# Patient Record
Sex: MALE | Race: WHITE | NOT HISPANIC OR LATINO | URBAN - METROPOLITAN AREA
[De-identification: names, ages, dates, MRNs, and addresses within clinical notes are randomized per-mention and may not be internally consistent; named-entity substitution may affect disease eponyms.]

---

## 2018-04-02 ENCOUNTER — APPOINTMENT (OUTPATIENT)
Dept: LAB | Facility: CLINIC | Age: 73
End: 2018-04-02
Payer: COMMERCIAL

## 2018-04-02 ENCOUNTER — TRANSCRIBE ORDERS (OUTPATIENT)
Dept: RADIOLOGY | Facility: CLINIC | Age: 73
End: 2018-04-02

## 2018-04-02 DIAGNOSIS — I11.0 BENIGN HYPERTENSIVE HEART DISEASE WITH CONGESTIVE HEART FAILURE (HCC): ICD-10-CM

## 2018-04-02 DIAGNOSIS — I11.0 BENIGN HYPERTENSIVE HEART DISEASE WITH CONGESTIVE HEART FAILURE (HCC): Primary | ICD-10-CM

## 2018-04-02 LAB
ALBUMIN SERPL BCP-MCNC: 2.9 G/DL (ref 3.5–5)
ALP SERPL-CCNC: 62 U/L (ref 46–116)
ALT SERPL W P-5'-P-CCNC: 19 U/L (ref 12–78)
ANION GAP SERPL CALCULATED.3IONS-SCNC: 8 MMOL/L (ref 4–13)
AST SERPL W P-5'-P-CCNC: 28 U/L (ref 5–45)
BACTERIA UR QL AUTO: NORMAL /HPF
BASOPHILS # BLD AUTO: 0.02 THOUSANDS/ΜL (ref 0–0.1)
BASOPHILS NFR BLD AUTO: 0 % (ref 0–1)
BILIRUB SERPL-MCNC: 0.24 MG/DL (ref 0.2–1)
BILIRUB UR QL STRIP: NEGATIVE
BUN SERPL-MCNC: 93 MG/DL (ref 5–25)
CALCIUM SERPL-MCNC: 7.6 MG/DL (ref 8.3–10.1)
CHLORIDE SERPL-SCNC: 105 MMOL/L (ref 100–108)
CLARITY UR: CLEAR
CO2 SERPL-SCNC: 25 MMOL/L (ref 21–32)
COLOR UR: YELLOW
CREAT SERPL-MCNC: 6.08 MG/DL (ref 0.6–1.3)
EOSINOPHIL # BLD AUTO: 0.18 THOUSAND/ΜL (ref 0–0.61)
EOSINOPHIL NFR BLD AUTO: 3 % (ref 0–6)
ERYTHROCYTE [DISTWIDTH] IN BLOOD BY AUTOMATED COUNT: 13.4 % (ref 11.6–15.1)
GFR SERPL CREATININE-BSD FRML MDRD: 8 ML/MIN/1.73SQ M
GLUCOSE SERPL-MCNC: 78 MG/DL (ref 65–140)
GLUCOSE UR STRIP-MCNC: NEGATIVE MG/DL
HCT VFR BLD AUTO: 26.9 % (ref 36.5–49.3)
HGB BLD-MCNC: 8.7 G/DL (ref 12–17)
HGB UR QL STRIP.AUTO: NEGATIVE
HYALINE CASTS #/AREA URNS LPF: NORMAL /LPF
KETONES UR STRIP-MCNC: NEGATIVE MG/DL
LEUKOCYTE ESTERASE UR QL STRIP: NEGATIVE
LYMPHOCYTES # BLD AUTO: 0.72 THOUSANDS/ΜL (ref 0.6–4.47)
LYMPHOCYTES NFR BLD AUTO: 11 % (ref 14–44)
MCH RBC QN AUTO: 32.2 PG (ref 26.8–34.3)
MCHC RBC AUTO-ENTMCNC: 32.3 G/DL (ref 31.4–37.4)
MCV RBC AUTO: 100 FL (ref 82–98)
MONOCYTES # BLD AUTO: 0.85 THOUSAND/ΜL (ref 0.17–1.22)
MONOCYTES NFR BLD AUTO: 13 % (ref 4–12)
NEUTROPHILS # BLD AUTO: 4.56 THOUSANDS/ΜL (ref 1.85–7.62)
NEUTS SEG NFR BLD AUTO: 73 % (ref 43–75)
NITRITE UR QL STRIP: NEGATIVE
NON-SQ EPI CELLS URNS QL MICRO: NORMAL /HPF
NRBC BLD AUTO-RTO: 0 /100 WBCS
PH UR STRIP.AUTO: 6.5 [PH] (ref 4.5–8)
PLATELET # BLD AUTO: 253 THOUSANDS/UL (ref 149–390)
PMV BLD AUTO: 9.9 FL (ref 8.9–12.7)
POTASSIUM SERPL-SCNC: 6 MMOL/L (ref 3.5–5.3)
PROT SERPL-MCNC: 5.6 G/DL (ref 6.4–8.2)
PROT UR STRIP-MCNC: ABNORMAL MG/DL
RBC # BLD AUTO: 2.7 MILLION/UL (ref 3.88–5.62)
RBC #/AREA URNS AUTO: NORMAL /HPF
SODIUM SERPL-SCNC: 138 MMOL/L (ref 136–145)
SP GR UR STRIP.AUTO: 1.01 (ref 1–1.03)
UROBILINOGEN UR QL STRIP.AUTO: 0.2 E.U./DL
WBC # BLD AUTO: 6.37 THOUSAND/UL (ref 4.31–10.16)
WBC #/AREA URNS AUTO: NORMAL /HPF

## 2018-04-02 PROCEDURE — 36415 COLL VENOUS BLD VENIPUNCTURE: CPT

## 2018-04-02 PROCEDURE — 80053 COMPREHEN METABOLIC PANEL: CPT

## 2018-04-02 PROCEDURE — 85025 COMPLETE CBC W/AUTO DIFF WBC: CPT

## 2018-04-02 PROCEDURE — 81001 URINALYSIS AUTO W/SCOPE: CPT

## 2019-02-13 ENCOUNTER — TRANSCRIBE ORDERS (OUTPATIENT)
Dept: ADMINISTRATIVE | Facility: HOSPITAL | Age: 74
End: 2019-02-13

## 2019-02-13 DIAGNOSIS — R55 SYNCOPE AND COLLAPSE: Primary | ICD-10-CM

## 2019-02-27 ENCOUNTER — HOSPITAL ENCOUNTER (OUTPATIENT)
Dept: NON INVASIVE DIAGNOSTICS | Facility: HOSPITAL | Age: 74
Discharge: HOME | End: 2019-02-27
Payer: COMMERCIAL

## 2019-02-27 DIAGNOSIS — R55 SYNCOPE AND COLLAPSE: ICD-10-CM

## 2019-02-27 PROCEDURE — 93306 TTE W/DOPPLER COMPLETE: CPT | Performed by: INTERNAL MEDICINE

## 2019-02-27 PROCEDURE — 93306 TTE W/DOPPLER COMPLETE: CPT

## 2019-04-29 ENCOUNTER — CONSULT (OUTPATIENT)
Dept: CARDIOLOGY CLINIC | Facility: CLINIC | Age: 74
End: 2019-04-29
Payer: COMMERCIAL

## 2019-04-29 VITALS
DIASTOLIC BLOOD PRESSURE: 78 MMHG | HEART RATE: 56 BPM | WEIGHT: 267 LBS | BODY MASS INDEX: 39.55 KG/M2 | SYSTOLIC BLOOD PRESSURE: 186 MMHG | HEIGHT: 69 IN

## 2019-04-29 DIAGNOSIS — I10 ESSENTIAL HYPERTENSION: Primary | ICD-10-CM

## 2019-04-29 DIAGNOSIS — I71.4 ABDOMINAL AORTIC ANEURYSM (AAA) WITHOUT RUPTURE (HCC): ICD-10-CM

## 2019-04-29 DIAGNOSIS — I95.3 HEMODIALYSIS-ASSOCIATED HYPOTENSION: ICD-10-CM

## 2019-04-29 PROBLEM — I71.40 AAA (ABDOMINAL AORTIC ANEURYSM) (HCC): Status: ACTIVE | Noted: 2019-04-29

## 2019-04-29 PROCEDURE — 93000 ELECTROCARDIOGRAM COMPLETE: CPT | Performed by: INTERNAL MEDICINE

## 2019-04-29 PROCEDURE — 99214 OFFICE O/P EST MOD 30 MIN: CPT | Performed by: INTERNAL MEDICINE

## 2019-04-29 RX ORDER — DUTASTERIDE 0.5 MG/1
0.5 CAPSULE, LIQUID FILLED ORAL
COMMUNITY
Start: 2019-03-20 | End: 2020-05-27

## 2019-04-29 RX ORDER — HYDRALAZINE HYDROCHLORIDE 100 MG/1
100 TABLET, FILM COATED ORAL
COMMUNITY
Start: 2019-02-14 | End: 2022-01-01 | Stop reason: SDUPTHER

## 2019-04-29 RX ORDER — CANDESARTAN 8 MG/1
8 TABLET ORAL
COMMUNITY
Start: 2019-03-05 | End: 2022-01-01

## 2019-04-29 RX ORDER — FEBUXOSTAT 80 MG/1
80 TABLET ORAL
COMMUNITY
Start: 2019-03-11 | End: 2022-01-01

## 2019-04-29 RX ORDER — CALCIUM ACETATE 667 MG/1
667 CAPSULE ORAL
Refills: 11 | COMMUNITY
Start: 2019-04-06 | End: 2022-01-01

## 2019-04-29 RX ORDER — ASPIRIN 81 MG/1
81 TABLET ORAL DAILY
COMMUNITY
End: 2019-04-29 | Stop reason: DRUGHIGH

## 2019-04-29 RX ORDER — METOPROLOL SUCCINATE 50 MG/1
50 TABLET, EXTENDED RELEASE ORAL 2 TIMES DAILY
COMMUNITY
Start: 2019-04-16 | End: 2022-01-01

## 2019-04-29 RX ORDER — UBIDECARENONE 200 MG
200 CAPSULE ORAL DAILY
COMMUNITY
End: 2022-01-01

## 2019-04-29 RX ORDER — DOXAZOSIN 2 MG/1
2 TABLET ORAL DAILY
COMMUNITY
Start: 2019-04-16 | End: 2022-01-01 | Stop reason: ALTCHOICE

## 2019-04-29 RX ORDER — MAGNESIUM OXIDE 400 MG/1
400 TABLET ORAL DAILY
COMMUNITY
End: 2022-01-01

## 2019-12-03 ENCOUNTER — ANESTHESIA EVENT (OUTPATIENT)
Dept: PERIOP | Facility: HOSPITAL | Age: 74
End: 2019-12-03
Payer: COMMERCIAL

## 2019-12-03 NOTE — ANESTHESIA PREPROCEDURE EVALUATION
Review of Systems/Medical History  Patient summary reviewed  Chart reviewed      Cardiovascular  Exercise tolerance (METS): <4,  Hyperlipidemia, Hypertension (Lopressor, Hydralazine take this am) on > 1 medication, CHF compensated CHF, No PND, Aortic disease (S/P AAA repair),    Pulmonary       GI/Hepatic    GERD well controlled,        Chronic kidney disease stage 4, Dialysis hemodialysis Date of last dialysis: 12/3/19,   Comment: K= 4 0 11/27/19     Endo/Other  Negative endo/other ROS      GYN       Hematology  Anemia anemia of chronic disease,     Musculoskeletal    Arthritis     Neurology  Negative neurology ROS      Psychology         This result has an attachment that is not available  Result Narrative   EXAM REASON:     INTERPRETATION:   SINUS BRADYCARDIA WITH 1ST DEGREE A-V BLOCK  MODERATE VOLTAGE CRITERIA FOR LVH, MAY BE NORMAL VARIANT  BORDERLINE ECG  WHEN COMPARED WITH ECG OF 28-FEB-2018 15:12,  AR INTERVAL HAS INCREASED  Confirmed by Matilde Hercules ([id number]) on 11/24/2019 1:56:41 PM       Physical Exam    Airway    Mallampati score: II  TM Distance: >3 FB  Neck ROM: full     Dental   upper dentures and lower dentures,     Cardiovascular  Cardiovascular exam normal    Pulmonary  Pulmonary exam normal     Other Findings        Anesthesia Plan  ASA Score- 3     Anesthesia Type- IV sedation with anesthesia with ASA Monitors  Additional Monitors:   Airway Plan:         Plan Factors-    Induction-     Postoperative Plan-     Informed Consent- Anesthetic plan and risks discussed with patient  I personally reviewed this patient with the CRNA  Discussed and agreed on the Anesthesia Plan with the CRNA  Guru Ramachandran

## 2019-12-03 NOTE — PRE-PROCEDURE INSTRUCTIONS
Pre-Surgery Instructions:   Medication Instructions    hydrALAZINE (APRESOLINE) 100 MG tablet Instructed patient per Anesthesia Guidelines   metoprolol succinate (TOPROL-XL) 50 mg 24 hr tablet Instructed patient per Anesthesia Guidelines

## 2019-12-04 ENCOUNTER — HOSPITAL ENCOUNTER (OUTPATIENT)
Facility: HOSPITAL | Age: 74
Setting detail: OUTPATIENT SURGERY
Discharge: HOME | End: 2019-12-04
Attending: PLASTIC SURGERY | Admitting: PLASTIC SURGERY
Payer: COMMERCIAL

## 2019-12-04 ENCOUNTER — ANESTHESIA (OUTPATIENT)
Dept: PERIOP | Facility: HOSPITAL | Age: 74
End: 2019-12-04
Payer: COMMERCIAL

## 2019-12-04 VITALS
TEMPERATURE: 97.9 F | RESPIRATION RATE: 16 BRPM | WEIGHT: 258 LBS | HEART RATE: 57 BPM | OXYGEN SATURATION: 94 % | DIASTOLIC BLOOD PRESSURE: 65 MMHG | SYSTOLIC BLOOD PRESSURE: 145 MMHG | HEIGHT: 69 IN | BODY MASS INDEX: 38.21 KG/M2

## 2019-12-04 DIAGNOSIS — D49.2 NEOPLASM OF UNSPECIFIED BEHAVIOR OF BONE, SOFT TISSUE, AND SKIN: ICD-10-CM

## 2019-12-04 PROCEDURE — 88305 TISSUE EXAM BY PATHOLOGIST: CPT | Performed by: PATHOLOGY

## 2019-12-04 RX ORDER — FENTANYL CITRATE 50 UG/ML
INJECTION, SOLUTION INTRAMUSCULAR; INTRAVENOUS AS NEEDED
Status: DISCONTINUED | OUTPATIENT
Start: 2019-12-04 | End: 2019-12-04 | Stop reason: SURG

## 2019-12-04 RX ORDER — MAGNESIUM HYDROXIDE 1200 MG/15ML
LIQUID ORAL AS NEEDED
Status: DISCONTINUED | OUTPATIENT
Start: 2019-12-04 | End: 2019-12-04 | Stop reason: HOSPADM

## 2019-12-04 RX ORDER — PROPOFOL 10 MG/ML
INJECTION, EMULSION INTRAVENOUS CONTINUOUS PRN
Status: DISCONTINUED | OUTPATIENT
Start: 2019-12-04 | End: 2019-12-04 | Stop reason: SURG

## 2019-12-04 RX ORDER — SODIUM CHLORIDE 9 MG/ML
50 INJECTION, SOLUTION INTRAVENOUS CONTINUOUS
Status: DISCONTINUED | OUTPATIENT
Start: 2019-12-04 | End: 2019-12-04 | Stop reason: HOSPADM

## 2019-12-04 RX ORDER — MINERAL OIL
OIL (ML) MISCELLANEOUS AS NEEDED
Status: DISCONTINUED | OUTPATIENT
Start: 2019-12-04 | End: 2019-12-04 | Stop reason: HOSPADM

## 2019-12-04 RX ORDER — LIDOCAINE HYDROCHLORIDE AND EPINEPHRINE 5; 5 MG/ML; UG/ML
INJECTION, SOLUTION INFILTRATION; PERINEURAL AS NEEDED
Status: DISCONTINUED | OUTPATIENT
Start: 2019-12-04 | End: 2019-12-04 | Stop reason: HOSPADM

## 2019-12-04 RX ORDER — CEFAZOLIN SODIUM 2 G/50ML
SOLUTION INTRAVENOUS AS NEEDED
Status: DISCONTINUED | OUTPATIENT
Start: 2019-12-04 | End: 2019-12-04 | Stop reason: SURG

## 2019-12-04 RX ORDER — GINSENG 100 MG
CAPSULE ORAL AS NEEDED
Status: DISCONTINUED | OUTPATIENT
Start: 2019-12-04 | End: 2019-12-04 | Stop reason: HOSPADM

## 2019-12-04 RX ORDER — PROPOFOL 10 MG/ML
INJECTION, EMULSION INTRAVENOUS AS NEEDED
Status: DISCONTINUED | OUTPATIENT
Start: 2019-12-04 | End: 2019-12-04 | Stop reason: SURG

## 2019-12-04 RX ORDER — LIDOCAINE HYDROCHLORIDE 10 MG/ML
INJECTION, SOLUTION INFILTRATION; PERINEURAL AS NEEDED
Status: DISCONTINUED | OUTPATIENT
Start: 2019-12-04 | End: 2019-12-04 | Stop reason: SURG

## 2019-12-04 RX ORDER — EPHEDRINE SULFATE 50 MG/ML
INJECTION INTRAVENOUS AS NEEDED
Status: DISCONTINUED | OUTPATIENT
Start: 2019-12-04 | End: 2019-12-04 | Stop reason: SURG

## 2019-12-04 RX ADMIN — CEFAZOLIN SODIUM 2000 MG: 2 SOLUTION INTRAVENOUS at 10:02

## 2019-12-04 RX ADMIN — PROPOFOL 50 MCG/KG/MIN: 10 INJECTION, EMULSION INTRAVENOUS at 09:50

## 2019-12-04 RX ADMIN — EPHEDRINE SULFATE 10 MG: 50 INJECTION, SOLUTION INTRAVENOUS at 10:55

## 2019-12-04 RX ADMIN — EPHEDRINE SULFATE 10 MG: 50 INJECTION, SOLUTION INTRAVENOUS at 10:33

## 2019-12-04 RX ADMIN — LIDOCAINE HYDROCHLORIDE 50 MG: 10 INJECTION, SOLUTION INFILTRATION; PERINEURAL at 09:48

## 2019-12-04 RX ADMIN — FENTANYL CITRATE 50 MCG: 50 INJECTION INTRAMUSCULAR; INTRAVENOUS at 09:47

## 2019-12-04 RX ADMIN — SODIUM CHLORIDE: 0.9 INJECTION, SOLUTION INTRAVENOUS at 09:09

## 2019-12-04 RX ADMIN — FENTANYL CITRATE 25 MCG: 50 INJECTION INTRAMUSCULAR; INTRAVENOUS at 10:03

## 2019-12-04 RX ADMIN — PROPOFOL 50 MG: 10 INJECTION, EMULSION INTRAVENOUS at 09:48

## 2019-12-04 RX ADMIN — FENTANYL CITRATE 25 MCG: 50 INJECTION INTRAMUSCULAR; INTRAVENOUS at 09:53

## 2019-12-04 NOTE — NURSING NOTE
Preadmission complete  VSS  Denies pain at this time  Skin growth noted on r-cheek and behind r-ear  No bleeding or weeping noted

## 2019-12-04 NOTE — ANESTHESIA POSTPROCEDURE EVALUATION
Post-Op Assessment Note    CV Status:  Stable  Pain Score: 0    Pain management: adequate     Mental Status:  Sleepy and arousable   Hydration Status:  Stable   PONV Controlled:  None   Airway Patency:  Patent   Post Op Vitals Reviewed: Yes      Staff: CRNA           /72 (12/04/19 1121)    Temp (!) 97 4 °F (36 3 °C) (12/04/19 1121)    Pulse 63 (12/04/19 1121)   Resp 16 (12/04/19 1121)    SpO2 93 % (12/04/19 1121) 3L nc

## 2019-12-04 NOTE — OP NOTE
OPERATIVE REPORT  PATIENT NAME: Viviane Navarro    :  1945  MRN: [id number]  Pt Location: [facility] OR ROOM 10    SURGERY DATE: 2019    Surgeon(s) and Role:     * Joselin Childress MD - Primary    Preop and postoperative Diagnosis:  Right cheek and right postauricular lesions    Operative Procedure(s) (LRB):  REMOVE SKIN CA FROM EAR & CHEEK (Right)  THIGH STSG (N/A)     Operative history:  The patient had a biopsy-proven skin cancer of the right cheek measuring 20 x 22 mm in size  It was removed with a oblique ellipse following skin wrinkles taking 5 mm margins  A suture was placed at the 12:00 p m  Or superior more ellipse pole of excision  Total length of closure was 42 mm  He also had a deep ulcerating lesion of the right postauricular crease measuring 30 x 35 mm in size  This also was removed taking 5 mm margins with a suture placed at the 12:00 p m  Or superior margin  In the center of the lesion was deep into the and posing on the ear cartilage that was taken with the specimen as the deep margin  An additional portion that was exposed of the 3:00 a m  Deep margin of cartilage was also sent for permanent examination  Cover this defect  A split-thickness skin graft necessary  Operative procedure:  Patient taken the operating placed supine the operating table  He was prepped and draped in usual fashion  General supplemented xylocaine 1% with epinephrine  Wide excision as mention of the right cheek lesion was performed  Hemostasis as necessary achieved with the Bovie  The areas closed with 3-0 Vicryl interrupted simple subcutaneous sutures followed by 4-0 nylon interrupted vertical mattress skin sutures  Light dressings were applied here  Next wide excision was performed of the lesion in the right postauricular area again using the Bovie for cutting coagulation purposes    Around the area where the tumor was adherent to the ear cartilage incision was made circumferentially in that cartilage  The skin of the floor of the jonah was then  from neck cartilage to service the deep margin  A [employer] electric dermatome was used to harvest a 13 1000 split-thickness skin graft from the right anterior thigh  Part of the graft was perforated 1/2 to 1  The graft was placed on the right postauricular area using staples for fixation  A dressing was sewn over this with 2-0 silk simple sutures  Some of the extra skin was meshed 3-1 returned to the center of the donor site of the right thigh where it too was stapled into position with the 10 to hasten healing of that donor site  Light dressings were placed on the right thigh  The patient tolerated procedure well taken to recovery in good condition      Specimen(s):  ID Type Source Tests Collected by Time Destination   1 : RIGHT POST AURICULAR LESION WITH SUTURE AT 12 0"CLOCK Tissue Ear, Right TISSUE EXAM Yvon Lazar MD 12/4/2019 1010    2 : RIGHT CHEEK LESIONC WITH SUTURE AT 12 O"CLOCK Tissue Soft Tissue, Other TISSUE EXAM Yvon Lazar MD 12/4/2019 1014    3 : DEEP MARGIN AT 3 O"CLOCK OF RIGHT POST AURICULAR LESION  Tissue Ear, Right TISSUE EXAM Yvon Laazr MD 12/4/2019 1051          SIGNATURE: Yvon Lazar MD  DATE: December 4, 2019  TIME: 3:07 PM

## 2019-12-04 NOTE — PERIOPERATIVE NURSING NOTE
Returned from pacu sleepy but arousable  ivs running  Incision on cheek well approximated  Right ear dressing sutured and dry  Right thigh dressing dry and intact  Denies pain  Taking sips of juice

## 2019-12-04 NOTE — DISCHARGE INSTRUCTIONS
Care For Your Stitches   AMBULATORY CARE:   Stitches , or sutures, are used to close cuts and wounds on the skin  Stitches need to be removed after your wound has healed  Seek care immediately if:   · Your stitches come apart  · Blood soaks through your bandages  · You suddenly cannot move your injured joint  · You have sudden numbness around your wound  · You see red streaks coming from your wound  Contact your healthcare provider if:   · You have a fever and chills  · Your wound is red, warm, swollen, or leaking pus  · There is a bad smell coming from your wound  · You have increased pain in the wound area  · You have questions or concerns about your condition or care  Care for your stitches:  Keep your stitches clean and dry  You may need to cover your stitches with a bandage for 24 to 48 hours, or as directed  Do not bump or hit the suture area, as this could open the wound  Do not trim or shorten the ends of your stitches  If they rub on your clothing, put a gauze bandage between the stitches and your clothes  Clean your wound:  Carefully wash your wound with soap and water  For mouth and lip wounds, rinse your mouth after meals and at bedtime  Ask your healthcare provider what to use to rinse your mouth  If you have a scalp wound, you may gently wash your hair every 2 days with mild shampoo  Do not use hair products, such as hair spray  Help your wound heal:   · Elevate  your wound above the level of your heart as often as you can  This will help decrease swelling and pain  Prop your wound on pillows or blankets to keep it elevated comfortably  · Limit activity  Do not stretch the skin around your wound  This will help prevent bleeding and swelling of the wound area  Follow up with your healthcare provider as directed: You may need to return to have your stitches removed  Write down your questions so you remember to ask them during your visits     © 2017 [facility] [address] is for End User's use only and may not be sold, redistributed or otherwise used for commercial purposes  All illustrations and images included in CareNotes® are the copyrighted property of A D A M , Inc  or Kuldip Aguirre  The above information is an  only  It is not intended as medical advice for individual conditions or treatments  Talk to your doctor, nurse or pharmacist before following any medical regimen to see if it is safe and effective for you

## 2019-12-04 NOTE — INTERVAL H&P NOTE
H&P reviewed  After examining the patient I find no changes in the patients condition since the H&P had been written      Vitals:    12/04/19 0819   BP: (!) 196/79   Pulse: 58   Resp: 18   Temp: 98 1 °F (36 7 °C)   SpO2: 98%

## 2019-12-17 PROCEDURE — 88305 TISSUE EXAM BY PATHOLOGIST: CPT | Performed by: PATHOLOGY

## 2019-12-18 ENCOUNTER — LAB REQUISITION (OUTPATIENT)
Dept: LAB | Facility: HOSPITAL | Age: 74
End: 2019-12-18
Payer: COMMERCIAL

## 2019-12-18 DIAGNOSIS — D49.2 NEOPLASM OF UNSPECIFIED BEHAVIOR OF BONE, SOFT TISSUE, AND SKIN: ICD-10-CM

## 2020-04-08 ENCOUNTER — HOSPITAL ENCOUNTER (OUTPATIENT)
Dept: GASTROENTEROLOGY | Facility: HOSPITAL | Age: 75
Discharge: HOME | End: 2020-04-08
Attending: INTERNAL MEDICINE

## 2020-05-12 ENCOUNTER — DOCUMENTATION (OUTPATIENT)
Dept: OTHER | Facility: HOSPITAL | Age: 75
End: 2020-05-12

## 2020-05-12 DIAGNOSIS — Z86.010 PERSONAL HISTORY OF COLONIC POLYPS: Primary | ICD-10-CM

## 2020-05-12 DIAGNOSIS — K57.30 DIVERTICULOSIS LARGE INTESTINE W/O PERFORATION OR ABSCESS W/O BLEEDING: ICD-10-CM

## 2020-05-20 DIAGNOSIS — Z86.010 PERSONAL HISTORY OF COLONIC POLYPS: ICD-10-CM

## 2020-05-20 DIAGNOSIS — K57.30 DIVERTICULOSIS LARGE INTESTINE W/O PERFORATION OR ABSCESS W/O BLEEDING: ICD-10-CM

## 2020-05-20 PROCEDURE — U0003 INFECTIOUS AGENT DETECTION BY NUCLEIC ACID (DNA OR RNA); SEVERE ACUTE RESPIRATORY SYNDROME CORONAVIRUS 2 (SARS-COV-2) (CORONAVIRUS DISEASE [COVID-19]), AMPLIFIED PROBE TECHNIQUE, MAKING USE OF HIGH THROUGHPUT TECHNOLOGIES AS DESCRIBED BY CMS-2020-01-R: HCPCS

## 2020-05-21 LAB — SARS-COV-2 RNA SPEC QL NAA+PROBE: NOT DETECTED

## 2020-05-22 ENCOUNTER — TRANSCRIBE ORDERS (OUTPATIENT)
Dept: ADMINISTRATIVE | Facility: HOSPITAL | Age: 75
End: 2020-05-22

## 2020-05-22 DIAGNOSIS — R93.89 ABNORMAL CT SCAN: ICD-10-CM

## 2020-05-22 DIAGNOSIS — K86.2 PANCREATIC CYST: Primary | ICD-10-CM

## 2020-05-27 ENCOUNTER — HOSPITAL ENCOUNTER (OUTPATIENT)
Dept: GASTROENTEROLOGY | Facility: HOSPITAL | Age: 75
Setting detail: OUTPATIENT SURGERY
Discharge: HOME | End: 2020-05-27
Attending: INTERNAL MEDICINE | Admitting: INTERNAL MEDICINE
Payer: COMMERCIAL

## 2020-05-27 ENCOUNTER — ANESTHESIA EVENT (OUTPATIENT)
Dept: GASTROENTEROLOGY | Facility: HOSPITAL | Age: 75
End: 2020-05-27

## 2020-05-27 ENCOUNTER — ANESTHESIA (OUTPATIENT)
Dept: GASTROENTEROLOGY | Facility: HOSPITAL | Age: 75
End: 2020-05-27

## 2020-05-27 VITALS
TEMPERATURE: 96.9 F | WEIGHT: 253 LBS | HEIGHT: 69 IN | HEART RATE: 52 BPM | BODY MASS INDEX: 37.47 KG/M2 | SYSTOLIC BLOOD PRESSURE: 154 MMHG | OXYGEN SATURATION: 94 % | DIASTOLIC BLOOD PRESSURE: 72 MMHG | RESPIRATION RATE: 18 BRPM

## 2020-05-27 DIAGNOSIS — K57.30 DIVERTICULOSIS OF LARGE INTESTINE WITHOUT PERFORATION OR ABSCESS WITHOUT BLEEDING: ICD-10-CM

## 2020-05-27 DIAGNOSIS — Z86.010 PERSONAL HISTORY OF COLONIC POLYPS: ICD-10-CM

## 2020-05-27 PROCEDURE — 88305 TISSUE EXAM BY PATHOLOGIST: CPT | Performed by: PATHOLOGY

## 2020-05-27 RX ORDER — LIDOCAINE HYDROCHLORIDE 20 MG/ML
INJECTION, SOLUTION EPIDURAL; INFILTRATION; INTRACAUDAL; PERINEURAL AS NEEDED
Status: DISCONTINUED | OUTPATIENT
Start: 2020-05-27 | End: 2020-05-27 | Stop reason: SURG

## 2020-05-27 RX ORDER — SODIUM CHLORIDE 9 MG/ML
INJECTION, SOLUTION INTRAVENOUS CONTINUOUS PRN
Status: DISCONTINUED | OUTPATIENT
Start: 2020-05-27 | End: 2020-05-27 | Stop reason: SURG

## 2020-05-27 RX ORDER — PROPOFOL 10 MG/ML
INJECTION, EMULSION INTRAVENOUS AS NEEDED
Status: DISCONTINUED | OUTPATIENT
Start: 2020-05-27 | End: 2020-05-27 | Stop reason: SURG

## 2020-05-27 RX ORDER — GLYCOPYRROLATE 0.2 MG/ML
INJECTION INTRAMUSCULAR; INTRAVENOUS AS NEEDED
Status: DISCONTINUED | OUTPATIENT
Start: 2020-05-27 | End: 2020-05-27 | Stop reason: SURG

## 2020-05-27 RX ADMIN — LIDOCAINE HYDROCHLORIDE 25 MG: 20 INJECTION, SOLUTION EPIDURAL; INFILTRATION; INTRACAUDAL; PERINEURAL at 08:16

## 2020-05-27 RX ADMIN — PROPOFOL 50 MG: 10 INJECTION, EMULSION INTRAVENOUS at 08:19

## 2020-05-27 RX ADMIN — PROPOFOL 50 MG: 10 INJECTION, EMULSION INTRAVENOUS at 08:22

## 2020-05-27 RX ADMIN — GLYCOPYRROLATE 0.2 MG: 0.2 INJECTION, SOLUTION INTRAMUSCULAR; INTRAVENOUS at 08:39

## 2020-05-27 RX ADMIN — PROPOFOL 50 MG: 10 INJECTION, EMULSION INTRAVENOUS at 08:25

## 2020-05-27 RX ADMIN — SODIUM CHLORIDE: 0.9 INJECTION, SOLUTION INTRAVENOUS at 07:59

## 2020-05-27 RX ADMIN — PROPOFOL 50 MG: 10 INJECTION, EMULSION INTRAVENOUS at 08:16

## 2020-05-27 RX ADMIN — PROPOFOL 50 MG: 10 INJECTION, EMULSION INTRAVENOUS at 08:35

## 2020-05-27 RX ADMIN — PROPOFOL 50 MG: 10 INJECTION, EMULSION INTRAVENOUS at 08:29

## 2020-05-29 ENCOUNTER — HOSPITAL ENCOUNTER (OUTPATIENT)
Dept: MRI IMAGING | Facility: HOSPITAL | Age: 75
Discharge: HOME | End: 2020-05-29
Attending: PHYSICIAN ASSISTANT
Payer: COMMERCIAL

## 2020-05-29 DIAGNOSIS — K86.2 PANCREATIC CYST: ICD-10-CM

## 2020-05-29 DIAGNOSIS — R93.89 ABNORMAL CT SCAN: ICD-10-CM

## 2020-05-29 PROCEDURE — 74181 MRI ABDOMEN W/O CONTRAST: CPT

## 2021-01-01 ENCOUNTER — OFFICE VISIT (OUTPATIENT)
Dept: FAMILY MEDICINE CLINIC | Facility: CLINIC | Age: 76
End: 2021-01-01
Payer: COMMERCIAL

## 2021-01-01 ENCOUNTER — APPOINTMENT (OUTPATIENT)
Dept: LAB | Facility: CLINIC | Age: 76
End: 2021-01-01
Payer: COMMERCIAL

## 2021-01-01 VITALS
OXYGEN SATURATION: 98 % | SYSTOLIC BLOOD PRESSURE: 162 MMHG | HEART RATE: 57 BPM | WEIGHT: 257 LBS | DIASTOLIC BLOOD PRESSURE: 68 MMHG | TEMPERATURE: 97.9 F | HEIGHT: 68 IN | BODY MASS INDEX: 38.95 KG/M2

## 2021-01-01 VITALS
DIASTOLIC BLOOD PRESSURE: 58 MMHG | SYSTOLIC BLOOD PRESSURE: 152 MMHG | OXYGEN SATURATION: 97 % | HEIGHT: 68 IN | HEART RATE: 51 BPM | WEIGHT: 258.38 LBS | BODY MASS INDEX: 39.16 KG/M2 | TEMPERATURE: 96.7 F

## 2021-01-01 DIAGNOSIS — D63.8 ANEMIA OF CHRONIC DISEASE: ICD-10-CM

## 2021-01-01 DIAGNOSIS — M17.0 PRIMARY OSTEOARTHRITIS OF BOTH KNEES: ICD-10-CM

## 2021-01-01 DIAGNOSIS — Z11.59 ENCOUNTER FOR HEPATITIS C SCREENING TEST FOR LOW RISK PATIENT: ICD-10-CM

## 2021-01-01 DIAGNOSIS — N18.6 CHRONIC KIDNEY DISEASE WITH END STAGE RENAL FAILURE ON DIALYSIS (HCC): ICD-10-CM

## 2021-01-01 DIAGNOSIS — I71.4 ABDOMINAL AORTIC ANEURYSM (AAA) WITHOUT RUPTURE (HCC): ICD-10-CM

## 2021-01-01 DIAGNOSIS — Z99.2 CHRONIC KIDNEY DISEASE WITH END STAGE RENAL FAILURE ON DIALYSIS (HCC): ICD-10-CM

## 2021-01-01 DIAGNOSIS — Z76.89 ENCOUNTER TO ESTABLISH CARE WITH NEW DOCTOR: Primary | ICD-10-CM

## 2021-01-01 DIAGNOSIS — E78.5 HYPERLIPIDEMIA, UNSPECIFIED HYPERLIPIDEMIA TYPE: ICD-10-CM

## 2021-01-01 DIAGNOSIS — N25.81 SECONDARY HYPERPARATHYROIDISM OF RENAL ORIGIN (HCC): ICD-10-CM

## 2021-01-01 DIAGNOSIS — I10 ESSENTIAL HYPERTENSION: ICD-10-CM

## 2021-01-01 DIAGNOSIS — Z23 ENCOUNTER FOR IMMUNIZATION: ICD-10-CM

## 2021-01-01 DIAGNOSIS — Z00.00 ENCOUNTER FOR SUBSEQUENT ANNUAL WELLNESS VISIT (AWV) IN MEDICARE PATIENT: Primary | ICD-10-CM

## 2021-01-01 DIAGNOSIS — Z13.31 NEGATIVE DEPRESSION SCREENING: ICD-10-CM

## 2021-01-01 DIAGNOSIS — N18.9 CHRONIC RENAL FAILURE, UNSPECIFIED CKD STAGE: ICD-10-CM

## 2021-01-01 DIAGNOSIS — M1A.3290: ICD-10-CM

## 2021-01-01 DIAGNOSIS — E66.01 CLASS 2 SEVERE OBESITY DUE TO EXCESS CALORIES WITH SERIOUS COMORBIDITY AND BODY MASS INDEX (BMI) OF 39.0 TO 39.9 IN ADULT (HCC): ICD-10-CM

## 2021-01-01 LAB
ALBUMIN SERPL BCP-MCNC: 3.4 G/DL (ref 3.5–5)
ALP SERPL-CCNC: 68 U/L (ref 46–116)
ALT SERPL W P-5'-P-CCNC: 23 U/L (ref 12–78)
ANION GAP SERPL CALCULATED.3IONS-SCNC: 5 MMOL/L (ref 4–13)
AST SERPL W P-5'-P-CCNC: 24 U/L (ref 5–45)
BASOPHILS # BLD AUTO: 0.06 THOUSANDS/ΜL (ref 0–0.1)
BASOPHILS NFR BLD AUTO: 1 % (ref 0–1)
BILIRUB SERPL-MCNC: 0.42 MG/DL (ref 0.2–1)
BUN SERPL-MCNC: 37 MG/DL (ref 5–25)
CALCIUM ALBUM COR SERPL-MCNC: 9.4 MG/DL (ref 8.3–10.1)
CALCIUM SERPL-MCNC: 8.9 MG/DL (ref 8.3–10.1)
CHLORIDE SERPL-SCNC: 101 MMOL/L (ref 100–108)
CHOLEST SERPL-MCNC: 131 MG/DL (ref 50–200)
CO2 SERPL-SCNC: 32 MMOL/L (ref 21–32)
CREAT SERPL-MCNC: 5.15 MG/DL (ref 0.6–1.3)
EOSINOPHIL # BLD AUTO: 0.12 THOUSAND/ΜL (ref 0–0.61)
EOSINOPHIL NFR BLD AUTO: 2 % (ref 0–6)
ERYTHROCYTE [DISTWIDTH] IN BLOOD BY AUTOMATED COUNT: 14 % (ref 11.6–15.1)
GFR SERPL CREATININE-BSD FRML MDRD: 10 ML/MIN/1.73SQ M
GLUCOSE P FAST SERPL-MCNC: 99 MG/DL (ref 65–99)
HCT VFR BLD AUTO: 35.9 % (ref 36.5–49.3)
HDLC SERPL-MCNC: 40 MG/DL
HGB BLD-MCNC: 11.1 G/DL (ref 12–17)
IMM GRANULOCYTES # BLD AUTO: 0.04 THOUSAND/UL (ref 0–0.2)
IMM GRANULOCYTES NFR BLD AUTO: 1 % (ref 0–2)
LDLC SERPL CALC-MCNC: 77 MG/DL (ref 0–100)
LYMPHOCYTES # BLD AUTO: 0.91 THOUSANDS/ΜL (ref 0.6–4.47)
LYMPHOCYTES NFR BLD AUTO: 18 % (ref 14–44)
MCH RBC QN AUTO: 32.5 PG (ref 26.8–34.3)
MCHC RBC AUTO-ENTMCNC: 30.9 G/DL (ref 31.4–37.4)
MCV RBC AUTO: 105 FL (ref 82–98)
MONOCYTES # BLD AUTO: 0.67 THOUSAND/ΜL (ref 0.17–1.22)
MONOCYTES NFR BLD AUTO: 13 % (ref 4–12)
NEUTROPHILS # BLD AUTO: 3.39 THOUSANDS/ΜL (ref 1.85–7.62)
NEUTS SEG NFR BLD AUTO: 65 % (ref 43–75)
NONHDLC SERPL-MCNC: 91 MG/DL
NRBC BLD AUTO-RTO: 0 /100 WBCS
PLATELET # BLD AUTO: 263 THOUSANDS/UL (ref 149–390)
PMV BLD AUTO: 9.7 FL (ref 8.9–12.7)
POTASSIUM SERPL-SCNC: 4.2 MMOL/L (ref 3.5–5.3)
PROT SERPL-MCNC: 6.9 G/DL (ref 6.4–8.2)
RBC # BLD AUTO: 3.42 MILLION/UL (ref 3.88–5.62)
SODIUM SERPL-SCNC: 138 MMOL/L (ref 136–145)
TRIGL SERPL-MCNC: 68 MG/DL
TSH SERPL DL<=0.05 MIU/L-ACNC: 1.4 UIU/ML (ref 0.36–3.74)
WBC # BLD AUTO: 5.19 THOUSAND/UL (ref 4.31–10.16)

## 2021-01-01 PROCEDURE — 3288F FALL RISK ASSESSMENT DOCD: CPT | Performed by: FAMILY MEDICINE

## 2021-01-01 PROCEDURE — 80061 LIPID PANEL: CPT

## 2021-01-01 PROCEDURE — 85025 COMPLETE CBC W/AUTO DIFF WBC: CPT

## 2021-01-01 PROCEDURE — 84443 ASSAY THYROID STIM HORMONE: CPT

## 2021-01-01 PROCEDURE — 1170F FXNL STATUS ASSESSED: CPT | Performed by: FAMILY MEDICINE

## 2021-01-01 PROCEDURE — 3077F SYST BP >= 140 MM HG: CPT | Performed by: FAMILY MEDICINE

## 2021-01-01 PROCEDURE — 1036F TOBACCO NON-USER: CPT | Performed by: FAMILY MEDICINE

## 2021-01-01 PROCEDURE — 3725F SCREEN DEPRESSION PERFORMED: CPT | Performed by: FAMILY MEDICINE

## 2021-01-01 PROCEDURE — 1160F RVW MEDS BY RX/DR IN RCRD: CPT | Performed by: FAMILY MEDICINE

## 2021-01-01 PROCEDURE — 3078F DIAST BP <80 MM HG: CPT | Performed by: FAMILY MEDICINE

## 2021-01-01 PROCEDURE — 99204 OFFICE O/P NEW MOD 45 MIN: CPT | Performed by: FAMILY MEDICINE

## 2021-01-01 PROCEDURE — G0439 PPPS, SUBSEQ VISIT: HCPCS | Performed by: FAMILY MEDICINE

## 2021-01-01 PROCEDURE — 99214 OFFICE O/P EST MOD 30 MIN: CPT | Performed by: FAMILY MEDICINE

## 2021-01-01 PROCEDURE — 36415 COLL VENOUS BLD VENIPUNCTURE: CPT

## 2021-01-01 PROCEDURE — 80053 COMPREHEN METABOLIC PANEL: CPT

## 2021-01-01 PROCEDURE — 1125F AMNT PAIN NOTED PAIN PRSNT: CPT | Performed by: FAMILY MEDICINE

## 2021-01-01 RX ORDER — SEVELAMER CARBONATE 800 MG/1
TABLET, FILM COATED ORAL
COMMUNITY
Start: 2021-06-27 | End: 2022-01-01

## 2021-09-07 PROBLEM — N18.9 CHRONIC RENAL FAILURE: Status: ACTIVE | Noted: 2021-01-01

## 2021-09-08 PROBLEM — Z99.2 CHRONIC KIDNEY DISEASE WITH END STAGE RENAL FAILURE ON DIALYSIS (HCC): Status: ACTIVE | Noted: 2021-01-01

## 2021-09-08 PROBLEM — N18.6 CHRONIC KIDNEY DISEASE WITH END STAGE RENAL FAILURE ON DIALYSIS (HCC): Status: ACTIVE | Noted: 2021-01-01

## 2021-09-08 PROBLEM — Z13.31 NEGATIVE DEPRESSION SCREENING: Status: ACTIVE | Noted: 2021-01-01

## 2021-09-08 PROBLEM — M17.0 PRIMARY OSTEOARTHRITIS OF BOTH KNEES: Status: ACTIVE | Noted: 2021-01-01

## 2021-09-08 PROBLEM — E66.01 CLASS 2 SEVERE OBESITY DUE TO EXCESS CALORIES WITH SERIOUS COMORBIDITY AND BODY MASS INDEX (BMI) OF 39.0 TO 39.9 IN ADULT (HCC): Status: ACTIVE | Noted: 2021-01-01

## 2021-09-08 PROBLEM — N25.81 SECONDARY HYPERPARATHYROIDISM OF RENAL ORIGIN (HCC): Status: ACTIVE | Noted: 2021-01-01

## 2021-09-08 PROBLEM — E78.5 HYPERLIPIDEMIA: Status: ACTIVE | Noted: 2021-01-01

## 2021-09-08 NOTE — PROGRESS NOTES
Assessment/Plan:    No problem-specific Assessment & Plan notes found for this encounter  Diagnoses and all orders for this visit:    Encounter to establish care with new doctor    Abdominal aortic aneurysm (AAA) without rupture ([facility] [address] )  Comments:  Repaired in 2004    Essential hypertension  Comments:  Continue current medications, elevated today at first visit, will monitor  Orders:  -     CBC and differential; Future  -     Comprehensive metabolic panel; Future  -     TSH, 3rd generation with Free T4 reflex; Future    Chronic renal failure, unspecified CKD stage  Comments:  Hemodialysis 3 days/week  Orders:  -     TSH, 3rd generation with Free T4 reflex; Future    Hyperlipidemia, unspecified hyperlipidemia type  Comments:  Continue Red Rice Yeast - intolerant to statins  Orders:  -     Lipid panel; Future    Primary osteoarthritis of both knees  Comments: Follows with Rheumatology    Class 2 severe obesity due to excess calories with serious comorbidity and body mass index (BMI) of 39 0 to 39 9 in adult [facility])    Negative depression screening    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 [facility])    Secondary hyperparathyroidism of renal [facility] ([facility] )    Other orders  -     sevelamer carbonate (RENVELA) 800 mg tablet; TAKE 2 TABLET BY MOUTH THREE TIMES A DAY WITH MEALS 1 WITH SNACKS  -     Methoxy PEG-Epoetin Beta (MIRCERA IJ); 75 mcg Once every four weeks          PHQ-9 Depression Screening    PHQ-9:   Frequency of the following problems over the past two weeks:      Little interest or pleasure in doing things: 0 - not at all  Feeling down, depressed, or hopeless: 0 - not at all  PHQ-2 Score: 0        BMI Counseling: Body mass index is 39 08 kg/m²   The BMI is above normal  Nutrition recommendations include reducing portion sizes, decreasing overall calorie intake, 3-5 servings of fruits/vegetables daily, reducing fast food intake, consuming healthier snacks, decreasing soda and/or juice intake and moderation in carbohydrate intake  Exercise recommendations include vigorous aerobic physical activity for 75 minutes/week and exercising 3-5 times per week  Subjective:      Patient ID: Monika Nunez is a 64 y o  male  NP establishment, has been on HD for 3 years  He is not clear as to why he has CRF  He feels well currently  The following portions of the patient's history were reviewed and updated as appropriate: allergies, current medications, past family history, past medical history, past social history, past surgical history and problem list     Review of Systems   Constitutional: Negative for activity change, appetite change, chills, diaphoresis, fatigue and fever  HENT: Negative for congestion, dental problem, hearing loss, postnasal drip, sore throat, trouble swallowing and voice change  Eyes: Negative for pain, redness and visual disturbance  Respiratory: Negative for cough, chest tightness, shortness of breath and wheezing  Cardiovascular: Positive for leg swelling  Negative for chest pain and palpitations  Gastrointestinal: Negative for abdominal pain, blood in stool, constipation, diarrhea, nausea and vomiting  Endocrine: Negative for cold intolerance and heat intolerance  Musculoskeletal: Positive for arthralgias  Negative for back pain, joint swelling and myalgias  Knee pain bilaterally   Skin: Negative  Negative for color change and rash  Neurological: Negative for dizziness, syncope, weakness, light-headedness and headaches  Psychiatric/Behavioral: Negative for dysphoric mood, sleep disturbance and suicidal ideas  The patient is not nervous/anxious  Objective:    /68 (BP Location: Left arm, Patient Position: Sitting, Cuff Size: Standard)   Pulse 57   Temp 97 9 °F (36 6 °C) (Tympanic)   Ht 5' 8" (1 727 m)   Wt 117 kg (257 lb)   SpO2 98%   BMI 39 08 kg/m²      Physical Exam  Vitals and nursing note reviewed     Constitutional: General: He is not in acute distress  Appearance: Normal appearance  He is well-developed  He is not ill-appearing, toxic-appearing or diaphoretic  HENT:      Head: Normocephalic and atraumatic  Right Ear: Tympanic membrane, ear canal and external ear normal  There is no impacted cerumen  Left Ear: Tympanic membrane, ear canal and external ear normal  There is no impacted cerumen  Ears:      Comments: R  Ear lesion S/P plastic surgery repair     Nose: Nose normal  No congestion or rhinorrhea  Mouth/Throat:      Mouth: Mucous membranes are moist       Pharynx: Oropharynx is clear  No oropharyngeal exudate or posterior oropharyngeal erythema  Eyes:      General: No scleral icterus  Right eye: No discharge  Left eye: No discharge  Extraocular Movements: Extraocular movements intact  Conjunctiva/sclera: Conjunctivae normal       Pupils: Pupils are equal, round, and reactive to light  Cardiovascular:      Rate and Rhythm: Normal rate and regular rhythm  Pulses: Normal pulses  Heart sounds: Normal heart sounds  No murmur heard  No friction rub  No gallop  Pulmonary:      Effort: Pulmonary effort is normal  No respiratory distress  Breath sounds: Normal breath sounds  No wheezing, rhonchi or rales  Abdominal:      General: Bowel sounds are normal  There is no distension  Palpations: Abdomen is soft  There is no mass  Tenderness: There is no abdominal tenderness  There is no guarding or rebound  Musculoskeletal:         General: No swelling or deformity  Normal range of motion  Cervical back: Normal range of motion and neck supple  No rigidity  Right lower leg: Edema present  Left lower leg: Edema present  Comments: Bilateral knee crepitations with flexion/extension    1+ L>R pitting edema   Lymphadenopathy:      Cervical: No cervical adenopathy  Skin:     General: Skin is warm and dry  Findings: No rash  Neurological:      General: No focal deficit present  Mental Status: He is alert and oriented to person, place, and time  Sensory: No sensory deficit  Motor: No weakness  Coordination: Coordination normal       Gait: Gait normal       Deep Tendon Reflexes: Reflexes are normal and symmetric  Reflexes normal    Psychiatric:         Mood and Affect: Mood normal          Behavior: Behavior normal          Thought Content:  Thought content normal          Judgment: Judgment normal

## 2022-01-01 ENCOUNTER — HOME CARE VISIT (OUTPATIENT)
Dept: HOME HOSPICE | Facility: HOSPICE | Age: 77
End: 2022-01-01
Payer: MEDICARE

## 2022-01-01 ENCOUNTER — APPOINTMENT (INPATIENT)
Dept: DIALYSIS | Facility: HOSPITAL | Age: 77
DRG: 177 | End: 2022-01-01
Attending: INTERNAL MEDICINE
Payer: COMMERCIAL

## 2022-01-01 ENCOUNTER — TRANSCRIBE ORDERS (OUTPATIENT)
Dept: HOME HEALTH SERVICES | Facility: HOME HEALTHCARE | Age: 77
End: 2022-01-01

## 2022-01-01 ENCOUNTER — OFFICE VISIT (OUTPATIENT)
Dept: FAMILY MEDICINE CLINIC | Facility: CLINIC | Age: 77
End: 2022-01-01
Payer: COMMERCIAL

## 2022-01-01 ENCOUNTER — OFFICE VISIT (OUTPATIENT)
Dept: OBGYN CLINIC | Facility: CLINIC | Age: 77
End: 2022-01-01
Payer: COMMERCIAL

## 2022-01-01 ENCOUNTER — APPOINTMENT (INPATIENT)
Dept: DIALYSIS | Facility: HOSPITAL | Age: 77
DRG: 177 | End: 2022-01-01
Payer: COMMERCIAL

## 2022-01-01 ENCOUNTER — APPOINTMENT (OUTPATIENT)
Dept: MRI IMAGING | Facility: HOSPITAL | Age: 77
DRG: 557 | End: 2022-01-01
Payer: COMMERCIAL

## 2022-01-01 ENCOUNTER — TELEPHONE (OUTPATIENT)
Dept: FAMILY MEDICINE CLINIC | Facility: CLINIC | Age: 77
End: 2022-01-01

## 2022-01-01 ENCOUNTER — APPOINTMENT (OUTPATIENT)
Dept: DIALYSIS | Facility: HOSPITAL | Age: 77
DRG: 557 | End: 2022-01-01
Payer: COMMERCIAL

## 2022-01-01 ENCOUNTER — APPOINTMENT (OUTPATIENT)
Dept: NON INVASIVE DIAGNOSTICS | Facility: HOSPITAL | Age: 77
DRG: 557 | End: 2022-01-01
Payer: COMMERCIAL

## 2022-01-01 ENCOUNTER — TELEPHONE (OUTPATIENT)
Dept: OBGYN CLINIC | Facility: HOSPITAL | Age: 77
End: 2022-01-01

## 2022-01-01 ENCOUNTER — HOSPITAL ENCOUNTER (INPATIENT)
Facility: HOSPITAL | Age: 77
LOS: 4 days | Discharge: HOME HEALTH | DRG: 557 | End: 2022-01-08
Attending: EMERGENCY MEDICINE | Admitting: HOSPITALIST
Payer: COMMERCIAL

## 2022-01-01 ENCOUNTER — APPOINTMENT (EMERGENCY)
Dept: RADIOLOGY | Facility: HOSPITAL | Age: 77
DRG: 557 | End: 2022-01-01
Payer: COMMERCIAL

## 2022-01-01 ENCOUNTER — HOME CARE VISIT (OUTPATIENT)
Dept: HOME HEALTH SERVICES | Facility: HOME HEALTHCARE | Age: 77
End: 2022-01-01
Payer: MEDICARE

## 2022-01-01 ENCOUNTER — APPOINTMENT (OUTPATIENT)
Dept: LAB | Facility: HOSPITAL | Age: 77
End: 2022-01-01
Payer: COMMERCIAL

## 2022-01-01 ENCOUNTER — HOSPICE ADMISSION (OUTPATIENT)
Dept: HOME HOSPICE | Facility: HOSPICE | Age: 77
End: 2022-01-01
Payer: MEDICARE

## 2022-01-01 ENCOUNTER — APPOINTMENT (EMERGENCY)
Dept: CT IMAGING | Facility: HOSPITAL | Age: 77
DRG: 557 | End: 2022-01-01
Payer: COMMERCIAL

## 2022-01-01 ENCOUNTER — APPOINTMENT (EMERGENCY)
Dept: RADIOLOGY | Facility: HOSPITAL | Age: 77
DRG: 177 | End: 2022-01-01
Payer: COMMERCIAL

## 2022-01-01 ENCOUNTER — OFFICE VISIT (OUTPATIENT)
Dept: LAB | Facility: HOSPITAL | Age: 77
End: 2022-01-01
Attending: PHYSICIAN ASSISTANT
Payer: COMMERCIAL

## 2022-01-01 ENCOUNTER — TELEPHONE (OUTPATIENT)
Dept: OBGYN CLINIC | Facility: CLINIC | Age: 77
End: 2022-01-01

## 2022-01-01 ENCOUNTER — APPOINTMENT (INPATIENT)
Dept: DIALYSIS | Facility: HOSPITAL | Age: 77
DRG: 557 | End: 2022-01-01
Payer: COMMERCIAL

## 2022-01-01 ENCOUNTER — HOME HEALTH ADMISSION (OUTPATIENT)
Dept: HOME HEALTH SERVICES | Facility: HOME HEALTHCARE | Age: 77
End: 2022-01-01

## 2022-01-01 ENCOUNTER — OFFICE VISIT (OUTPATIENT)
Dept: URGENT CARE | Facility: CLINIC | Age: 77
End: 2022-01-01
Payer: COMMERCIAL

## 2022-01-01 ENCOUNTER — PREP FOR PROCEDURE (OUTPATIENT)
Dept: OBGYN CLINIC | Facility: CLINIC | Age: 77
End: 2022-01-01

## 2022-01-01 ENCOUNTER — APPOINTMENT (INPATIENT)
Dept: RADIOLOGY | Facility: HOSPITAL | Age: 77
DRG: 177 | End: 2022-01-01
Payer: COMMERCIAL

## 2022-01-01 ENCOUNTER — TRANSITIONAL CARE MANAGEMENT (OUTPATIENT)
Dept: FAMILY MEDICINE CLINIC | Facility: CLINIC | Age: 77
End: 2022-01-01

## 2022-01-01 ENCOUNTER — HOSPITAL ENCOUNTER (INPATIENT)
Facility: HOSPITAL | Age: 77
LOS: 13 days | Discharge: HOSPICE | DRG: 177 | End: 2022-06-27
Attending: EMERGENCY MEDICINE | Admitting: INTERNAL MEDICINE
Payer: COMMERCIAL

## 2022-01-01 ENCOUNTER — APPOINTMENT (INPATIENT)
Dept: CT IMAGING | Facility: HOSPITAL | Age: 77
DRG: 177 | End: 2022-01-01
Payer: COMMERCIAL

## 2022-01-01 VITALS
HEART RATE: 51 BPM | BODY MASS INDEX: 37.3 KG/M2 | DIASTOLIC BLOOD PRESSURE: 70 MMHG | SYSTOLIC BLOOD PRESSURE: 178 MMHG | HEIGHT: 68 IN | WEIGHT: 246.13 LBS | OXYGEN SATURATION: 95 % | TEMPERATURE: 96.8 F

## 2022-01-01 VITALS
WEIGHT: 229.72 LBS | SYSTOLIC BLOOD PRESSURE: 79 MMHG | DIASTOLIC BLOOD PRESSURE: 49 MMHG | HEIGHT: 68 IN | RESPIRATION RATE: 20 BRPM | TEMPERATURE: 98.5 F | OXYGEN SATURATION: 91 % | HEART RATE: 57 BPM | BODY MASS INDEX: 34.82 KG/M2

## 2022-01-01 VITALS
DIASTOLIC BLOOD PRESSURE: 68 MMHG | SYSTOLIC BLOOD PRESSURE: 118 MMHG | OXYGEN SATURATION: 98 % | HEART RATE: 56 BPM | HEIGHT: 68 IN | TEMPERATURE: 97 F | BODY MASS INDEX: 38.49 KG/M2 | WEIGHT: 254 LBS

## 2022-01-01 VITALS — TEMPERATURE: 97.5 F | OXYGEN SATURATION: 94 % | HEART RATE: 66 BPM | RESPIRATION RATE: 20 BRPM

## 2022-01-01 VITALS
DIASTOLIC BLOOD PRESSURE: 74 MMHG | HEIGHT: 68 IN | HEART RATE: 58 BPM | SYSTOLIC BLOOD PRESSURE: 197 MMHG | BODY MASS INDEX: 37.28 KG/M2 | WEIGHT: 246 LBS

## 2022-01-01 VITALS
WEIGHT: 261.91 LBS | DIASTOLIC BLOOD PRESSURE: 71 MMHG | RESPIRATION RATE: 18 BRPM | OXYGEN SATURATION: 98 % | BODY MASS INDEX: 39.69 KG/M2 | SYSTOLIC BLOOD PRESSURE: 158 MMHG | HEART RATE: 55 BPM | TEMPERATURE: 97.6 F | HEIGHT: 68 IN

## 2022-01-01 VITALS
TEMPERATURE: 97.8 F | RESPIRATION RATE: 28 BRPM | SYSTOLIC BLOOD PRESSURE: 130 MMHG | DIASTOLIC BLOOD PRESSURE: 66 MMHG | HEART RATE: 78 BPM

## 2022-01-01 DIAGNOSIS — K86.2 PANCREATIC CYST: ICD-10-CM

## 2022-01-01 DIAGNOSIS — U07.1 PNEUMONIA DUE TO COVID-19 VIRUS: ICD-10-CM

## 2022-01-01 DIAGNOSIS — R79.89 ELEVATED D-DIMER: ICD-10-CM

## 2022-01-01 DIAGNOSIS — R11.0 NAUSEA: Primary | ICD-10-CM

## 2022-01-01 DIAGNOSIS — N18.6 CHRONIC KIDNEY DISEASE WITH END STAGE RENAL FAILURE ON DIALYSIS (HCC): ICD-10-CM
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
## 2022-01-01 DIAGNOSIS — K86.89 PANCREATIC MASS: ICD-10-CM

## 2022-01-01 DIAGNOSIS — Z01.812 PRE-OPERATIVE LABORATORY EXAMINATION: ICD-10-CM

## 2022-01-01 DIAGNOSIS — D63.1 ANEMIA IN CHRONIC KIDNEY DISEASE, ON CHRONIC DIALYSIS (HCC): ICD-10-CM

## 2022-01-01 DIAGNOSIS — Z11.59 SPECIAL SCREENING EXAMINATION FOR VIRAL DISEASE: ICD-10-CM

## 2022-01-01 DIAGNOSIS — I10 ESSENTIAL HYPERTENSION: ICD-10-CM

## 2022-01-01 DIAGNOSIS — Z11.59 ENCOUNTER FOR HEPATITIS C SCREENING TEST FOR LOW RISK PATIENT: ICD-10-CM

## 2022-01-01 DIAGNOSIS — S72.002A CLOSED FRACTURE OF LEFT HIP, INITIAL ENCOUNTER (HCC): Primary | ICD-10-CM

## 2022-01-01 DIAGNOSIS — I10 PRIMARY HYPERTENSION: Primary | ICD-10-CM

## 2022-01-01 DIAGNOSIS — M25.562 PAIN IN BOTH KNEES, UNSPECIFIED CHRONICITY: ICD-10-CM

## 2022-01-01 DIAGNOSIS — E78.5 HYPERLIPIDEMIA, UNSPECIFIED HYPERLIPIDEMIA TYPE: ICD-10-CM

## 2022-01-01 DIAGNOSIS — J93.9 PNEUMOTHORAX, UNSPECIFIED TYPE: ICD-10-CM

## 2022-01-01 DIAGNOSIS — M16.12 PRIMARY OSTEOARTHRITIS OF LEFT HIP: ICD-10-CM

## 2022-01-01 DIAGNOSIS — I95.3 HEMODIALYSIS-ASSOCIATED HYPOTENSION: ICD-10-CM

## 2022-01-01 DIAGNOSIS — R09.02 HYPOXIA: ICD-10-CM

## 2022-01-01 DIAGNOSIS — Z99.2 CHRONIC KIDNEY DISEASE WITH END STAGE RENAL FAILURE ON DIALYSIS (HCC): ICD-10-CM

## 2022-01-01 DIAGNOSIS — J96.01 ACUTE RESPIRATORY FAILURE WITH HYPOXIA (HCC): ICD-10-CM

## 2022-01-01 DIAGNOSIS — Z99.2 ANEMIA IN CHRONIC KIDNEY DISEASE, ON CHRONIC DIALYSIS (HCC): ICD-10-CM

## 2022-01-01 DIAGNOSIS — M17.0 PRIMARY OSTEOARTHRITIS OF BOTH KNEES: ICD-10-CM

## 2022-01-01 DIAGNOSIS — R11.0 NAUSEA: ICD-10-CM

## 2022-01-01 DIAGNOSIS — M25.561 PAIN IN BOTH KNEES, UNSPECIFIED CHRONICITY: ICD-10-CM

## 2022-01-01 DIAGNOSIS — R73.9 HYPERGLYCEMIA: ICD-10-CM

## 2022-01-01 DIAGNOSIS — N18.6 ANEMIA IN CHRONIC KIDNEY DISEASE, ON CHRONIC DIALYSIS (HCC): ICD-10-CM

## 2022-01-01 DIAGNOSIS — J12.82 PNEUMONIA DUE TO CORONAVIRUS DISEASE 2019 (CODE): Primary | ICD-10-CM

## 2022-01-01 DIAGNOSIS — M17.11 PRIMARY OSTEOARTHRITIS OF RIGHT KNEE: ICD-10-CM

## 2022-01-01 DIAGNOSIS — R05.1 ACUTE COUGH: Primary | ICD-10-CM

## 2022-01-01 DIAGNOSIS — J12.82 PNEUMONIA DUE TO COVID-19 VIRUS: ICD-10-CM

## 2022-01-01 DIAGNOSIS — M17.12 PRIMARY OSTEOARTHRITIS OF LEFT KNEE: Primary | ICD-10-CM

## 2022-01-01 DIAGNOSIS — I10 ESSENTIAL HYPERTENSION: Primary | ICD-10-CM

## 2022-01-01 DIAGNOSIS — N18.6 ESRD (END STAGE RENAL DISEASE) (HCC): ICD-10-CM

## 2022-01-01 DIAGNOSIS — M17.12 PRIMARY OSTEOARTHRITIS OF LEFT KNEE: ICD-10-CM

## 2022-01-01 DIAGNOSIS — K21.9 GASTROESOPHAGEAL REFLUX DISEASE, UNSPECIFIED WHETHER ESOPHAGITIS PRESENT: Primary | ICD-10-CM

## 2022-01-01 DIAGNOSIS — U07.1 COVID-19: Primary | ICD-10-CM

## 2022-01-01 LAB
ALBUMIN SERPL BCP-MCNC: 2.6 G/DL (ref 3.5–5)
ALBUMIN SERPL BCP-MCNC: 2.8 G/DL (ref 3.5–5)
ALBUMIN SERPL BCP-MCNC: 3 G/DL (ref 3.5–5)
ALBUMIN SERPL BCP-MCNC: 3.1 G/DL (ref 3.5–5)
ALBUMIN SERPL BCP-MCNC: 3.6 G/DL (ref 3.5–5)
ALBUMIN SERPL BCP-MCNC: 3.9 G/DL (ref 3.5–5)
ALBUMIN SERPL BCP-MCNC: 3.9 G/DL (ref 3.5–5)
ALP SERPL-CCNC: 41 U/L (ref 34–104)
ALP SERPL-CCNC: 51 U/L (ref 34–104)
ALP SERPL-CCNC: 55 U/L (ref 34–104)
ALP SERPL-CCNC: 65 U/L (ref 34–104)
ALT SERPL W P-5'-P-CCNC: 13 U/L (ref 7–52)
ALT SERPL W P-5'-P-CCNC: 14 U/L (ref 7–52)
ALT SERPL W P-5'-P-CCNC: 16 U/L (ref 7–52)
ALT SERPL W P-5'-P-CCNC: 17 U/L (ref 7–52)
ALT SERPL W P-5'-P-CCNC: 21 U/L (ref 7–52)
ALT SERPL W P-5'-P-CCNC: 9 U/L (ref 7–52)
ANION GAP SERPL CALCULATED.3IONS-SCNC: 10 MMOL/L (ref 4–13)
ANION GAP SERPL CALCULATED.3IONS-SCNC: 11 MMOL/L (ref 4–13)
ANION GAP SERPL CALCULATED.3IONS-SCNC: 13 MMOL/L (ref 4–13)
ANION GAP SERPL CALCULATED.3IONS-SCNC: 13 MMOL/L (ref 4–13)
ANION GAP SERPL CALCULATED.3IONS-SCNC: 14 MMOL/L (ref 4–13)
ANION GAP SERPL CALCULATED.3IONS-SCNC: 14 MMOL/L (ref 4–13)
ANION GAP SERPL CALCULATED.3IONS-SCNC: 7 MMOL/L (ref 4–13)
ANION GAP SERPL CALCULATED.3IONS-SCNC: 8 MMOL/L (ref 4–13)
ANION GAP SERPL CALCULATED.3IONS-SCNC: 9 MMOL/L (ref 4–13)
ANISOCYTOSIS BLD QL SMEAR: PRESENT
ANISOCYTOSIS BLD QL SMEAR: PRESENT
APTT 1H NP PPP: 37 SECONDS (ref 24–36)
APTT IMM NP PPP: 35 SECONDS (ref 24–36)
APTT PPP: 107 SECONDS (ref 23–37)
APTT PPP: 121 SECONDS (ref 23–37)
APTT PPP: 140 SECONDS (ref 23–37)
APTT PPP: 195 SECONDS (ref 23–37)
APTT PPP: 29 SECONDS (ref 23–37)
APTT PPP: 33 SECONDS (ref 23–37)
APTT PPP: 35 SECONDS (ref 23–37)
APTT PPP: 38 SECONDS (ref 23–37)
APTT PPP: 39 SECONDS (ref 23–37)
APTT PPP: 40 SECONDS (ref 23–37)
APTT PPP: 42 SECONDS (ref 23–37)
APTT PPP: 45 SECONDS (ref 23–37)
APTT PPP: 47 SECONDS (ref 23–37)
APTT PPP: 50 SECONDS (ref 23–37)
APTT PPP: 52 SECONDS (ref 23–37)
APTT PPP: 67 SECONDS (ref 23–37)
APTT PPP: 73 SECONDS (ref 23–37)
APTT PPP: 77 SECONDS (ref 23–37)
APTT PPP: 95 SECONDS (ref 23–37)
AST SERPL W P-5'-P-CCNC: 15 U/L (ref 13–39)
AST SERPL W P-5'-P-CCNC: 15 U/L (ref 13–39)
AST SERPL W P-5'-P-CCNC: 16 U/L (ref 13–39)
AST SERPL W P-5'-P-CCNC: 21 U/L (ref 13–39)
AST SERPL W P-5'-P-CCNC: 31 U/L (ref 13–39)
AST SERPL W P-5'-P-CCNC: 32 U/L (ref 13–39)
ATRIAL RATE: 52 BPM
ATRIAL RATE: 73 BPM
BACTERIA BLD CULT: NORMAL
BACTERIA BLD CULT: NORMAL
BACTERIA SPT RESP CULT: ABNORMAL
BACTERIA SPT RESP CULT: ABNORMAL
BASOPHILS # BLD AUTO: 0.01 THOUSANDS/ΜL (ref 0–0.1)
BASOPHILS # BLD AUTO: 0.01 THOUSANDS/ΜL (ref 0–0.1)
BASOPHILS # BLD AUTO: 0.02 THOUSANDS/ΜL (ref 0–0.1)
BASOPHILS # BLD AUTO: 0.04 THOUSANDS/ΜL (ref 0–0.1)
BASOPHILS # BLD MANUAL: 0 THOUSAND/UL (ref 0–0.1)
BASOPHILS NFR BLD AUTO: 0 % (ref 0–1)
BASOPHILS NFR BLD AUTO: 1 % (ref 0–1)
BASOPHILS NFR MAR MANUAL: 0 % (ref 0–1)
BILIRUB SERPL-MCNC: 0.32 MG/DL (ref 0.2–1)
BILIRUB SERPL-MCNC: 0.35 MG/DL (ref 0.2–1)
BILIRUB SERPL-MCNC: 0.36 MG/DL (ref 0.2–1)
BILIRUB SERPL-MCNC: 0.37 MG/DL (ref 0.2–1)
BILIRUB SERPL-MCNC: 0.55 MG/DL (ref 0.2–1)
BILIRUB SERPL-MCNC: 0.59 MG/DL (ref 0.2–1)
BNP SERPL-MCNC: 784 PG/ML (ref 0–100)
BUN SERPL-MCNC: 24 MG/DL (ref 5–25)
BUN SERPL-MCNC: 36 MG/DL (ref 5–25)
BUN SERPL-MCNC: 40 MG/DL (ref 5–25)
BUN SERPL-MCNC: 42 MG/DL (ref 5–25)
BUN SERPL-MCNC: 43 MG/DL (ref 5–25)
BUN SERPL-MCNC: 46 MG/DL (ref 5–25)
BUN SERPL-MCNC: 47 MG/DL (ref 5–25)
BUN SERPL-MCNC: 47 MG/DL (ref 5–25)
BUN SERPL-MCNC: 49 MG/DL (ref 5–25)
BUN SERPL-MCNC: 51 MG/DL (ref 5–25)
BUN SERPL-MCNC: 54 MG/DL (ref 5–25)
BUN SERPL-MCNC: 55 MG/DL (ref 5–25)
BUN SERPL-MCNC: 57 MG/DL (ref 5–25)
BUN SERPL-MCNC: 57 MG/DL (ref 5–25)
BUN SERPL-MCNC: 69 MG/DL (ref 5–25)
BUN SERPL-MCNC: 73 MG/DL (ref 5–25)
BUN SERPL-MCNC: 86 MG/DL (ref 5–25)
CA-I BLD-SCNC: 1 MMOL/L (ref 1.12–1.32)
CALCIUM ALBUM COR SERPL-MCNC: 8.5 MG/DL (ref 8.3–10.1)
CALCIUM ALBUM COR SERPL-MCNC: 9.3 MG/DL (ref 8.3–10.1)
CALCIUM ALBUM COR SERPL-MCNC: 9.7 MG/DL (ref 8.3–10.1)
CALCIUM SERPL-MCNC: 7.7 MG/DL (ref 8.4–10.2)
CALCIUM SERPL-MCNC: 7.8 MG/DL (ref 8.4–10.2)
CALCIUM SERPL-MCNC: 8.1 MG/DL (ref 8.4–10.2)
CALCIUM SERPL-MCNC: 8.1 MG/DL (ref 8.4–10.2)
CALCIUM SERPL-MCNC: 8.2 MG/DL (ref 8.4–10.2)
CALCIUM SERPL-MCNC: 8.3 MG/DL (ref 8.4–10.2)
CALCIUM SERPL-MCNC: 8.3 MG/DL (ref 8.4–10.2)
CALCIUM SERPL-MCNC: 8.4 MG/DL (ref 8.4–10.2)
CALCIUM SERPL-MCNC: 8.4 MG/DL (ref 8.4–10.2)
CALCIUM SERPL-MCNC: 8.5 MG/DL (ref 8.4–10.2)
CALCIUM SERPL-MCNC: 8.6 MG/DL (ref 8.4–10.2)
CALCIUM SERPL-MCNC: 8.6 MG/DL (ref 8.4–10.2)
CALCIUM SERPL-MCNC: 8.7 MG/DL (ref 8.4–10.2)
CALCIUM SERPL-MCNC: 8.7 MG/DL (ref 8.4–10.2)
CHLORIDE SERPL-SCNC: 88 MMOL/L (ref 96–108)
CHLORIDE SERPL-SCNC: 90 MMOL/L (ref 96–108)
CHLORIDE SERPL-SCNC: 91 MMOL/L (ref 96–108)
CHLORIDE SERPL-SCNC: 92 MMOL/L (ref 96–108)
CHLORIDE SERPL-SCNC: 93 MMOL/L (ref 96–108)
CHLORIDE SERPL-SCNC: 93 MMOL/L (ref 96–108)
CHLORIDE SERPL-SCNC: 94 MMOL/L (ref 96–108)
CHLORIDE SERPL-SCNC: 94 MMOL/L (ref 96–108)
CHLORIDE SERPL-SCNC: 95 MMOL/L (ref 96–108)
CHLORIDE SERPL-SCNC: 95 MMOL/L (ref 96–108)
CHLORIDE SERPL-SCNC: 96 MMOL/L (ref 96–108)
CK SERPL-CCNC: 125 U/L (ref 39–308)
CO2 SERPL-SCNC: 22 MMOL/L (ref 21–32)
CO2 SERPL-SCNC: 24 MMOL/L (ref 21–32)
CO2 SERPL-SCNC: 25 MMOL/L (ref 21–32)
CO2 SERPL-SCNC: 26 MMOL/L (ref 21–32)
CO2 SERPL-SCNC: 26 MMOL/L (ref 21–32)
CO2 SERPL-SCNC: 27 MMOL/L (ref 21–32)
CO2 SERPL-SCNC: 28 MMOL/L (ref 21–32)
CO2 SERPL-SCNC: 28 MMOL/L (ref 21–32)
CO2 SERPL-SCNC: 29 MMOL/L (ref 21–32)
CO2 SERPL-SCNC: 30 MMOL/L (ref 21–32)
CO2 SERPL-SCNC: 30 MMOL/L (ref 21–32)
CO2 SERPL-SCNC: 31 MMOL/L (ref 21–32)
CO2 SERPL-SCNC: 35 MMOL/L (ref 21–32)
CREAT SERPL-MCNC: 3.87 MG/DL (ref 0.6–1.3)
CREAT SERPL-MCNC: 4.36 MG/DL (ref 0.6–1.3)
CREAT SERPL-MCNC: 4.66 MG/DL (ref 0.6–1.3)
CREAT SERPL-MCNC: 4.93 MG/DL (ref 0.6–1.3)
CREAT SERPL-MCNC: 5.13 MG/DL (ref 0.6–1.3)
CREAT SERPL-MCNC: 5.44 MG/DL (ref 0.6–1.3)
CREAT SERPL-MCNC: 5.81 MG/DL (ref 0.6–1.3)
CREAT SERPL-MCNC: 5.9 MG/DL (ref 0.6–1.3)
CREAT SERPL-MCNC: 6.09 MG/DL (ref 0.6–1.3)
CREAT SERPL-MCNC: 6.26 MG/DL (ref 0.6–1.3)
CREAT SERPL-MCNC: 6.34 MG/DL (ref 0.6–1.3)
CREAT SERPL-MCNC: 6.54 MG/DL (ref 0.6–1.3)
CREAT SERPL-MCNC: 7.16 MG/DL (ref 0.6–1.3)
CREAT SERPL-MCNC: 7.16 MG/DL (ref 0.6–1.3)
CREAT SERPL-MCNC: 7.33 MG/DL (ref 0.6–1.3)
CREAT SERPL-MCNC: 7.64 MG/DL (ref 0.6–1.3)
CREAT SERPL-MCNC: 8.29 MG/DL (ref 0.6–1.3)
CRP SERPL QL: 152.7 MG/L
CRP SERPL QL: 173.9 MG/L
CRP SERPL QL: 305.7 MG/L
D DIMER PPP FEU-MCNC: 2.33 UG/ML FEU
D DIMER PPP FEU-MCNC: 2.39 UG/ML FEU
D DIMER PPP FEU-MCNC: 3.55 UG/ML FEU
EOSINOPHIL # BLD AUTO: 0 THOUSAND/ΜL (ref 0–0.61)
EOSINOPHIL # BLD AUTO: 0 THOUSAND/ΜL (ref 0–0.61)
EOSINOPHIL # BLD AUTO: 0.04 THOUSAND/ΜL (ref 0–0.61)
EOSINOPHIL # BLD AUTO: 0.1 THOUSAND/ΜL (ref 0–0.61)
EOSINOPHIL # BLD AUTO: 0.13 THOUSAND/ΜL (ref 0–0.61)
EOSINOPHIL # BLD AUTO: 0.14 THOUSAND/ΜL (ref 0–0.61)
EOSINOPHIL # BLD AUTO: 0.15 THOUSAND/ΜL (ref 0–0.61)
EOSINOPHIL # BLD MANUAL: 0 THOUSAND/UL (ref 0–0.4)
EOSINOPHIL # BLD MANUAL: 0 THOUSAND/UL (ref 0–0.4)
EOSINOPHIL # BLD MANUAL: 0.1 THOUSAND/UL (ref 0–0.4)
EOSINOPHIL NFR BLD AUTO: 0 % (ref 0–6)
EOSINOPHIL NFR BLD AUTO: 0 % (ref 0–6)
EOSINOPHIL NFR BLD AUTO: 1 % (ref 0–6)
EOSINOPHIL NFR BLD AUTO: 2 % (ref 0–6)
EOSINOPHIL NFR BLD AUTO: 2 % (ref 0–6)
EOSINOPHIL NFR BLD AUTO: 3 % (ref 0–6)
EOSINOPHIL NFR BLD AUTO: 3 % (ref 0–6)
EOSINOPHIL NFR BLD MANUAL: 0 % (ref 0–6)
EOSINOPHIL NFR BLD MANUAL: 0 % (ref 0–6)
EOSINOPHIL NFR BLD MANUAL: 1 % (ref 0–6)
ERYTHROCYTE [DISTWIDTH] IN BLOOD BY AUTOMATED COUNT: 13.2 % (ref 11.6–15.1)
ERYTHROCYTE [DISTWIDTH] IN BLOOD BY AUTOMATED COUNT: 13.3 % (ref 11.6–15.1)
ERYTHROCYTE [DISTWIDTH] IN BLOOD BY AUTOMATED COUNT: 13.3 % (ref 11.6–15.1)
ERYTHROCYTE [DISTWIDTH] IN BLOOD BY AUTOMATED COUNT: 13.4 % (ref 11.6–15.1)
ERYTHROCYTE [DISTWIDTH] IN BLOOD BY AUTOMATED COUNT: 13.4 % (ref 11.6–15.1)
ERYTHROCYTE [DISTWIDTH] IN BLOOD BY AUTOMATED COUNT: 13.6 % (ref 11.6–15.1)
ERYTHROCYTE [DISTWIDTH] IN BLOOD BY AUTOMATED COUNT: 13.6 % (ref 11.6–15.1)
ERYTHROCYTE [DISTWIDTH] IN BLOOD BY AUTOMATED COUNT: 13.7 % (ref 11.6–15.1)
ERYTHROCYTE [DISTWIDTH] IN BLOOD BY AUTOMATED COUNT: 13.8 % (ref 11.6–15.1)
ERYTHROCYTE [DISTWIDTH] IN BLOOD BY AUTOMATED COUNT: 14.1 % (ref 11.6–15.1)
ERYTHROCYTE [DISTWIDTH] IN BLOOD BY AUTOMATED COUNT: 14.2 % (ref 11.6–15.1)
ERYTHROCYTE [DISTWIDTH] IN BLOOD BY AUTOMATED COUNT: 14.6 % (ref 11.6–15.1)
ERYTHROCYTE [DISTWIDTH] IN BLOOD BY AUTOMATED COUNT: 15.2 % (ref 11.6–15.1)
ERYTHROCYTE [DISTWIDTH] IN BLOOD BY AUTOMATED COUNT: 15.6 % (ref 11.6–15.1)
ERYTHROCYTE [DISTWIDTH] IN BLOOD BY AUTOMATED COUNT: 15.7 % (ref 11.6–15.1)
ERYTHROCYTE [DISTWIDTH] IN BLOOD BY AUTOMATED COUNT: 15.8 % (ref 11.6–15.1)
EST. AVERAGE GLUCOSE BLD GHB EST-MCNC: 85 MG/DL
FERRITIN SERPL-MCNC: 1103 NG/ML (ref 8–388)
FERRITIN SERPL-MCNC: 2154 NG/ML (ref 8–388)
G6PD BLD QN: 450 U/10E12 RBC (ref 127–427)
GFR SERPL CREATININE-BSD FRML MDRD: 10 ML/MIN/1.73SQ M
GFR SERPL CREATININE-BSD FRML MDRD: 11 ML/MIN/1.73SQ M
GFR SERPL CREATININE-BSD FRML MDRD: 12 ML/MIN/1.73SQ M
GFR SERPL CREATININE-BSD FRML MDRD: 13 ML/MIN/1.73SQ M
GFR SERPL CREATININE-BSD FRML MDRD: 5 ML/MIN/1.73SQ M
GFR SERPL CREATININE-BSD FRML MDRD: 6 ML/MIN/1.73SQ M
GFR SERPL CREATININE-BSD FRML MDRD: 7 ML/MIN/1.73SQ M
GFR SERPL CREATININE-BSD FRML MDRD: 8 ML/MIN/1.73SQ M
GFR SERPL CREATININE-BSD FRML MDRD: 9 ML/MIN/1.73SQ M
GFR SERPL CREATININE-BSD FRML MDRD: 9 ML/MIN/1.73SQ M
GLUCOSE P FAST SERPL-MCNC: 90 MG/DL (ref 65–99)
GLUCOSE SERPL-MCNC: 103 MG/DL (ref 65–140)
GLUCOSE SERPL-MCNC: 108 MG/DL (ref 65–140)
GLUCOSE SERPL-MCNC: 112 MG/DL (ref 65–140)
GLUCOSE SERPL-MCNC: 113 MG/DL (ref 65–140)
GLUCOSE SERPL-MCNC: 113 MG/DL (ref 65–140)
GLUCOSE SERPL-MCNC: 117 MG/DL (ref 65–140)
GLUCOSE SERPL-MCNC: 126 MG/DL (ref 65–140)
GLUCOSE SERPL-MCNC: 132 MG/DL (ref 65–140)
GLUCOSE SERPL-MCNC: 133 MG/DL (ref 65–140)
GLUCOSE SERPL-MCNC: 136 MG/DL (ref 65–140)
GLUCOSE SERPL-MCNC: 138 MG/DL (ref 65–140)
GLUCOSE SERPL-MCNC: 148 MG/DL (ref 65–140)
GLUCOSE SERPL-MCNC: 87 MG/DL (ref 65–140)
GLUCOSE SERPL-MCNC: 87 MG/DL (ref 65–140)
GLUCOSE SERPL-MCNC: 90 MG/DL (ref 65–140)
GLUCOSE SERPL-MCNC: 93 MG/DL (ref 65–140)
GLUCOSE SERPL-MCNC: 95 MG/DL (ref 65–140)
GRAM STN SPEC: ABNORMAL
HBA1C MFR BLD: 4.6 %
HCT VFR BLD AUTO: 22.5 % (ref 36.5–49.3)
HCT VFR BLD AUTO: 23.8 % (ref 36.5–49.3)
HCT VFR BLD AUTO: 24.5 % (ref 36.5–49.3)
HCT VFR BLD AUTO: 25.1 % (ref 36.5–49.3)
HCT VFR BLD AUTO: 25.5 % (ref 36.5–49.3)
HCT VFR BLD AUTO: 26.2 % (ref 36.5–49.3)
HCT VFR BLD AUTO: 27.3 % (ref 36.5–49.3)
HCT VFR BLD AUTO: 27.9 % (ref 36.5–49.3)
HCT VFR BLD AUTO: 28.4 % (ref 36.5–49.3)
HCT VFR BLD AUTO: 28.5 % (ref 36.5–49.3)
HCT VFR BLD AUTO: 29.1 % (ref 36.5–49.3)
HCT VFR BLD AUTO: 29.4 % (ref 36.5–49.3)
HCT VFR BLD AUTO: 31.5 % (ref 36.5–49.3)
HCT VFR BLD AUTO: 32.2 % (ref 36.5–49.3)
HCT VFR BLD AUTO: 32.6 % (ref 36.5–49.3)
HCT VFR BLD AUTO: 35.4 % (ref 36.5–49.3)
HCV AB SER QL: NORMAL
HGB BLD-MCNC: 10.1 G/DL (ref 12–17)
HGB BLD-MCNC: 10.3 G/DL (ref 12–17)
HGB BLD-MCNC: 10.6 G/DL (ref 12–17)
HGB BLD-MCNC: 11.3 G/DL (ref 12–17)
HGB BLD-MCNC: 7.5 G/DL (ref 12–17)
HGB BLD-MCNC: 7.7 G/DL (ref 12–17)
HGB BLD-MCNC: 8 G/DL (ref 12–17)
HGB BLD-MCNC: 8.3 G/DL (ref 12–17)
HGB BLD-MCNC: 8.4 G/DL (ref 12–17)
HGB BLD-MCNC: 8.7 G/DL (ref 12–17)
HGB BLD-MCNC: 8.7 G/DL (ref 12–17)
HGB BLD-MCNC: 9.2 G/DL (ref 12–17)
HGB BLD-MCNC: 9.2 G/DL (ref 12–17)
HGB BLD-MCNC: 9.3 G/DL (ref 12–17)
IMM GRANULOCYTES # BLD AUTO: 0.02 THOUSAND/UL (ref 0–0.2)
IMM GRANULOCYTES # BLD AUTO: 0.03 THOUSAND/UL (ref 0–0.2)
IMM GRANULOCYTES # BLD AUTO: 0.03 THOUSAND/UL (ref 0–0.2)
IMM GRANULOCYTES # BLD AUTO: 0.04 THOUSAND/UL (ref 0–0.2)
IMM GRANULOCYTES # BLD AUTO: 0.06 THOUSAND/UL (ref 0–0.2)
IMM GRANULOCYTES # BLD AUTO: 0.07 THOUSAND/UL (ref 0–0.2)
IMM GRANULOCYTES # BLD AUTO: 0.13 THOUSAND/UL (ref 0–0.2)
IMM GRANULOCYTES NFR BLD AUTO: 0 % (ref 0–2)
IMM GRANULOCYTES NFR BLD AUTO: 0 % (ref 0–2)
IMM GRANULOCYTES NFR BLD AUTO: 1 % (ref 0–2)
INR PPP: 0.95 (ref 0.84–1.19)
INR PPP: 0.98 (ref 0.84–1.19)
INR PPP: 1.01 (ref 0.84–1.19)
INR PPP: 1.07 (ref 0.84–1.19)
IRON SATN MFR SERPL: 57 % (ref 20–50)
IRON SERPL-MCNC: 125 UG/DL (ref 65–175)
LACTATE SERPL-SCNC: 1 MMOL/L (ref 0.5–2)
LYMPHOCYTES # BLD AUTO: 0.27 THOUSAND/UL (ref 0.6–4.47)
LYMPHOCYTES # BLD AUTO: 0.44 THOUSANDS/ΜL (ref 0.6–4.47)
LYMPHOCYTES # BLD AUTO: 0.48 THOUSANDS/ΜL (ref 0.6–4.47)
LYMPHOCYTES # BLD AUTO: 0.78 THOUSAND/UL (ref 0.6–4.47)
LYMPHOCYTES # BLD AUTO: 0.8 THOUSANDS/ΜL (ref 0.6–4.47)
LYMPHOCYTES # BLD AUTO: 0.86 THOUSANDS/ΜL (ref 0.6–4.47)
LYMPHOCYTES # BLD AUTO: 0.93 THOUSANDS/ΜL (ref 0.6–4.47)
LYMPHOCYTES # BLD AUTO: 0.98 THOUSAND/UL (ref 0.6–4.47)
LYMPHOCYTES # BLD AUTO: 1 % (ref 14–44)
LYMPHOCYTES # BLD AUTO: 1.1 THOUSANDS/ΜL (ref 0.6–4.47)
LYMPHOCYTES # BLD AUTO: 1.16 THOUSANDS/ΜL (ref 0.6–4.47)
LYMPHOCYTES # BLD AUTO: 5 % (ref 14–44)
LYMPHOCYTES # BLD AUTO: 8 % (ref 14–44)
LYMPHOCYTES NFR BLD AUTO: 11 % (ref 14–44)
LYMPHOCYTES NFR BLD AUTO: 16 % (ref 14–44)
LYMPHOCYTES NFR BLD AUTO: 17 % (ref 14–44)
LYMPHOCYTES NFR BLD AUTO: 19 % (ref 14–44)
LYMPHOCYTES NFR BLD AUTO: 20 % (ref 14–44)
LYMPHOCYTES NFR BLD AUTO: 4 % (ref 14–44)
LYMPHOCYTES NFR BLD AUTO: 5 % (ref 14–44)
MACROCYTES BLD QL AUTO: PRESENT
MAGNESIUM SERPL-MCNC: 2 MG/DL (ref 1.9–2.7)
MAGNESIUM SERPL-MCNC: 2.1 MG/DL (ref 1.9–2.7)
MCH RBC QN AUTO: 32.1 PG (ref 26.8–34.3)
MCH RBC QN AUTO: 32.2 PG (ref 26.8–34.3)
MCH RBC QN AUTO: 32.4 PG (ref 26.8–34.3)
MCH RBC QN AUTO: 32.5 PG (ref 26.8–34.3)
MCH RBC QN AUTO: 32.6 PG (ref 26.8–34.3)
MCH RBC QN AUTO: 32.6 PG (ref 26.8–34.3)
MCH RBC QN AUTO: 32.7 PG (ref 26.8–34.3)
MCH RBC QN AUTO: 32.8 PG (ref 26.8–34.3)
MCH RBC QN AUTO: 32.8 PG (ref 26.8–34.3)
MCH RBC QN AUTO: 32.9 PG (ref 26.8–34.3)
MCH RBC QN AUTO: 33 PG (ref 26.8–34.3)
MCH RBC QN AUTO: 33 PG (ref 26.8–34.3)
MCH RBC QN AUTO: 33.1 PG (ref 26.8–34.3)
MCH RBC QN AUTO: 33.1 PG (ref 26.8–34.3)
MCH RBC QN AUTO: 33.2 PG (ref 26.8–34.3)
MCH RBC QN AUTO: 33.5 PG (ref 26.8–34.3)
MCHC RBC AUTO-ENTMCNC: 31.6 G/DL (ref 31.4–37.4)
MCHC RBC AUTO-ENTMCNC: 31.9 G/DL (ref 31.4–37.4)
MCHC RBC AUTO-ENTMCNC: 31.9 G/DL (ref 31.4–37.4)
MCHC RBC AUTO-ENTMCNC: 32 G/DL (ref 31.4–37.4)
MCHC RBC AUTO-ENTMCNC: 32 G/DL (ref 31.4–37.4)
MCHC RBC AUTO-ENTMCNC: 32.1 G/DL (ref 31.4–37.4)
MCHC RBC AUTO-ENTMCNC: 32.4 G/DL (ref 31.4–37.4)
MCHC RBC AUTO-ENTMCNC: 32.4 G/DL (ref 31.4–37.4)
MCHC RBC AUTO-ENTMCNC: 32.5 G/DL (ref 31.4–37.4)
MCHC RBC AUTO-ENTMCNC: 32.6 G/DL (ref 31.4–37.4)
MCHC RBC AUTO-ENTMCNC: 32.7 G/DL (ref 31.4–37.4)
MCHC RBC AUTO-ENTMCNC: 32.9 G/DL (ref 31.4–37.4)
MCHC RBC AUTO-ENTMCNC: 33 G/DL (ref 31.4–37.4)
MCHC RBC AUTO-ENTMCNC: 33.1 G/DL (ref 31.4–37.4)
MCHC RBC AUTO-ENTMCNC: 33.2 G/DL (ref 31.4–37.4)
MCHC RBC AUTO-ENTMCNC: 33.3 G/DL (ref 31.4–37.4)
MCV RBC AUTO: 100 FL (ref 82–98)
MCV RBC AUTO: 101 FL (ref 82–98)
MCV RBC AUTO: 102 FL (ref 82–98)
MCV RBC AUTO: 103 FL (ref 82–98)
MCV RBC AUTO: 103 FL (ref 82–98)
MCV RBC AUTO: 104 FL (ref 82–98)
MCV RBC AUTO: 98 FL (ref 82–98)
MCV RBC AUTO: 99 FL (ref 82–98)
MCV RBC AUTO: 99 FL (ref 82–98)
METAMYELOCYTES NFR BLD MANUAL: 5 % (ref 0–1)
MONOCYTES # BLD AUTO: 0 THOUSAND/UL (ref 0–1.22)
MONOCYTES # BLD AUTO: 0.45 THOUSAND/ΜL (ref 0.17–1.22)
MONOCYTES # BLD AUTO: 0.55 THOUSAND/UL (ref 0–1.22)
MONOCYTES # BLD AUTO: 0.57 THOUSAND/ΜL (ref 0.17–1.22)
MONOCYTES # BLD AUTO: 0.58 THOUSAND/ΜL (ref 0.17–1.22)
MONOCYTES # BLD AUTO: 0.71 THOUSAND/ΜL (ref 0.17–1.22)
MONOCYTES # BLD AUTO: 0.74 THOUSAND/ΜL (ref 0.17–1.22)
MONOCYTES # BLD AUTO: 0.79 THOUSAND/UL (ref 0–1.22)
MONOCYTES # BLD AUTO: 0.81 THOUSAND/ΜL (ref 0.17–1.22)
MONOCYTES # BLD AUTO: 0.9 THOUSAND/ΜL (ref 0.17–1.22)
MONOCYTES NFR BLD AUTO: 10 % (ref 4–12)
MONOCYTES NFR BLD AUTO: 11 % (ref 4–12)
MONOCYTES NFR BLD AUTO: 12 % (ref 4–12)
MONOCYTES NFR BLD AUTO: 13 % (ref 4–12)
MONOCYTES NFR BLD AUTO: 6 % (ref 4–12)
MONOCYTES NFR BLD AUTO: 7 % (ref 4–12)
MONOCYTES NFR BLD AUTO: 9 % (ref 4–12)
MONOCYTES NFR BLD: 0 % (ref 4–12)
MONOCYTES NFR BLD: 2 % (ref 4–12)
MONOCYTES NFR BLD: 4 % (ref 4–12)
MRSA NOSE QL CULT: NORMAL
MRSA NOSE QL CULT: NORMAL
NEUTROPHILS # BLD AUTO: 10.87 THOUSANDS/ΜL (ref 1.85–7.62)
NEUTROPHILS # BLD AUTO: 3.56 THOUSANDS/ΜL (ref 1.85–7.62)
NEUTROPHILS # BLD AUTO: 3.75 THOUSANDS/ΜL (ref 1.85–7.62)
NEUTROPHILS # BLD AUTO: 3.87 THOUSANDS/ΜL (ref 1.85–7.62)
NEUTROPHILS # BLD AUTO: 4.13 THOUSANDS/ΜL (ref 1.85–7.62)
NEUTROPHILS # BLD AUTO: 5.56 THOUSANDS/ΜL (ref 1.85–7.62)
NEUTROPHILS # BLD AUTO: 8.41 THOUSANDS/ΜL (ref 1.85–7.62)
NEUTROPHILS # BLD MANUAL: 16.9 THOUSAND/UL (ref 1.85–7.62)
NEUTROPHILS # BLD MANUAL: 26.47 THOUSAND/UL (ref 1.85–7.62)
NEUTROPHILS # BLD MANUAL: 8.86 THOUSAND/UL (ref 1.85–7.62)
NEUTS BAND NFR BLD MANUAL: 21 % (ref 0–8)
NEUTS BAND NFR BLD MANUAL: 3 % (ref 0–8)
NEUTS SEG NFR BLD AUTO: 65 % (ref 43–75)
NEUTS SEG NFR BLD AUTO: 66 % (ref 43–75)
NEUTS SEG NFR BLD AUTO: 69 % (ref 43–75)
NEUTS SEG NFR BLD AUTO: 70 % (ref 43–75)
NEUTS SEG NFR BLD AUTO: 76 % (ref 43–75)
NEUTS SEG NFR BLD AUTO: 77 % (ref 43–75)
NEUTS SEG NFR BLD AUTO: 83 % (ref 43–75)
NEUTS SEG NFR BLD AUTO: 88 % (ref 43–75)
NEUTS SEG NFR BLD AUTO: 88 % (ref 43–75)
NEUTS SEG NFR BLD AUTO: 91 % (ref 43–75)
NRBC BLD AUTO-RTO: 0 /100 WBCS
P AXIS: 2 DEGREES
P AXIS: 44 DEGREES
PHOSPHATE SERPL-MCNC: 4.2 MG/DL (ref 2.3–4.1)
PHOSPHATE SERPL-MCNC: 4.8 MG/DL (ref 2.3–4.1)
PLATELET # BLD AUTO: 194 THOUSANDS/UL (ref 149–390)
PLATELET # BLD AUTO: 207 THOUSANDS/UL (ref 149–390)
PLATELET # BLD AUTO: 237 THOUSANDS/UL (ref 149–390)
PLATELET # BLD AUTO: 244 THOUSANDS/UL (ref 149–390)
PLATELET # BLD AUTO: 244 THOUSANDS/UL (ref 149–390)
PLATELET # BLD AUTO: 255 THOUSANDS/UL (ref 149–390)
PLATELET # BLD AUTO: 256 THOUSANDS/UL (ref 149–390)
PLATELET # BLD AUTO: 258 THOUSANDS/UL (ref 149–390)
PLATELET # BLD AUTO: 263 THOUSANDS/UL (ref 149–390)
PLATELET # BLD AUTO: 267 THOUSANDS/UL (ref 149–390)
PLATELET # BLD AUTO: 282 THOUSANDS/UL (ref 149–390)
PLATELET # BLD AUTO: 290 THOUSANDS/UL (ref 149–390)
PLATELET # BLD AUTO: 293 THOUSANDS/UL (ref 149–390)
PLATELET # BLD AUTO: 293 THOUSANDS/UL (ref 149–390)
PLATELET # BLD AUTO: 311 THOUSANDS/UL (ref 149–390)
PLATELET # BLD AUTO: 314 THOUSANDS/UL (ref 149–390)
PLATELET BLD QL SMEAR: ADEQUATE
PMV BLD AUTO: 10 FL (ref 8.9–12.7)
PMV BLD AUTO: 10.1 FL (ref 8.9–12.7)
PMV BLD AUTO: 9 FL (ref 8.9–12.7)
PMV BLD AUTO: 9.1 FL (ref 8.9–12.7)
PMV BLD AUTO: 9.2 FL (ref 8.9–12.7)
PMV BLD AUTO: 9.3 FL (ref 8.9–12.7)
PMV BLD AUTO: 9.4 FL (ref 8.9–12.7)
PMV BLD AUTO: 9.5 FL (ref 8.9–12.7)
PMV BLD AUTO: 9.6 FL (ref 8.9–12.7)
PMV BLD AUTO: 9.6 FL (ref 8.9–12.7)
PMV BLD AUTO: 9.7 FL (ref 8.9–12.7)
PMV BLD AUTO: 9.8 FL (ref 8.9–12.7)
PMV BLD AUTO: 9.9 FL (ref 8.9–12.7)
PMV BLD AUTO: 9.9 FL (ref 8.9–12.7)
POLYCHROMASIA BLD QL SMEAR: PRESENT
POTASSIUM SERPL-SCNC: 3.6 MMOL/L (ref 3.5–5.3)
POTASSIUM SERPL-SCNC: 3.9 MMOL/L (ref 3.5–5.3)
POTASSIUM SERPL-SCNC: 3.9 MMOL/L (ref 3.5–5.3)
POTASSIUM SERPL-SCNC: 4 MMOL/L (ref 3.5–5.3)
POTASSIUM SERPL-SCNC: 4.1 MMOL/L (ref 3.5–5.3)
POTASSIUM SERPL-SCNC: 4.2 MMOL/L (ref 3.5–5.3)
POTASSIUM SERPL-SCNC: 4.3 MMOL/L (ref 3.5–5.3)
POTASSIUM SERPL-SCNC: 4.5 MMOL/L (ref 3.5–5.3)
POTASSIUM SERPL-SCNC: 4.6 MMOL/L (ref 3.5–5.3)
POTASSIUM SERPL-SCNC: 4.7 MMOL/L (ref 3.5–5.3)
POTASSIUM SERPL-SCNC: 4.9 MMOL/L (ref 3.5–5.3)
POTASSIUM SERPL-SCNC: 5 MMOL/L (ref 3.5–5.3)
PR INTERVAL: 174 MS
PR INTERVAL: 190 MS
PROCALCITONIN SERPL-MCNC: 0.9 NG/ML
PROCALCITONIN SERPL-MCNC: 1.28 NG/ML
PROCALCITONIN SERPL-MCNC: 1.54 NG/ML
PROCALCITONIN SERPL-MCNC: 3.01 NG/ML
PROCALCITONIN SERPL-MCNC: 6.42 NG/ML
PROCALCITONIN SERPL-MCNC: 7.86 NG/ML
PROT SERPL-MCNC: 5 G/DL (ref 6.4–8.4)
PROT SERPL-MCNC: 5.2 G/DL (ref 6.4–8.4)
PROT SERPL-MCNC: 5.4 G/DL (ref 6.4–8.4)
PROT SERPL-MCNC: 6.3 G/DL (ref 6.4–8.4)
PROT SERPL-MCNC: 6.4 G/DL (ref 6.4–8.4)
PROT SERPL-MCNC: 6.7 G/DL (ref 6.4–8.4)
PROTHROMBIN TIME: 12.6 SECONDS (ref 11.6–14.5)
PROTHROMBIN TIME: 12.9 SECONDS (ref 11.6–14.5)
PROTHROMBIN TIME: 13.2 SECONDS (ref 11.6–14.5)
PROTHROMBIN TIME: 13.8 SECONDS (ref 11.6–14.5)
QRS AXIS: -30 DEGREES
QRS AXIS: -39 DEGREES
QRSD INTERVAL: 106 MS
QRSD INTERVAL: 108 MS
QT INTERVAL: 422 MS
QT INTERVAL: 478 MS
QTC INTERVAL: 444 MS
QTC INTERVAL: 464 MS
RBC # BLD AUTO: 2.27 MILLION/UL (ref 3.88–5.62)
RBC # BLD AUTO: 2.39 MILLION/UL (ref 3.88–5.62)
RBC # BLD AUTO: 2.44 MILLION/UL (ref 3.88–5.62)
RBC # BLD AUTO: 2.51 MILLION/UL (ref 3.88–5.62)
RBC # BLD AUTO: 2.53 MILLION/UL (ref 3.88–5.62)
RBC # BLD AUTO: 2.67 MILLION/UL (ref 3.88–5.62)
RBC # BLD AUTO: 2.68 MILLION/UL (ref 3.88–5.62)
RBC # BLD AUTO: 2.75 MILLION/UL (ref 3.88–5.62)
RBC # BLD AUTO: 2.81 MILLION/UL (ref 3.88–5.62)
RBC # BLD AUTO: 2.82 MILLION/UL (ref 3.88–5.62)
RBC # BLD AUTO: 2.87 MILLION/UL (ref 3.88–5.62)
RBC # BLD AUTO: 2.9 MILLION/UL (ref 3.88–5.62)
RBC # BLD AUTO: 3.09 MILLION/UL (ref 3.88–5.62)
RBC # BLD AUTO: 3.11 X10E6/UL (ref 4.14–5.8)
RBC # BLD AUTO: 3.12 MILLION/UL (ref 3.88–5.62)
RBC # BLD AUTO: 3.22 MILLION/UL (ref 3.88–5.62)
RBC # BLD AUTO: 3.45 MILLION/UL (ref 3.88–5.62)
RBC MORPH BLD: NORMAL
RBC MORPH BLD: PRESENT
RBC MORPH BLD: PRESENT
RETICS # AUTO: NORMAL 10*3/UL (ref 14356–105094)
RETICS # CALC: 1.66 % (ref 0.37–1.87)
SARS-COV-2 AG UPPER RESP QL IA: POSITIVE
SARS-COV-2 RNA RESP QL NAA+PROBE: POSITIVE
SODIUM SERPL-SCNC: 125 MMOL/L (ref 135–147)
SODIUM SERPL-SCNC: 126 MMOL/L (ref 135–147)
SODIUM SERPL-SCNC: 128 MMOL/L (ref 135–147)
SODIUM SERPL-SCNC: 129 MMOL/L (ref 135–147)
SODIUM SERPL-SCNC: 129 MMOL/L (ref 135–147)
SODIUM SERPL-SCNC: 130 MMOL/L (ref 135–147)
SODIUM SERPL-SCNC: 130 MMOL/L (ref 135–147)
SODIUM SERPL-SCNC: 131 MMOL/L (ref 135–147)
SODIUM SERPL-SCNC: 132 MMOL/L (ref 135–147)
SODIUM SERPL-SCNC: 132 MMOL/L (ref 135–147)
SODIUM SERPL-SCNC: 135 MMOL/L (ref 135–147)
SODIUM SERPL-SCNC: 135 MMOL/L (ref 135–147)
SODIUM SERPL-SCNC: 137 MMOL/L (ref 135–147)
T WAVE AXIS: 16 DEGREES
T WAVE AXIS: 55 DEGREES
TIBC SERPL-MCNC: 218 UG/DL (ref 250–450)
TRIGL SERPL-MCNC: 153 MG/DL
VALID CONTROL: ABNORMAL
VANCOMYCIN SERPL-MCNC: 12.2 UG/ML (ref 10–20)
VENTRICULAR RATE: 52 BPM
VENTRICULAR RATE: 73 BPM
WBC # BLD AUTO: 12.4 THOUSAND/UL (ref 4.31–10.16)
WBC # BLD AUTO: 13 THOUSAND/UL (ref 4.31–10.16)
WBC # BLD AUTO: 19.65 THOUSAND/UL (ref 4.31–10.16)
WBC # BLD AUTO: 21.87 THOUSAND/UL (ref 4.31–10.16)
WBC # BLD AUTO: 22.7 THOUSAND/UL (ref 4.31–10.16)
WBC # BLD AUTO: 23.8 THOUSAND/UL (ref 4.31–10.16)
WBC # BLD AUTO: 23.82 THOUSAND/UL (ref 4.31–10.16)
WBC # BLD AUTO: 27.29 THOUSAND/UL (ref 4.31–10.16)
WBC # BLD AUTO: 5.05 THOUSAND/UL (ref 4.31–10.16)
WBC # BLD AUTO: 5.78 THOUSAND/UL (ref 4.31–10.16)
WBC # BLD AUTO: 5.84 THOUSAND/UL (ref 4.31–10.16)
WBC # BLD AUTO: 5.93 THOUSAND/UL (ref 4.31–10.16)
WBC # BLD AUTO: 6.44 THOUSAND/UL (ref 4.31–10.16)
WBC # BLD AUTO: 7.26 THOUSAND/UL (ref 4.31–10.16)
WBC # BLD AUTO: 9.5 THOUSAND/UL (ref 4.31–10.16)
WBC # BLD AUTO: 9.74 THOUSAND/UL (ref 4.31–10.16)

## 2022-01-01 PROCEDURE — 36415 COLL VENOUS BLD VENIPUNCTURE: CPT

## 2022-01-01 PROCEDURE — 83550 IRON BINDING TEST: CPT

## 2022-01-01 PROCEDURE — 99232 SBSQ HOSP IP/OBS MODERATE 35: CPT | Performed by: NURSE PRACTITIONER

## 2022-01-01 PROCEDURE — 94640 AIRWAY INHALATION TREATMENT: CPT

## 2022-01-01 PROCEDURE — 80048 BASIC METABOLIC PNL TOTAL CA: CPT | Performed by: PHYSICIAN ASSISTANT

## 2022-01-01 PROCEDURE — 99232 SBSQ HOSP IP/OBS MODERATE 35: CPT | Performed by: INTERNAL MEDICINE

## 2022-01-01 PROCEDURE — 99285 EMERGENCY DEPT VISIT HI MDM: CPT | Performed by: EMERGENCY MEDICINE

## 2022-01-01 PROCEDURE — 97110 THERAPEUTIC EXERCISES: CPT

## 2022-01-01 PROCEDURE — 87081 CULTURE SCREEN ONLY: CPT | Performed by: HOSPITALIST

## 2022-01-01 PROCEDURE — 99232 SBSQ HOSP IP/OBS MODERATE 35: CPT | Performed by: PHYSICIAN ASSISTANT

## 2022-01-01 PROCEDURE — 5A1D70Z PERFORMANCE OF URINARY FILTRATION, INTERMITTENT, LESS THAN 6 HOURS PER DAY: ICD-10-PCS | Performed by: INTERNAL MEDICINE

## 2022-01-01 PROCEDURE — 80048 BASIC METABOLIC PNL TOTAL CA: CPT | Performed by: HOSPITALIST

## 2022-01-01 PROCEDURE — 85025 COMPLETE CBC W/AUTO DIFF WBC: CPT

## 2022-01-01 PROCEDURE — 85025 COMPLETE CBC W/AUTO DIFF WBC: CPT | Performed by: HOSPITALIST

## 2022-01-01 PROCEDURE — 71045 X-RAY EXAM CHEST 1 VIEW: CPT

## 2022-01-01 PROCEDURE — 85379 FIBRIN DEGRADATION QUANT: CPT | Performed by: EMERGENCY MEDICINE

## 2022-01-01 PROCEDURE — 36415 COLL VENOUS BLD VENIPUNCTURE: CPT | Performed by: PHYSICIAN ASSISTANT

## 2022-01-01 PROCEDURE — 85027 COMPLETE CBC AUTOMATED: CPT | Performed by: PHYSICIAN ASSISTANT

## 2022-01-01 PROCEDURE — 97530 THERAPEUTIC ACTIVITIES: CPT

## 2022-01-01 PROCEDURE — 84145 PROCALCITONIN (PCT): CPT | Performed by: INTERNAL MEDICINE

## 2022-01-01 PROCEDURE — 99232 SBSQ HOSP IP/OBS MODERATE 35: CPT | Performed by: HOSPITALIST

## 2022-01-01 PROCEDURE — 70486 CT MAXILLOFACIAL W/O DYE: CPT

## 2022-01-01 PROCEDURE — 94760 N-INVAS EAR/PLS OXIMETRY 1: CPT

## 2022-01-01 PROCEDURE — 94668 MNPJ CHEST WALL SBSQ: CPT

## 2022-01-01 PROCEDURE — 71275 CT ANGIOGRAPHY CHEST: CPT

## 2022-01-01 PROCEDURE — 93970 EXTREMITY STUDY: CPT | Performed by: SURGERY

## 2022-01-01 PROCEDURE — 93010 ELECTROCARDIOGRAM REPORT: CPT | Performed by: INTERNAL MEDICINE

## 2022-01-01 PROCEDURE — 90935 HEMODIALYSIS ONE EVALUATION: CPT | Performed by: INTERNAL MEDICINE

## 2022-01-01 PROCEDURE — 97535 SELF CARE MNGMENT TRAINING: CPT

## 2022-01-01 PROCEDURE — 85610 PROTHROMBIN TIME: CPT

## 2022-01-01 PROCEDURE — 93970 EXTREMITY STUDY: CPT

## 2022-01-01 PROCEDURE — 83605 ASSAY OF LACTIC ACID: CPT | Performed by: EMERGENCY MEDICINE

## 2022-01-01 PROCEDURE — 80053 COMPREHEN METABOLIC PANEL: CPT | Performed by: NURSE PRACTITIONER

## 2022-01-01 PROCEDURE — 99222 1ST HOSP IP/OBS MODERATE 55: CPT | Performed by: NURSE PRACTITIONER

## 2022-01-01 PROCEDURE — 72125 CT NECK SPINE W/O DYE: CPT

## 2022-01-01 PROCEDURE — 86140 C-REACTIVE PROTEIN: CPT | Performed by: EMERGENCY MEDICINE

## 2022-01-01 PROCEDURE — 85730 THROMBOPLASTIN TIME PARTIAL: CPT

## 2022-01-01 PROCEDURE — NC001 PR NO CHARGE: Performed by: NURSE PRACTITIONER

## 2022-01-01 PROCEDURE — 87070 CULTURE OTHR SPECIMN AEROBIC: CPT | Performed by: INTERNAL MEDICINE

## 2022-01-01 PROCEDURE — G0257 UNSCHED DIALYSIS ESRD PT HOS: HCPCS

## 2022-01-01 PROCEDURE — 99204 OFFICE O/P NEW MOD 45 MIN: CPT | Performed by: INTERNAL MEDICINE

## 2022-01-01 PROCEDURE — 85732 THROMBOPLASTIN TIME PARTIAL: CPT | Performed by: NURSE PRACTITIONER

## 2022-01-01 PROCEDURE — 82550 ASSAY OF CK (CPK): CPT | Performed by: EMERGENCY MEDICINE

## 2022-01-01 PROCEDURE — 93005 ELECTROCARDIOGRAM TRACING: CPT

## 2022-01-01 PROCEDURE — 85730 THROMBOPLASTIN TIME PARTIAL: CPT | Performed by: PHYSICIAN ASSISTANT

## 2022-01-01 PROCEDURE — 99223 1ST HOSP IP/OBS HIGH 75: CPT | Performed by: INTERNAL MEDICINE

## 2022-01-01 PROCEDURE — 84145 PROCALCITONIN (PCT): CPT | Performed by: EMERGENCY MEDICINE

## 2022-01-01 PROCEDURE — 87811 SARS-COV-2 COVID19 W/OPTIC: CPT | Performed by: PHYSICIAN ASSISTANT

## 2022-01-01 PROCEDURE — 97116 GAIT TRAINING THERAPY: CPT

## 2022-01-01 PROCEDURE — 85027 COMPLETE CBC AUTOMATED: CPT | Performed by: INTERNAL MEDICINE

## 2022-01-01 PROCEDURE — 85045 AUTOMATED RETICULOCYTE COUNT: CPT

## 2022-01-01 PROCEDURE — 1160F RVW MEDS BY RX/DR IN RCRD: CPT | Performed by: FAMILY MEDICINE

## 2022-01-01 PROCEDURE — 71250 CT THORAX DX C-: CPT

## 2022-01-01 PROCEDURE — 99222 1ST HOSP IP/OBS MODERATE 55: CPT | Performed by: INTERNAL MEDICINE

## 2022-01-01 PROCEDURE — 84145 PROCALCITONIN (PCT): CPT | Performed by: NURSE PRACTITIONER

## 2022-01-01 PROCEDURE — 99203 OFFICE O/P NEW LOW 30 MIN: CPT | Performed by: ORTHOPAEDIC SURGERY

## 2022-01-01 PROCEDURE — 82728 ASSAY OF FERRITIN: CPT

## 2022-01-01 PROCEDURE — 86803 HEPATITIS C AB TEST: CPT

## 2022-01-01 PROCEDURE — 73564 X-RAY EXAM KNEE 4 OR MORE: CPT

## 2022-01-01 PROCEDURE — 82040 ASSAY OF SERUM ALBUMIN: CPT | Performed by: INTERNAL MEDICINE

## 2022-01-01 PROCEDURE — 85730 THROMBOPLASTIN TIME PARTIAL: CPT | Performed by: INTERNAL MEDICINE

## 2022-01-01 PROCEDURE — 99213 OFFICE O/P EST LOW 20 MIN: CPT | Performed by: PHYSICIAN ASSISTANT

## 2022-01-01 PROCEDURE — 85027 COMPLETE CBC AUTOMATED: CPT | Performed by: NURSE PRACTITIONER

## 2022-01-01 PROCEDURE — 20610 DRAIN/INJ JOINT/BURSA W/O US: CPT | Performed by: ORTHOPAEDIC SURGERY

## 2022-01-01 PROCEDURE — 85730 THROMBOPLASTIN TIME PARTIAL: CPT | Performed by: NURSE PRACTITIONER

## 2022-01-01 PROCEDURE — 87205 SMEAR GRAM STAIN: CPT | Performed by: INTERNAL MEDICINE

## 2022-01-01 PROCEDURE — 87081 CULTURE SCREEN ONLY: CPT | Performed by: NURSE PRACTITIONER

## 2022-01-01 PROCEDURE — 1111F DSCHRG MED/CURRENT MED MERGE: CPT | Performed by: FAMILY MEDICINE

## 2022-01-01 PROCEDURE — 85007 BL SMEAR W/DIFF WBC COUNT: CPT | Performed by: PHYSICIAN ASSISTANT

## 2022-01-01 PROCEDURE — 80053 COMPREHEN METABOLIC PANEL: CPT | Performed by: EMERGENCY MEDICINE

## 2022-01-01 PROCEDURE — 85025 COMPLETE CBC W/AUTO DIFF WBC: CPT | Performed by: INTERNAL MEDICINE

## 2022-01-01 PROCEDURE — 85027 COMPLETE CBC AUTOMATED: CPT | Performed by: EMERGENCY MEDICINE

## 2022-01-01 PROCEDURE — 99239 HOSP IP/OBS DSCHRG MGMT >30: CPT | Performed by: HOSPITALIST

## 2022-01-01 PROCEDURE — 80202 ASSAY OF VANCOMYCIN: CPT | Performed by: NURSE PRACTITIONER

## 2022-01-01 PROCEDURE — G1004 CDSM NDSC: HCPCS

## 2022-01-01 PROCEDURE — 80053 COMPREHEN METABOLIC PANEL: CPT | Performed by: INTERNAL MEDICINE

## 2022-01-01 PROCEDURE — 86140 C-REACTIVE PROTEIN: CPT | Performed by: INTERNAL MEDICINE

## 2022-01-01 PROCEDURE — 82948 REAGENT STRIP/BLOOD GLUCOSE: CPT

## 2022-01-01 PROCEDURE — 99220 PR INITIAL OBSERVATION CARE/DAY 70 MINUTES: CPT | Performed by: HOSPITALIST

## 2022-01-01 PROCEDURE — 83036 HEMOGLOBIN GLYCOSYLATED A1C: CPT

## 2022-01-01 PROCEDURE — 74176 CT ABD & PELVIS W/O CONTRAST: CPT

## 2022-01-01 PROCEDURE — 90935 HEMODIALYSIS ONE EVALUATION: CPT | Performed by: PHYSICIAN ASSISTANT

## 2022-01-01 PROCEDURE — 85610 PROTHROMBIN TIME: CPT | Performed by: PHYSICIAN ASSISTANT

## 2022-01-01 PROCEDURE — S9088 SERVICES PROVIDED IN URGENT: HCPCS | Performed by: PHYSICIAN ASSISTANT

## 2022-01-01 PROCEDURE — XW033E5 INTRODUCTION OF REMDESIVIR ANTI-INFECTIVE INTO PERIPHERAL VEIN, PERCUTANEOUS APPROACH, NEW TECHNOLOGY GROUP 5: ICD-10-PCS | Performed by: INTERNAL MEDICINE

## 2022-01-01 PROCEDURE — U0003 INFECTIOUS AGENT DETECTION BY NUCLEIC ACID (DNA OR RNA); SEVERE ACUTE RESPIRATORY SYNDROME CORONAVIRUS 2 (SARS-COV-2) (CORONAVIRUS DISEASE [COVID-19]), AMPLIFIED PROBE TECHNIQUE, MAKING USE OF HIGH THROUGHPUT TECHNOLOGIES AS DESCRIBED BY CMS-2020-01-R: HCPCS | Performed by: PHYSICIAN ASSISTANT

## 2022-01-01 PROCEDURE — 85007 BL SMEAR W/DIFF WBC COUNT: CPT | Performed by: EMERGENCY MEDICINE

## 2022-01-01 PROCEDURE — 80053 COMPREHEN METABOLIC PANEL: CPT

## 2022-01-01 PROCEDURE — T2042 HOSPICE ROUTINE HOME CARE: HCPCS

## 2022-01-01 PROCEDURE — 99285 EMERGENCY DEPT VISIT HI MDM: CPT

## 2022-01-01 PROCEDURE — 97167 OT EVAL HIGH COMPLEX 60 MIN: CPT

## 2022-01-01 PROCEDURE — 86140 C-REACTIVE PROTEIN: CPT | Performed by: NURSE PRACTITIONER

## 2022-01-01 PROCEDURE — 84100 ASSAY OF PHOSPHORUS: CPT | Performed by: PHYSICIAN ASSISTANT

## 2022-01-01 PROCEDURE — 83540 ASSAY OF IRON: CPT

## 2022-01-01 PROCEDURE — 99239 HOSP IP/OBS DSCHRG MGMT >30: CPT | Performed by: NURSE PRACTITIONER

## 2022-01-01 PROCEDURE — 74181 MRI ABDOMEN W/O CONTRAST: CPT

## 2022-01-01 PROCEDURE — 10330057 MEDICATION, GENERAL

## 2022-01-01 PROCEDURE — 85379 FIBRIN DEGRADATION QUANT: CPT | Performed by: PHYSICIAN ASSISTANT

## 2022-01-01 PROCEDURE — 36415 COLL VENOUS BLD VENIPUNCTURE: CPT | Performed by: INTERNAL MEDICINE

## 2022-01-01 PROCEDURE — 92610 EVALUATE SWALLOWING FUNCTION: CPT

## 2022-01-01 PROCEDURE — 83735 ASSAY OF MAGNESIUM: CPT | Performed by: EMERGENCY MEDICINE

## 2022-01-01 PROCEDURE — 85379 FIBRIN DEGRADATION QUANT: CPT | Performed by: NURSE PRACTITIONER

## 2022-01-01 PROCEDURE — 1036F TOBACCO NON-USER: CPT | Performed by: FAMILY MEDICINE

## 2022-01-01 PROCEDURE — U0005 INFEC AGEN DETEC AMPLI PROBE: HCPCS | Performed by: PHYSICIAN ASSISTANT

## 2022-01-01 PROCEDURE — 73721 MRI JNT OF LWR EXTRE W/O DYE: CPT

## 2022-01-01 PROCEDURE — 85025 COMPLETE CBC W/AUTO DIFF WBC: CPT | Performed by: NURSE PRACTITIONER

## 2022-01-01 PROCEDURE — 83880 ASSAY OF NATRIURETIC PEPTIDE: CPT | Performed by: EMERGENCY MEDICINE

## 2022-01-01 PROCEDURE — 99233 SBSQ HOSP IP/OBS HIGH 50: CPT | Performed by: NURSE PRACTITIONER

## 2022-01-01 PROCEDURE — 99233 SBSQ HOSP IP/OBS HIGH 50: CPT | Performed by: INTERNAL MEDICINE

## 2022-01-01 PROCEDURE — 3725F SCREEN DEPRESSION PERFORMED: CPT | Performed by: FAMILY MEDICINE

## 2022-01-01 PROCEDURE — 85025 COMPLETE CBC W/AUTO DIFF WBC: CPT | Performed by: PHYSICIAN ASSISTANT

## 2022-01-01 PROCEDURE — 99285 EMERGENCY DEPT VISIT HI MDM: CPT | Performed by: PHYSICIAN ASSISTANT

## 2022-01-01 PROCEDURE — 97166 OT EVAL MOD COMPLEX 45 MIN: CPT

## 2022-01-01 PROCEDURE — 97163 PT EVAL HIGH COMPLEX 45 MIN: CPT

## 2022-01-01 PROCEDURE — 87040 BLOOD CULTURE FOR BACTERIA: CPT | Performed by: EMERGENCY MEDICINE

## 2022-01-01 PROCEDURE — 82330 ASSAY OF CALCIUM: CPT | Performed by: EMERGENCY MEDICINE

## 2022-01-01 PROCEDURE — 82728 ASSAY OF FERRITIN: CPT | Performed by: EMERGENCY MEDICINE

## 2022-01-01 PROCEDURE — 70450 CT HEAD/BRAIN W/O DYE: CPT

## 2022-01-01 PROCEDURE — 82955 ASSAY OF G6PD ENZYME: CPT | Performed by: EMERGENCY MEDICINE

## 2022-01-01 PROCEDURE — 99214 OFFICE O/P EST MOD 30 MIN: CPT | Performed by: FAMILY MEDICINE

## 2022-01-01 PROCEDURE — 80048 BASIC METABOLIC PNL TOTAL CA: CPT | Performed by: NURSE PRACTITIONER

## 2022-01-01 PROCEDURE — 84145 PROCALCITONIN (PCT): CPT | Performed by: PHYSICIAN ASSISTANT

## 2022-01-01 PROCEDURE — 85007 BL SMEAR W/DIFF WBC COUNT: CPT | Performed by: NURSE PRACTITIONER

## 2022-01-01 PROCEDURE — 80048 BASIC METABOLIC PNL TOTAL CA: CPT | Performed by: INTERNAL MEDICINE

## 2022-01-01 PROCEDURE — 85610 PROTHROMBIN TIME: CPT | Performed by: EMERGENCY MEDICINE

## 2022-01-01 PROCEDURE — 36415 COLL VENOUS BLD VENIPUNCTURE: CPT | Performed by: EMERGENCY MEDICINE

## 2022-01-01 PROCEDURE — 84478 ASSAY OF TRIGLYCERIDES: CPT | Performed by: EMERGENCY MEDICINE

## 2022-01-01 PROCEDURE — 99496 TRANSJ CARE MGMT HIGH F2F 7D: CPT | Performed by: FAMILY MEDICINE

## 2022-01-01 PROCEDURE — 83735 ASSAY OF MAGNESIUM: CPT | Performed by: PHYSICIAN ASSISTANT

## 2022-01-01 RX ORDER — BENZONATATE 100 MG/1
100 CAPSULE ORAL 3 TIMES DAILY
Status: DISCONTINUED | OUTPATIENT
Start: 2022-01-01 | End: 2022-01-01

## 2022-01-01 RX ORDER — ONDANSETRON 2 MG/ML
4 INJECTION INTRAMUSCULAR; INTRAVENOUS EVERY 6 HOURS PRN
Status: DISCONTINUED | OUTPATIENT
Start: 2022-01-01 | End: 2022-01-01

## 2022-01-01 RX ORDER — TRIAMCINOLONE ACETONIDE 40 MG/ML
80 INJECTION, SUSPENSION INTRA-ARTICULAR; INTRAMUSCULAR
Status: COMPLETED | OUTPATIENT
Start: 2022-01-01 | End: 2022-01-01

## 2022-01-01 RX ORDER — HYDRALAZINE HYDROCHLORIDE 25 MG/1
25 TABLET, FILM COATED ORAL ONCE
Status: COMPLETED | OUTPATIENT
Start: 2022-01-01 | End: 2022-01-01

## 2022-01-01 RX ORDER — LIDOCAINE 40 MG/G
CREAM TOPICAL DAILY PRN
Status: DISCONTINUED | OUTPATIENT
Start: 2022-01-01 | End: 2022-01-01

## 2022-01-01 RX ORDER — FLUCONAZOLE 100 MG/1
200 TABLET ORAL DAILY
Status: DISCONTINUED | OUTPATIENT
Start: 2022-01-01 | End: 2022-01-01

## 2022-01-01 RX ORDER — MAGNESIUM HYDROXIDE/ALUMINUM HYDROXICE/SIMETHICONE 120; 1200; 1200 MG/30ML; MG/30ML; MG/30ML
30 SUSPENSION ORAL EVERY 4 HOURS PRN
Status: DISCONTINUED | OUTPATIENT
Start: 2022-01-01 | End: 2022-01-01 | Stop reason: HOSPADM

## 2022-01-01 RX ORDER — ACETAMINOPHEN 325 MG/1
650 TABLET ORAL EVERY 4 HOURS PRN
Status: DISCONTINUED | OUTPATIENT
Start: 2022-01-01 | End: 2022-01-01 | Stop reason: HOSPADM

## 2022-01-01 RX ORDER — HEPARIN SODIUM 5000 [USP'U]/ML
5000 INJECTION, SOLUTION INTRAVENOUS; SUBCUTANEOUS EVERY 8 HOURS SCHEDULED
Status: DISCONTINUED | OUTPATIENT
Start: 2022-01-01 | End: 2022-01-01

## 2022-01-01 RX ORDER — LIDOCAINE AND PRILOCAINE 25; 25 MG/G; MG/G
CREAM TOPICAL
COMMUNITY
Start: 2022-01-01 | End: 2022-01-01

## 2022-01-01 RX ORDER — ALBUMIN, HUMAN INJ 5% 5 %
12.5 SOLUTION INTRAVENOUS ONCE
Status: DISCONTINUED | OUTPATIENT
Start: 2022-01-01 | End: 2022-01-01

## 2022-01-01 RX ORDER — HEPARIN SODIUM 5000 [USP'U]/ML
5000 INJECTION, SOLUTION INTRAVENOUS; SUBCUTANEOUS EVERY 8 HOURS SCHEDULED
Status: DISCONTINUED | OUTPATIENT
Start: 2022-01-01 | End: 2022-01-01 | Stop reason: HOSPADM

## 2022-01-01 RX ORDER — HYDROMORPHONE HCL/PF 1 MG/ML
1 SYRINGE (ML) INJECTION EVERY 4 HOURS PRN
Status: DISCONTINUED | OUTPATIENT
Start: 2022-01-01 | End: 2022-01-01 | Stop reason: HOSPADM

## 2022-01-01 RX ORDER — FLUTICASONE FUROATE AND VILANTEROL 200; 25 UG/1; UG/1
1 POWDER RESPIRATORY (INHALATION) DAILY
Status: DISCONTINUED | OUTPATIENT
Start: 2022-01-01 | End: 2022-01-01

## 2022-01-01 RX ORDER — ALLOPURINOL 100 MG/1
200 TABLET ORAL DAILY
Status: DISCONTINUED | OUTPATIENT
Start: 2022-01-01 | End: 2022-01-01

## 2022-01-01 RX ORDER — FOLIC ACID 1 MG/1
1 TABLET ORAL DAILY
Status: DISCONTINUED | OUTPATIENT
Start: 2022-01-01 | End: 2022-01-01

## 2022-01-01 RX ORDER — FERROUS SULFATE 325(65) MG
325 TABLET ORAL
Status: DISCONTINUED | OUTPATIENT
Start: 2022-01-01 | End: 2022-01-01

## 2022-01-01 RX ORDER — HYDRALAZINE HYDROCHLORIDE 100 MG/1
100 TABLET, FILM COATED ORAL
Status: CANCELLED | OUTPATIENT
Start: 2022-01-01

## 2022-01-01 RX ORDER — HEPARIN SODIUM 10000 [USP'U]/100ML
3-20 INJECTION, SOLUTION INTRAVENOUS
Status: DISCONTINUED | OUTPATIENT
Start: 2022-01-01 | End: 2022-01-01

## 2022-01-01 RX ORDER — METOCLOPRAMIDE HYDROCHLORIDE 5 MG/ML
5 INJECTION INTRAMUSCULAR; INTRAVENOUS EVERY 6 HOURS PRN
Status: DISCONTINUED | OUTPATIENT
Start: 2022-01-01 | End: 2022-01-01 | Stop reason: HOSPADM

## 2022-01-01 RX ORDER — FOLIC ACID 1 MG/1
1 TABLET ORAL DAILY
Qty: 30 TABLET | Refills: 1 | Status: SHIPPED | OUTPATIENT
Start: 2022-01-01 | End: 2022-01-01

## 2022-01-01 RX ORDER — ONDANSETRON 4 MG/1
4 TABLET, FILM COATED ORAL EVERY 8 HOURS PRN
Qty: 10 TABLET | Refills: 0 | Status: SHIPPED | OUTPATIENT
Start: 2022-01-01 | End: 2022-01-01

## 2022-01-01 RX ORDER — HYDROMORPHONE HCL/PF 1 MG/ML
0.5 SYRINGE (ML) INJECTION
Status: DISCONTINUED | OUTPATIENT
Start: 2022-01-01 | End: 2022-01-01 | Stop reason: HOSPADM

## 2022-01-01 RX ORDER — FLUCONAZOLE 100 MG/1
100 TABLET ORAL DAILY
Status: DISCONTINUED | OUTPATIENT
Start: 2022-01-01 | End: 2022-01-01

## 2022-01-01 RX ORDER — ONDANSETRON 2 MG/ML
4 INJECTION INTRAMUSCULAR; INTRAVENOUS EVERY 6 HOURS PRN
Status: DISCONTINUED | OUTPATIENT
Start: 2022-01-01 | End: 2022-01-01 | Stop reason: HOSPADM

## 2022-01-01 RX ORDER — SODIUM CHLORIDE 30 MG/ML INHALATION SOLUTION 30 MG/ML
4 SOLUTION INHALANT 3 TIMES DAILY
Status: DISCONTINUED | OUTPATIENT
Start: 2022-01-01 | End: 2022-01-01

## 2022-01-01 RX ORDER — HEPARIN SODIUM 1000 [USP'U]/ML
8800 INJECTION, SOLUTION INTRAVENOUS; SUBCUTANEOUS ONCE
Status: COMPLETED | OUTPATIENT
Start: 2022-01-01 | End: 2022-01-01

## 2022-01-01 RX ORDER — LEVALBUTEROL 1.25 MG/.5ML
1.25 SOLUTION, CONCENTRATE RESPIRATORY (INHALATION)
Status: DISCONTINUED | OUTPATIENT
Start: 2022-01-01 | End: 2022-01-01

## 2022-01-01 RX ORDER — HYDROCODONE BITARTRATE AND ACETAMINOPHEN 5; 325 MG/1; MG/1
1 TABLET ORAL EVERY 6 HOURS PRN
Status: DISCONTINUED | OUTPATIENT
Start: 2022-01-01 | End: 2022-01-01 | Stop reason: HOSPADM

## 2022-01-01 RX ORDER — HYDRALAZINE HYDROCHLORIDE 25 MG/1
25 TABLET, FILM COATED ORAL EVERY 8 HOURS SCHEDULED
Status: DISCONTINUED | OUTPATIENT
Start: 2022-01-01 | End: 2022-01-01

## 2022-01-01 RX ORDER — CEFEPIME HYDROCHLORIDE 1 G/50ML
1000 INJECTION, SOLUTION INTRAVENOUS EVERY 24 HOURS
Status: DISCONTINUED | OUTPATIENT
Start: 2022-01-01 | End: 2022-01-01

## 2022-01-01 RX ORDER — MORPHINE SULFATE 100 MG/5ML
5 SOLUTION, CONCENTRATE ORAL EVERY 2 HOUR PRN
Qty: 30 ML | Refills: 0 | Status: SHIPPED | OUTPATIENT
Start: 2022-01-01 | End: 2022-07-07

## 2022-01-01 RX ORDER — LOSARTAN POTASSIUM 50 MG/1
50 TABLET ORAL DAILY
Status: DISCONTINUED | OUTPATIENT
Start: 2022-01-01 | End: 2022-01-01

## 2022-01-01 RX ORDER — SODIUM CHLORIDE FOR INHALATION 0.9 %
3 VIAL, NEBULIZER (ML) INHALATION
Status: DISCONTINUED | OUTPATIENT
Start: 2022-01-01 | End: 2022-01-01

## 2022-01-01 RX ORDER — ASCORBIC ACID 500 MG
500 TABLET ORAL 2 TIMES DAILY
Status: DISCONTINUED | OUTPATIENT
Start: 2022-01-01 | End: 2022-01-01

## 2022-01-01 RX ORDER — METOPROLOL SUCCINATE 25 MG/1
50 TABLET, EXTENDED RELEASE ORAL 2 TIMES DAILY
Status: DISCONTINUED | OUTPATIENT
Start: 2022-01-01 | End: 2022-01-01 | Stop reason: HOSPADM

## 2022-01-01 RX ORDER — LIDOCAINE 40 MG/G
CREAM TOPICAL DAILY PRN
Status: DISCONTINUED | OUTPATIENT
Start: 2022-01-01 | End: 2022-01-01 | Stop reason: HOSPADM

## 2022-01-01 RX ORDER — METOCLOPRAMIDE HYDROCHLORIDE 5 MG/ML
5 INJECTION INTRAMUSCULAR; INTRAVENOUS EVERY 6 HOURS PRN
Status: DISCONTINUED | OUTPATIENT
Start: 2022-01-01 | End: 2022-01-01

## 2022-01-01 RX ORDER — BENZONATATE 100 MG/1
200 CAPSULE ORAL 3 TIMES DAILY
Status: DISCONTINUED | OUTPATIENT
Start: 2022-01-01 | End: 2022-01-01

## 2022-01-01 RX ORDER — ZINC SULFATE 50(220)MG
220 CAPSULE ORAL
Status: DISPENSED | OUTPATIENT
Start: 2022-01-01 | End: 2022-01-01

## 2022-01-01 RX ORDER — SEVELAMER HYDROCHLORIDE 800 MG/1
1600 TABLET, FILM COATED ORAL
Status: DISCONTINUED | OUTPATIENT
Start: 2022-01-01 | End: 2022-01-01

## 2022-01-01 RX ORDER — OXYCODONE HYDROCHLORIDE 5 MG/1
5 TABLET ORAL EVERY 8 HOURS PRN
Status: DISCONTINUED | OUTPATIENT
Start: 2022-01-01 | End: 2022-01-01 | Stop reason: HOSPADM

## 2022-01-01 RX ORDER — FERROUS SULFATE TAB EC 324 MG (65 MG FE EQUIVALENT) 324 (65 FE) MG
324 TABLET DELAYED RESPONSE ORAL
Qty: 60 TABLET | Refills: 1 | Status: SHIPPED | OUTPATIENT
Start: 2022-01-01 | End: 2022-01-01

## 2022-01-01 RX ORDER — ACETAMINOPHEN 325 MG/1
650 TABLET ORAL EVERY 6 HOURS PRN
Status: DISCONTINUED | OUTPATIENT
Start: 2022-01-01 | End: 2022-01-01 | Stop reason: HOSPADM

## 2022-01-01 RX ORDER — HYDROCODONE BITARTRATE AND ACETAMINOPHEN 5; 325 MG/1; MG/1
1 TABLET ORAL ONCE
Status: COMPLETED | OUTPATIENT
Start: 2022-01-01 | End: 2022-01-01

## 2022-01-01 RX ORDER — TRAMADOL HYDROCHLORIDE 50 MG/1
50 TABLET ORAL EVERY 8 HOURS PRN
Status: DISCONTINUED | OUTPATIENT
Start: 2022-01-01 | End: 2022-01-01 | Stop reason: HOSPADM

## 2022-01-01 RX ORDER — CHLORHEXIDINE GLUCONATE 0.12 MG/ML
15 RINSE ORAL ONCE
Status: CANCELLED | OUTPATIENT
Start: 2022-01-01 | End: 2022-01-01

## 2022-01-01 RX ORDER — CHLORHEXIDINE GLUCONATE 4 G/100ML
SOLUTION TOPICAL DAILY PRN
Status: CANCELLED | OUTPATIENT
Start: 2022-01-01

## 2022-01-01 RX ORDER — HYDROMORPHONE HCL/PF 1 MG/ML
0.5 SYRINGE (ML) INJECTION EVERY 4 HOURS PRN
Status: DISCONTINUED | OUTPATIENT
Start: 2022-01-01 | End: 2022-01-01

## 2022-01-01 RX ORDER — HYDROMORPHONE HCL/PF 1 MG/ML
0.5 SYRINGE (ML) INJECTION
Status: DISCONTINUED | OUTPATIENT
Start: 2022-01-01 | End: 2022-01-01

## 2022-01-01 RX ORDER — MAGNESIUM CARB/ALUMINUM HYDROX 105-160MG
296 TABLET,CHEWABLE ORAL ONCE
Status: COMPLETED | OUTPATIENT
Start: 2022-01-01 | End: 2022-01-01

## 2022-01-01 RX ORDER — HEPARIN SODIUM 1000 [USP'U]/ML
8800 INJECTION, SOLUTION INTRAVENOUS; SUBCUTANEOUS
Status: DISCONTINUED | OUTPATIENT
Start: 2022-01-01 | End: 2022-01-01

## 2022-01-01 RX ORDER — HEPARIN SODIUM 1000 [USP'U]/ML
4400 INJECTION, SOLUTION INTRAVENOUS; SUBCUTANEOUS
Status: DISCONTINUED | OUTPATIENT
Start: 2022-01-01 | End: 2022-01-01

## 2022-01-01 RX ORDER — ALBUTEROL SULFATE 2.5 MG/3ML
2.5 SOLUTION RESPIRATORY (INHALATION) EVERY 4 HOURS PRN
Status: DISCONTINUED | OUTPATIENT
Start: 2022-01-01 | End: 2022-01-01 | Stop reason: HOSPADM

## 2022-01-01 RX ORDER — HYDRALAZINE HYDROCHLORIDE 25 MG/1
50 TABLET, FILM COATED ORAL 2 TIMES DAILY
Status: DISCONTINUED | OUTPATIENT
Start: 2022-01-01 | End: 2022-01-01 | Stop reason: HOSPADM

## 2022-01-01 RX ORDER — ALLOPURINOL 100 MG/1
200 TABLET ORAL DAILY
Status: DISCONTINUED | OUTPATIENT
Start: 2022-01-01 | End: 2022-01-01 | Stop reason: HOSPADM

## 2022-01-01 RX ORDER — BUPIVACAINE HYDROCHLORIDE 2.5 MG/ML
4 INJECTION, SOLUTION INFILTRATION; PERINEURAL
Status: COMPLETED | OUTPATIENT
Start: 2022-01-01 | End: 2022-01-01

## 2022-01-01 RX ORDER — ALLOPURINOL 100 MG/1
200 TABLET ORAL
Status: DISCONTINUED | OUTPATIENT
Start: 2022-01-01 | End: 2022-01-01

## 2022-01-01 RX ORDER — GUAIFENESIN 600 MG
600 TABLET, EXTENDED RELEASE 12 HR ORAL EVERY 12 HOURS SCHEDULED
Status: DISCONTINUED | OUTPATIENT
Start: 2022-01-01 | End: 2022-01-01

## 2022-01-01 RX ORDER — DOXAZOSIN MESYLATE 4 MG/1
2 TABLET ORAL DAILY
Status: DISCONTINUED | OUTPATIENT
Start: 2022-01-01 | End: 2022-01-01 | Stop reason: HOSPADM

## 2022-01-01 RX ORDER — MELATONIN
5000 DAILY
Status: DISPENSED | OUTPATIENT
Start: 2022-01-01 | End: 2022-01-01

## 2022-01-01 RX ORDER — ASCORBIC ACID 500 MG
500 TABLET ORAL 2 TIMES DAILY
Qty: 60 TABLET | Refills: 1 | Status: SHIPPED | OUTPATIENT
Start: 2022-01-01 | End: 2022-01-01

## 2022-01-01 RX ORDER — HYDRALAZINE HYDROCHLORIDE 25 MG/1
100 TABLET, FILM COATED ORAL 2 TIMES DAILY
Status: DISCONTINUED | OUTPATIENT
Start: 2022-01-01 | End: 2022-01-01

## 2022-01-01 RX ORDER — POLYETHYLENE GLYCOL 3350 17 G/17G
17 POWDER, FOR SOLUTION ORAL DAILY
Status: DISPENSED | OUTPATIENT
Start: 2022-01-01 | End: 2022-01-01

## 2022-01-01 RX ORDER — CALCIUM ACETATE 667 MG/1
1334 CAPSULE ORAL
Status: DISCONTINUED | OUTPATIENT
Start: 2022-01-01 | End: 2022-01-01 | Stop reason: HOSPADM

## 2022-01-01 RX ORDER — SEVELAMER HYDROCHLORIDE 800 MG/1
800 TABLET, FILM COATED ORAL
Status: DISCONTINUED | OUTPATIENT
Start: 2022-01-01 | End: 2022-01-01 | Stop reason: HOSPADM

## 2022-01-01 RX ORDER — ALBUTEROL SULFATE 90 UG/1
2 AEROSOL, METERED RESPIRATORY (INHALATION) 4 TIMES DAILY
Status: DISCONTINUED | OUTPATIENT
Start: 2022-01-01 | End: 2022-01-01

## 2022-01-01 RX ORDER — HYDROCODONE BITARTRATE AND ACETAMINOPHEN 5; 325 MG/1; MG/1
1 TABLET ORAL EVERY 6 HOURS PRN
Qty: 12 TABLET | Refills: 0 | Status: SHIPPED | OUTPATIENT
Start: 2022-01-01 | End: 2022-01-01

## 2022-01-01 RX ORDER — CHOLECALCIFEROL (VITAMIN D3) 125 MCG
200 CAPSULE ORAL DAILY
Status: DISCONTINUED | OUTPATIENT
Start: 2022-01-01 | End: 2022-01-01 | Stop reason: HOSPADM

## 2022-01-01 RX ORDER — AMPICILLIN TRIHYDRATE 250 MG
1200 CAPSULE ORAL DAILY
Status: DISCONTINUED | OUTPATIENT
Start: 2022-01-01 | End: 2022-01-01

## 2022-01-01 RX ORDER — CEFEPIME HYDROCHLORIDE 1 G/1
1000 INJECTION, POWDER, FOR SOLUTION INTRAMUSCULAR; INTRAVENOUS EVERY 12 HOURS
Status: DISCONTINUED | OUTPATIENT
Start: 2022-01-01 | End: 2022-01-01

## 2022-01-01 RX ORDER — HYDRALAZINE HYDROCHLORIDE 100 MG/1
100 TABLET, FILM COATED ORAL 2 TIMES DAILY
Qty: 180 TABLET | Refills: 1 | OUTPATIENT
Start: 2022-01-01

## 2022-01-01 RX ORDER — DIPHENHYDRAMINE HYDROCHLORIDE 50 MG/ML
25 INJECTION INTRAMUSCULAR; INTRAVENOUS ONCE
Status: COMPLETED | OUTPATIENT
Start: 2022-01-01 | End: 2022-01-01

## 2022-01-01 RX ORDER — METOPROLOL SUCCINATE 50 MG/1
50 TABLET, EXTENDED RELEASE ORAL 2 TIMES DAILY
Status: DISCONTINUED | OUTPATIENT
Start: 2022-01-01 | End: 2022-01-01

## 2022-01-01 RX ORDER — ALBUTEROL SULFATE 90 UG/1
2 AEROSOL, METERED RESPIRATORY (INHALATION) EVERY 4 HOURS PRN
Status: DISCONTINUED | OUTPATIENT
Start: 2022-01-01 | End: 2022-01-01

## 2022-01-01 RX ORDER — CHOLECALCIFEROL (VITAMIN D3) 125 MCG
200 CAPSULE ORAL DAILY
Status: DISCONTINUED | OUTPATIENT
Start: 2022-01-01 | End: 2022-01-01

## 2022-01-01 RX ORDER — LOSARTAN POTASSIUM 50 MG/1
50 TABLET ORAL DAILY
Status: DISCONTINUED | OUTPATIENT
Start: 2022-01-01 | End: 2022-01-01 | Stop reason: HOSPADM

## 2022-01-01 RX ORDER — CHOLECALCIFEROL (VITAMIN D3) 125 MCG
200 CAPSULE ORAL
Status: DISCONTINUED | OUTPATIENT
Start: 2022-01-01 | End: 2022-01-01

## 2022-01-01 RX ORDER — AMLODIPINE BESYLATE 5 MG/1
5 TABLET ORAL DAILY
Status: DISCONTINUED | OUTPATIENT
Start: 2022-01-01 | End: 2022-01-01

## 2022-01-01 RX ORDER — LABETALOL HYDROCHLORIDE 5 MG/ML
10 INJECTION, SOLUTION INTRAVENOUS EVERY 4 HOURS PRN
Status: DISCONTINUED | OUTPATIENT
Start: 2022-01-01 | End: 2022-01-01 | Stop reason: HOSPADM

## 2022-01-01 RX ORDER — LOSARTAN POTASSIUM 50 MG/1
50 TABLET ORAL 2 TIMES DAILY
Status: DISCONTINUED | OUTPATIENT
Start: 2022-01-01 | End: 2022-01-01

## 2022-01-01 RX ORDER — ONDANSETRON 4 MG/1
4 TABLET, FILM COATED ORAL EVERY 8 HOURS PRN
Qty: 10 TABLET | Refills: 0 | Status: SHIPPED | OUTPATIENT
Start: 2022-01-01 | End: 2022-01-01 | Stop reason: SDUPTHER

## 2022-01-01 RX ORDER — HEPARIN SODIUM 10000 [USP'U]/100ML
3-30 INJECTION, SOLUTION INTRAVENOUS
Status: DISCONTINUED | OUTPATIENT
Start: 2022-01-01 | End: 2022-01-01

## 2022-01-01 RX ORDER — HYDRALAZINE HYDROCHLORIDE 100 MG/1
100 TABLET, FILM COATED ORAL 2 TIMES DAILY
Qty: 180 TABLET | Refills: 1 | Status: SHIPPED | OUTPATIENT
Start: 2022-01-01 | End: 2022-01-01

## 2022-01-01 RX ORDER — CALCIUM ACETATE 667 MG/1
1334 CAPSULE ORAL
Status: DISCONTINUED | OUTPATIENT
Start: 2022-01-01 | End: 2022-01-01

## 2022-01-01 RX ORDER — SENNOSIDES 8.6 MG
1 TABLET ORAL
Status: DISCONTINUED | OUTPATIENT
Start: 2022-01-01 | End: 2022-01-01

## 2022-01-01 RX ORDER — ONDANSETRON 4 MG/1
4 TABLET, ORALLY DISINTEGRATING ORAL EVERY 6 HOURS PRN
Status: DISCONTINUED | OUTPATIENT
Start: 2022-01-01 | End: 2022-01-01 | Stop reason: HOSPADM

## 2022-01-01 RX ORDER — FUROSEMIDE 10 MG/ML
80 INJECTION INTRAMUSCULAR; INTRAVENOUS ONCE
Status: COMPLETED | OUTPATIENT
Start: 2022-01-01 | End: 2022-01-01

## 2022-01-01 RX ORDER — DOCUSATE SODIUM 100 MG/1
100 CAPSULE, LIQUID FILLED ORAL 2 TIMES DAILY
Status: DISCONTINUED | OUTPATIENT
Start: 2022-01-01 | End: 2022-01-01

## 2022-01-01 RX ORDER — HYDRALAZINE HYDROCHLORIDE 25 MG/1
100 TABLET, FILM COATED ORAL EVERY 8 HOURS SCHEDULED
Status: DISCONTINUED | OUTPATIENT
Start: 2022-01-01 | End: 2022-01-01

## 2022-01-01 RX ORDER — DEXAMETHASONE SODIUM PHOSPHATE 4 MG/ML
6 INJECTION, SOLUTION INTRA-ARTICULAR; INTRALESIONAL; INTRAMUSCULAR; INTRAVENOUS; SOFT TISSUE EVERY 24 HOURS
Status: COMPLETED | OUTPATIENT
Start: 2022-01-01 | End: 2022-01-01

## 2022-01-01 RX ORDER — LORAZEPAM 0.5 MG/1
0.5 TABLET ORAL EVERY 6 HOURS PRN
Qty: 10 TABLET | Refills: 0 | Status: SHIPPED | OUTPATIENT
Start: 2022-01-01 | End: 2022-07-07

## 2022-01-01 RX ADMIN — CALCIUM ACETATE 1334 MG: 667 CAPSULE ORAL at 08:17

## 2022-01-01 RX ADMIN — HYDROMORPHONE HYDROCHLORIDE 0.5 MG: 1 INJECTION, SOLUTION INTRAMUSCULAR; INTRAVENOUS; SUBCUTANEOUS at 19:35

## 2022-01-01 RX ADMIN — AMLODIPINE BESYLATE 5 MG: 5 TABLET ORAL at 13:57

## 2022-01-01 RX ADMIN — HYDRALAZINE HYDROCHLORIDE 25 MG: 25 TABLET ORAL at 05:58

## 2022-01-01 RX ADMIN — BENZONATATE 200 MG: 100 CAPSULE ORAL at 16:08

## 2022-01-01 RX ADMIN — ACETAMINOPHEN 650 MG: 325 TABLET ORAL at 11:26

## 2022-01-01 RX ADMIN — SEVELAMER HYDROCHLORIDE 1600 MG: 800 TABLET, FILM COATED PARENTERAL at 08:17

## 2022-01-01 RX ADMIN — CALCIUM ACETATE 1334 MG: 667 CAPSULE ORAL at 12:07

## 2022-01-01 RX ADMIN — HYDROCODONE BITARTRATE AND HOMATROPINE METHYLBROMIDE 5 ML: 5; 1.5 SOLUTION ORAL at 13:09

## 2022-01-01 RX ADMIN — CALCIUM ACETATE 1334 MG: 667 CAPSULE ORAL at 08:37

## 2022-01-01 RX ADMIN — IPRATROPIUM BROMIDE 0.5 MG: 0.5 SOLUTION RESPIRATORY (INHALATION) at 19:39

## 2022-01-01 RX ADMIN — Medication 5000 UNITS: at 08:16

## 2022-01-01 RX ADMIN — HEPARIN SODIUM 11.1 UNITS/KG/HR: 10000 INJECTION, SOLUTION INTRAVENOUS at 15:51

## 2022-01-01 RX ADMIN — SODIUM CHLORIDE SOLN NEBU 3% 4 ML: 3 NEBU SOLN at 13:04

## 2022-01-01 RX ADMIN — SEVELAMER HYDROCHLORIDE 1600 MG: 800 TABLET, FILM COATED PARENTERAL at 16:08

## 2022-01-01 RX ADMIN — NYSTATIN 500000 UNITS: 100000 SUSPENSION ORAL at 21:39

## 2022-01-01 RX ADMIN — HYDROCODONE BITARTRATE AND ACETAMINOPHEN 1 TABLET: 5; 325 TABLET ORAL at 12:50

## 2022-01-01 RX ADMIN — LOSARTAN POTASSIUM 50 MG: 50 TABLET, FILM COATED ORAL at 08:23

## 2022-01-01 RX ADMIN — CALCIUM ACETATE 1334 MG: 667 CAPSULE ORAL at 14:46

## 2022-01-01 RX ADMIN — MAGNESIUM OXIDE TAB 400 MG (241.3 MG ELEMENTAL MG) 400 MG: 400 (241.3 MG) TAB at 14:46

## 2022-01-01 RX ADMIN — ALLOPURINOL 200 MG: 100 TABLET ORAL at 17:54

## 2022-01-01 RX ADMIN — Medication 5000 UNITS: at 13:09

## 2022-01-01 RX ADMIN — REMDESIVIR 100 MG: 100 INJECTION, POWDER, LYOPHILIZED, FOR SOLUTION INTRAVENOUS at 17:57

## 2022-01-01 RX ADMIN — ONDANSETRON 4 MG: 4 TABLET, ORALLY DISINTEGRATING ORAL at 17:54

## 2022-01-01 RX ADMIN — METOPROLOL SUCCINATE 50 MG: 50 TABLET, EXTENDED RELEASE ORAL at 14:46

## 2022-01-01 RX ADMIN — ALLOPURINOL 200 MG: 100 TABLET ORAL at 21:29

## 2022-01-01 RX ADMIN — SODIUM CHLORIDE SOLN NEBU 3% 4 ML: 3 NEBU SOLN at 07:21

## 2022-01-01 RX ADMIN — DOCUSATE SODIUM 100 MG: 100 CAPSULE, LIQUID FILLED ORAL at 21:09

## 2022-01-01 RX ADMIN — HEPARIN SODIUM 5000 UNITS: 5000 INJECTION INTRAVENOUS; SUBCUTANEOUS at 05:46

## 2022-01-01 RX ADMIN — HEPARIN SODIUM 5000 UNITS: 5000 INJECTION INTRAVENOUS; SUBCUTANEOUS at 14:13

## 2022-01-01 RX ADMIN — LOSARTAN POTASSIUM 50 MG: 50 TABLET, FILM COATED ORAL at 15:48

## 2022-01-01 RX ADMIN — IPRATROPIUM BROMIDE 0.5 MG: 0.5 SOLUTION RESPIRATORY (INHALATION) at 07:01

## 2022-01-01 RX ADMIN — METOPROLOL SUCCINATE 50 MG: 50 TABLET, EXTENDED RELEASE ORAL at 17:22

## 2022-01-01 RX ADMIN — Medication 5000 UNITS: at 08:37

## 2022-01-01 RX ADMIN — SODIUM CHLORIDE SOLN NEBU 3% 4 ML: 3 NEBU SOLN at 20:01

## 2022-01-01 RX ADMIN — HYDROCODONE BITARTRATE AND ACETAMINOPHEN 1 TABLET: 5; 325 TABLET ORAL at 20:28

## 2022-01-01 RX ADMIN — FOLIC ACID 1 MG: 1 TABLET ORAL at 08:35

## 2022-01-01 RX ADMIN — METOPROLOL SUCCINATE 50 MG: 50 TABLET, EXTENDED RELEASE ORAL at 17:01

## 2022-01-01 RX ADMIN — DOXAZOSIN 2 MG: 4 TABLET ORAL at 08:23

## 2022-01-01 RX ADMIN — HYDRALAZINE HYDROCHLORIDE 100 MG: 25 TABLET ORAL at 02:01

## 2022-01-01 RX ADMIN — MAGNESIUM OXIDE TAB 400 MG (241.3 MG ELEMENTAL MG) 400 MG: 400 (241.3 MG) TAB at 11:04

## 2022-01-01 RX ADMIN — ACETAMINOPHEN 650 MG: 325 TABLET ORAL at 08:22

## 2022-01-01 RX ADMIN — CALCIUM ACETATE 1334 MG: 667 CAPSULE ORAL at 08:28

## 2022-01-01 RX ADMIN — HYDROCODONE BITARTRATE AND HOMATROPINE METHYLBROMIDE 5 ML: 5; 1.5 SOLUTION ORAL at 21:00

## 2022-01-01 RX ADMIN — DEXAMETHASONE SODIUM PHOSPHATE 6 MG: 4 INJECTION, SOLUTION INTRAMUSCULAR; INTRAVENOUS at 17:36

## 2022-01-01 RX ADMIN — BENZONATATE 100 MG: 100 CAPSULE ORAL at 18:01

## 2022-01-01 RX ADMIN — BENZONATATE 100 MG: 100 CAPSULE ORAL at 17:03

## 2022-01-01 RX ADMIN — GUAIFENESIN 600 MG: 600 TABLET, EXTENDED RELEASE ORAL at 08:35

## 2022-01-01 RX ADMIN — BENZONATATE 200 MG: 100 CAPSULE ORAL at 21:32

## 2022-01-01 RX ADMIN — LEVALBUTEROL HYDROCHLORIDE 1.25 MG: 1.25 SOLUTION, CONCENTRATE RESPIRATORY (INHALATION) at 13:14

## 2022-01-01 RX ADMIN — HYDROCODONE BITARTRATE AND HOMATROPINE METHYLBROMIDE 5 ML: 5; 1.5 SOLUTION ORAL at 12:21

## 2022-01-01 RX ADMIN — GUAIFENESIN 600 MG: 600 TABLET, EXTENDED RELEASE ORAL at 08:16

## 2022-01-01 RX ADMIN — METOPROLOL SUCCINATE 50 MG: 50 TABLET, EXTENDED RELEASE ORAL at 08:19

## 2022-01-01 RX ADMIN — GUAIFENESIN 600 MG: 600 TABLET, EXTENDED RELEASE ORAL at 21:38

## 2022-01-01 RX ADMIN — HEPARIN SODIUM 5000 UNITS: 5000 INJECTION INTRAVENOUS; SUBCUTANEOUS at 21:22

## 2022-01-01 RX ADMIN — HYDRALAZINE HYDROCHLORIDE 50 MG: 25 TABLET ORAL at 21:26

## 2022-01-01 RX ADMIN — HYDROMORPHONE HYDROCHLORIDE 1 MG: 1 INJECTION, SOLUTION INTRAMUSCULAR; INTRAVENOUS; SUBCUTANEOUS at 20:49

## 2022-01-01 RX ADMIN — POLYETHYLENE GLYCOL 3350 17 G: 17 POWDER, FOR SOLUTION ORAL at 10:25

## 2022-01-01 RX ADMIN — CALCIUM ACETATE 1334 MG: 667 CAPSULE ORAL at 11:54

## 2022-01-01 RX ADMIN — BENZONATATE 200 MG: 100 CAPSULE ORAL at 21:33

## 2022-01-01 RX ADMIN — ONDANSETRON 4 MG: 4 TABLET, ORALLY DISINTEGRATING ORAL at 00:46

## 2022-01-01 RX ADMIN — HYDROMORPHONE HYDROCHLORIDE 0.5 MG: 1 INJECTION, SOLUTION INTRAMUSCULAR; INTRAVENOUS; SUBCUTANEOUS at 21:26

## 2022-01-01 RX ADMIN — NYSTATIN 500000 UNITS: 100000 SUSPENSION ORAL at 14:40

## 2022-01-01 RX ADMIN — SEVELAMER HYDROCHLORIDE 800 MG: 800 TABLET, FILM COATED PARENTERAL at 07:53

## 2022-01-01 RX ADMIN — HYDRALAZINE HYDROCHLORIDE 25 MG: 25 TABLET ORAL at 06:12

## 2022-01-01 RX ADMIN — HEPARIN SODIUM 18 UNITS/KG/HR: 10000 INJECTION, SOLUTION INTRAVENOUS at 13:16

## 2022-01-01 RX ADMIN — Medication 200 MG: at 17:40

## 2022-01-01 RX ADMIN — IPRATROPIUM BROMIDE 0.5 MG: 0.5 SOLUTION RESPIRATORY (INHALATION) at 07:00

## 2022-01-01 RX ADMIN — SEVELAMER HYDROCHLORIDE 800 MG: 800 TABLET, FILM COATED PARENTERAL at 11:54

## 2022-01-01 RX ADMIN — ACETAMINOPHEN 650 MG: 325 TABLET ORAL at 04:09

## 2022-01-01 RX ADMIN — HEPARIN SODIUM 8800 UNITS: 1000 INJECTION, SOLUTION INTRAVENOUS; SUBCUTANEOUS at 04:38

## 2022-01-01 RX ADMIN — HYDRALAZINE HYDROCHLORIDE 50 MG: 25 TABLET ORAL at 21:22

## 2022-01-01 RX ADMIN — HYDROCODONE BITARTRATE AND HOMATROPINE METHYLBROMIDE 5 ML: 5; 1.5 SOLUTION ORAL at 04:21

## 2022-01-01 RX ADMIN — SEVELAMER HYDROCHLORIDE 1600 MG: 800 TABLET, FILM COATED PARENTERAL at 16:58

## 2022-01-01 RX ADMIN — LEVALBUTEROL HYDROCHLORIDE 1.25 MG: 1.25 SOLUTION, CONCENTRATE RESPIRATORY (INHALATION) at 13:09

## 2022-01-01 RX ADMIN — IPRATROPIUM BROMIDE 0.5 MG: 0.5 SOLUTION RESPIRATORY (INHALATION) at 13:03

## 2022-01-01 RX ADMIN — IPRATROPIUM BROMIDE 0.5 MG: 0.5 SOLUTION RESPIRATORY (INHALATION) at 19:51

## 2022-01-01 RX ADMIN — IPRATROPIUM BROMIDE 0.5 MG: 0.5 SOLUTION RESPIRATORY (INHALATION) at 13:08

## 2022-01-01 RX ADMIN — ALLOPURINOL 200 MG: 100 TABLET ORAL at 08:23

## 2022-01-01 RX ADMIN — BENZONATATE 200 MG: 100 CAPSULE ORAL at 21:59

## 2022-01-01 RX ADMIN — FOLIC ACID 1 MG: 1 TABLET ORAL at 08:17

## 2022-01-01 RX ADMIN — OXYCODONE HYDROCHLORIDE AND ACETAMINOPHEN 500 MG: 500 TABLET ORAL at 13:09

## 2022-01-01 RX ADMIN — ALBUTEROL SULFATE 2 PUFF: 90 AEROSOL, METERED RESPIRATORY (INHALATION) at 19:54

## 2022-01-01 RX ADMIN — SODIUM CHLORIDE SOLN NEBU 3% 4 ML: 3 NEBU SOLN at 07:41

## 2022-01-01 RX ADMIN — HEPARIN SODIUM 5000 UNITS: 5000 INJECTION INTRAVENOUS; SUBCUTANEOUS at 15:35

## 2022-01-01 RX ADMIN — LEVALBUTEROL HYDROCHLORIDE 1.25 MG: 1.25 SOLUTION, CONCENTRATE RESPIRATORY (INHALATION) at 20:26

## 2022-01-01 RX ADMIN — SODIUM CHLORIDE SOLN NEBU 3% 4 ML: 3 NEBU SOLN at 13:03

## 2022-01-01 RX ADMIN — GUAIFENESIN 600 MG: 600 TABLET, EXTENDED RELEASE ORAL at 21:54

## 2022-01-01 RX ADMIN — HYDROCODONE BITARTRATE AND HOMATROPINE METHYLBROMIDE 5 ML: 5; 1.5 SOLUTION ORAL at 01:46

## 2022-01-01 RX ADMIN — Medication 296 ML: at 14:41

## 2022-01-01 RX ADMIN — LOSARTAN POTASSIUM 50 MG: 50 TABLET, FILM COATED ORAL at 21:54

## 2022-01-01 RX ADMIN — IPRATROPIUM BROMIDE 0.5 MG: 0.5 SOLUTION RESPIRATORY (INHALATION) at 20:26

## 2022-01-01 RX ADMIN — HEPARIN SODIUM 5000 UNITS: 5000 INJECTION INTRAVENOUS; SUBCUTANEOUS at 05:04

## 2022-01-01 RX ADMIN — FERROUS SULFATE TAB 325 MG (65 MG ELEMENTAL FE) 325 MG: 325 (65 FE) TAB at 08:07

## 2022-01-01 RX ADMIN — IPRATROPIUM BROMIDE 0.5 MG: 0.5 SOLUTION RESPIRATORY (INHALATION) at 20:20

## 2022-01-01 RX ADMIN — DEXAMETHASONE SODIUM PHOSPHATE 6 MG: 4 INJECTION, SOLUTION INTRAMUSCULAR; INTRAVENOUS at 15:48

## 2022-01-01 RX ADMIN — HYDROCODONE BITARTRATE AND ACETAMINOPHEN 1 TABLET: 5; 325 TABLET ORAL at 17:00

## 2022-01-01 RX ADMIN — SEVELAMER HYDROCHLORIDE 1600 MG: 800 TABLET, FILM COATED PARENTERAL at 12:07

## 2022-01-01 RX ADMIN — DEXAMETHASONE SODIUM PHOSPHATE 6 MG: 4 INJECTION, SOLUTION INTRAMUSCULAR; INTRAVENOUS at 17:49

## 2022-01-01 RX ADMIN — VANCOMYCIN HYDROCHLORIDE 750 MG: 1 INJECTION, POWDER, LYOPHILIZED, FOR SOLUTION INTRAVENOUS at 12:21

## 2022-01-01 RX ADMIN — LOSARTAN POTASSIUM 50 MG: 50 TABLET, FILM COATED ORAL at 13:57

## 2022-01-01 RX ADMIN — CEFEPIME HYDROCHLORIDE 1000 MG: 1 INJECTION, SOLUTION INTRAVENOUS at 11:00

## 2022-01-01 RX ADMIN — Medication 200 MG: at 21:33

## 2022-01-01 RX ADMIN — HEPARIN SODIUM 5000 UNITS: 5000 INJECTION INTRAVENOUS; SUBCUTANEOUS at 05:33

## 2022-01-01 RX ADMIN — SEVELAMER HYDROCHLORIDE 800 MG: 800 TABLET, FILM COATED PARENTERAL at 15:36

## 2022-01-01 RX ADMIN — HYDRALAZINE HYDROCHLORIDE 100 MG: 25 TABLET ORAL at 08:18

## 2022-01-01 RX ADMIN — OXYCODONE HYDROCHLORIDE AND ACETAMINOPHEN 500 MG: 500 TABLET ORAL at 17:22

## 2022-01-01 RX ADMIN — LEVALBUTEROL HYDROCHLORIDE 1.25 MG: 1.25 SOLUTION, CONCENTRATE RESPIRATORY (INHALATION) at 07:15

## 2022-01-01 RX ADMIN — AMLODIPINE BESYLATE 5 MG: 5 TABLET ORAL at 17:01

## 2022-01-01 RX ADMIN — REMDESIVIR 100 MG: 100 INJECTION, POWDER, LYOPHILIZED, FOR SOLUTION INTRAVENOUS at 17:00

## 2022-01-01 RX ADMIN — METOPROLOL SUCCINATE 50 MG: 50 TABLET, EXTENDED RELEASE ORAL at 17:20

## 2022-01-01 RX ADMIN — METOPROLOL SUCCINATE 50 MG: 50 TABLET, EXTENDED RELEASE ORAL at 17:39

## 2022-01-01 RX ADMIN — LEVALBUTEROL HYDROCHLORIDE 1.25 MG: 1.25 SOLUTION, CONCENTRATE RESPIRATORY (INHALATION) at 13:03

## 2022-01-01 RX ADMIN — HEPARIN SODIUM 15 UNITS/KG/HR: 10000 INJECTION, SOLUTION INTRAVENOUS at 09:34

## 2022-01-01 RX ADMIN — ALLOPURINOL 200 MG: 100 TABLET ORAL at 14:47

## 2022-01-01 RX ADMIN — FOLIC ACID 1 MG: 1 TABLET ORAL at 15:48

## 2022-01-01 RX ADMIN — HYDRALAZINE HYDROCHLORIDE 25 MG: 25 TABLET ORAL at 06:00

## 2022-01-01 RX ADMIN — LEVALBUTEROL HYDROCHLORIDE 1.25 MG: 1.25 SOLUTION, CONCENTRATE RESPIRATORY (INHALATION) at 07:01

## 2022-01-01 RX ADMIN — MAGNESIUM OXIDE TAB 400 MG (241.3 MG ELEMENTAL MG) 400 MG: 400 (241.3 MG) TAB at 08:37

## 2022-01-01 RX ADMIN — ZINC SULFATE 220 MG (50 MG) CAPSULE 220 MG: CAPSULE at 12:38

## 2022-01-01 RX ADMIN — ACETAMINOPHEN 650 MG: 325 TABLET ORAL at 01:46

## 2022-01-01 RX ADMIN — CALCIUM ACETATE 1334 MG: 667 CAPSULE ORAL at 12:37

## 2022-01-01 RX ADMIN — ONDANSETRON 4 MG: 4 TABLET, ORALLY DISINTEGRATING ORAL at 16:59

## 2022-01-01 RX ADMIN — CALCIUM ACETATE 1334 MG: 667 CAPSULE ORAL at 13:26

## 2022-01-01 RX ADMIN — GUAIFENESIN 600 MG: 600 TABLET, EXTENDED RELEASE ORAL at 08:05

## 2022-01-01 RX ADMIN — ACETAMINOPHEN 650 MG: 325 TABLET ORAL at 04:26

## 2022-01-01 RX ADMIN — HYDROMORPHONE HYDROCHLORIDE 0.5 MG: 1 INJECTION, SOLUTION INTRAMUSCULAR; INTRAVENOUS; SUBCUTANEOUS at 02:36

## 2022-01-01 RX ADMIN — AMLODIPINE BESYLATE 5 MG: 5 TABLET ORAL at 08:06

## 2022-01-01 RX ADMIN — HYDRALAZINE HYDROCHLORIDE 100 MG: 25 TABLET ORAL at 05:52

## 2022-01-01 RX ADMIN — OXYCODONE HYDROCHLORIDE AND ACETAMINOPHEN 500 MG: 500 TABLET ORAL at 18:01

## 2022-01-01 RX ADMIN — HYDRALAZINE HYDROCHLORIDE 100 MG: 25 TABLET ORAL at 08:05

## 2022-01-01 RX ADMIN — LEVALBUTEROL HYDROCHLORIDE 1.25 MG: 1.25 SOLUTION, CONCENTRATE RESPIRATORY (INHALATION) at 19:39

## 2022-01-01 RX ADMIN — BENZONATATE 100 MG: 100 CAPSULE ORAL at 21:17

## 2022-01-01 RX ADMIN — LOSARTAN POTASSIUM 50 MG: 50 TABLET, FILM COATED ORAL at 21:09

## 2022-01-01 RX ADMIN — HYDRALAZINE HYDROCHLORIDE 50 MG: 25 TABLET ORAL at 08:23

## 2022-01-01 RX ADMIN — MAGNESIUM OXIDE TAB 400 MG (241.3 MG ELEMENTAL MG) 400 MG: 400 (241.3 MG) TAB at 08:23

## 2022-01-01 RX ADMIN — ZINC SULFATE 220 MG (50 MG) CAPSULE 220 MG: CAPSULE at 13:26

## 2022-01-01 RX ADMIN — HYDROCODONE BITARTRATE AND ACETAMINOPHEN 1 TABLET: 5; 325 TABLET ORAL at 04:12

## 2022-01-01 RX ADMIN — LOSARTAN POTASSIUM 50 MG: 50 TABLET, FILM COATED ORAL at 21:16

## 2022-01-01 RX ADMIN — HYDROCODONE BITARTRATE AND ACETAMINOPHEN 1 TABLET: 5; 325 TABLET ORAL at 08:38

## 2022-01-01 RX ADMIN — METOPROLOL SUCCINATE 50 MG: 50 TABLET, EXTENDED RELEASE ORAL at 08:22

## 2022-01-01 RX ADMIN — CALCIUM ACETATE 1334 MG: 667 CAPSULE ORAL at 07:15

## 2022-01-01 RX ADMIN — FERROUS SULFATE TAB 325 MG (65 MG ELEMENTAL FE) 325 MG: 325 (65 FE) TAB at 08:16

## 2022-01-01 RX ADMIN — HYDROMORPHONE HYDROCHLORIDE 0.5 MG: 1 INJECTION, SOLUTION INTRAMUSCULAR; INTRAVENOUS; SUBCUTANEOUS at 03:21

## 2022-01-01 RX ADMIN — FUROSEMIDE 80 MG: 10 INJECTION, SOLUTION INTRAMUSCULAR; INTRAVENOUS at 17:41

## 2022-01-01 RX ADMIN — LEVALBUTEROL HYDROCHLORIDE 1.25 MG: 1.25 SOLUTION, CONCENTRATE RESPIRATORY (INHALATION) at 20:01

## 2022-01-01 RX ADMIN — HYDROMORPHONE HYDROCHLORIDE 1 MG: 1 INJECTION, SOLUTION INTRAMUSCULAR; INTRAVENOUS; SUBCUTANEOUS at 02:18

## 2022-01-01 RX ADMIN — METOCLOPRAMIDE HYDROCHLORIDE 5 MG: 5 INJECTION INTRAMUSCULAR; INTRAVENOUS at 20:23

## 2022-01-01 RX ADMIN — METOPROLOL SUCCINATE 50 MG: 50 TABLET, EXTENDED RELEASE ORAL at 18:01

## 2022-01-01 RX ADMIN — HEPARIN SODIUM 18 UNITS/KG/HR: 10000 INJECTION, SOLUTION INTRAVENOUS at 04:41

## 2022-01-01 RX ADMIN — METOPROLOL SUCCINATE 50 MG: 50 TABLET, EXTENDED RELEASE ORAL at 11:03

## 2022-01-01 RX ADMIN — OXYCODONE HYDROCHLORIDE AND ACETAMINOPHEN 500 MG: 500 TABLET ORAL at 08:05

## 2022-01-01 RX ADMIN — HEPARIN SODIUM 5000 UNITS: 5000 INJECTION INTRAVENOUS; SUBCUTANEOUS at 14:41

## 2022-01-01 RX ADMIN — IPRATROPIUM BROMIDE 0.5 MG: 0.5 SOLUTION RESPIRATORY (INHALATION) at 20:00

## 2022-01-01 RX ADMIN — AMLODIPINE BESYLATE 5 MG: 5 TABLET ORAL at 08:16

## 2022-01-01 RX ADMIN — IPRATROPIUM BROMIDE 0.5 MG: 0.5 SOLUTION RESPIRATORY (INHALATION) at 07:21

## 2022-01-01 RX ADMIN — OXYCODONE HYDROCHLORIDE AND ACETAMINOPHEN 500 MG: 500 TABLET ORAL at 08:35

## 2022-01-01 RX ADMIN — SODIUM CHLORIDE SOLN NEBU 3% 4 ML: 3 NEBU SOLN at 19:58

## 2022-01-01 RX ADMIN — BENZONATATE 200 MG: 100 CAPSULE ORAL at 21:38

## 2022-01-01 RX ADMIN — IPRATROPIUM BROMIDE 0.5 MG: 0.5 SOLUTION RESPIRATORY (INHALATION) at 07:23

## 2022-01-01 RX ADMIN — LEVALBUTEROL HYDROCHLORIDE 1.25 MG: 1.25 SOLUTION, CONCENTRATE RESPIRATORY (INHALATION) at 19:57

## 2022-01-01 RX ADMIN — LOSARTAN POTASSIUM 50 MG: 50 TABLET, FILM COATED ORAL at 21:37

## 2022-01-01 RX ADMIN — SEVELAMER HYDROCHLORIDE 1600 MG: 800 TABLET, FILM COATED PARENTERAL at 17:39

## 2022-01-01 RX ADMIN — ALLOPURINOL 200 MG: 100 TABLET ORAL at 08:05

## 2022-01-01 RX ADMIN — OXYCODONE HYDROCHLORIDE AND ACETAMINOPHEN 500 MG: 500 TABLET ORAL at 08:37

## 2022-01-01 RX ADMIN — SEVELAMER HYDROCHLORIDE 1600 MG: 800 TABLET, FILM COATED PARENTERAL at 13:56

## 2022-01-01 RX ADMIN — HYDROMORPHONE HYDROCHLORIDE 0.5 MG: 1 INJECTION, SOLUTION INTRAMUSCULAR; INTRAVENOUS; SUBCUTANEOUS at 11:20

## 2022-01-01 RX ADMIN — HEPARIN SODIUM 5000 UNITS: 5000 INJECTION INTRAVENOUS; SUBCUTANEOUS at 21:32

## 2022-01-01 RX ADMIN — CEFEPIME HYDROCHLORIDE 1000 MG: 1 INJECTION, SOLUTION INTRAVENOUS at 08:14

## 2022-01-01 RX ADMIN — HEPARIN SODIUM 20 UNITS/KG/HR: 10000 INJECTION, SOLUTION INTRAVENOUS at 03:12

## 2022-01-01 RX ADMIN — BENZONATATE 200 MG: 100 CAPSULE ORAL at 21:09

## 2022-01-01 RX ADMIN — SEVELAMER HYDROCHLORIDE 1600 MG: 800 TABLET, FILM COATED PARENTERAL at 12:37

## 2022-01-01 RX ADMIN — OXYCODONE HYDROCHLORIDE 5 MG: 5 TABLET ORAL at 17:56

## 2022-01-01 RX ADMIN — TRAMADOL HYDROCHLORIDE 50 MG: 50 TABLET, COATED ORAL at 13:59

## 2022-01-01 RX ADMIN — ALBUTEROL SULFATE 2 PUFF: 90 AEROSOL, METERED RESPIRATORY (INHALATION) at 04:18

## 2022-01-01 RX ADMIN — LEVALBUTEROL HYDROCHLORIDE 1.25 MG: 1.25 SOLUTION, CONCENTRATE RESPIRATORY (INHALATION) at 07:23

## 2022-01-01 RX ADMIN — HYDRALAZINE HYDROCHLORIDE 25 MG: 25 TABLET ORAL at 05:05

## 2022-01-01 RX ADMIN — HEPARIN SODIUM 5000 UNITS: 5000 INJECTION INTRAVENOUS; SUBCUTANEOUS at 06:12

## 2022-01-01 RX ADMIN — HYDROCODONE BITARTRATE AND HOMATROPINE METHYLBROMIDE 5 ML: 5; 1.5 SOLUTION ORAL at 00:23

## 2022-01-01 RX ADMIN — ACETAMINOPHEN 650 MG: 325 TABLET ORAL at 09:17

## 2022-01-01 RX ADMIN — FOLIC ACID 1 MG: 1 TABLET ORAL at 13:56

## 2022-01-01 RX ADMIN — OXYCODONE HYDROCHLORIDE AND ACETAMINOPHEN 500 MG: 500 TABLET ORAL at 17:01

## 2022-01-01 RX ADMIN — HEPARIN SODIUM 5000 UNITS: 5000 INJECTION INTRAVENOUS; SUBCUTANEOUS at 21:09

## 2022-01-01 RX ADMIN — METOPROLOL SUCCINATE 50 MG: 50 TABLET, EXTENDED RELEASE ORAL at 13:55

## 2022-01-01 RX ADMIN — SEVELAMER HYDROCHLORIDE 800 MG: 800 TABLET, FILM COATED PARENTERAL at 07:15

## 2022-01-01 RX ADMIN — HYDROMORPHONE HYDROCHLORIDE 0.5 MG: 1 INJECTION, SOLUTION INTRAMUSCULAR; INTRAVENOUS; SUBCUTANEOUS at 05:43

## 2022-01-01 RX ADMIN — MAGNESIUM OXIDE TAB 400 MG (241.3 MG ELEMENTAL MG) 400 MG: 400 (241.3 MG) TAB at 08:05

## 2022-01-01 RX ADMIN — LEVALBUTEROL HYDROCHLORIDE 1.25 MG: 1.25 SOLUTION, CONCENTRATE RESPIRATORY (INHALATION) at 20:08

## 2022-01-01 RX ADMIN — LEVALBUTEROL HYDROCHLORIDE 1.25 MG: 1.25 SOLUTION, CONCENTRATE RESPIRATORY (INHALATION) at 07:21

## 2022-01-01 RX ADMIN — CALCIUM ACETATE 1334 MG: 667 CAPSULE ORAL at 11:03

## 2022-01-01 RX ADMIN — METOPROLOL SUCCINATE 50 MG: 50 TABLET, EXTENDED RELEASE ORAL at 18:07

## 2022-01-01 RX ADMIN — CALCIUM ACETATE 1334 MG: 667 CAPSULE ORAL at 08:18

## 2022-01-01 RX ADMIN — HYDRALAZINE HYDROCHLORIDE 25 MG: 25 TABLET ORAL at 13:59

## 2022-01-01 RX ADMIN — ACETAMINOPHEN 650 MG: 325 TABLET ORAL at 17:49

## 2022-01-01 RX ADMIN — LEVALBUTEROL HYDROCHLORIDE 1.25 MG: 1.25 SOLUTION, CONCENTRATE RESPIRATORY (INHALATION) at 07:41

## 2022-01-01 RX ADMIN — HYDRALAZINE HYDROCHLORIDE 25 MG: 25 TABLET ORAL at 05:04

## 2022-01-01 RX ADMIN — HYDRALAZINE HYDROCHLORIDE 25 MG: 25 TABLET ORAL at 21:32

## 2022-01-01 RX ADMIN — HYDROCODONE BITARTRATE AND HOMATROPINE METHYLBROMIDE 5 ML: 5; 1.5 SOLUTION ORAL at 00:46

## 2022-01-01 RX ADMIN — HYDRALAZINE HYDROCHLORIDE 50 MG: 25 TABLET ORAL at 11:04

## 2022-01-01 RX ADMIN — OXYCODONE HYDROCHLORIDE AND ACETAMINOPHEN 500 MG: 500 TABLET ORAL at 17:02

## 2022-01-01 RX ADMIN — IPRATROPIUM BROMIDE 0.5 MG: 0.5 SOLUTION RESPIRATORY (INHALATION) at 19:58

## 2022-01-01 RX ADMIN — HYDROCODONE BITARTRATE AND HOMATROPINE METHYLBROMIDE 5 ML: 5; 1.5 SOLUTION ORAL at 20:23

## 2022-01-01 RX ADMIN — DEXAMETHASONE SODIUM PHOSPHATE 6 MG: 4 INJECTION, SOLUTION INTRAMUSCULAR; INTRAVENOUS at 14:30

## 2022-01-01 RX ADMIN — SODIUM CHLORIDE SOLN NEBU 3% 4 ML: 3 NEBU SOLN at 20:20

## 2022-01-01 RX ADMIN — MAGNESIUM OXIDE TAB 400 MG (241.3 MG ELEMENTAL MG) 400 MG: 400 (241.3 MG) TAB at 08:02

## 2022-01-01 RX ADMIN — ONDANSETRON 4 MG: 4 TABLET, ORALLY DISINTEGRATING ORAL at 18:08

## 2022-01-01 RX ADMIN — LOSARTAN POTASSIUM 50 MG: 50 TABLET, FILM COATED ORAL at 08:06

## 2022-01-01 RX ADMIN — ACETAMINOPHEN 650 MG: 325 TABLET ORAL at 19:52

## 2022-01-01 RX ADMIN — ALBUTEROL SULFATE 2 PUFF: 90 AEROSOL, METERED RESPIRATORY (INHALATION) at 11:24

## 2022-01-01 RX ADMIN — MAGNESIUM OXIDE TAB 400 MG (241.3 MG ELEMENTAL MG) 400 MG: 400 (241.3 MG) TAB at 08:28

## 2022-01-01 RX ADMIN — METOPROLOL SUCCINATE 50 MG: 50 TABLET, EXTENDED RELEASE ORAL at 17:02

## 2022-01-01 RX ADMIN — IPRATROPIUM BROMIDE 0.5 MG: 0.5 SOLUTION RESPIRATORY (INHALATION) at 20:08

## 2022-01-01 RX ADMIN — FERROUS SULFATE TAB 325 MG (65 MG ELEMENTAL FE) 325 MG: 325 (65 FE) TAB at 13:11

## 2022-01-01 RX ADMIN — SEVELAMER HYDROCHLORIDE 800 MG: 800 TABLET, FILM COATED PARENTERAL at 12:00

## 2022-01-01 RX ADMIN — ACETAMINOPHEN 650 MG: 325 TABLET ORAL at 00:28

## 2022-01-01 RX ADMIN — IPRATROPIUM BROMIDE 0.5 MG: 0.5 SOLUTION RESPIRATORY (INHALATION) at 14:00

## 2022-01-01 RX ADMIN — HEPARIN SODIUM 12.1 UNITS/KG/HR: 10000 INJECTION, SOLUTION INTRAVENOUS at 16:59

## 2022-01-01 RX ADMIN — MAGNESIUM OXIDE TAB 400 MG (241.3 MG ELEMENTAL MG) 400 MG: 400 (241.3 MG) TAB at 08:16

## 2022-01-01 RX ADMIN — ALLOPURINOL 200 MG: 100 TABLET ORAL at 11:04

## 2022-01-01 RX ADMIN — LOSARTAN POTASSIUM 50 MG: 50 TABLET, FILM COATED ORAL at 21:14

## 2022-01-01 RX ADMIN — METOPROLOL SUCCINATE 50 MG: 50 TABLET, EXTENDED RELEASE ORAL at 13:10

## 2022-01-01 RX ADMIN — SODIUM CHLORIDE SOLN NEBU 3% 4 ML: 3 NEBU SOLN at 07:15

## 2022-01-01 RX ADMIN — NYSTATIN 500000 UNITS: 100000 SUSPENSION ORAL at 21:14

## 2022-01-01 RX ADMIN — CALCIUM ACETATE 1334 MG: 667 CAPSULE ORAL at 17:00

## 2022-01-01 RX ADMIN — HYDRALAZINE HYDROCHLORIDE 100 MG: 25 TABLET ORAL at 21:36

## 2022-01-01 RX ADMIN — ACETAMINOPHEN 650 MG: 325 TABLET ORAL at 03:15

## 2022-01-01 RX ADMIN — HYDRALAZINE HYDROCHLORIDE 100 MG: 25 TABLET ORAL at 21:14

## 2022-01-01 RX ADMIN — BUPIVACAINE HYDROCHLORIDE 4 ML: 2.5 INJECTION, SOLUTION INFILTRATION; PERINEURAL at 08:55

## 2022-01-01 RX ADMIN — LEVALBUTEROL HYDROCHLORIDE 1.25 MG: 1.25 SOLUTION, CONCENTRATE RESPIRATORY (INHALATION) at 20:20

## 2022-01-01 RX ADMIN — CALCIUM ACETATE 1334 MG: 667 CAPSULE ORAL at 15:36

## 2022-01-01 RX ADMIN — CALCIUM ACETATE 1334 MG: 667 CAPSULE ORAL at 16:10

## 2022-01-01 RX ADMIN — ACETAMINOPHEN 650 MG: 325 TABLET ORAL at 08:16

## 2022-01-01 RX ADMIN — IPRATROPIUM BROMIDE 0.5 MG: 0.5 SOLUTION RESPIRATORY (INHALATION) at 19:57

## 2022-01-01 RX ADMIN — SEVELAMER HYDROCHLORIDE 1600 MG: 800 TABLET, FILM COATED PARENTERAL at 15:48

## 2022-01-01 RX ADMIN — SEVELAMER HYDROCHLORIDE 1600 MG: 800 TABLET, FILM COATED PARENTERAL at 17:02

## 2022-01-01 RX ADMIN — MAGNESIUM OXIDE TAB 400 MG (241.3 MG ELEMENTAL MG) 400 MG: 400 (241.3 MG) TAB at 13:53

## 2022-01-01 RX ADMIN — ALBUTEROL SULFATE 2 PUFF: 90 AEROSOL, METERED RESPIRATORY (INHALATION) at 03:12

## 2022-01-01 RX ADMIN — DOXAZOSIN 2 MG: 4 TABLET ORAL at 14:47

## 2022-01-01 RX ADMIN — LEVALBUTEROL HYDROCHLORIDE 1.25 MG: 1.25 SOLUTION, CONCENTRATE RESPIRATORY (INHALATION) at 14:00

## 2022-01-01 RX ADMIN — BENZONATATE 100 MG: 100 CAPSULE ORAL at 08:28

## 2022-01-01 RX ADMIN — FOLIC ACID 1 MG: 1 TABLET ORAL at 08:38

## 2022-01-01 RX ADMIN — BENZONATATE 100 MG: 100 CAPSULE ORAL at 08:37

## 2022-01-01 RX ADMIN — HEPARIN SODIUM 5000 UNITS: 5000 INJECTION INTRAVENOUS; SUBCUTANEOUS at 05:28

## 2022-01-01 RX ADMIN — HYDRALAZINE HYDROCHLORIDE 25 MG: 25 TABLET ORAL at 21:37

## 2022-01-01 RX ADMIN — ALUMINUM HYDROXIDE, MAGNESIUM HYDROXIDE, AND SIMETHICONE 30 ML: 200; 200; 20 SUSPENSION ORAL at 17:43

## 2022-01-01 RX ADMIN — DEXAMETHASONE SODIUM PHOSPHATE 6 MG: 4 INJECTION, SOLUTION INTRAMUSCULAR; INTRAVENOUS at 17:01

## 2022-01-01 RX ADMIN — CALCIUM ACETATE 1334 MG: 667 CAPSULE ORAL at 13:55

## 2022-01-01 RX ADMIN — LOSARTAN POTASSIUM 50 MG: 50 TABLET, FILM COATED ORAL at 08:17

## 2022-01-01 RX ADMIN — ALBUTEROL SULFATE 2 PUFF: 90 AEROSOL, METERED RESPIRATORY (INHALATION) at 10:21

## 2022-01-01 RX ADMIN — OXYCODONE HYDROCHLORIDE AND ACETAMINOPHEN 500 MG: 500 TABLET ORAL at 17:39

## 2022-01-01 RX ADMIN — GUAIFENESIN 600 MG: 600 TABLET, EXTENDED RELEASE ORAL at 08:07

## 2022-01-01 RX ADMIN — FUROSEMIDE 100 MG: 10 INJECTION, SOLUTION INTRAVENOUS at 12:22

## 2022-01-01 RX ADMIN — MAGNESIUM OXIDE TAB 400 MG (241.3 MG ELEMENTAL MG) 400 MG: 400 (241.3 MG) TAB at 08:35

## 2022-01-01 RX ADMIN — HYDROMORPHONE HYDROCHLORIDE 0.5 MG: 1 INJECTION, SOLUTION INTRAMUSCULAR; INTRAVENOUS; SUBCUTANEOUS at 09:01

## 2022-01-01 RX ADMIN — ALLOPURINOL 200 MG: 100 TABLET ORAL at 21:59

## 2022-01-01 RX ADMIN — METOCLOPRAMIDE HYDROCHLORIDE 5 MG: 5 INJECTION INTRAMUSCULAR; INTRAVENOUS at 21:55

## 2022-01-01 RX ADMIN — CEFEPIME HYDROCHLORIDE 1000 MG: 1 INJECTION, SOLUTION INTRAVENOUS at 09:07

## 2022-01-01 RX ADMIN — FOLIC ACID 1 MG: 1 TABLET ORAL at 08:07

## 2022-01-01 RX ADMIN — FOLIC ACID 1 MG: 1 TABLET ORAL at 08:04

## 2022-01-01 RX ADMIN — GUAIFENESIN 600 MG: 600 TABLET, EXTENDED RELEASE ORAL at 21:33

## 2022-01-01 RX ADMIN — SEVELAMER HYDROCHLORIDE 1600 MG: 800 TABLET, FILM COATED PARENTERAL at 11:22

## 2022-01-01 RX ADMIN — IPRATROPIUM BROMIDE 0.5 MG: 0.5 SOLUTION RESPIRATORY (INHALATION) at 13:04

## 2022-01-01 RX ADMIN — LOSARTAN POTASSIUM 50 MG: 50 TABLET, FILM COATED ORAL at 21:36

## 2022-01-01 RX ADMIN — ZINC SULFATE 220 MG (50 MG) CAPSULE 220 MG: CAPSULE at 11:22

## 2022-01-01 RX ADMIN — CEFEPIME HYDROCHLORIDE 1000 MG: 1 INJECTION, SOLUTION INTRAVENOUS at 08:06

## 2022-01-01 RX ADMIN — HEPARIN SODIUM 5000 UNITS: 5000 INJECTION INTRAVENOUS; SUBCUTANEOUS at 06:00

## 2022-01-01 RX ADMIN — SEVELAMER HYDROCHLORIDE 1600 MG: 800 TABLET, FILM COATED PARENTERAL at 08:02

## 2022-01-01 RX ADMIN — MAGNESIUM OXIDE TAB 400 MG (241.3 MG ELEMENTAL MG) 400 MG: 400 (241.3 MG) TAB at 15:48

## 2022-01-01 RX ADMIN — HEPARIN SODIUM 5000 UNITS: 5000 INJECTION INTRAVENOUS; SUBCUTANEOUS at 05:05

## 2022-01-01 RX ADMIN — HYDRALAZINE HYDROCHLORIDE 100 MG: 25 TABLET ORAL at 13:10

## 2022-01-01 RX ADMIN — HEPARIN SODIUM 5000 UNITS: 5000 INJECTION INTRAVENOUS; SUBCUTANEOUS at 13:53

## 2022-01-01 RX ADMIN — CALCIUM ACETATE 1334 MG: 667 CAPSULE ORAL at 11:22

## 2022-01-01 RX ADMIN — SEVELAMER HYDROCHLORIDE 800 MG: 800 TABLET, FILM COATED PARENTERAL at 16:10

## 2022-01-01 RX ADMIN — Medication 200 MG: at 08:05

## 2022-01-01 RX ADMIN — ONDANSETRON 4 MG: 4 TABLET, ORALLY DISINTEGRATING ORAL at 20:30

## 2022-01-01 RX ADMIN — NYSTATIN 500000 UNITS: 100000 SUSPENSION ORAL at 13:27

## 2022-01-01 RX ADMIN — SEVELAMER HYDROCHLORIDE 800 MG: 800 TABLET, FILM COATED PARENTERAL at 08:22

## 2022-01-01 RX ADMIN — CALCIUM ACETATE 1334 MG: 667 CAPSULE ORAL at 16:58

## 2022-01-01 RX ADMIN — HYDROCODONE BITARTRATE AND ACETAMINOPHEN 1 TABLET: 5; 325 TABLET ORAL at 00:44

## 2022-01-01 RX ADMIN — HYDROMORPHONE HYDROCHLORIDE 0.5 MG: 1 INJECTION, SOLUTION INTRAMUSCULAR; INTRAVENOUS; SUBCUTANEOUS at 23:54

## 2022-01-01 RX ADMIN — HEPARIN SODIUM 5000 UNITS: 5000 INJECTION INTRAVENOUS; SUBCUTANEOUS at 21:40

## 2022-01-01 RX ADMIN — ZINC SULFATE 220 MG (50 MG) CAPSULE 220 MG: CAPSULE at 13:09

## 2022-01-01 RX ADMIN — LABETALOL HYDROCHLORIDE 10 MG: 5 INJECTION, SOLUTION INTRAVENOUS at 14:58

## 2022-01-01 RX ADMIN — HYDRALAZINE HYDROCHLORIDE 25 MG: 25 TABLET ORAL at 14:36

## 2022-01-01 RX ADMIN — LEVALBUTEROL HYDROCHLORIDE 1.25 MG: 1.25 SOLUTION, CONCENTRATE RESPIRATORY (INHALATION) at 13:04

## 2022-01-01 RX ADMIN — HYDRALAZINE HYDROCHLORIDE 100 MG: 25 TABLET ORAL at 22:29

## 2022-01-01 RX ADMIN — SODIUM CHLORIDE SOLN NEBU 3% 4 ML: 3 NEBU SOLN at 20:08

## 2022-01-01 RX ADMIN — FERROUS SULFATE TAB 325 MG (65 MG ELEMENTAL FE) 325 MG: 325 (65 FE) TAB at 08:02

## 2022-01-01 RX ADMIN — HEPARIN SODIUM 5000 UNITS: 5000 INJECTION INTRAVENOUS; SUBCUTANEOUS at 17:34

## 2022-01-01 RX ADMIN — CEFEPIME HYDROCHLORIDE 1000 MG: 1 INJECTION, SOLUTION INTRAVENOUS at 12:44

## 2022-01-01 RX ADMIN — Medication 200 MG: at 14:46

## 2022-01-01 RX ADMIN — HYDRALAZINE HYDROCHLORIDE 50 MG: 25 TABLET ORAL at 22:21

## 2022-01-01 RX ADMIN — HYDRALAZINE HYDROCHLORIDE 25 MG: 25 TABLET ORAL at 14:16

## 2022-01-01 RX ADMIN — OXYCODONE HYDROCHLORIDE 5 MG: 5 TABLET ORAL at 18:28

## 2022-01-01 RX ADMIN — HYDRALAZINE HYDROCHLORIDE 25 MG: 25 TABLET ORAL at 22:00

## 2022-01-01 RX ADMIN — CALCIUM ACETATE 1334 MG: 667 CAPSULE ORAL at 15:48

## 2022-01-01 RX ADMIN — FOLIC ACID 1 MG: 1 TABLET ORAL at 08:19

## 2022-01-01 RX ADMIN — DEXAMETHASONE SODIUM PHOSPHATE 6 MG: 4 INJECTION, SOLUTION INTRAMUSCULAR; INTRAVENOUS at 18:02

## 2022-01-01 RX ADMIN — SENNOSIDES 8.6 MG: 8.6 TABLET, FILM COATED ORAL at 10:24

## 2022-01-01 RX ADMIN — HYDROMORPHONE HYDROCHLORIDE 0.5 MG: 1 INJECTION, SOLUTION INTRAMUSCULAR; INTRAVENOUS; SUBCUTANEOUS at 20:32

## 2022-01-01 RX ADMIN — ALLOPURINOL 200 MG: 100 TABLET ORAL at 08:36

## 2022-01-01 RX ADMIN — SEVELAMER HYDROCHLORIDE 1600 MG: 800 TABLET, FILM COATED PARENTERAL at 17:49

## 2022-01-01 RX ADMIN — ALBUTEROL SULFATE 2 PUFF: 90 AEROSOL, METERED RESPIRATORY (INHALATION) at 21:55

## 2022-01-01 RX ADMIN — MAGNESIUM OXIDE TAB 400 MG (241.3 MG ELEMENTAL MG) 400 MG: 400 (241.3 MG) TAB at 08:19

## 2022-01-01 RX ADMIN — BENZONATATE 200 MG: 100 CAPSULE ORAL at 16:58

## 2022-01-01 RX ADMIN — METOPROLOL SUCCINATE 50 MG: 50 TABLET, EXTENDED RELEASE ORAL at 17:48

## 2022-01-01 RX ADMIN — DEXAMETHASONE SODIUM PHOSPHATE 6 MG: 4 INJECTION, SOLUTION INTRAMUSCULAR; INTRAVENOUS at 16:56

## 2022-01-01 RX ADMIN — ALLOPURINOL 200 MG: 100 TABLET ORAL at 21:32

## 2022-01-01 RX ADMIN — HYDROCODONE BITARTRATE AND HOMATROPINE METHYLBROMIDE 5 ML: 5; 1.5 SOLUTION ORAL at 06:23

## 2022-01-01 RX ADMIN — HYDRALAZINE HYDROCHLORIDE 25 MG: 25 TABLET ORAL at 13:56

## 2022-01-01 RX ADMIN — HEPARIN SODIUM 5000 UNITS: 5000 INJECTION INTRAVENOUS; SUBCUTANEOUS at 05:42

## 2022-01-01 RX ADMIN — Medication 200 MG: at 21:59

## 2022-01-01 RX ADMIN — MAGNESIUM OXIDE TAB 400 MG (241.3 MG ELEMENTAL MG) 400 MG: 400 (241.3 MG) TAB at 13:09

## 2022-01-01 RX ADMIN — Medication 200 MG: at 16:05

## 2022-01-01 RX ADMIN — METOPROLOL SUCCINATE 50 MG: 50 TABLET, EXTENDED RELEASE ORAL at 08:06

## 2022-01-01 RX ADMIN — HYDROCODONE BITARTRATE AND ACETAMINOPHEN 1 TABLET: 5; 325 TABLET ORAL at 21:30

## 2022-01-01 RX ADMIN — GUAIFENESIN 600 MG: 600 TABLET, EXTENDED RELEASE ORAL at 22:00

## 2022-01-01 RX ADMIN — CALCIUM ACETATE 1334 MG: 667 CAPSULE ORAL at 08:01

## 2022-01-01 RX ADMIN — CALCIUM ACETATE 1334 MG: 667 CAPSULE ORAL at 08:05

## 2022-01-01 RX ADMIN — TRAMADOL HYDROCHLORIDE 50 MG: 50 TABLET, COATED ORAL at 12:36

## 2022-01-01 RX ADMIN — DEXAMETHASONE SODIUM PHOSPHATE 6 MG: 4 INJECTION, SOLUTION INTRAMUSCULAR; INTRAVENOUS at 16:53

## 2022-01-01 RX ADMIN — Medication 200 MG: at 08:23

## 2022-01-01 RX ADMIN — HYDROCODONE BITARTRATE AND ACETAMINOPHEN 1 TABLET: 5; 325 TABLET ORAL at 11:30

## 2022-01-01 RX ADMIN — HYDRALAZINE HYDROCHLORIDE 50 MG: 25 TABLET ORAL at 14:47

## 2022-01-01 RX ADMIN — SEVELAMER HYDROCHLORIDE 1600 MG: 800 TABLET, FILM COATED PARENTERAL at 13:10

## 2022-01-01 RX ADMIN — Medication 5000 UNITS: at 08:35

## 2022-01-01 RX ADMIN — DOCUSATE SODIUM 100 MG: 100 CAPSULE, LIQUID FILLED ORAL at 10:24

## 2022-01-01 RX ADMIN — BENZONATATE 200 MG: 100 CAPSULE ORAL at 08:16

## 2022-01-01 RX ADMIN — ALLOPURINOL 200 MG: 100 TABLET ORAL at 15:48

## 2022-01-01 RX ADMIN — HEPARIN SODIUM 5000 UNITS: 5000 INJECTION INTRAVENOUS; SUBCUTANEOUS at 22:21

## 2022-01-01 RX ADMIN — SODIUM CHLORIDE SOLN NEBU 3% 4 ML: 3 NEBU SOLN at 07:17

## 2022-01-01 RX ADMIN — NYSTATIN 500000 UNITS: 100000 SUSPENSION ORAL at 11:01

## 2022-01-01 RX ADMIN — HYDRALAZINE HYDROCHLORIDE 25 MG: 25 TABLET ORAL at 21:15

## 2022-01-01 RX ADMIN — HEPARIN SODIUM 8.1 UNITS/KG/HR: 10000 INJECTION, SOLUTION INTRAVENOUS at 17:43

## 2022-01-01 RX ADMIN — CEFEPIME HYDROCHLORIDE 1000 MG: 1 INJECTION, SOLUTION INTRAVENOUS at 08:08

## 2022-01-01 RX ADMIN — CALCIUM ACETATE 1334 MG: 667 CAPSULE ORAL at 17:39

## 2022-01-01 RX ADMIN — OXYCODONE HYDROCHLORIDE AND ACETAMINOPHEN 500 MG: 500 TABLET ORAL at 17:00

## 2022-01-01 RX ADMIN — Medication 200 MG: at 08:37

## 2022-01-01 RX ADMIN — ZINC SULFATE 220 MG (50 MG) CAPSULE 220 MG: CAPSULE at 11:23

## 2022-01-01 RX ADMIN — FERROUS SULFATE TAB 325 MG (65 MG ELEMENTAL FE) 325 MG: 325 (65 FE) TAB at 08:18

## 2022-01-01 RX ADMIN — METOPROLOL SUCCINATE 50 MG: 50 TABLET, EXTENDED RELEASE ORAL at 08:17

## 2022-01-01 RX ADMIN — OXYCODONE HYDROCHLORIDE AND ACETAMINOPHEN 500 MG: 500 TABLET ORAL at 08:17

## 2022-01-01 RX ADMIN — HYDROCODONE BITARTRATE AND ACETAMINOPHEN 1 TABLET: 5; 325 TABLET ORAL at 06:28

## 2022-01-01 RX ADMIN — LEVALBUTEROL HYDROCHLORIDE 1.25 MG: 1.25 SOLUTION, CONCENTRATE RESPIRATORY (INHALATION) at 07:00

## 2022-01-01 RX ADMIN — LOSARTAN POTASSIUM 50 MG: 50 TABLET, FILM COATED ORAL at 13:11

## 2022-01-01 RX ADMIN — CALCIUM ACETATE 1334 MG: 667 CAPSULE ORAL at 07:53

## 2022-01-01 RX ADMIN — ALLOPURINOL 200 MG: 100 TABLET ORAL at 08:16

## 2022-01-01 RX ADMIN — LOSARTAN POTASSIUM 50 MG: 50 TABLET, FILM COATED ORAL at 21:38

## 2022-01-01 RX ADMIN — REMDESIVIR 100 MG: 100 INJECTION, POWDER, LYOPHILIZED, FOR SOLUTION INTRAVENOUS at 16:57

## 2022-01-01 RX ADMIN — HYDROMORPHONE HYDROCHLORIDE 0.5 MG: 1 INJECTION, SOLUTION INTRAMUSCULAR; INTRAVENOUS; SUBCUTANEOUS at 22:00

## 2022-01-01 RX ADMIN — ALLOPURINOL 200 MG: 100 TABLET ORAL at 13:10

## 2022-01-01 RX ADMIN — HYDRALAZINE HYDROCHLORIDE 100 MG: 25 TABLET ORAL at 17:22

## 2022-01-01 RX ADMIN — OXYCODONE HYDROCHLORIDE AND ACETAMINOPHEN 500 MG: 500 TABLET ORAL at 08:19

## 2022-01-01 RX ADMIN — LEVALBUTEROL HYDROCHLORIDE 1.25 MG: 1.25 SOLUTION, CONCENTRATE RESPIRATORY (INHALATION) at 19:58

## 2022-01-01 RX ADMIN — HYDRALAZINE HYDROCHLORIDE 25 MG: 25 TABLET ORAL at 21:38

## 2022-01-01 RX ADMIN — CALCIUM ACETATE 1334 MG: 667 CAPSULE ORAL at 08:22

## 2022-01-01 RX ADMIN — HYDRALAZINE HYDROCHLORIDE 25 MG: 25 TABLET ORAL at 06:21

## 2022-01-01 RX ADMIN — HYDROCODONE BITARTRATE AND HOMATROPINE METHYLBROMIDE 5 ML: 5; 1.5 SOLUTION ORAL at 17:10

## 2022-01-01 RX ADMIN — ACETAMINOPHEN 650 MG: 325 TABLET ORAL at 15:08

## 2022-01-01 RX ADMIN — FERROUS SULFATE TAB 325 MG (65 MG ELEMENTAL FE) 325 MG: 325 (65 FE) TAB at 08:03

## 2022-01-01 RX ADMIN — BENZONATATE 200 MG: 100 CAPSULE ORAL at 08:06

## 2022-01-01 RX ADMIN — DIPHENHYDRAMINE HYDROCHLORIDE 25 MG: 50 INJECTION, SOLUTION INTRAMUSCULAR; INTRAVENOUS at 05:48

## 2022-01-01 RX ADMIN — SEVELAMER HYDROCHLORIDE 1600 MG: 800 TABLET, FILM COATED PARENTERAL at 08:18

## 2022-01-01 RX ADMIN — Medication 200 MG: at 13:10

## 2022-01-01 RX ADMIN — DOXAZOSIN 2 MG: 4 TABLET ORAL at 08:05

## 2022-01-01 RX ADMIN — HYDRALAZINE HYDROCHLORIDE 100 MG: 25 TABLET ORAL at 14:20

## 2022-01-01 RX ADMIN — CALCIUM ACETATE 1334 MG: 667 CAPSULE ORAL at 16:08

## 2022-01-01 RX ADMIN — IPRATROPIUM BROMIDE 0.5 MG: 0.5 SOLUTION RESPIRATORY (INHALATION) at 07:15

## 2022-01-01 RX ADMIN — CALCIUM ACETATE 1334 MG: 667 CAPSULE ORAL at 13:10

## 2022-01-01 RX ADMIN — IPRATROPIUM BROMIDE 0.5 MG: 0.5 SOLUTION RESPIRATORY (INHALATION) at 07:41

## 2022-01-01 RX ADMIN — FERROUS SULFATE TAB 325 MG (65 MG ELEMENTAL FE) 325 MG: 325 (65 FE) TAB at 08:37

## 2022-01-01 RX ADMIN — SENNOSIDES 8.6 MG: 8.6 TABLET, FILM COATED ORAL at 21:09

## 2022-01-01 RX ADMIN — LOSARTAN POTASSIUM 50 MG: 50 TABLET, FILM COATED ORAL at 21:32

## 2022-01-01 RX ADMIN — METOPROLOL SUCCINATE 50 MG: 50 TABLET, EXTENDED RELEASE ORAL at 17:05

## 2022-01-01 RX ADMIN — GUAIFENESIN 600 MG: 600 TABLET, EXTENDED RELEASE ORAL at 21:09

## 2022-01-01 RX ADMIN — CALCIUM ACETATE 1334 MG: 667 CAPSULE ORAL at 12:00

## 2022-01-01 RX ADMIN — METOPROLOL SUCCINATE 50 MG: 50 TABLET, EXTENDED RELEASE ORAL at 19:22

## 2022-01-01 RX ADMIN — HYDRALAZINE HYDROCHLORIDE 25 MG: 25 TABLET ORAL at 21:54

## 2022-01-01 RX ADMIN — METOPROLOL SUCCINATE 50 MG: 50 TABLET, EXTENDED RELEASE ORAL at 17:00

## 2022-01-01 RX ADMIN — DOXAZOSIN 2 MG: 4 TABLET ORAL at 11:04

## 2022-01-01 RX ADMIN — MAGNESIUM OXIDE TAB 400 MG (241.3 MG ELEMENTAL MG) 400 MG: 400 (241.3 MG) TAB at 08:07

## 2022-01-01 RX ADMIN — HEPARIN SODIUM 5000 UNITS: 5000 INJECTION INTRAVENOUS; SUBCUTANEOUS at 14:51

## 2022-01-01 RX ADMIN — HYDROCODONE BITARTRATE AND HOMATROPINE METHYLBROMIDE 5 ML: 5; 1.5 SOLUTION ORAL at 06:29

## 2022-01-01 RX ADMIN — DEXAMETHASONE SODIUM PHOSPHATE 6 MG: 4 INJECTION, SOLUTION INTRAMUSCULAR; INTRAVENOUS at 14:41

## 2022-01-01 RX ADMIN — LEVALBUTEROL HYDROCHLORIDE 1.25 MG: 1.25 SOLUTION, CONCENTRATE RESPIRATORY (INHALATION) at 13:08

## 2022-01-01 RX ADMIN — REMDESIVIR 100 MG: 100 INJECTION, POWDER, LYOPHILIZED, FOR SOLUTION INTRAVENOUS at 18:01

## 2022-01-01 RX ADMIN — Medication 200 MG: at 11:03

## 2022-01-01 RX ADMIN — SEVELAMER HYDROCHLORIDE 800 MG: 800 TABLET, FILM COATED PARENTERAL at 11:30

## 2022-01-01 RX ADMIN — HYDRALAZINE HYDROCHLORIDE 25 MG: 25 TABLET ORAL at 05:33

## 2022-01-01 RX ADMIN — LEVALBUTEROL HYDROCHLORIDE 1.25 MG: 1.25 SOLUTION, CONCENTRATE RESPIRATORY (INHALATION) at 19:51

## 2022-01-01 RX ADMIN — LOSARTAN POTASSIUM 50 MG: 50 TABLET, FILM COATED ORAL at 11:04

## 2022-01-01 RX ADMIN — HYDRALAZINE HYDROCHLORIDE 100 MG: 25 TABLET ORAL at 20:53

## 2022-01-01 RX ADMIN — HYDROCODONE BITARTRATE AND ACETAMINOPHEN 1 TABLET: 5; 325 TABLET ORAL at 14:47

## 2022-01-01 RX ADMIN — HEPARIN SODIUM 5000 UNITS: 5000 INJECTION INTRAVENOUS; SUBCUTANEOUS at 14:31

## 2022-01-01 RX ADMIN — HYDROMORPHONE HYDROCHLORIDE 0.5 MG: 1 INJECTION, SOLUTION INTRAMUSCULAR; INTRAVENOUS; SUBCUTANEOUS at 17:10

## 2022-01-01 RX ADMIN — HYDROCODONE BITARTRATE AND ACETAMINOPHEN 1 TABLET: 5; 325 TABLET ORAL at 03:26

## 2022-01-01 RX ADMIN — HYDRALAZINE HYDROCHLORIDE 25 MG: 25 TABLET ORAL at 05:44

## 2022-01-01 RX ADMIN — IPRATROPIUM BROMIDE 0.5 MG: 0.5 SOLUTION RESPIRATORY (INHALATION) at 07:17

## 2022-01-01 RX ADMIN — CALCIUM ACETATE 1334 MG: 667 CAPSULE ORAL at 11:30

## 2022-01-01 RX ADMIN — HEPARIN SODIUM 5000 UNITS: 5000 INJECTION INTRAVENOUS; SUBCUTANEOUS at 22:00

## 2022-01-01 RX ADMIN — HEPARIN SODIUM 5000 UNITS: 5000 INJECTION INTRAVENOUS; SUBCUTANEOUS at 16:10

## 2022-01-01 RX ADMIN — OXYCODONE HYDROCHLORIDE AND ACETAMINOPHEN 500 MG: 500 TABLET ORAL at 17:49

## 2022-01-01 RX ADMIN — BENZONATATE 100 MG: 100 CAPSULE ORAL at 17:39

## 2022-01-01 RX ADMIN — HYDROCODONE BITARTRATE AND ACETAMINOPHEN 1 TABLET: 5; 325 TABLET ORAL at 18:06

## 2022-01-01 RX ADMIN — Medication 5000 UNITS: at 12:37

## 2022-01-01 RX ADMIN — METOPROLOL SUCCINATE 50 MG: 50 TABLET, EXTENDED RELEASE ORAL at 08:05

## 2022-01-01 RX ADMIN — SODIUM CHLORIDE SOLN NEBU 3% 4 ML: 3 NEBU SOLN at 13:09

## 2022-01-01 RX ADMIN — ACETAMINOPHEN 650 MG: 325 TABLET ORAL at 21:22

## 2022-01-01 RX ADMIN — IPRATROPIUM BROMIDE 0.5 MG: 0.5 SOLUTION RESPIRATORY (INHALATION) at 13:09

## 2022-01-01 RX ADMIN — BENZONATATE 200 MG: 100 CAPSULE ORAL at 08:02

## 2022-01-01 RX ADMIN — SEVELAMER HYDROCHLORIDE 1600 MG: 800 TABLET, FILM COATED PARENTERAL at 08:06

## 2022-01-01 RX ADMIN — SEVELAMER HYDROCHLORIDE 800 MG: 800 TABLET, FILM COATED PARENTERAL at 08:47

## 2022-01-01 RX ADMIN — OXYCODONE HYDROCHLORIDE AND ACETAMINOPHEN 500 MG: 500 TABLET ORAL at 08:06

## 2022-01-01 RX ADMIN — REMDESIVIR 200 MG: 100 INJECTION, POWDER, LYOPHILIZED, FOR SOLUTION INTRAVENOUS at 16:37

## 2022-01-01 RX ADMIN — HYDRALAZINE HYDROCHLORIDE 50 MG: 25 TABLET ORAL at 05:53

## 2022-01-01 RX ADMIN — IPRATROPIUM BROMIDE 0.5 MG: 0.5 SOLUTION RESPIRATORY (INHALATION) at 13:14

## 2022-01-01 RX ADMIN — TRIAMCINOLONE ACETONIDE 80 MG: 40 INJECTION, SUSPENSION INTRA-ARTICULAR; INTRAMUSCULAR at 08:55

## 2022-01-01 RX ADMIN — ACETAMINOPHEN 650 MG: 325 TABLET ORAL at 01:18

## 2022-01-01 RX ADMIN — HEPARIN SODIUM 5000 UNITS: 5000 INJECTION INTRAVENOUS; SUBCUTANEOUS at 21:26

## 2022-01-01 RX ADMIN — NYSTATIN 500000 UNITS: 100000 SUSPENSION ORAL at 17:34

## 2022-01-01 RX ADMIN — LOSARTAN POTASSIUM 50 MG: 50 TABLET, FILM COATED ORAL at 22:00

## 2022-01-01 RX ADMIN — Medication 200 MG: at 21:09

## 2022-01-01 RX ADMIN — LOSARTAN POTASSIUM 50 MG: 50 TABLET, FILM COATED ORAL at 14:47

## 2022-01-01 RX ADMIN — HYDRALAZINE HYDROCHLORIDE 50 MG: 25 TABLET ORAL at 06:16

## 2022-01-01 RX ADMIN — CEFEPIME HYDROCHLORIDE 1000 MG: 1 INJECTION, SOLUTION INTRAVENOUS at 08:36

## 2022-01-01 RX ADMIN — CALCIUM ACETATE 1334 MG: 667 CAPSULE ORAL at 17:49

## 2022-01-01 RX ADMIN — SEVELAMER HYDROCHLORIDE 800 MG: 800 TABLET, FILM COATED PARENTERAL at 17:00

## 2022-01-01 RX ADMIN — ALLOPURINOL 200 MG: 100 TABLET ORAL at 21:09

## 2022-01-01 RX ADMIN — SEVELAMER HYDROCHLORIDE 800 MG: 800 TABLET, FILM COATED PARENTERAL at 11:03

## 2022-01-01 RX ADMIN — CALCIUM ACETATE 1334 MG: 667 CAPSULE ORAL at 08:06

## 2022-01-01 RX ADMIN — HEPARIN SODIUM 5000 UNITS: 5000 INJECTION INTRAVENOUS; SUBCUTANEOUS at 21:15

## 2022-01-01 RX ADMIN — SEVELAMER HYDROCHLORIDE 1600 MG: 800 TABLET, FILM COATED PARENTERAL at 08:36

## 2022-01-01 RX ADMIN — SEVELAMER HYDROCHLORIDE 1600 MG: 800 TABLET, FILM COATED PARENTERAL at 13:26

## 2022-01-01 RX ADMIN — FOLIC ACID 1 MG: 1 TABLET ORAL at 13:11

## 2022-01-01 RX ADMIN — DEXAMETHASONE SODIUM PHOSPHATE 6 MG: 4 INJECTION, SOLUTION INTRAMUSCULAR; INTRAVENOUS at 15:13

## 2022-01-01 RX ADMIN — IOHEXOL 70 ML: 350 INJECTION, SOLUTION INTRAVENOUS at 05:43

## 2022-01-01 RX ADMIN — BENZONATATE 100 MG: 100 CAPSULE ORAL at 21:36

## 2022-01-01 RX ADMIN — SEVELAMER HYDROCHLORIDE 1600 MG: 800 TABLET, FILM COATED PARENTERAL at 08:28

## 2022-01-01 RX ADMIN — LEVALBUTEROL HYDROCHLORIDE 1.25 MG: 1.25 SOLUTION, CONCENTRATE RESPIRATORY (INHALATION) at 07:17

## 2022-01-01 RX ADMIN — HYDRALAZINE HYDROCHLORIDE 25 MG: 25 TABLET ORAL at 21:09

## 2022-01-01 RX ADMIN — Medication 5000 UNITS: at 08:02

## 2022-01-01 RX ADMIN — CALCIUM ACETATE 1334 MG: 667 CAPSULE ORAL at 17:02

## 2022-01-01 RX ADMIN — VANCOMYCIN HYDROCHLORIDE 1750 MG: 1 INJECTION, POWDER, LYOPHILIZED, FOR SOLUTION INTRAVENOUS at 10:22

## 2022-01-01 RX ADMIN — BENZONATATE 200 MG: 100 CAPSULE ORAL at 21:54

## 2022-01-03 PROBLEM — N40.0 BENIGN PROSTATIC HYPERPLASIA WITHOUT LOWER URINARY TRACT SYMPTOMS: Status: ACTIVE | Noted: 2018-08-29

## 2022-01-03 PROBLEM — E87.8 OTHER DISORDERS OF ELECTROLYTE AND FLUID BALANCE, NOT ELSEWHERE CLASSIFIED: Status: ACTIVE | Noted: 2018-08-29

## 2022-01-03 PROBLEM — I65.21 CAROTID STENOSIS, RIGHT: Status: ACTIVE | Noted: 2017-12-11

## 2022-01-03 PROBLEM — E87.79 OTHER FLUID OVERLOAD: Status: ACTIVE | Noted: 2020-05-28

## 2022-01-03 PROBLEM — I50.22 CHRONIC SYSTOLIC CONGESTIVE HEART FAILURE (HCC): Status: ACTIVE | Noted: 2018-06-01

## 2022-01-03 PROBLEM — E61.2 MAGNESIUM DEFICIENCY: Status: ACTIVE | Noted: 2018-08-29

## 2022-01-03 PROBLEM — J43.1 PANLOBULAR EMPHYSEMA (HCC): Status: ACTIVE | Noted: 2020-03-11

## 2022-01-03 PROBLEM — N20.0 KIDNEY STONE: Status: ACTIVE | Noted: 2018-08-29

## 2022-01-03 PROBLEM — E55.9 VITAMIN D DEFICIENCY: Status: ACTIVE | Noted: 2017-12-11

## 2022-01-03 PROBLEM — E83.30 DISORDER OF PHOSPHORUS METABOLISM, UNSPECIFIED: Status: ACTIVE | Noted: 2019-07-18

## 2022-01-03 PROBLEM — M1A.9XX1 CHRONIC GOUT, UNSPECIFIED, WITH TOPHUS (TOPHI): Status: ACTIVE | Noted: 2018-08-29

## 2022-01-03 PROBLEM — T82.590A: Status: ACTIVE | Noted: 2019-02-21

## 2022-01-03 PROBLEM — R94.39 ABNORMAL NUCLEAR STRESS TEST: Status: ACTIVE | Noted: 2020-03-18

## 2022-01-03 PROBLEM — D63.1 ANEMIA IN CHRONIC KIDNEY DISEASE: Status: ACTIVE | Noted: 2018-08-29

## 2022-01-03 PROBLEM — L98.9 SKIN LESION OF FACE: Status: ACTIVE | Noted: 2018-08-23

## 2022-01-03 PROBLEM — D50.9 IRON DEFICIENCY ANEMIA: Status: ACTIVE | Noted: 2018-08-29

## 2022-01-03 PROBLEM — E46 UNSPECIFIED PROTEIN-CALORIE MALNUTRITION (HCC): Status: ACTIVE | Noted: 2018-08-29

## 2022-01-03 PROBLEM — I25.10 ASCVD (ARTERIOSCLEROTIC CARDIOVASCULAR DISEASE): Status: ACTIVE | Noted: 2017-12-11

## 2022-01-03 PROBLEM — K21.9 GASTROESOPHAGEAL REFLUX DISEASE: Status: ACTIVE | Noted: 2017-12-11

## 2022-01-03 PROBLEM — R80.9 PROTEINURIA, UNSPECIFIED: Status: ACTIVE | Noted: 2018-08-29

## 2022-01-03 PROBLEM — Z87.891 PERSONAL HISTORY OF NICOTINE DEPENDENCE: Status: ACTIVE | Noted: 2018-08-29

## 2022-01-03 PROBLEM — N18.9 ANEMIA IN CHRONIC KIDNEY DISEASE: Status: ACTIVE | Noted: 2018-08-29

## 2022-01-04 PROBLEM — S72.002A CLOSED FRACTURE OF NECK OF LEFT FEMUR (HCC): Status: ACTIVE | Noted: 2022-01-01

## 2022-01-04 PROBLEM — R79.89 ELEVATED D-DIMER: Status: ACTIVE | Noted: 2022-01-01

## 2022-01-04 PROBLEM — K86.89 PANCREATIC MASS: Status: ACTIVE | Noted: 2022-01-01

## 2022-01-04 NOTE — ASSESSMENT & PLAN NOTE
Wt Readings from Last 3 Encounters:   01/03/22 113 kg (250 lb)   10/06/21 117 kg (258 lb 6 oz)   09/08/21 117 kg (257 lb)     Obtain daily weights, continue pre-hospital Toprol XL 50 mg p o  B i d

## 2022-01-04 NOTE — ED CARE HANDOFF
Emergency Department Sign Out Note        Sign out and transfer of care from [address] [place] [address]  See Separate Emergency Department note  The patient, Susan Hodges, was evaluated by the previous provider for Fall from standing  Workup Completed:  labs    ED Course / Workup Pending (followup):                                      ED Course as of 01/04/22 0125   Mon Jan 03, 2022 2108 Received in sign out:     Pt was a fall on [address]  Presented w/ left leg pain  Reported that he hit his head  Pt gets heparin during HD, so trauma eval was called  Fast exam was negative     [address] Radiologist  Possible fx of Left hip - will need MRI to further evaluate  Hypodensity of the pancreatitic head - is larger from prior MRI study   2221 CT CHEST, ABDOMEN AND PELVIS WITHOUT IV CONTRAST        LUNGS:  The trachea and central bronchial tree are patent  Atelectasis seen within the lung bases      PLEURA:  Unremarkable      HEART/GREAT VESSELS: Coronary artery calcifications  the heart is enlarged      MEDIASTINUM AND ROBINSON:  Unremarkable      CHEST WALL AND LOWER NECK:   Unremarkable      ABDOMEN     LIVER/BILIARY TREE:  Unremarkable      GALLBLADDER:  No calcified gallstones  No pericholecystic inflammatory change      SPLEEN:  Unremarkable      PANCREAS:  There is a 2 4 x 1 9 cm hypodense lesion seen within the pancreatic head     ADRENAL GLANDS:  Unremarkable      KIDNEYS/URETERS:  One or more simple renal cyst(s) is noted  Otherwise unremarkable kidneys  No hydronephrosis      STOMACH AND BOWEL:  There is colonic diverticulosis without evidence of acute diverticulitis      APPENDIX:  No findings to suggest appendicitis      ABDOMINOPELVIC CAVITY:  No ascites  No pneumoperitoneum    No lymphadenopathy      VESSELS:  Focal prominence of the infrarenal abdominal aorta is seen      PELVIS     REPRODUCTIVE ORGANS:  Unremarkable for patient's age      URINARY BLADDER:  Inflammatory changes around the urinary bladder are seen      ABDOMINAL WALL/INGUINAL REGIONS:  Unremarkable      OSSEOUS STRUCTURES:  Nondisplaced left femoral neck fracture      IMPRESSION:     Hypodense lesion within the pancreatic head more prominent compared to prior study  Further evaluation with a MRI/MRCP is recommended      Partially visualized nondisplaced left femoral neck fracture  Recommend MRI for further evaluation  2221 CT CERVICAL SPINE - WITHOUT CONTRAST     INDICATION:   TRAUMA      COMPARISON:  None      TECHNIQUE:  CT examination of the cervical spine was performed without intravenous contrast   Contiguous axial images were obtained  Sagittal and coronal reconstructions were performed        Radiation dose length product (DLP) for this visit:  483 mGy-cm   This examination, like all CT scans performed in the [facility], was performed utilizing techniques to minimize radiation dose exposure, including the use of iterative   reconstruction and automated exposure control        IMAGE QUALITY:  Diagnostic      FINDINGS:     ALIGNMENT:  Normal alignment of the cervical spine  Anterolisthesis of C3 on C4 and C4 on C5 is seen      VERTEBRAL BODIES:  No fracture      DEGENERATIVE CHANGES:  Moderate multilevel cervical degenerative changes are noted  No critical central canal stenosis      PREVERTEBRAL AND PARASPINAL SOFT TISSUES:  Unremarkable      THORACIC INLET:  Please refer to the concurrent chest, abdomen, and pelvic CT report for description of the thoracic inlet findings      IMPRESSION:     No cervical spine fracture or traumatic malalignment  2221 CT FACIAL BONES WITHOUT INTRAVENOUS CONTRAST     INDICATION:   TRAUMA      COMPARISON: None      TECHNIQUE:  Axial CT images were obtained through the facial bones with additional sagittal and coronal reconstructions       Radiation dose length product (DLP) for this visit:  353 mGy-cm     This examination, like all CT scans performed in the [facility], was performed utilizing techniques to minimize radiation dose exposure, including the use of iterative   reconstruction and automated exposure control        IMAGE QUALITY:  Diagnostic      FINDINGS:      FACIAL BONES:   No facial bone fracture identified  Normal alignment of the temporomandibular joints  No lytic or blastic lesion      ORBITS:  Orbital globes, optic nerves, and extraocular muscles appear symmetric and normal  There is no evidence of retrobulbar mass, abscess, or hematoma      SINUSES:  Normal      SOFT TISSUES:  Normal      IMPRESSION:     No evidence of acute traumatic injury to the facial bones       2221 CT BRAIN - WITHOUT CONTRAST     INDICATION:   TRAUMA      COMPARISON:  None      TECHNIQUE:  CT examination of the brain was performed  In addition to axial images, sagittal and coronal 2D reformatted images were created and submitted for interpretation      Radiation dose length product (DLP) for this visit:  887 mGy-cm   This examination, like all CT scans performed in the [facility], was performed utilizing techniques to minimize radiation dose exposure, including the use of iterative   reconstruction and automated exposure control        IMAGE QUALITY:  Diagnostic      FINDINGS:     PARENCHYMA: Decreased attenuation is noted in periventricular and subcortical white matter demonstrating an appearance that is statistically most likely to represent mild microangiopathic change      No CT signs of acute infarction  No intracranial mass, mass effect or midline shift  No acute parenchymal hemorrhage      VENTRICLES AND EXTRA-AXIAL SPACES:  Normal for the patient's age      VISUALIZED ORBITS AND PARANASAL SINUSES:  Unremarkable      CALVARIUM AND EXTRACRANIAL SOFT TISSUES:  Normal      IMPRESSION:     No acute intracranial abnormality     [address]   Will admit     Procedures  MDM        Disposition  Final diagnoses:   Closed fracture of left hip, initial encounter [facility])   [facility] stage renal disease) [facility]     Time reflects when diagnosis was documented in both MDM as applicable and the Disposition within this note     Time User Action Codes Description Comment    1/3/2022 10:44 PM Lissa Walters Add [S72 002A] Closed fracture of left hip, initial encounter ([place] [address] )     1/3/2022 10:44 PM Elizabeth Darby [N18 [id number]] ESRD (end stage renal disease) ([place] [address] )     1/3/2022 11:03 PM Saima Jones Add [K86 [id number]] Pancreatic mass       ED Disposition     ED Disposition Condition Date/Time Comment    Admit Stable Mon Mychal 3, 2022 10:44 PM Case was discussed with Desire Rascon and the patient's admission status was agreed to be Admission Status: observation status to the service of Dr Desire Rascon   Follow-up Information    None       Patient's Medications   Discharge Prescriptions    No medications on file     No discharge procedures on file         ED Provider  Electronically Signed by     Vida Giron MD  01/04/22 6515

## 2022-01-04 NOTE — ASSESSMENT & PLAN NOTE
Wt Readings from Last 3 Encounters:   01/03/22 113 kg (250 lb)   10/06/21 117 kg (258 lb 6 oz)   09/08/21 117 kg (257 lb)     · Volume management is with dialysis  · Continue Toprol-XL and losartan otherwise

## 2022-01-04 NOTE — ASSESSMENT & PLAN NOTE
Continue pre-hospital Toprol XL 50 mg p o  B i d , Cozaar 50 mg p o  Daily, hydralazine 100 mg p o  B i d  And Cardura 2 mg p o   Daily

## 2022-01-04 NOTE — CONSULTS
Consultation - [facility]) [facility]  Anabellelaila Harvey 76 y o  male MRN: [id number]  Unit/Bed#: ED 22 Encounter: [id number]        Consults    Reason for Consult / Principal Problem:     Pancreatic mass    ASSESSMENT AND PLAN:      Mr Darryle Rast is a 18-year-old male with PMH significant for ESRD on HD who presented to the ED for left lower extremity pain and swelling proceeded by a fall on Christmas [place], and was incidentally found to have a mass of his pancreatic head  CT scan revealed a hypodense lesion within the pancreatic head more prominent compared to prior study, measuring 2 4 x 1 9 cm  This is an increase in size from previous MRI on 05/29/2020 which showed a similar mass measuring 1 4 x 1 3 cm characterized as likely an intraductal papillary mucinous neoplasm  Denies any red flag symptoms  Patient has had MRCP to further characterize lesion, awaiting results  Of note, most recent CMP on 10/1/2021 with normal hepatic function including T  Bili  Will review results of MRCP to assess need for further workup possibly including outpatient EUS       ______________________________________________________________________    HPI: Patient is a 76 y o  male with PMH significant for ESRD on HD (MWF) who presented to the ED for left lower extremity pain and swelling preceded by a fall on Christmas Eve where he struck both his face and left knee  Patient was incidentally found to have a mass of his pancreatic head on CT scan, currently measuring 2 4 x 1 9 cm  Previous imaging including an MRI on 05/29/2020 showed a similar mass measuring 1 4 x 1 3 cm  This was noted to likely be an intraductal papillary mucinous neoplasm  It was recommended that patient have follow-up in 2 years  During questioning, patient does not recall being informed of this  He denies jaundice, unintentional weight loss, fevers/chills, or night sweats  Denies abdominal pain    Patient admits to regular bowel habits, having 1 large formed stool daily, but takes 2 binders to do so  Denies white or dang colored stools  CT chest abdomen pelvis wo contrast with results as follows:   IMPRESSION:  1  Hypodense lesion within the pancreatic head more prominent compared to prior study  Further evaluation with a MRI/MRCP is recommended  2  Partially visualized nondisplaced left femoral neck fracture  Recommend MRI for further evaluation  Patient has had MRCP to further characterize lesion  Awaiting results  REVIEW OF SYSTEMS:    CONSTITUTIONAL: Denies any fever, chills, rigors, and weight loss  HEENT: No earache or tinnitus  Denies hearing loss or visual disturbances  CARDIOVASCULAR: No chest pain or palpitations  RESPIRATORY: Denies any cough, hemoptysis, shortness of breath or dyspnea on exertion  GASTROINTESTINAL: As noted in the History of Present Illness  GENITOURINARY: No problems with urination  Denies any hematuria or dysuria  NEUROLOGIC: No dizziness or vertigo, denies headaches  MUSCULOSKELETAL: Denies any muscle or joint pain  SKIN: Denies skin rashes or itching  ENDOCRINE: Denies excessive thirst  Denies intolerance to heat or cold  PSYCHOSOCIAL: Denies depression or anxiety  Denies any recent memory loss  Historical Information   Past Medical History:   Diagnosis Date    Abdominal aortic aneurysm (AAA) ([facility] [address] )     s/p repair    Anemia     due to kidney disease    Arthritis     CHF (congestive heart failure) (HCC)     stable at present    Chronic kidney disease     on hemodialysis Tu Th Sat    Chronic pain disorder     knees    Edema     GERD (gastroesophageal reflux disease)     no meds     Gout     History of echocardiogram 03/02/2018    EF 55%, mild LVH, technically difficult study      Hyperlipidemia     borderline no meds    Hyperparathyroidism ([facility] [address] )     Hypertension     Kidney stone     Seasonal allergies     Shortness of breath     mild exertional    Skin cancer     Vitamin D deficiency Past Surgical History:   Procedure Laterality Date    ABDOMINAL AORTIC ANEURYSM REPAIR  2004    CARDIAC CATHETERIZATION  2020    no blockages    CARPAL TUNNEL RELEASE Right     COLONOSCOPY      DIALYSIS FISTULA CREATION Right 2017    GLAUCOMA SURGERY      LEG SURGERY      removal splinter    PARATHYROIDECTOMY      SPLIT THICKNESS SKIN GRAFT N/A 2019    Procedure: THIGH STSG;  Surgeon: Casie Munoz MD;  Location: [facility] MAIN OR;  Service: Plastics    SQUAMOUS CELL CARCINOMA EXCISION Right 2019    Procedure: REMOVE SKIN CA FROM EAR & CHEEK;  Surgeon: Casie Munoz MD;  Location: [facility] MAIN OR;  Service: Plastics     Social History   Social History     Substance and Sexual Activity   Alcohol Use Not Currently     Social History     Substance and Sexual Activity   Drug Use Never     Social History     Tobacco Use   Smoking Status Former Smoker    Types: Cigarettes    Quit date: 2004    Years since quittin 6   Smokeless Tobacco Never Used     Family History   Problem Relation Age of Onset    Kidney disease Mother     Leukemia Father        Meds/Allergies     (Not in a hospital admission)    Current Facility-Administered Medications   Medication Dose Route Frequency    acetaminophen (TYLENOL) tablet 650 mg  650 mg Oral Q6H PRN    allopurinol (ZYLOPRIM) tablet 200 mg  200 mg Oral Daily    calcium acetate (PHOSLO) capsule 1,334 mg  1,334 mg Oral TID With Meals    co-enzyme Q-10 capsule 200 mg  200 mg Oral Daily    doxazosin (CARDURA) tablet 2 mg  2 mg Oral Daily    heparin (porcine) 25,000 units in 0 45% NaCl 250 mL infusion (premix)  3-30 Units/kg/hr (Order-Specific) Intravenous Titrated    heparin (porcine) injection 4,400 Units  4,400 Units Intravenous Q1H PRN    heparin (porcine) injection 8,800 Units  8,800 Units Intravenous Q1H PRN    hydrALAZINE (APRESOLINE) tablet 100 mg  100 mg Oral BID    HYDROcodone-acetaminophen (NORCO) 5-325 mg per tablet 1 tablet  1 tablet Oral Q6H PRN    HYDROmorphone (DILAUDID) injection 1 mg  1 mg Intravenous Q4H PRN    losartan (COZAAR) tablet 50 mg  50 mg Oral Daily    magnesium oxide (MAG-OX) tablet 400 mg  400 mg Oral Daily    metoprolol succinate (TOPROL-XL) 24 hr tablet 50 mg  50 mg Oral BID    ondansetron (ZOFRAN) injection 4 mg  4 mg Intravenous Q6H PRN    sevelamer (RENAGEL) tablet 800 mg  800 mg Oral TID With Meals       Allergies   Allergen Reactions    Iodine - Food Allergy      Other reaction(s): Respiratory Distress  "swelling of throat"    Levofloxacin      Other reaction(s): Respiratory Distress  "swelling of throat"    Lovastatin      Muscle weakness    Medical Tape Other (See Comments)     Skin breaks down    Shellfish-Derived Products - Food Allergy            Objective     Blood pressure (!) 171/68, pulse (!) 53, temperature 97 5 °F (36 4 °C), temperature source Tympanic, resp  rate 16, height 5' 8" (1 727 m), weight 113 kg (250 lb), SpO2 98 %  Body mass index is 38 01 kg/m²  Intake/Output Summary (Last 24 hours) at 1/4/2022 1129  Last data filed at 1/4/2022 0209  Gross per 24 hour   Intake --   Output 50 ml   Net -50 ml         PHYSICAL EXAM:      General Appearance:   Alert, cooperative, no distress   HEENT:   Normocephalic, atraumatic, anicteric  Neck:  Supple, symmetrical, trachea midline   Lungs:   Clear to auscultation bilaterally; no rales, rhonchi or wheezing; respirations unlabored    Heart[de-identified]   Regular rate and rhythm; no murmur, rub, or gallop     Abdomen:   Soft, non-tender, non-distended; normal bowel sounds; no masses, no organomegaly    Genitalia:   Deferred    Rectal:   Deferred    Extremities:  No cyanosis, clubbing or edema    Pulses:  2+ and symmetric all extremities    Skin:  No jaundice, rashes, or lesions    Lymph nodes:  No palpable cervical lymphadenopathy        Lab Results:   Admission on 01/03/2022   Component Date Value    WBC 01/03/2022 7 26     RBC 01/03/2022 2 67*    Hemoglobin 01/03/2022 8 7*    Hematocrit 01/03/2022 26 2*    MCV 01/03/2022 98     MCH 01/03/2022 32 6     MCHC 01/03/2022 33 2     RDW 01/03/2022 13 2     MPV 01/03/2022 9 1     Platelets 69/79/0988 263     nRBC 01/03/2022 0     Neutrophils Relative 01/03/2022 77*    Immat GRANS % 01/03/2022 0     Lymphocytes Relative 01/03/2022 11*    Monocytes Relative 01/03/2022 11     Eosinophils Relative 01/03/2022 1     Basophils Relative 01/03/2022 0     Neutrophils Absolute 01/03/2022 5 56     Immature Grans Absolute 01/03/2022 0 03     Lymphocytes Absolute 01/03/2022 0 80     Monocytes Absolute 01/03/2022 0 81     Eosinophils Absolute 01/03/2022 0 04     Basophils Absolute 01/03/2022 0 02     Protime 01/03/2022 13 2     INR 01/03/2022 1 01     PTT 01/03/2022 38*    Sodium 01/03/2022 131*    Potassium 01/03/2022 3 9     Chloride 01/03/2022 90*    CO2 01/03/2022 28     ANION GAP 01/03/2022 13     BUN 01/03/2022 47*    Creatinine 01/03/2022 6 09*    Glucose 01/03/2022 93     Calcium 01/03/2022 8 5     eGFR 01/03/2022 8     D-Dimer, Quant 01/03/2022 3 55*    Sodium 01/04/2022 131*    Potassium 01/04/2022 3 6     Chloride 01/04/2022 91*    CO2 01/04/2022 30     ANION GAP 01/04/2022 10     BUN 01/04/2022 55*    Creatinine 01/04/2022 7 16*    Glucose 01/04/2022 95     Calcium 01/04/2022 8 2*    eGFR 01/04/2022 6     WBC 01/04/2022 6 44     RBC 01/04/2022 2 51*    Hemoglobin 01/04/2022 8 3*    Hematocrit 01/04/2022 25 1*    MCV 01/04/2022 100*    MCH 01/04/2022 33 1     MCHC 01/04/2022 33 1     RDW 01/04/2022 13 3     Platelets 30/75/2337 244     MPV 01/04/2022 9 6        Imaging Studies: I have personally reviewed pertinent imaging studies

## 2022-01-04 NOTE — ASSESSMENT & PLAN NOTE
· This condition has been ruled out  · Left lower extremity pain is secondary to musculoskeletal reasons  · MRI of the left hip as follows:-No acute fracture in the left hip  Moderate bilateral hip osteoarthritis    Okay for activity as tolerated  · Appreciate orthopedic surgical input  · Status post a PT and OT evaluation-they recommend short-term rehab  · Case management is working on rehab placement options

## 2022-01-04 NOTE — CASE MANAGEMENT
Case Management Assessment & Discharge Planning Note    Patient name Tamela Breaux  Location ED 22/ED 22 MRN [id number]  : 1945 Date 2022       Current Admission Date: 1/3/2022  Current Admission Diagnosis:Closed fracture of neck of left femur [facility])   Patient Active Problem List    Diagnosis Date Noted    Elevated d-dimer 2022    Pancreatic mass 2022    Closed fracture of neck of left femur ([place] [address] ) 2022    Primary osteoarthritis of both knees 2021    Hyperlipidemia 2021    Class 2 severe obesity due to excess calories with serious comorbidity and body mass index (BMI) of 39 0 to 39 9 in adult [facility]) 2021    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 ([place] [address] ) 2021    Secondary hyperparathyroidism of renal origin ([place] [address] ) 2021    Chronic renal failure 2021    Other fluid overload 2020    Abnormal nuclear stress test 2020    Panlobular emphysema ([place] [address] ) 2020    Disorder of phosphorus metabolism, unspecified 2019    Essential hypertension 2019    Hemodialysis-associated hypotension 2019    AAA (abdominal aortic aneurysm) ([place] [address] ) 2019    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, initial encounter ([place] [address] ) 2019    Anemia in chronic kidney disease 2018    Benign prostatic hyperplasia without lower urinary tract symptoms 2018    Chronic gout, unspecified, with tophus (tophi) 2018    Iron deficiency anemia 2018    Kidney stone 2018    Magnesium deficiency 2018    Other disorders of electrolyte and fluid balance, not elsewhere classified 2018    Personal history of nicotine dependence 2018    Proteinuria, unspecified 2018    Unspecified protein-calorie malnutrition ([facility] ) 2018    Skin lesion of face 2018    Chronic systolic congestive heart failure ([place] [address] ) 2018    ASCVD (arteriosclerotic cardiovascular disease) 12/11/2017    Carotid stenosis, right 12/11/2017    Gastroesophageal reflux disease 12/11/2017    Vitamin D deficiency 12/11/2017    Osteoarthritis of both knees 09/02/2015      LOS (days): 0  Geometric Mean LOS (GMLOS) (days):   Days to GMLOS:     OBJECTIVE:              Current admission status: Observation       Preferred Pharmacy:   [facility]/pharmacy #[id number]- [place] [address]  [address] [place] [address], [address]  Phone: [phone] Fax: [phone] [facility] [address] [address]  Phone: [phone] Fax: [phone]    Primary Care Provider: Nagi Gil DO    Primary Insurance: [facility]  Secondary Insurance:     ASSESSMENT:  [address] [facility], [address] [facility] - Spouse   Primary Phone: [phone] (Mobile)               Advance Directives  Does patient have a [address]?: Yes  Does patient have Advance Directives?: Yes  Advance Directives: Living will,Power of  for health care,Power of  for finance  Primary Contact: Patient's Wife Maria T Frances    Readmission Root Cause  30 Day Readmission: No    Patient Information  Admitted from[de-identified] Home  Mental Status: Alert  During Assessment patient was accompanied by: Not accompanied during assessment  Assessment information provided by[de-identified] Spouse  Primary Caregiver: Self  Support Systems: Family members,Spouse/significant other  [place] of Residence: [address] [place] [address] Avenue do you live in?: [email or web address] entry access options   Select all that apply : Stairs  Number of steps to enter home : 3  Do the steps have railings?: Yes  Type of Current Residence: [address] [place] [address] home  Upon entering residence, is there a bedroom on the main floor (no further steps)?: Yes  Upon entering residence, is there a bathroom on the main floor (no further steps)?: Yes  In the last 12 months, was there a time when you were not able to pay the mortgage or rent on time?: No  In the last 12 months, how many places have you lived?: 1  In the last 12 months, was there a time when you did not have a steady place to sleep or slept in a shelter (including now)?: No  Homeless/housing insecurity resource given?: N/A  Living Arrangements: Lives w/ Spouse/significant other  Is patient a ?: No    Activities of Daily Living Prior to Admission  Functional Status: Independent  Completes ADLs independently?: Yes  Ambulates independently?: Yes  Does patient use assisted devices?: Yes  Assisted Devices (DME) used: [employer]  Does patient currently own DME?: Yes  What DME does the patient currently own?: [employer]  Does patient have a history of Outpatient Therapy (PT/OT)?: No  Does the patient have a history of Short-Term Rehab?: No  Does patient have a history of HHC?: No  Does patient currently have [facility]?: No    Patient Information Continued  Income Source: Pension/FDC  Does patient have prescription coverage?: Yes (Patient uses [facility] in [place], and his wife denied any barriers to obtaining or affording prescriptions )  Within the past 12 months, you worried that your food would run out before you got the money to buy more : Never true  Within the past 12 months, the food you bought just didnt last and you didnt have money to get more : Never true  Food insecurity resource given?: N/A  Does patient receive dialysis treatments?: Yes (Patient does HD at [facility] in [address] [facility] on [address]TS at 11:00   A family friend provides transportation to and from )  Does patient have a history of substance abuse?: No  Does patient have a history of Mental Health Diagnosis?: No    Means of Transportation  Means of Transport to Appts[de-identified] Drives Self  In the past 12 months, has lack of transportation kept you from medical appointments or from getting medications?: No  In the past 12 months, has lack of transportation kept you from meetings, work, or from getting things needed for daily living?: No  Was application for public transport provided?: N/A    DISCHARGE DETAILS:    Discharge planning discussed with[de-identified] Patient's Wife  Freedom of Choice: Yes  Comments - Freedom of Choice: Wife denying any needs at this time and asked to discuss STR vs  HHC at a later time  Wife asked that CM follows up with patient once recommendations have been made    CM contacted family/caregiver?: Yes  Were Treatment Team discharge recommendations reviewed with patient/caregiver?: Yes  Did patient/caregiver verbalize understanding of patient care needs?: Yes  Were patient/caregiver advised of the risks associated with not following Treatment Team discharge recommendations?: Yes    Contacts  Patient Contacts: Susan Sepulveda  Relationship to Patient[de-identified] Family  Contact Method: Phone  Phone Number: [phone]  Reason/Outcome: Continuity of [address]         Is the patient interested in [place] [address] at discharge?: No    DME Referral Provided  Referral made for DME?: No    Would you like to participate in our [address] service program?  : No - Declined    Treatment Team Recommendation: Home  Discharge Destination Plan[de-identified] Home  Transport at Discharge : Family

## 2022-01-04 NOTE — OCCUPATIONAL THERAPY NOTE
Occupational Therapy Cancellation Note     Patient Name: Shavonne ALONZO Date: 1/4/2022  Problem List  Principal Problem:    Closed fracture of neck of left femur ([address] )  Active Problems:    Essential hypertension    Class 2 severe obesity due to excess calories with serious comorbidity and body mass index (BMI) of 39 0 to 39 9 in adult [facility])    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 [facility])    Chronic systolic congestive heart failure (HCC)    Magnesium deficiency    Elevated d-dimer    Pancreatic mass          01/04/22 1315   OT Last Visit   OT Visit Date 01/04/22   Note Type   Note type Evaluation   Cancel Reasons Patient off floor/test       OT order received and chart review performed  At this time, OT evaluation cancelled due to patient off floor at [facility]  OT will follow and evaluate as appropriate        GILBERTO Moura, OTR/L

## 2022-01-04 NOTE — PHYSICAL THERAPY NOTE
Physical Therapy Cancellation Note       01/04/22 1316   PT Last Visit   PT Visit Date 01/04/22   Note Type   Note type Evaluation   Cancel Reasons Patient off floor/test  (pt off floor at HD)       PT order received  Chart review performed  At this time, PT evaluation cancelled as patient is off the floor for HD session  PT will follow and evaluate as appropriate      Shelia Nam, PT

## 2022-01-04 NOTE — H&P
[address]  H&P- Zander Perches 1945, 76 y o  male MRN: [id number]  Unit/Bed#: Z1 H3 Encounter: [id number]  Primary Care Provider: Maryuri Harris DO   Date and time admitted to hospital: 1/3/2022  6:54 PM    * Closed fracture of neck of left femur [facility])  Assessment & Plan  · Placed in observation Medicine  · Questionable over read on CT scan  · Will obtain MRI in a m  · Make patient NPO  · Consult orthopedic surgery  · Give pain control p r n  · Consult PT OT    Pancreatic mass  Assessment & Plan  · Noted on previous imaging though increasing in size  · Will obtain MRCP  · Consult GI in a m  Elevated d-dimer  Assessment & Plan  · Will place on VTE protocol heparin drip  · Obtain venous duplex extended name    Magnesium deficiency  Assessment & Plan  New pre-hospital magnesium oxide 400 mg p o  Daily    Chronic systolic congestive heart failure [facility])  Assessment & Plan  Wt Readings from Last 3 Encounters:   01/03/22 113 kg (250 lb)   10/06/21 117 kg (258 lb 6 oz)   09/08/21 117 kg (257 lb)     Obtain daily weights, continue pre-hospital Toprol XL 50 mg p o  B i d  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 [facility])  Assessment & Plan  Lab Results   Component Value Date    EGFR 8 01/03/2022    EGFR 10 10/01/2021    EGFR 8 04/02/2018    CREATININE 6 09 (H) 01/03/2022    CREATININE 5 15 (H) 10/01/2021    CREATININE 6 08 (H) 04/02/2018     Will continue pre-hospital PhosLo and Renagel consult Nephrology in a m  Class 2 severe obesity due to excess calories with serious comorbidity and body mass index (BMI) of 39 0 to 39 9 in adult [facility])  Assessment & Plan  Encourage healthy weight loss and supportive care    Essential hypertension  Assessment & Plan  Continue pre-hospital Toprol XL 50 mg p o  B i d , Cozaar 50 mg p o  Daily, hydralazine 100 mg p o  B i d  And Cardura 2 mg p o   Daily    VTE Prophylaxis: Enoxaparin (Lovenox)  Code Status:  Level 1  POLST: There is no POLST form on file for this patient (pre-hospital)  Discussion with family:  None present at bedside at time of exam    Anticipated Length of Stay:  Patient will be admitted on an Observation basis with an anticipated length of stay of  < 2 midnights  Justification for Hospital Stay:  Closed fracture of left femoral neck requiring further evaluation, elevated D-dimer rule out DVT requiring further evaluation    Chief Complaint:   Left leg pain and swelling x1 day    History of Present Illness:    Anabelle Harvey is a 76 y o  male who presents with left leg pain and swelling x1 day  Patient presents ER for further evaluation treatment is 1 day history of left leg pain and swelling  Patient states that he noticed some increased swelling of his left lower extremity this been ongoing the past several days  Patient states that he fell on [employer] and struck his face and left knee but denies falling on his hip and has been able to bear weight since his fall  Patient states last evening he was hanging up code closet when he felt a pop behind his left knee experience pain that radiated down into his foot as well as up the back of his left thigh  Patient denies any fever chills, no further trauma, patient states his fall on [employer] was secondary to losing his balance and denies any loss of consciousness or further trauma  Review of Systems:  Review of Systems   Constitutional: Negative for chills and fever  Respiratory: Negative for cough, shortness of breath and wheezing  Cardiovascular: Positive for leg swelling  Negative for chest pain and palpitations  Gastrointestinal: Negative for abdominal pain, diarrhea, nausea and vomiting  Genitourinary: Negative for dysuria, frequency, hematuria and urgency  Musculoskeletal: Positive for arthralgias, gait problem and myalgias  Neurological: Negative for weakness, light-headedness and headaches     All other systems reviewed and are negative  Past Medical and Surgical History:   Past Medical History:   Diagnosis Date    Abdominal aortic aneurysm (AAA) ([facility] [address] )     s/p repair    Anemia     due to kidney disease    Arthritis     CH[facility]congestive heart failure) [facility])     stable at present    Chronic kidney disease     on hemodialysis Tu Th Sat    Chronic pain disorder     knees    Edema     GERD (gastroesophageal reflux disease)     no meds     Gout     History of echocardiogram 03/02/2018    EF 55%, mild LVH, technically difficult study   Hyperlipidemia     borderline no meds    Hyperparathyroidism ([facility] [address] )     Hypertension     Kidney stone     Seasonal allergies     Shortness of breath     mild exertional    Skin cancer     Vitamin D deficiency        Past Surgical History:   Procedure Laterality Date    ABDOMINAL AORTIC ANEURYSM REPAIR  2004    CARDIAC CATHETERIZATION  05/01/2020    no blockages    CARPAL TUNNEL RELEASE Right     COLONOSCOPY      DIALYSIS FISTULA CREATION Right 2017    GLAUCOMA SURGERY      LEG SURGERY      removal splinter    PARATHYROIDECTOMY      SPLIT THICKNESS SKIN GRAFT N/A 12/4/2019    Procedure: THIGH STSG;  Surgeon: Richard Patel MD;  Location: [address] [place] [address];  Service: Plastics    SQUAMOUS CELL CARCINOMA EXCISION Right 12/4/2019    Procedure: REMOVE SKIN CA FROM EAR & CHEEK;  Surgeon: Richard Patel MD;  Location: [address] [place] [address];  Service: Plastics       Meds/Allergies:  Prior to Admission medications    Medication Sig Start Date End Date Taking?  Authorizing Provider   Acetaminophen 325 MG CAPS Take 650 mg by mouth 7/1/21 6/30/22  Historical Provider, MD   calcium acetate (PHOSLO) capsule 667 mg Take 2 capsules by oral route before each meal  4/6/19   Historical Provider, MD   candesartan (ATACAND) 8 MG tablet Take 8 mg by mouth daily at bedtime  3/5/19   Historical Provider, MD   Coenzyme Q-10 200 MG CAPS Take 200 mg by mouth daily    Historical Provider, MD   doxazosin (CARDURA) 2 mg tablet Take 2 mg by mouth daily At lunch time 19   Historical Provider, MD   hydrALAZINE (APRESOLINE) 100 MG tablet 100 mg Take 1 tablet twice a day; 1/2 tablet in the morning and 1 in the evening on dialysis days  19   Historical Provider, MD   magnesium oxide (MAG-OX) 400 mg tablet Take 400 mg by mouth daily     Historical Provider, MD   Methoxy PEG-Epoetin Beta (MIRCERA IJ) 75 mcg Once every four weeks 21  Historical Provider, MD   metoprolol succinate (TOPROL-XL) 50 mg 24 hr tablet Take 50 mg by mouth 2 (two) times a day 19   Historical Provider, MD   Red Yeast Rice Extract (RED YEAST RICE PO) Take 1,200 mg by mouth daily    Historical Provider, MD   sevelamer carbonate (RENVELA) 800 mg tablet TAKE 2 TABLET BY MOUTH THREE TIMES A DAY WITH MEALS 1 WITH SNACKS 21   Historical Provider, MD   ULORIC 80 MG TABS Take 80 mg by mouth daily with dinner 3/11/19   Historical Provider, MD     I have reviewed home medications with patient personally  Allergies:    Allergies   Allergen Reactions    Iodine - Food Allergy      Other reaction(s): Respiratory Distress  "swelling of throat"    Levofloxacin      Other reaction(s): Respiratory Distress  "swelling of throat"    Lovastatin      Muscle weakness    Medical Tape Other (See Comments)     Skin breaks down    Shellfish-Derived Products - Food Allergy        Social History:  Marital Status: /Civil Union   Occupation:  Retired  Patient Pre-hospital Living Situation:  Resides at home with wife  Patient Pre-hospital Level of Mobility:  Full with assist of a walker and sometimes cane  Patient Pre-hospital Diet Restrictions:  None  Substance Use History:   Social History     Substance and Sexual Activity   Alcohol Use Not Currently     Social History     Tobacco Use   Smoking Status Former Smoker    Types: Cigarettes    Quit date: 2004    Years since quittin 6   Smokeless Tobacco Never Used     Social History Substance and Sexual Activity   Drug Use Never       Family History:  I have reviewed the patients family history    Physical Exam:   Vitals:   Blood Pressure: 144/58 (01/04/22 0200)  Pulse: 58 (01/04/22 0200)  Temperature: 97 5 °F (36 4 °C) (01/03/22 1900)  Temp Source: Tympanic (01/03/22 1900)  Respirations: 16 (01/04/22 0200)  Height: 5' 8" (172 7 cm) (01/03/22 1900)  Weight - Scale: 113 kg (250 lb) (01/03/22 1900)  SpO2: 97 % (01/04/22 0200)    Physical Exam  Vitals and nursing note reviewed  Constitutional:       General: He is not in acute distress  Appearance: Normal appearance  He is obese  HENT:      Head: Normocephalic  Comments: Left periorbital ecchymosis     Right Ear: Tympanic membrane normal       Left Ear: Tympanic membrane normal       Nose: Nose normal       Mouth/Throat:      Mouth: Mucous membranes are moist       Pharynx: No oropharyngeal exudate or posterior oropharyngeal erythema  Eyes:      Extraocular Movements: Extraocular movements intact  Pupils: Pupils are equal, round, and reactive to light  Cardiovascular:      Rate and Rhythm: Normal rate and regular rhythm  Pulses: Normal pulses  Heart sounds: Normal heart sounds  Pulmonary:      Effort: Pulmonary effort is normal  No respiratory distress  Breath sounds: Normal breath sounds  Abdominal:      General: Abdomen is flat  Bowel sounds are normal       Palpations: Abdomen is soft  Musculoskeletal:         General: Tenderness present  Normal range of motion  Cervical back: Normal range of motion and neck supple  Right lower leg: No edema  Left lower leg: Edema present  Comments: Left lower extremity tenderness palpation and Homans sign positive   Skin:     General: Skin is warm and dry  Capillary Refill: Capillary refill takes less than 2 seconds  Neurological:      General: No focal deficit present        Mental Status: He is alert and oriented to person, place, and time          Additional Data:   Lab Results: I have personally reviewed pertinent reports  Results from last 7 days   Lab Units 01/03/22 2011   WBC Thousand/uL 7 26   HEMOGLOBIN g/dL 8 7*   HEMATOCRIT % 26 2*   PLATELETS Thousands/uL 263   NEUTROS PCT % 77*   LYMPHS PCT % 11*   MONOS PCT % 11   EOS PCT % 1     Results from last 7 days   Lab Units 01/03/22 2011   SODIUM mmol/L 131*   POTASSIUM mmol/L 3 9   CHLORIDE mmol/L 90*   CO2 mmol/L 28   BUN mg/dL 47*   CREATININE mg/dL 6 09*   CALCIUM mg/dL 8 5     Results from last 7 days   Lab Units 01/03/22 2011   INR  1 01               Imaging: I have personally reviewed pertinent reports  TRAUMA - CT head wo contrast   Final Result by Eugenia Cox DO (01/03 2144)      No acute intracranial abnormality  Workstation performed: [id number]         TRAUMA - CT spine cervical wo contrast   Final Result by Eugenia Cox DO (01/03 2152)      No cervical spine fracture or traumatic malalignment  Workstation performed: [id number]         TRAUMA - CT facial bones wo contrast   Final Result by Eugenia Cox DO (01/03 2150)      No evidence of acute traumatic injury to the facial bones  Workstation performed: [id number]         CT chest abdomen pelvis wo contrast   Final Result by Eugenia Cox DO (01/03 2219)      Hypodense lesion within the pancreatic head more prominent compared to prior study  Further evaluation with a MRI/MRCP is recommended  Partially visualized nondisplaced left femoral neck fracture  Recommend MRI for further evaluation         I personally discussed this study with Braden Beckett on 1/3/2022 at 10:03 PM                            Workstation performed: [id number]         XR Trauma chest portable    (Results Pending)   VAS lower limb venous duplex study, unilateral/limited    (Results Pending)   XR knee 4+ vw left injury    (Results Pending)   MRI inpatient order    (Results Pending)   VAS lower limb venous duplex study, complete bilateral    (Results Pending)       EKG, Pathology, and Other Studies Reviewed on Admission:   · EKG: N/A    Epic Records Reviewed: Yes     ** Please Note: This note has been constructed using a voice recognition system   **

## 2022-01-04 NOTE — ED PROVIDER NOTES
Emergency Department Trauma Note  Dania Hall 76 y o  male MRN: [id number]  Unit/Bed#: Z1 H3/Z1 H3 Encounter: [id number]      Trauma Alert: Trauma Acuity: Trauma Evaluation  Model of Arrival: Mode of Arrival: Direct from scene via    Trauma Team: Current Providers  Attending Provider: Beni Huerta DO  Attending Provider: Jayshree Montague MD  Registered Nurse: Devi Temple RN  Physician Assistant: Charles Sandra PA-C  Consultants: None      History of Present Illness     Chief Complaint:   Chief Complaint   Patient presents with    Knee Pain     patient fell on christmas eve and today was putting wash away and heard a pop in his left knee and now theres sharp pain and swelling  cannot bear weight     HPI:  Dania Hall is a 76 y o  male who presents with fall  Mechanism:Details of Incident: pt fell at home TEN days ago  complaining of knee pain  on thinners  old facial bruising          68-year-old male history of end-stage renal disease on Monday Wednesday Friday dialysis presents accompanied by his wife complaining of left knee pain  Patient notes that he fell approximately 10 days ago on Christmas Eve  Patient describes a mechanical fall, tripping striking the left side of his head without loss of consciousness  Patient received heparin at dialysis  He states that he does not believe that he injured his head, neck or back but there is ecchymosis to the left side of the patient's face  Patient notes that he has been having increased left lower leg edema over the past few weeks  States he was reaching up in a closet today, felt a sharp shooting pain in the posterior aspect of his right knee going through his calf and up through the back of his left leg  Patient reports that is so painful he is unable to ambulate  Has not taken anything for symptoms  Denies significant alleviating or exacerbating factors    Denies any chest pain, shortness of breath, pleuritic pain or any other complaints at this time  Patient denies any known history of DVT/PE  Review of Systems   Constitutional: Negative for chills, fatigue and fever  HENT: Negative for congestion and sore throat  Eyes: Negative for pain  Respiratory: Negative for cough, chest tightness, shortness of breath and wheezing  Cardiovascular: Positive for leg swelling  Negative for chest pain and palpitations  Gastrointestinal: Negative for abdominal pain, constipation, diarrhea, nausea and vomiting  Endocrine: Negative for polyuria  Genitourinary: Negative for dysuria  Musculoskeletal: Positive for arthralgias, gait problem and myalgias  Negative for back pain and neck pain  Skin: Negative for rash  Neurological: Negative for dizziness, syncope, light-headedness and headaches  All other systems reviewed and are negative  Historical Information     Immunizations:   Immunization History   Administered Date(s) Administered    COVID-19 MODERNA VACC 0 5 ML IM 03/03/2021, 03/31/2021    Hep B, adult 09/18/2018, 10/16/2018, 11/19/2018, 03/21/2019, 06/18/2019, 07/16/2019, 08/20/2019, 12/17/2019    INFLUENZA 10/05/2019    Influenza Split High Dose Preservative Free IM 12/11/2017    Influenza, high dose seasonal 0 7 mL 09/22/2020    Influenza, seasonal, injectable, preservative free 10/06/2015    Pneumococcal Conjugate 13-Valent 12/11/2017    Pneumococcal Polysaccharide PPV23 09/11/2018       Past Medical History:   Diagnosis Date    Abdominal aortic aneurysm (AAA) ([facility] [address] )     s/p repair    Anemia     due to kidney disease    Arthritis     CHF (congestive heart failure) (HCC)     stable at present    Chronic kidney disease     on hemodialysis Tu Th Sat    Chronic pain disorder     knees    Edema     GERD (gastroesophageal reflux disease)     no meds     Gout     History of echocardiogram 03/02/2018    EF 55%, mild LVH, technically difficult study      Hyperlipidemia     borderline no meds  Hyperparathyroidism ([address] )     Hypertension     Kidney stone     Seasonal allergies     Shortness of breath     mild exertional    Skin cancer     Vitamin D deficiency        Family History   Problem Relation Age of Onset    Kidney disease Mother     Leukemia Father      Past Surgical History:   Procedure Laterality Date    ABDOMINAL AORTIC ANEURYSM REPAIR  2004    CARDIAC CATHETERIZATION  2020    no blockages    CARPAL TUNNEL RELEASE Right     COLONOSCOPY      DIALYSIS FISTULA CREATION Right 2017    GLAUCOMA SURGERY      LEG SURGERY      removal splinter    PARATHYROIDECTOMY      SPLIT THICKNESS SKIN GRAFT N/A 2019    Procedure: THIGH STSG;  Surgeon: Daryle Bottoms, MD;  Location: [address] [place] [address];  Service: Plastics    SQUAMOUS CELL CARCINOMA EXCISION Right 2019    Procedure: REMOVE SKIN CA FROM EAR & CHEEK;  Surgeon: Daryle Bottoms, MD;  Location: [address] [place] [address];  Service: Plastics     Social History     Tobacco Use    Smoking status: Former Smoker     Types: Cigarettes     Quit date: 2004     Years since quittin 6    Smokeless tobacco: Never Used   Vaping Use    Vaping Use: Never used   Substance Use Topics    Alcohol use: Not Currently    Drug use: Never     E-Cigarette/Vaping    E-Cigarette Use Never User      E-Cigarette/Vaping Substances    Nicotine No     THC No     CBD No     Flavoring No     Other No     Unknown No        Family History: non-contributory    Meds/Allergies   Prior to Admission Medications   Prescriptions Last Dose Informant Patient Reported? Taking?    Acetaminophen 325 MG CAPS  Self Yes No   Sig: Take 650 mg by mouth   Coenzyme Q-10 200 MG CAPS  Self Yes No   Sig: Take 200 mg by mouth daily   Methoxy PEG-Epoetin Beta (MIRCERA IJ)  Self Yes No   Si mcg Once every four weeks   Red Yeast Rice Extract (RED YEAST RICE PO)  Self Yes No   Sig: Take 1,200 mg by mouth daily   ULORIC 80 MG TABS  Self Yes No   Sig: Take 80 mg by mouth daily with dinner   calcium acetate (PHOSLO) capsule  Self Yes No   Si mg Take 2 capsules by oral route before each meal    candesartan (ATACAND) 8 MG tablet  Self Yes No   Sig: Take 8 mg by mouth daily at bedtime    doxazosin (CARDURA) 2 mg tablet  Self Yes No   Sig: Take 2 mg by mouth daily At lunch time   hydrALAZINE (APRESOLINE) 100 MG tablet  Self Yes No   Si mg Take 1 tablet twice a day; 1/2 tablet in the morning and 1 in the evening on dialysis days  magnesium oxide (MAG-OX) 400 mg tablet  Self Yes No   Sig: Take 400 mg by mouth daily    metoprolol succinate (TOPROL-XL) 50 mg 24 hr tablet  Self Yes No   Sig: Take 50 mg by mouth 2 (two) times a day   sevelamer carbonate (RENVELA) 800 mg tablet  Self Yes No   Sig: TAKE 2 TABLET BY MOUTH THREE TIMES A DAY WITH MEALS 1 WITH SNACKS      Facility-Administered Medications: None       Allergies   Allergen Reactions    Iodine - Food Allergy      Other reaction(s): Respiratory Distress  "swelling of throat"    Levofloxacin      Other reaction(s): Respiratory Distress  "swelling of throat"    Lovastatin      Muscle weakness    Medical Tape Other (See Comments)     Skin breaks down    Shellfish-Derived Products - Food Allergy        PHYSICAL EXAM    PE limited by: none    Objective   Vitals:   First set: Temperature: 97 5 °F (36 4 °C) (22)  Pulse: 55 (22)  Respirations: 18 (22)  Blood Pressure: (!) 217/89 (22)  SpO2: 97 % (22)    Primary Survey:   (A) Airway:  Intat  (B) Breathing:  Bilateral breath sounds  (C) Circulation: Pulses:   normal  (D) Disabliity:  GCS Total:  15  (E) Expose:  Completed    Secondary Survey: (Click on Physical Exam tab above)  Physical Exam  Vitals reviewed  Constitutional:       Appearance: Normal appearance  He is well-developed  HENT:      Head: Normocephalic and atraumatic        Comments: Left-sided facial ecchymosis     Mouth/Throat:      Mouth: Mucous membranes are moist    Eyes:      Extraocular Movements: Extraocular movements intact  Conjunctiva/sclera: Conjunctivae normal       Pupils: Pupils are equal, round, and reactive to light  Cardiovascular:      Rate and Rhythm: Normal rate and regular rhythm  Heart sounds: Normal heart sounds  Pulmonary:      Effort: Pulmonary effort is normal       Breath sounds: Normal breath sounds  Abdominal:      General: Bowel sounds are normal       Palpations: Abdomen is soft  Tenderness: There is no abdominal tenderness  Musculoskeletal:         General: Normal range of motion  Cervical back: Normal range of motion  Left lower leg: Edema present  Comments: Positive Homans sign  Considerable left lower leg swelling  Diffuse tenderness throughout entire left knee  Knee stable to collateral stress  Negative Lachman's  GCS 15, full ROM of bilateral upper and lower extremities with exception of minimal range of motion to left knee  Airway intact, bilateral breath sounds, palpable pulses  No active bleeding  No bony point tenderness in extremities, chest, abdomen or c/t,l spine unless otherwise noted  No crepitus, abdomen soft/non tender  Chest wall soft non tender with no deformities  Pelvis stable  Skin:     General: Skin is warm and dry  Capillary Refill: Capillary refill takes less than 2 seconds  Neurological:      General: No focal deficit present  Mental Status: He is alert and oriented to person, place, and time  Psychiatric:         Mood and Affect: Mood normal          Behavior: Behavior normal          Cervical spine cleared by clinical criteria?  No (imaging required)      Invasive Devices  Report    None                 Lab Results:   Results Reviewed     Procedure Component Value Units Date/Time    Basic metabolic panel [[id number]]  (Abnormal) Collected: 01/03/22 2011    Lab Status: Final result Specimen: Blood from Arm, Left Updated: 01/03/22 2038     Sodium 131 mmol/L Potassium 3 9 mmol/L      Chloride 90 mmol/L      CO2 28 mmol/L      ANION GAP 13 mmol/L      BUN 47 mg/dL      Creatinine 6 09 mg/dL      Glucose 93 mg/dL      Calcium 8 5 mg/dL      eGFR 8 ml/min/1 73sq m     Narrative:      National Kidney Disease Foundation guidelines for Chronic Kidney Disease (CKD):     Stage 1 with normal or high GFR (GFR > 90 mL/min/1 73 square meters)    Stage 2 Mild CKD (GFR = 60-89 mL/min/1 73 square meters)    Stage 3A Moderate CKD (GFR = 45-59 mL/min/1 73 square meters)    Stage 3B Moderate CKD (GFR = 30-44 mL/min/1 73 square meters)    Stage 4 Severe CKD (GFR = 15-29 mL/min/1 73 square meters)    Stage 5 End Stage CKD (GFR <15 mL/min/1 73 square meters)  Note: GFR calculation is accurate only with a steady state creatinine    Protime-INR [[id number]]  (Normal) Collected: 01/03/22 2011    Lab Status: Final result Specimen: Blood from Arm, Left Updated: 01/03/22 2032     Protime 13 2 seconds      INR 1 01    APTT [[id number]]  (Abnormal) Collected: 01/03/22 2011    Lab Status: Final result Specimen: Blood from Arm, Left Updated: 01/03/22 2032     PTT 38 seconds     CBC and differential [[id number]]  (Abnormal) Collected: 01/03/22 2011    Lab Status: Final result Specimen: Blood from Arm, Left Updated: 01/03/22 2017     WBC 7 26 Thousand/uL      RBC 2 67 Million/uL      Hemoglobin 8 7 g/dL      Hematocrit 26 2 %      MCV 98 fL      MCH 32 6 pg      MCHC 33 2 g/dL      RDW 13 2 %      MPV 9 1 fL      Platelets 707 Thousands/uL      nRBC 0 /100 WBCs      Neutrophils Relative 77 %      Immat GRANS % 0 %      Lymphocytes Relative 11 %      Monocytes Relative 11 %      Eosinophils Relative 1 %      Basophils Relative 0 %      Neutrophils Absolute 5 56 Thousands/µL      Immature Grans Absolute 0 03 Thousand/uL      Lymphocytes Absolute 0 80 Thousands/µL      Monocytes Absolute 0 81 Thousand/µL      Eosinophils Absolute 0 04 Thousand/µL      Basophils Absolute 0 02 Thousands/µL Imaging Studies:   Direct to CT: No  XR Trauma chest portable    (Results Pending)   TRAUMA - CT head wo contrast    (Results Pending)   TRAUMA - CT spine cervical wo contrast    (Results Pending)   TRAUMA - CT facial bones wo contrast    (Results Pending)   VAS lower limb venous duplex study, unilateral/limited    (Results Pending)   XR knee 4+ vw left injury    (Results Pending)   CT chest abdomen pelvis wo contrast    (Results Pending)         Procedures  POC FAST US    Date/Time: 1/3/2022 9:27 PM  Performed by: Vy Teresa PA-C  Authorized by: Vy Teresa PA-C     Patient location:  ED  Other Assisting Provider: Yes (comment)    Procedure details:     Indications: blunt abdominal trauma and blunt chest trauma      Assess for:  Intra-abdominal fluid, pericardial effusion and pneumothorax    Technique: extended FAST      Views obtained:  Heart - Pericardial sac, RUQ - Davison's Pouch, LUQ - Splenorenal space and Suprapubic - Pouch of Chaz  FAST Findings:     RUQ (Hepatorenal) free fluid: absent      LUQ (Splenorenal) free fluid: absent      Suprapubic free fluid: absent      Cardiac wall motion: identified      Pericardial effusion: absent    extended FAST (Pulmonary) findings:     Left lung sliding: Present      Right lung sliding: Present      Left pleural effusion: Absent      Right pleural effusion: Absent    Interpretation:     Impressions: negative               ED Course  ED Course as of 01/03/22 2129 Mon Jan 03, 2022 2125 Spoke with possible supervisor, vascular tech not available tonight to perform duplex           MDM  Number of Diagnoses or Management Options  Diagnosis management comments: Suspect likely left leg DVT  Patient called trauma evaluation due to fall with head strike, signs of facial fracture with periorbital ecchymosis on heparin  Negative fast exam   Low suspicion for significant traumatic injury however patient has history of AAA    CT chest abdomen and pelvis was obtained to assess for aorta  Patient states he has contrast dye allergy and in the setting of trauma, benefits of contrast did not outweigh risk  Patient signed out to Dr Brian Stanford at 9:15 p m  pending CT results with likely plan for shared decision-making with the patient in regards to disposition  Disposition  [facility] Transfer: No  Final diagnoses:   None     ED Disposition     None      Follow-up Information    None       Patient's Medications   Discharge Prescriptions    No medications on file     No discharge procedures on file      PDMP Review     None          ED Provider  Electronically Signed by         Joo Cornejo PA-C  01/03/22 [id number]

## 2022-01-04 NOTE — ED NOTES
In report from SHRUTHI SKY  it was said that trauma documentation can be ended        Josr Fleming RN  01/04/22 [id number]

## 2022-01-04 NOTE — PHYSICAL THERAPY NOTE
Physical Therapy Cancellation Note       01/04/22 0723   PT Last Visit   PT Visit Date 01/04/22   Note Type   Note type Cancelled Session   Cancel Reasons Medical status  (questionable fracture, pending ortho)       PT order received  Chart review performed  At this time, PT evaluation cancelled as patient is pending orthopedic consult  PT will follow and evaluate as appropriate      Jeanine Luna PT

## 2022-01-04 NOTE — UTILIZATION REVIEW
Initial Clinical Review    OBSERVATION WRITTEN 1/3/22 @ 0463  CONVERTED TO INPATIENT ADMISSION 1/4/22 @ 1603 DUE TO FURTHER DIAGNOSTIC WORKUP REQUIRED FOR LEFT HIP PAIN AND FURTHER NEED FOR PT/OT EVAL, REQUIRING AT LEAST A 2 MIDNIGHT STAY  Admission: Date/Time/Statement:   Admission Orders (From admission, onward)     Ordered        01/04/22 1603  Inpatient Admission  Once            01/03/22 2244  Place in Observation  Once                      Orders Placed This Encounter   Procedures    Inpatient Admission     Standing Status:   Standing     Number of Occurrences:   1     Order Specific Question:   Level of Care     Answer:   Med Surg [16]     Order Specific Question:   Estimated length of stay     Answer:   More than 2 Midnights     Order Specific Question:   Certification     Answer:   I certify that inpatient services are medically necessary for this patient for a duration of greater than two midnights  See H&P and MD Progress Notes for additional information about the patient's course of treatment  ED Arrival Information     Expected Arrival Acuity    1/3/2022 18:53 1/3/2022 18:54 Less Urgent         Means of arrival Escorted by Service Admission type    Ambulance [facility] Hospitalist Urgent         Arrival complaint    leg pain        Chief Complaint   Patient presents with    Knee Pain     patient fell on [address] and today was putting wash away and heard a pop in his left knee and now theres sharp pain and swelling  cannot bear weight       Initial Presentation: 75 y/o male presents to [address] [place] [address] ED via EMS with c/o left leg pain and swelling for 1 day after fall on 12/24 after losing his balance, striking face and left knee, has been bearing weight since fall  Felt a pop behind his left knee and pain radiating down to foot and up posterior left thigh  Closed fx of neck of left femur    Admit Inpatient med surg d/t Closed fx of left femoral neck requiring further eval and elevated D-dimer r/o DVT:  Initially started on heparin drip that was then dc'd d/t doppler testing negative for DVT, ortho consult, s/p MRI left hip revealed there is no fx, await formal PT/OT eval, NPO, pain control, GI consult d/t pancreatic mass, continue home meds  MRCP, Nephrology Consult  1/4 GI CONSULT  MRCP completed this afternoon demonstrates 1 9 cm cystic lesion in the head of the pancreas increased from 1 4 cm in 2020  Given the small size of the cystic lesion along with no further concerning features such as pancreatic ductal dilatation former year old nodule within the cyst, this is low risk and is appropriate for follow-up imaging  Per radiology recommendations repeat imaging in 6 months  At this time doubt that endoscopic ultrasound would be warranted given the small size of the cyst     Patient can follow-up with GI as an outpatient to establish care and plan for repeat imaging as indicated  1/4 NEPHROLOGY CONSULT  1  End-stage renal disease              Continue hemodialysis sessions Tuesday Thursday Saturday  Next hemodialysis session will be on Thursday, January 6th  2  Secondary hyperparathyroidism              Continue with sevelamer and calcium acetate at this time, follow phosphorus levels  3  Anemia of chronic kidney disease              Will avoid JARETT in the inpatient setting as the patient is most likely an long-acting JARETT as an outpatient  4  Hypertension setting of end-stage renal disease              Continue current medications including losartan hydralazine and metoprolol  6  Hypomagnesemia              Continue with magnesium supplement, follow-up magnesium levels  7  Closed neck fracture left femur              Follow Orthopedics recommendations, appreciate PT OT recommendations  8  Pancreatic mass              Patient for MRCP    Date 1/5/22   Day 2 Hospitalist Note  S/p PT and OT evaluation-they recommend short-term rehab    S/p GI evaluation - no further inpatient testing, treatment, and or workup is warranted, patient will need continued surveillance of the pancreatic lesions in the outpatient setting within the next 6-12 months  Continue phoslo, renagel, dialysis, nephrology following, continue home meds  The patient will continue to require additional inpatient hospital stay due to The need for placement  Patient is medically stable for discharge, case management is working on rehab placement options      ED Triage Vitals [01/03/22 1900]   Temperature Pulse Respirations Blood Pressure SpO2   97 5 °F (36 4 °C) 55 18 (!) 217/89 97 %      Temp Source Heart Rate Source Patient Position - Orthostatic VS BP Location FiO2 (%)   Tympanic Monitor Sitting Left arm --      Pain Score       10 - Worst Possible Pain          Wt Readings from Last 1 Encounters:   01/05/22 115 kg (254 lb 3 1 oz)     Additional Vital Signs:   Date/Time Temp Pulse Resp BP MAP (mmHg) SpO2 O2 Device Patient Position - Orthostatic VS   01/05/22 0700 98 7 °F (37 1 °C) 57 18 160/72 104 92 % None (Room air) Lying   01/04/22 2300 99 3 °F (37 4 °C) 65 20 145/62 89 95 % -- Lying   01/04/22 2053 -- -- -- 158/70 -- -- -- --   01/04/22 1855 98 1 °F (36 7 °C) 68 20 155/71 102 97 % None (Room air) Lying   01/04/22 1758 -- -- -- 134/62 -- -- -- Sitting   01/04/22 1605 -- 60 16 118/83 -- -- -- --   01/04/22 1601 -- -- -- 89/56 Abnormal  -- -- -- --   01/04/22 1600 -- 60 16 120/82 -- -- -- --   01/04/22 1530 -- 60 16 132/82 -- -- -- --   01/04/22 1500 -- 55 16 152/96 -- -- -- --   01/04/22 1430 -- 51 Abnormal  16 178/108 Abnormal  -- -- -- --   01/04/22 1400 95 8 °F (35 4 °C) Abnormal  54 Abnormal  16 158/103 Abnormal  -- -- -- --   01/04/22 1335 -- 60 16 189/117 Abnormal  -- -- -- --   01/04/22 1330 -- 60 16 155/105 Abnormal  -- -- -- Lying   01/04/22 0559 -- 53 Abnormal  16 171/68 Abnormal  98 98 % None (Room air) --   01/04/22 0200 -- 58 16 144/58 81 97 % None (Room air) --   01/03/22 2200 -- 49 Abnormal  17 153/69 -- 98 % -- --   01/03/22 2100 -- 55 17 173/73 Abnormal  -- 98 % -- --   01/03/22 2000 -- 54 Abnormal  18 182/82 Abnormal  -- 98 % -- --   01/03/22 1900 97 5 °F (36 4 °C) 55 18 217/89 Abnormal  -- 97 % None (Room air) Sitting     Pertinent Labs/Diagnostic Test Results:   1/3 ct head:  No acute intracranial abnormality  1/3 ct cspine:  No cervical spine fracture or traumatic malalignment  1/3 ct facial bones:  No evidence of acute traumatic injury to the facial bones  1/3 ct cap:  Hypodense lesion within the pancreatic head more prominent compared to prior study  Further evaluation with a MRI/MRCP is recommended  Partially visualized nondisplaced left femoral neck fracture  Recommend MRI for further evaluation  1/4 vas lower limb venous duplex bl: RIGHT LOWER LIMB: Limited  No gross evidence of acute deep vein thrombosis in the CFV, FV, Popliteal Vein  or posterior tibial veins  LEFT LOWER LIMB: Limited  No gross evidence of acute deep vein thrombosis in the CFV, FV, Popliteal Vein  or posterior tibial veins  There is a complex, non-vascularized fluid collection noted in the left  popliteal fossa  1/4 mri left hip:  No acute fracture in the left hip  Moderate bilateral hip osteoarthritis  1/4 xr left knee:  Pending  1/4 mri abd, mrcp:  1 9 cm (craniocaudal) cystic lesion in the head the pancreas is increased from 1 4 cm in 2020  For simple cyst(s) that have demonstrated significant interval growth (>20% in longest dimension) because it is still <2 0 cm, recommend continued followup per   protocol  Next followup in 6 months   Preferred imaging modality: abdomen MRI and MRCP with and without IV contrast, or triple phase abdomen CT with IV contrast, or abdomen MRI and MRCP without IV contrast       Results from last 7 days   Lab Units 01/05/22  0514 01/04/22  0556 01/03/22 2011   WBC Thousand/uL 5 78 6 44 7 26   HEMOGLOBIN g/dL 8 0* 8 3* 8 7*   HEMATOCRIT % 24 5* 25 1* 26 2*   PLATELETS Thousands/uL 258 244 263 NEUTROS ABS Thousands/µL 3 75  --  5 56     Results from last 7 days   Lab Units 01/05/22  0514 01/04/22  0556 01/03/22 2011   SODIUM mmol/L 135 131* 131*   POTASSIUM mmol/L 4 1 3 6 3 9   CHLORIDE mmol/L 95* 91* 90*   CO2 mmol/L 31 30 28   ANION GAP mmol/L 9 10 13   BUN mg/dL 40* 55* 47*   CREATININE mg/dL 5 90* 7 16* 6 09*   EGFR ml/min/1 73sq m 8 6 8   CALCIUM mg/dL 8 1* 8 2* 8 5     Results from last 7 days   Lab Units 01/05/22  0514 01/04/22  0556 01/03/22 2011   GLUCOSE RANDOM mg/dL 87 95 93     Results from last 7 days   Lab Units 01/03/22 2011   D-DIMER QUANTITATIVE ug/ml FEU 3 55*     Results from last 7 days   Lab Units 01/04/22  1305 01/03/22 2011   PROTIME seconds  --  13 2   INR   --  1 01   PTT seconds 195* 38*     ED Treatment:   Medication Administration from 01/03/2022 1854 to 01/04/2022 0809       Date/Time Order Dose Route Action     01/03/2022 2130 HYDROcodone-acetaminophen (NORCO) 5-325 mg per tablet 1 tablet 1 tablet Oral Given     01/04/2022 0753 calcium acetate (PHOSLO) capsule 1,334 mg 1,334 mg Oral Given     01/04/2022 0201 hydrALAZINE (APRESOLINE) tablet 100 mg 100 mg Oral Given     01/04/2022 0753 sevelamer (RENAGEL) tablet 800 mg 800 mg Oral Given     01/04/2022 0326 HYDROcodone-acetaminophen (NORCO) 5-325 mg per tablet 1 tablet 1 tablet Oral Given     01/04/2022 0438 heparin (porcine) injection 8,800 Units 8,800 Units Intravenous Given     01/04/2022 0441 heparin (porcine) 25,000 units in 0 45% NaCl 250 mL infusion (premix) 18 Units/kg/hr Intravenous New Bag        Past Medical History:   Diagnosis Date    Abdominal aortic aneurysm (AAA) ([address] )     s/p repair    Anemia     due to kidney disease    Arthritis     CHF (congestive heart failure) (HCC)     stable at present    Chronic kidney disease     on hemodialysis Tu Th Sat    Chronic pain disorder     knees    Edema     GERD (gastroesophageal reflux disease)     no meds     Gout     History of echocardiogram 03/02/2018 EF 55%, mild LVH, technically difficult study   Hyperlipidemia     borderline no meds    Hyperparathyroidism ([place] [address] )     Hypertension     Kidney stone     Seasonal allergies     Shortness of breath     mild exertional    Skin cancer     Vitamin D deficiency      Present on Admission:   Magnesium deficiency   Essential hypertension   Chronic systolic congestive heart failure (HCC)   Elevated d-dimer   Pancreatic mass   Closed fracture of neck of left femur (HCC)      Admitting Diagnosis: Leg pain [M79 606]  ESRD (end stage renal disease) ([place] [address] ) [N18 6]  Pancreatic mass [K86 89]  Closed fracture of left hip, initial encounter ([place] [address] ) [S72 002A]  Age/Sex: 76 y o  male  Admission Orders:  Scheduled Medications:  allopurinol, 200 mg, Oral, Daily  calcium acetate, 1,334 mg, Oral, TID With Meals  co-enzyme Q-10, 200 mg, Oral, Daily  doxazosin, 2 mg, Oral, Daily  hydrALAZINE, 100 mg, Oral, BID  losartan, 50 mg, Oral, Daily  magnesium oxide, 400 mg, Oral, Daily  metoprolol succinate, 50 mg, Oral, BID  sevelamer, 800 mg, Oral, TID With Meals      Continuous IV Infusions:     PRN Meds:  acetaminophen, 650 mg, Oral, Q6H PRN  heparin (porcine), 4,400 Units, Intravenous, Q1H PRN  heparin (porcine), 8,800 Units, Intravenous, Q1H PRN  HYDROcodone-acetaminophen, 1 tablet, Oral, Q6H PRN x1 thus far  HYDROmorphone, 1 mg, Intravenous, Q4H PRN  ondansetron, 4 mg, Intravenous, Q6H PRN        IP CONSULT TO ORTHOPEDIC SURGERY  IP CONSULT TO NEPHROLOGY  IP CONSULT TO GASTROENTEROLOGY    Network Utilization Review Department  ATTENTION: Please call with any questions or concerns to [phone] and carefully listen to the prompts so that you are directed to the right person  All voicemails are confidential   Catarina Prasad all requests for admission clinical reviews, approved or denied determinations and any other requests to dedicated fax number below belonging to the campus where the patient is receiving treatment   List of dedicated fax numbers for the Facilities:  [address] [place] [address] DENIALS (Administrative/Medical Necessity) [phone]   [address] (Maternity/NICU/Pediatrics) [address] [facility],[address]  [address] [place] [address] [id number] [place] [address] [address] [facility] [address] [facility] [phone]

## 2022-01-04 NOTE — ASSESSMENT & PLAN NOTE
· Continue pre-hospital Toprol XL 50 mg p o  B i d , Cozaar 50 mg p o  Daily, hydralazine 100 mg p o  B i d  And Cardura 2 mg p o   Daily

## 2022-01-04 NOTE — ASSESSMENT & PLAN NOTE
Lab Results   Component Value Date    EGFR 8 01/03/2022    EGFR 10 10/01/2021    EGFR 8 04/02/2018    CREATININE 6 09 (H) 01/03/2022    CREATININE 5 15 (H) 10/01/2021    CREATININE 6 08 (H) 04/02/2018     Will continue pre-hospital [place] consult Nephrology in a m

## 2022-01-04 NOTE — ASSESSMENT & PLAN NOTE
Lab Results   Component Value Date    EGFR 8 01/03/2022    EGFR 10 10/01/2021    EGFR 8 04/02/2018    CREATININE 6 09 (H) 01/03/2022    CREATININE 5 15 (H) 10/01/2021    CREATININE 6 08 (H) 04/02/2018     · With secondary hyperparathyroidism  · Continue PhosLo, Renagel  · Continue dialysis on Mondays, Wednesdays, and Fridays  · Nephrology services are following

## 2022-01-04 NOTE — PLAN OF CARE
Chronic hemodialysis treatment planned for 150 minutes using a 4 K+ bath for potassium 3 6 this morning  Fluid goal 3000 ml as tolerated            Post-Dialysis RN Treatment Note    Blood Pressure:  Pre 155/105 mm/Hg  Post 118/83 mmHg   EDW:  TBD   Weight:    none   Volume Removed  2738 ml    Treatment duration 150 minutes    NS given:  100 ml x 1 for c/o dizziness   Treatment shortened:  no   Medications given during Rx:  none   Estimated Kt/V:  none   Access type:   DARA fistula   Access Issues:   none   Report called to primary nurse:  Verbal            Problem: METABOLIC, FLUID AND ELECTROLYTES - ADULT  Goal: Electrolytes maintained within normal limits  Description: INTERVENTIONS:  - Monitor labs and assess patient for signs and symptoms of electrolyte imbalances  - Administer electrolyte replacement as ordered  - Monitor response to electrolyte replacements, including repeat lab results as appropriate  - Instruct patient on fluid and nutrition as appropriate  Outcome: Progressing  Goal: Fluid balance maintained  Description: INTERVENTIONS:  - Monitor labs   - Monitor I/O and WT  - Instruct patient on fluid and nutrition as appropriate  - Assess for signs & symptoms of volume excess or deficit  Outcome: Progressing

## 2022-01-04 NOTE — ASSESSMENT & PLAN NOTE
· Placed in observation Medicine  · Questionable over read on CT scan  · Will obtain MRI in a m  · Make patient NPO  · Consult orthopedic surgery  · Give pain control p r n    · Consult PT OT

## 2022-01-04 NOTE — CONSULTS
Consultation - Nephrology   Roxane Dove 76 y o  male MRN: [id number]  Unit/Bed#: ED 22 Encounter: [id number]    HEMODIALYSIS PROCEDURE NOTE  The patient was seen and examined on hemodialysis  Time: 2 5 hours  Sodium: 138 Blood flow: 400   Dialyzer: F160 Potassium: 2 Dialysate flow: 600   Access: right AVF Bicarbonate: 30 Ultrafiltration goal: 3L   Medications on HD: none       A/P:  1  End-stage renal disease   Continue hemodialysis sessions Tuesday Thursday Saturday  Next hemodialysis session will be on Thursday, January 6th  2  Secondary hyperparathyroidism   Continue with sevelamer and calcium acetate at this time, follow phosphorus levels  3  Anemia of chronic kidney disease   Will avoid JARETT in the inpatient setting as the patient is most likely an long-acting JARETT as an outpatient  4  Hypertension setting of end-stage renal disease   Continue current medications including losartan hydralazine and metoprolol  6  Hypomagnesemia   Continue with magnesium supplement, follow-up magnesium levels  7  Closed neck fracture left femur    Follow Orthopedics recommendations, appreciate PT OT recommendations  8  Pancreatic mass   Patient for MRCP       Thank you for allowing us to participate in the care of your patient  Please feel free to contact us regarding the care of this patient, or any other questions/concerns that may be applicable      Patient Active Problem List   Diagnosis    Essential hypertension    Hemodialysis-associated hypotension    AAA (abdominal aortic aneurysm) (HCC)    Chronic renal failure    Primary osteoarthritis of both knees    Hyperlipidemia    Class 2 severe obesity due to excess calories with serious comorbidity and body mass index (BMI) of 39 0 to 39 9 in adult [facility])    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 [facility])    Secondary hyperparathyroidism of renal origin ([facility] [address] )    Abnormal nuclear stress test    Anemia in chronic kidney disease    ASCVD (arteriosclerotic cardiovascular disease)    Benign prostatic hyperplasia without lower urinary tract symptoms    Carotid stenosis, right    Chronic gout, unspecified, with tophus (tophi)    Chronic systolic congestive heart failure (HCC)    Disorder of phosphorus metabolism, unspecified    Gastroesophageal reflux disease    Iron deficiency anemia    Kidney stone    Magnesium deficiency    Osteoarthritis of both knees    Other disorders of electrolyte and fluid balance, not elsewhere classified    Other fluid overload    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, initial encounter ([facility] [address] )    Panlobular emphysema ([facility] [address] )    Personal history of nicotine dependence    Proteinuria, unspecified    Skin lesion of face    Unspecified protein-calorie malnutrition (HCC)    Vitamin D deficiency    Elevated d-dimer    Pancreatic mass    Closed fracture of neck of left femur ([facility] [address] )       History of Present Illness   Physician Requesting Consult: Nuvia Sandoval MD  Reason for Consult / Principal Problem:  End-stage renal disease   Hx and PE limited by:   HPI: Demarcus Ribeiro is a 76y o  year old male who presented to the emergency room last time was admitted earlier this morning, January 4th, after it was noted that he sustained a fall on [address] and had a fracture of the left neck of femur  Patient has severe pain and ambulatory dysfunction due to this  Patient also noted increased left lower extremity edema, much more so compared to the right  Pain at this time controlled  From the renal standpoint, patient has end-stage renal disease in the hemodialysis sessions Tuesday Thursday and Saturday  He claims that occasionally he has intra dialytic hypotension, otherwise no other significant issues with hemodialysis sessions  Home like is the [facility] in [place]  Patient is on medications as prescribed      Previous electronic medical records reviewed during the course this consultation, patient relatively healthy with no hospitalizations in last 2 years  History obtained from chart review and the patient    Review of Systems - Negative except as mentioned above in HPI, more specifics as mentioned below  Review of Systems - General ROS: negative  Psychological ROS: negative  Ophthalmic ROS: negative  ENT ROS: negative  Allergy and Immunology ROS: negative  Hematological and Lymphatic ROS: negative  Endocrine ROS: negative  Cardiovascular ROS: negative  Gastrointestinal ROS: negative  Genito-Urinary ROS: negative  Musculoskeletal ROS: negative  Neurological ROS: negative  Dermatological ROS: negative    Historical Information   Past Medical History:   Diagnosis Date    Abdominal aortic aneurysm (AAA) ([facility])     s/p repair    Anemia     due to kidney disease    Arthritis     CHF (congestive heart failure) ([facility])     stable at present    Chronic kidney disease     on hemodialysis Tu Th Sat    Chronic pain disorder     knees    Edema     GERD (gastroesophageal reflux disease)     no meds     Gout     History of echocardiogram 03/02/2018    EF 55%, mild LVH, technically difficult study      Hyperlipidemia     borderline no meds    Hyperparathyroidism ([address] )     Hypertension     Kidney stone     Seasonal allergies     Shortness of breath     mild exertional    Skin cancer     Vitamin D deficiency      Past Surgical History:   Procedure Laterality Date    ABDOMINAL AORTIC ANEURYSM REPAIR  2004    CARDIAC CATHETERIZATION  05/01/2020    no blockages    CARPAL TUNNEL RELEASE Right     COLONOSCOPY      DIALYSIS FISTULA CREATION Right 2017    GLAUCOMA SURGERY      LEG SURGERY      removal splinter    PARATHYROIDECTOMY      SPLIT THICKNESS SKIN GRAFT N/A 12/4/2019    Procedure: THIGH STSG;  Surgeon: Jacob Sahu MD;  Location: [address] [place] [address];  Service: Plastics    SQUAMOUS CELL CARCINOMA EXCISION Right 12/4/2019    Procedure: REMOVE SKIN CA FROM EAR & CHEEK;  Surgeon: Oscar Breen MD;  Location: [facility] MAIN OR;  Service: Plastics     Social History   Social History     Substance and Sexual Activity   Alcohol Use Not Currently     Social History     Substance and Sexual Activity   Drug Use Never     Social History     Tobacco Use   Smoking Status Former Smoker    Types: Cigarettes    Quit date: 2004    Years since quittin 6   Smokeless Tobacco Never Used     Family History   Problem Relation Age of Onset    Kidney disease Mother     Leukemia Father        Meds/Allergies   all current active meds have been reviewed, current meds:   Current Facility-Administered Medications   Medication Dose Route Frequency    acetaminophen (TYLENOL) tablet 650 mg  650 mg Oral Q6H PRN    allopurinol (ZYLOPRIM) tablet 200 mg  200 mg Oral Daily    calcium acetate (PHOSLO) capsule 1,334 mg  1,334 mg Oral TID With Meals    co-enzyme Q-10 capsule 200 mg  200 mg Oral Daily    doxazosin (CARDURA) tablet 2 mg  2 mg Oral Daily    heparin (porcine) 25,000 units in 0 45% NaCl 250 mL infusion (premix)  3-30 Units/kg/hr (Order-Specific) Intravenous Titrated    heparin (porcine) injection 4,400 Units  4,400 Units Intravenous Q1H PRN    heparin (porcine) injection 8,800 Units  8,800 Units Intravenous Q1H PRN    hydrALAZINE (APRESOLINE) tablet 100 mg  100 mg Oral BID    HYDROcodone-acetaminophen (NORCO) 5-325 mg per tablet 1 tablet  1 tablet Oral Q6H PRN    HYDROmorphone (DILAUDID) injection 1 mg  1 mg Intravenous Q4H PRN    losartan (COZAAR) tablet 50 mg  50 mg Oral Daily    magnesium oxide (MAG-OX) tablet 400 mg  400 mg Oral Daily    metoprolol succinate (TOPROL-XL) 24 hr tablet 50 mg  50 mg Oral BID    ondansetron (ZOFRAN) injection 4 mg  4 mg Intravenous Q6H PRN    sevelamer (RENAGEL) tablet 800 mg  800 mg Oral TID With Meals    and PTA meds:  (Not in a hospital admission)        Allergies   Allergen Reactions    Iodine - Food Allergy      Other reaction(s): Respiratory Distress  "swelling of throat"    Levofloxacin      Other reaction(s): Respiratory Distress  "swelling of throat"    Lovastatin      Muscle weakness    Medical Tape Other (See Comments)     Skin breaks down    Shellfish-Derived Products - Food Allergy        Objective     Intake/Output Summary (Last 24 hours) at 1/4/2022 1454  Last data filed at 1/4/2022 1330  Gross per 24 hour   Intake 200 ml   Output 50 ml   Net 150 ml       Invasive Devices:        Physical Exam      I/O last 3 completed shifts:  In: -   Out: 50 [Urine:50]    Vitals:    01/04/22 1430   BP: (!) 178/108   Pulse: (!) 51   Resp: 16   Temp:    SpO2:        Gen: in NAD, Alert/Awake  HEENT: no sclerous icterus, MMM, neck supple  CV: +S1/S2, RRR  Lungs: CTA bilaterally  Abd: +BS, soft NT/ND  Ext: all four extremities are warm, +3 left lower extremity edema, +1 right lower extremity edema  Skin: no rashes noted  Neuro: CN II-XII intact    Current Weight: Weight - Scale: 113 kg (250 lb)  First Weight: Weight - Scale: 113 kg (250 lb)    Lab Results:  I have personally reviewed pertinent labs      CBC:   Lab Results   Component Value Date    WBC 6 44 01/04/2022    HGB 8 3 (L) 01/04/2022    HCT 25 1 (L) 01/04/2022     (H) 01/04/2022     01/04/2022    MCH 33 1 01/04/2022    MCHC 33 1 01/04/2022    RDW 13 3 01/04/2022    MPV 9 6 01/04/2022    NRBC 0 01/03/2022     CMP:   Lab Results   Component Value Date    K 3 6 01/04/2022    CL 91 (L) 01/04/2022    CO2 30 01/04/2022    BUN 55 (H) 01/04/2022    CREATININE 7 16 (H) 01/04/2022    CALCIUM 8 2 (L) 01/04/2022    EGFR 6 01/04/2022     Phosphorus: No results found for: PHOS  Magnesium: No results found for: MG  Urinalysis: No results found for: Ferd Hurry, SPECGRAV, PHUR, LEUKOCYTESUR, NITRITE, PROTEINUA, GLUCOSEU, KETONESU, BILIRUBINUR, BLOODU  Ionized Calcium: No results found for: SHAYNE  Coagulation:   Lab Results   Component Value Date    INR 1 01 01/03/2022 Troponin: No results found for: TROPONINI  ABG: No results found for: PHART, GRL8XLZ, PO2ART, JYT1OEU, B9SMVQKJ, BEART, SOURCE    Results from last 7 days   Lab Units 01/04/22  0556 01/03/22 2011   POTASSIUM mmol/L 3 6 3 9   CHLORIDE mmol/L 91* 90*   CO2 mmol/L 30 28   BUN mg/dL 55* 47*   CREATININE mg/dL 7 16* 6 09*   CALCIUM mg/dL 8 2* 8 5       Radiology review:  Procedure: CT chest abdomen pelvis wo contrast    Result Date: 1/3/2022  Narrative: CT CHEST, ABDOMEN AND PELVIS WITHOUT IV CONTRAST INDICATION:   TRAUMA  COMPARISON:  MRI dated May 29, 2020  TECHNIQUE: CT examination of the chest, abdomen and pelvis was performed without intravenous contrast   Axial, sagittal, and coronal 2D reformatted images were created from the source data and submitted for interpretation  Radiation dose length product (DLP) for this visit:  1201 16 mGy-cm   This examination, like all CT scans performed in the [facility], was performed utilizing techniques to minimize radiation dose exposure, including the use of iterative reconstruction and automated exposure control  Enteric contrast was not administered  FINDINGS: Study is limited due to lack of intravenous and oral contrast to evaluate for solid abdominal organs and vascular structures CHEST LUNGS:  The trachea and central bronchial tree are patent  Atelectasis seen within the lung bases  PLEURA:  Unremarkable  HEART/GREAT VESSELS: Coronary artery calcifications  the heart is enlarged  MEDIASTINUM AND ROBINSON:  Unremarkable  CHEST WALL AND LOWER NECK:   Unremarkable  ABDOMEN LIVER/BILIARY TREE:  Unremarkable  GALLBLADDER:  No calcified gallstones  No pericholecystic inflammatory change  SPLEEN:  Unremarkable  PANCREAS:  There is a 2 4 x 1 9 cm hypodense lesion seen within the pancreatic head ADRENAL GLANDS:  Unremarkable  KIDNEYS/URETERS:  One or more simple renal cyst(s) is noted  Otherwise unremarkable kidneys  No hydronephrosis   STOMACH AND BOWEL: There is colonic diverticulosis without evidence of acute diverticulitis  APPENDIX:  No findings to suggest appendicitis  ABDOMINOPELVIC CAVITY:  No ascites  No pneumoperitoneum  No lymphadenopathy  VESSELS:  Focal prominence of the infrarenal abdominal aorta is seen  PELVIS REPRODUCTIVE ORGANS:  Unremarkable for patient's age  URINARY BLADDER:  Inflammatory changes around the urinary bladder are seen  ABDOMINAL WALL/INGUINAL REGIONS:  Unremarkable  OSSEOUS STRUCTURES:  Nondisplaced left femoral neck fracture  Impression: Hypodense lesion within the pancreatic head more prominent compared to prior study  Further evaluation with a MRI/MRCP is recommended  Partially visualized nondisplaced left femoral neck fracture  Recommend MRI for further evaluation  I personally discussed this study with Mohamud Romero on 1/3/2022 at 10:03 PM  Workstation performed: [id number]     Procedure: XR Trauma chest portable    Result Date: 1/4/2022  Narrative: CHEST INDICATION:   TRAUMA  COMPARISON:  None EXAM PERFORMED/VIEWS:  XR CHEST PORTABLE 1 image FINDINGS: Cardiomediastinal silhouette appears enlarged  The right hemidiaphragm is elevated  Minimal right base atelectasis  No pneumothorax or pleural effusion  Osseous structures appear within normal limits for patient age  Impression: Minimal right base atelectasis  Workstation performed: [id number]     Procedure: XR knee 4+ vw left injury    Result Date: 1/4/2022  Narrative: LEFT KNEE INDICATION:   r/o fx  [place] on [address]  Today was putting wash away and heard a pop in the left knee and now has sharp pain and swelling  Cannot bear weight  COMPARISON:  None VIEWS:  XR KNEE 4+ VW LEFT INJURY FINDINGS: Bones are intact  Alignment is anatomic  Small tricompartment osteophytes with joint space narrowing  No suspicious lytic or blastic bone lesion  Medial meniscus chondrocalcinosis  Soft tissue vascular calcifications  No evidence of joint effusion       Impression: No acute osseous abnormality  Degenerative changes  Workstation performed: [id number]     Procedure: TRAUMA - CT head wo contrast    Result Date: 1/3/2022  Narrative: CT BRAIN - WITHOUT CONTRAST INDICATION:   TRAUMA  COMPARISON:  None  TECHNIQUE:  CT examination of the brain was performed  In addition to axial images, sagittal and coronal 2D reformatted images were created and submitted for interpretation  Radiation dose length product (DLP) for this visit:  887 mGy-cm   This examination, like all CT scans performed in the [facility], was performed utilizing techniques to minimize radiation dose exposure, including the use of iterative reconstruction and automated exposure control  IMAGE QUALITY:  Diagnostic  FINDINGS: PARENCHYMA: Decreased attenuation is noted in periventricular and subcortical white matter demonstrating an appearance that is statistically most likely to represent mild microangiopathic change  No CT signs of acute infarction  No intracranial mass, mass effect or midline shift  No acute parenchymal hemorrhage  VENTRICLES AND EXTRA-AXIAL SPACES:  Normal for the patient's age  VISUALIZED ORBITS AND PARANASAL SINUSES:  Unremarkable  CALVARIUM AND EXTRACRANIAL SOFT TISSUES:  Normal      Impression: No acute intracranial abnormality  Workstation performed: [id number]     Procedure: TRAUMA - CT facial bones wo contrast    Result Date: 1/3/2022  Narrative: CT FACIAL BONES WITHOUT INTRAVENOUS CONTRAST INDICATION:   TRAUMA  COMPARISON: None  TECHNIQUE:  Axial CT images were obtained through the facial bones with additional sagittal and coronal reconstructions  Radiation dose length product (DLP) for this visit:  353 mGy-cm   This examination, like all CT scans performed in the [facility], was performed utilizing techniques to minimize radiation dose exposure, including the use of iterative reconstruction and automated exposure control  IMAGE QUALITY:  Diagnostic  FINDINGS: FACIAL BONES:   No facial bone fracture identified  Normal alignment of the temporomandibular joints  No lytic or blastic lesion  ORBITS:  Orbital globes, optic nerves, and extraocular muscles appear symmetric and normal  There is no evidence of retrobulbar mass, abscess, or hematoma  SINUSES:  Normal  SOFT TISSUES:  Normal      Impression: No evidence of acute traumatic injury to the facial bones  Workstation performed: [id number]     Procedure: TRAUMA - CT spine cervical wo contrast    Result Date: 1/3/2022  Narrative: CT CERVICAL SPINE - WITHOUT CONTRAST INDICATION:   TRAUMA  COMPARISON:  None  TECHNIQUE:  CT examination of the cervical spine was performed without intravenous contrast   Contiguous axial images were obtained  Sagittal and coronal reconstructions were performed  Radiation dose length product (DLP) for this visit:  483 mGy-cm   This examination, like all CT scans performed in the [facility], was performed utilizing techniques to minimize radiation dose exposure, including the use of iterative reconstruction and automated exposure control  IMAGE QUALITY:  Diagnostic  FINDINGS: ALIGNMENT:  Normal alignment of the cervical spine  Anterolisthesis of C3 on C4 and C4 on C5 is seen  VERTEBRAL BODIES:  No fracture  DEGENERATIVE CHANGES:  Moderate multilevel cervical degenerative changes are noted  No critical central canal stenosis  PREVERTEBRAL AND PARASPINAL SOFT TISSUES:  Unremarkable  THORACIC INLET:  Please refer to the concurrent chest, abdomen, and pelvic CT report for description of the thoracic inlet findings  Impression: No cervical spine fracture or traumatic malalignment  Workstation performed: [id number]     Procedure: MRI hip left wo contrast    Result Date: 1/4/2022  Narrative: MRI LEFT HIP INDICATION:   MRI of left hip rule out fracture, MRCP evaluate pancreatic mass   COMPARISON:  CT chest/abdomen/pelvis 1/3/2022 TECHNIQUE:  MR sequences were obtained of the left hip and pelvis including: Localizer, axial T2 fat sat, coronal T1/STIR, cone-down axial oblique PD of the affected hip, coronal PD of the affected hip, sagittal T2 fat sat of the affected hip  Gadolinium was not used  FINDINGS: LEFT HIP: -JOINT EFFUSION: There is a moderate joint effusion  -BONES: Normal marrow signal demonstrated without hip fracture or AVN  -ARTICULAR SURFACE: Moderate osteoarthritis  -ACETABULAR LABRUM: Macerated, degenerative-type tear  -TROCHANTERIC BURSA: Normal  RIGHT HIP: (please note dedicated small field of view images were not made of the right hip joint limiting its evaluation ) -JOINT EFFUSION: There is a moderate joint effusion  Mild iliopsoas bursitis  -BONES: Normal marrow signal demonstrated without hip fracture or AVN  -ARTICULAR SURFACE:  Moderate osteoarthritis  -ACETABULAR LABRUM: Lacerated, degenerative-type tear  -TROCHANTERIC BURSA: Normal  REST OF PELVIS: -BONES: Normal  -SI JOINTS AND SYMPHYSIS PUBIS:  Intact  -VISUALIZED LUMBAR SPINE:  Unremarkable  -MUSCULATURE: Low-grade, degenerative-type partial-thickness tears at the hamstring origins  Normal muscle signal  -PELVIC SOFT TISSUES: Normal  SUBCUTANEOUS TISSUES: Normal     Impression: No acute fracture in the left hip  Moderate bilateral hip osteoarthritis  Workstation performed: [id number]     Procedure: MRI abdomen wo contrast and mrcp    Result Date: 1/4/2022  Narrative: MRI OF THE ABDOMEN WITHOUT CONTRAST WITH MRCP INDICATION:  Evaluate pancreatic cyst  COMPARISON: TECHNIQUE:  MRI of the abdomen was performed without the administration of contrast using the following  using sequences: Axial T1 weighted in/out of phase images, multiplanar T2 weighted images, diffusion weighted and fat suppressed T1 weighted images  3D MRCP images were obtained with radial thick slabs and projections  3D rendering was performed from the acquisition scanner  FINDINGS: LOWER CHEST:   Unremarkable   LIVER:  Normal in size and configuration  Stable subcentimeter T2 hyperintense liver lesions that likely represent cysts or hemangiomas  BILE DUCTS:  No intrahepatic or extrahepatic bile duct dilation  Common bile duct is normal in caliber  No choledocholithiasis, biliary stricture or suspicious mass  GALLBLADDER:  Normal  PANCREAS:  1 7 x 1 7 x 1 9 cm (AP, transverse, craniocaudal) cystic lesion in the head the pancreas has increased from 1 4 on previous MRI  No obvious solid component however evaluation is limited due to lack of contrast  No communication with the pancreatic duct  No pancreatic duct dilatation  ADRENAL GLANDS:  1 3 cm right adrenal nodule without signal dropout on out of phase imaging is stable compared with CT dated 5/7/2013 and likely represents a  lipid poor adenoma  Left adrenal gland is unremarkable  SPLEEN:  Normal  KIDNEYS/PROXIMAL URETERS:  Kidneys are atrophic  No hydronephrosis  Multiple stable bilateral renal cysts  A few of the subcentimeter cysts are hemorrhagic or proteinaceous  BOWEL:  No dilated loops of bowel  PERITONEAL CAVITY/RETROPERITONEUM:  No ascites  No mass  LYMPH NODES:  No abdominal lymphadenopathy  VASCULAR STRUCTURES:  Unremarkable  No aneurysm  ABDOMINAL WALL:  Unremarkable  OSSEOUS STRUCTURES:  No suspicious osseous lesion  Impression: 1 9 cm (craniocaudal) cystic lesion in the head the pancreas is increased from 1 4 cm in 2020  For simple cyst(s) that have demonstrated significant interval growth (>20% in longest dimension) because it is still <2 0 cm, recommend continued followup per  protocol  Next followup in 6 months   Preferred imaging modality: abdomen MRI and MRCP with and without IV contrast, or triple phase abdomen CT with IV contrast, or abdomen MRI and MRCP without IV contrast  The recommendations regarding pancreatic findings assumes that patient does not have family history of pancreatic cancer nor have any symptoms potentially attributable to pancreatic cystic lesions (hyperamylasemia, recent-onset diabetes, severe epigastric pain, weight loss, steatorrhea, or jaundice ) If these conditions are not true, then management should be deferred to judgement of specialists such as gastroenterologists or oncologic surgeons  Recommendations are based on recent consensus statements on management of pancreatic cystic lesions from [address] [place] [address] [employer]astro[employer] Association, [employer] of Radiology, the journal Pancreatology, and our own institutional consensus  Other stable findings as above  The study was marked in EPIC for significant notification  Workstation performed: [id number]       William Cuba DO      This consultation note was produced in part using a dictation device which may document imprecise wording from author's original intent

## 2022-01-04 NOTE — ASSESSMENT & PLAN NOTE
· Status post treatment with heparin drip that has since been discontinued  · Bilateral lower extremity Dopplers negative for DVT

## 2022-01-04 NOTE — ASSESSMENT & PLAN NOTE
· Status post an MRCP  · MRCP results appreciated  · Status post a GI evaluation - no further inpatient testing, treatment, and or workup is warranted, patient will need continued surveillance of the pancreatic lesions in the outpatient setting within the next 6-12 months

## 2022-01-05 NOTE — PROGRESS NOTES
Progress Note - Nephrology   Rishi Spears 76 y o  male MRN: [id number]  Unit/Bed#: [facility] 05 Encounter: [id number]    A/P:  1  End-stage renal disease               will continue hemodialysis sessions Tuesday Thursday Saturday, next session will be tomorrow, January 6th  Will need to be careful with ultrafiltration as the patient had a near syncopal episode with dialysis yesterday  2  Secondary hyperparathyroidism               continue sevelamer and calcium acetate, follow-up phosphorus levels from time to time  3  Anemia of chronic kidney disease               continue monitor hemoglobin about a by packed red blood cells as indicated, patient is on an JARETT in the outpatient setting, avoid Epogen in the inpatient setting  4  Hypertension setting of end-stage renal disease               blood pressure low normal, will reduce hydralazine from 100 mg twice a day down to 50 mg twice a day  6  Hypomagnesemia               continue monitor magnesium in check magnesium a time time  7  No fracture of the left hip region, bilateral hip osteoarthritis  8  Ambulatory dysfunction   He need to have rehabilitation, continue avoiding to PT/OT recommendations  8  Pancreatic mass   Patient MRI done, repeat MRI in 6 months due to mass being less than 2 cm at 1 9 cm       Follow up reason for today's visit:  End-stage renal disease/secondary hyperparathyroidism/anemia chronic kidney disease    Closed fracture of neck of left femur [facility])    Patient Active Problem List   Diagnosis    Essential hypertension    Hemodialysis-associated hypotension    AAA (abdominal aortic aneurysm) (HCC)    Chronic renal failure    Primary osteoarthritis of both knees    Hyperlipidemia    Class 2 severe obesity due to excess calories with serious comorbidity and body mass index (BMI) of 39 0 to 39 9 in adult [facility])    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 [facility])    Secondary hyperparathyroidism of renal origin ([facility] [address] )  Abnormal nuclear stress test    Anemia in chronic kidney disease    ASCVD (arteriosclerotic cardiovascular disease)    Benign prostatic hyperplasia without lower urinary tract symptoms    Carotid stenosis, right    Chronic gout, unspecified, with tophus (tophi)    Chronic systolic congestive heart failure (HCC)    Disorder of phosphorus metabolism, unspecified    Gastroesophageal reflux disease    Iron deficiency anemia    Kidney stone    Magnesium deficiency    Osteoarthritis of both knees    Other disorders of electrolyte and fluid balance, not elsewhere classified    Other fluid overload    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, initial encounter ([facility] )    Panlobular emphysema ([facility] )    Personal history of nicotine dependence    Proteinuria, unspecified    Skin lesion of face    Unspecified protein-calorie malnutrition (HCC)    Vitamin D deficiency    Elevated d-dimer    Pancreatic mass    Closed fracture of neck of left femur ([facility])         Subjective:   Patient feels weak but otherwise has no specific complaints at this time  Objective:     Vitals: Blood pressure 101/50, pulse 57, temperature 98 7 °F (37 1 °C), temperature source Temporal, resp  rate 18, height 5' 8" (1 727 m), weight 115 kg (254 lb 3 1 oz), SpO2 92 %  ,Body mass index is 38 65 kg/m²  Weight (last 2 days)     Date/Time Weight    01/05/22 0534 115 (254 19)    01/05/22 0500 115 (254 19)    01/03/22 1900 113 (250)            Intake/Output Summary (Last 24 hours) at 1/5/2022 1402  Last data filed at 1/5/2022 0700  Gross per 24 hour   Intake 640 ml   Output 2783 ml   Net -2143 ml     I/O last 3 completed shifts: In: 840 [P O :240; I V :500;  Other:100]  Out: 2833 [Urine:50; Other:2783]         Physical Exam: /50   Pulse 57   Temp 98 7 °F (37 1 °C) (Temporal)   Resp 18   Ht 5' 8" (1 727 m)   Wt 115 kg (254 lb 3 1 oz)   SpO2 92%   BMI 38 65 kg/m²     General Appearance: Alert, cooperative, no distress, appears stated age   Head:    Normocephalic, without obvious abnormality, atraumatic   Eyes:    Conjunctiva/corneas clear   Ears:    Normal external ears   Nose:   Nares normal, septum midline, mucosa normal, no drainage    or sinus tenderness   Throat:   Lips, mucosa, and tongue normal; teeth and gums normal   Neck:   Supple   Back:     Symmetric, no curvature, ROM normal, no CVA tenderness   Lungs:     Reduced but otherwise clear   Chest wall:    No tenderness or deformity   Heart:    Regular rate and rhythm, S1 and S2 normal, no murmur, rub   or gallop   Abdomen:     Soft, non-tender, bowel sounds active   Extremities:   Extremities normal, atraumatic, no cyanosis, mild bilateral lower extremity edema, left much more swollen than right   Skin:   Skin color, texture, turgor normal, no rashes or lesions   Lymph nodes:   Cervical normal   Neurologic:   CNII-XII intact            Lab, Imaging and other studies: I have personally reviewed pertinent labs  CBC:   Lab Results   Component Value Date    WBC 5 78 01/05/2022    HGB 8 0 (L) 01/05/2022    HCT 24 5 (L) 01/05/2022     (H) 01/05/2022     01/05/2022    MCH 32 8 01/05/2022    MCHC 32 7 01/05/2022    RDW 13 6 01/05/2022    MPV 9 5 01/05/2022    NRBC 0 01/05/2022     CMP:   Lab Results   Component Value Date    K 4 1 01/05/2022    CL 95 (L) 01/05/2022    CO2 31 01/05/2022    BUN 40 (H) 01/05/2022    CREATININE 5 90 (H) 01/05/2022    CALCIUM 8 1 (L) 01/05/2022    EGFR 8 01/05/2022           Results from last 7 days   Lab Units 01/05/22  0514 01/04/22  0556 01/03/22 2011   POTASSIUM mmol/L 4 1 3 6 3 9   CHLORIDE mmol/L 95* 91* 90*   CO2 mmol/L 31 30 28   BUN mg/dL 40* 55* 47*   CREATININE mg/dL 5 90* 7 16* 6 09*   CALCIUM mg/dL 8 1* 8 2* 8 5         Phosphorus: No results found for: PHOS  Magnesium: No results found for: MG  Urinalysis: No results found for: COLORU, CLARITYU, SPECGRAV, PHUR, LEUKOCYTESUR, NITRITE, Tiffany Bristol, KETONESU, BILIRUBINUR, BLOODU  Ionized Calcium: No results found for: CAION  Coagulation: No results found for: PT, INR, APTT  Troponin: No results found for: TROPONINI  ABG: No results found for: PHART, JJO0NHQ, PO2ART, LEP7XZE, J8KNZZLW, BEART, SOURCE  Radiology review:     IMAGING  Procedure: CT chest abdomen pelvis wo contrast    Result Date: 1/3/2022  Narrative: CT CHEST, ABDOMEN AND PELVIS WITHOUT IV CONTRAST INDICATION:   TRAUMA  COMPARISON:  MRI dated May 29, 2020  TECHNIQUE: CT examination of the chest, abdomen and pelvis was performed without intravenous contrast   Axial, sagittal, and coronal 2D reformatted images were created from the source data and submitted for interpretation  Radiation dose length product (DLP) for this visit:  1201 16 mGy-cm   This examination, like all CT scans performed in the [facility], was performed utilizing techniques to minimize radiation dose exposure, including the use of iterative reconstruction and automated exposure control  Enteric contrast was not administered  FINDINGS: Study is limited due to lack of intravenous and oral contrast to evaluate for solid abdominal organs and vascular structures CHEST LUNGS:  The trachea and central bronchial tree are patent  Atelectasis seen within the lung bases  PLEURA:  Unremarkable  HEART/GREAT VESSELS: Coronary artery calcifications  the heart is enlarged  MEDIASTINUM AND ROBINSON:  Unremarkable  CHEST WALL AND LOWER NECK:   Unremarkable  ABDOMEN LIVER/BILIARY TREE:  Unremarkable  GALLBLADDER:  No calcified gallstones  No pericholecystic inflammatory change  SPLEEN:  Unremarkable  PANCREAS:  There is a 2 4 x 1 9 cm hypodense lesion seen within the pancreatic head ADRENAL GLANDS:  Unremarkable  KIDNEYS/URETERS:  One or more simple renal cyst(s) is noted  Otherwise unremarkable kidneys  No hydronephrosis   STOMACH AND BOWEL:  There is colonic diverticulosis without evidence of acute diverticulitis  APPENDIX:  No findings to suggest appendicitis  ABDOMINOPELVIC CAVITY:  No ascites  No pneumoperitoneum  No lymphadenopathy  VESSELS:  Focal prominence of the infrarenal abdominal aorta is seen  PELVIS REPRODUCTIVE ORGANS:  Unremarkable for patient's age  URINARY BLADDER:  Inflammatory changes around the urinary bladder are seen  ABDOMINAL WALL/INGUINAL REGIONS:  Unremarkable  OSSEOUS STRUCTURES:  Nondisplaced left femoral neck fracture  Impression: Hypodense lesion within the pancreatic head more prominent compared to prior study  Further evaluation with a MRI/MRCP is recommended  Partially visualized nondisplaced left femoral neck fracture  Recommend MRI for further evaluation  I personally discussed this study with Chloe Duvall on 1/3/2022 at 10:03 PM  Workstation performed: [id number]     Procedure: XR Trauma chest portable    Result Date: 1/4/2022  Narrative: CHEST INDICATION:   TRAUMA  COMPARISON:  None EXAM PERFORMED/VIEWS:  XR CHEST PORTABLE 1 image FINDINGS: Cardiomediastinal silhouette appears enlarged  The right hemidiaphragm is elevated  Minimal right base atelectasis  No pneumothorax or pleural effusion  Osseous structures appear within normal limits for patient age  Impression: Minimal right base atelectasis  Workstation performed: [id number]     Procedure: XR knee 4+ vw left injury    Result Date: 1/4/2022  Narrative: LEFT KNEE INDICATION:   r/o fx  Felipe Carlos on [address] ioana  Today was putting wash away and heard a pop in the left knee and now has sharp pain and swelling  Cannot bear weight  COMPARISON:  None VIEWS:  XR KNEE 4+ VW LEFT INJURY FINDINGS: Bones are intact  Alignment is anatomic  Small tricompartment osteophytes with joint space narrowing  No suspicious lytic or blastic bone lesion  Medial meniscus chondrocalcinosis  Soft tissue vascular calcifications  No evidence of joint effusion  Impression: No acute osseous abnormality  Degenerative changes  Workstation performed: [id number]     Procedure: TRAUMA - CT head wo contrast    Result Date: 1/3/2022  Narrative: CT BRAIN - WITHOUT CONTRAST INDICATION:   TRAUMA  COMPARISON:  None  TECHNIQUE:  CT examination of the brain was performed  In addition to axial images, sagittal and coronal 2D reformatted images were created and submitted for interpretation  Radiation dose length product (DLP) for this visit:  887 mGy-cm   This examination, like all CT scans performed in the [facility], was performed utilizing techniques to minimize radiation dose exposure, including the use of iterative reconstruction and automated exposure control  IMAGE QUALITY:  Diagnostic  FINDINGS: PARENCHYMA: Decreased attenuation is noted in periventricular and subcortical white matter demonstrating an appearance that is statistically most likely to represent mild microangiopathic change  No CT signs of acute infarction  No intracranial mass, mass effect or midline shift  No acute parenchymal hemorrhage  VENTRICLES AND EXTRA-AXIAL SPACES:  Normal for the patient's age  VISUALIZED ORBITS AND PARANASAL SINUSES:  Unremarkable  CALVARIUM AND EXTRACRANIAL SOFT TISSUES:  Normal      Impression: No acute intracranial abnormality  Workstation performed: [id number]     Procedure: TRAUMA - CT facial bones wo contrast    Result Date: 1/3/2022  Narrative: CT FACIAL BONES WITHOUT INTRAVENOUS CONTRAST INDICATION:   TRAUMA  COMPARISON: None  TECHNIQUE:  Axial CT images were obtained through the facial bones with additional sagittal and coronal reconstructions  Radiation dose length product (DLP) for this visit:  353 mGy-cm   This examination, like all CT scans performed in the [facility], was performed utilizing techniques to minimize radiation dose exposure, including the use of iterative reconstruction and automated exposure control  IMAGE QUALITY:  Diagnostic   FINDINGS: FACIAL BONES:   No facial bone fracture identified  Normal alignment of the temporomandibular joints  No lytic or blastic lesion  ORBITS:  Orbital globes, optic nerves, and extraocular muscles appear symmetric and normal  There is no evidence of retrobulbar mass, abscess, or hematoma  SINUSES:  Normal  SOFT TISSUES:  Normal      Impression: No evidence of acute traumatic injury to the facial bones  Workstation performed: [id number]     Procedure: TRAUMA - CT spine cervical wo contrast    Result Date: 1/3/2022  Narrative: CT CERVICAL SPINE - WITHOUT CONTRAST INDICATION:   TRAUMA  COMPARISON:  None  TECHNIQUE:  CT examination of the cervical spine was performed without intravenous contrast   Contiguous axial images were obtained  Sagittal and coronal reconstructions were performed  Radiation dose length product (DLP) for this visit:  483 mGy-cm   This examination, like all CT scans performed in the [facility], was performed utilizing techniques to minimize radiation dose exposure, including the use of iterative reconstruction and automated exposure control  IMAGE QUALITY:  Diagnostic  FINDINGS: ALIGNMENT:  Normal alignment of the cervical spine  Anterolisthesis of C3 on C4 and C4 on C5 is seen  VERTEBRAL BODIES:  No fracture  DEGENERATIVE CHANGES:  Moderate multilevel cervical degenerative changes are noted  No critical central canal stenosis  PREVERTEBRAL AND PARASPINAL SOFT TISSUES:  Unremarkable  THORACIC INLET:  Please refer to the concurrent chest, abdomen, and pelvic CT report for description of the thoracic inlet findings  Impression: No cervical spine fracture or traumatic malalignment  Workstation performed: [id number]     Procedure: MRI hip left wo contrast    Result Date: 1/4/2022  Narrative: MRI LEFT HIP INDICATION:   MRI of left hip rule out fracture, MRCP evaluate pancreatic mass   COMPARISON:  CT chest/abdomen/pelvis 1/3/2022 TECHNIQUE:  MR sequences were obtained of the left hip and pelvis including: Localizer, axial T2 fat sat, coronal T1/STIR, cone-down axial oblique PD of the affected hip, coronal PD of the affected hip, sagittal T2 fat sat of the affected hip  Gadolinium was not used  FINDINGS: LEFT HIP: -JOINT EFFUSION: There is a moderate joint effusion  -BONES: Normal marrow signal demonstrated without hip fracture or AVN  -ARTICULAR SURFACE: Moderate osteoarthritis  -ACETABULAR LABRUM: Macerated, degenerative-type tear  -TROCHANTERIC BURSA: Normal  RIGHT HIP: (please note dedicated small field of view images were not made of the right hip joint limiting its evaluation ) -JOINT EFFUSION: There is a moderate joint effusion  Mild iliopsoas bursitis  -BONES: Normal marrow signal demonstrated without hip fracture or AVN  -ARTICULAR SURFACE:  Moderate osteoarthritis  -ACETABULAR LABRUM: Lacerated, degenerative-type tear  -TROCHANTERIC BURSA: Normal  REST OF PELVIS: -BONES: Normal  -SI JOINTS AND SYMPHYSIS PUBIS:  Intact  -VISUALIZED LUMBAR SPINE:  Unremarkable  -MUSCULATURE: Low-grade, degenerative-type partial-thickness tears at the hamstring origins  Normal muscle signal  -PELVIC SOFT TISSUES: Normal  SUBCUTANEOUS TISSUES: Normal     Impression: No acute fracture in the left hip  Moderate bilateral hip osteoarthritis  Workstation performed: [id number]     Procedure: MRI abdomen wo contrast and mrcp    Result Date: 1/4/2022  Narrative: MRI OF THE ABDOMEN WITHOUT CONTRAST WITH MRCP INDICATION:  Evaluate pancreatic cyst  COMPARISON: TECHNIQUE:  MRI of the abdomen was performed without the administration of contrast using the following  using sequences: Axial T1 weighted in/out of phase images, multiplanar T2 weighted images, diffusion weighted and fat suppressed T1 weighted images  3D MRCP images were obtained with radial thick slabs and projections  3D rendering was performed from the acquisition scanner  FINDINGS: LOWER CHEST:   Unremarkable  LIVER:  Normal in size and configuration    Stable subcentimeter T2 hyperintense liver lesions that likely represent cysts or hemangiomas  BILE DUCTS:  No intrahepatic or extrahepatic bile duct dilation  Common bile duct is normal in caliber  No choledocholithiasis, biliary stricture or suspicious mass  GALLBLADDER:  Normal  PANCREAS:  1 7 x 1 7 x 1 9 cm (AP, transverse, craniocaudal) cystic lesion in the head the pancreas has increased from 1 4 on previous MRI  No obvious solid component however evaluation is limited due to lack of contrast  No communication with the pancreatic duct  No pancreatic duct dilatation  ADRENAL GLANDS:  1 3 cm right adrenal nodule without signal dropout on out of phase imaging is stable compared with CT dated 5/7/2013 and likely represents a  lipid poor adenoma  Left adrenal gland is unremarkable  SPLEEN:  Normal  KIDNEYS/PROXIMAL URETERS:  Kidneys are atrophic  No hydronephrosis  Multiple stable bilateral renal cysts  A few of the subcentimeter cysts are hemorrhagic or proteinaceous  BOWEL:  No dilated loops of bowel  PERITONEAL CAVITY/RETROPERITONEUM:  No ascites  No mass  LYMPH NODES:  No abdominal lymphadenopathy  VASCULAR STRUCTURES:  Unremarkable  No aneurysm  ABDOMINAL WALL:  Unremarkable  OSSEOUS STRUCTURES:  No suspicious osseous lesion  Impression: 1 9 cm (craniocaudal) cystic lesion in the head the pancreas is increased from 1 4 cm in 2020  For simple cyst(s) that have demonstrated significant interval growth (>20% in longest dimension) because it is still <2 0 cm, recommend continued followup per  protocol  Next followup in 6 months   Preferred imaging modality: abdomen MRI and MRCP with and without IV contrast, or triple phase abdomen CT with IV contrast, or abdomen MRI and MRCP without IV contrast  The recommendations regarding pancreatic findings assumes that patient does not have family history of pancreatic cancer nor have any symptoms potentially attributable to pancreatic cystic lesions (hyperamylasemia, recent-onset diabetes, severe epigastric pain, weight loss, steatorrhea, or jaundice ) If these conditions are not true, then management should be deferred to judgement of specialists such as gastroenterologists or oncologic surgeons  Recommendations are based on recent consensus statements on management of pancreatic cystic lesions from [address] [place] [address] [employer], [employer] of Radiology, the journal Pancreatology, and our own institutional consensus  Other stable findings as above  The study was marked in EPIC for significant notification  Workstation performed: [id number]     Procedure: VAS lower limb venous duplex study, complete bilateral    Result Date: 1/4/2022  Narrative:  [facility] REPORT CLINICAL: Indications: Patient presents with left knee and lower leg pain and swelling since a fall on 12/24/21  Patient states he felt a pop in his left knee when he fell  FINDINGS:  Segment  Right            Left              Impression       Impression       CFV      Normal (Patent)  Normal (Patent)     CONCLUSION:  Impression: RIGHT LOWER LIMB: Limited No gross evidence of acute deep vein thrombosis in the CFV, FV, Popliteal Vein or posterior tibial veins  LEFT LOWER LIMB: Limited No gross evidence of acute deep vein thrombosis in the CFV, FV, Popliteal Vein or posterior tibial veins  There is a complex, non-vascularized fluid collection noted in the left popliteal fossa  Technically difficult/limited study to body habitus, edema and poor visualization of calf veins    SIGNATURE: Electronically Signed by: Femi Canales MD on 2022-01-04 11:03:12 PM      Current Facility-Administered Medications   Medication Dose Route Frequency    acetaminophen (TYLENOL) tablet 650 mg  650 mg Oral Q6H PRN    allopurinol (ZYLOPRIM) tablet 200 mg  200 mg Oral Daily    calcium acetate (PHOSLO) capsule 1,334 mg  1,334 mg Oral TID With Meals    co-enzyme Q-10 capsule 200 mg  200 mg Oral Daily    doxazosin (CARDURA) tablet 2 mg  2 mg Oral Daily    hydrALAZINE (APRESOLINE) tablet 100 mg  100 mg Oral BID    HYDROcodone-acetaminophen (NORCO) 5-325 mg per tablet 1 tablet  1 tablet Oral Q6H PRN    HYDROmorphone (DILAUDID) injection 1 mg  1 mg Intravenous Q4H PRN    losartan (COZAAR) tablet 50 mg  50 mg Oral Daily    magnesium oxide (MAG-OX) tablet 400 mg  400 mg Oral Daily    metoprolol succinate (TOPROL-XL) 24 hr tablet 50 mg  50 mg Oral BID    ondansetron (ZOFRAN) injection 4 mg  4 mg Intravenous Q6H PRN    sevelamer (RENAGEL) tablet 800 mg  800 mg Oral TID With Meals     Medications Discontinued During This Encounter   Medication Reason    Red Yeast Rice CAPS 1,200 mg     enoxaparin (LOVENOX) subcutaneous injection 90 mg     heparin (VTE/PE) high     heparin (porcine) 25,000 units in 0 45% NaCl 250 mL infusion (premix)     heparin (porcine) injection 8,800 Units     heparin (porcine) injection 4,400 Units        Lesa Perdomo, DO      This progress note was produced in part using a dictation device which may document imprecise wording from author's original intent

## 2022-01-05 NOTE — PROGRESS NOTES
[facility]  Progress Note - Suzanne Orozco 1945, 76 y o  male MRN: [id number]  Unit/Bed#: [facility] 05 Encounter: [id number]  Primary Care Provider: Liza Estevez DO   Date and time admitted to hospital: 1/3/2022  6:54 PM    * Closed fracture of neck of left femur ([facility] )  Assessment & Plan  · This condition has been ruled out  · Left lower extremity pain is secondary to musculoskeletal reasons  · MRI of the left hip as follows:-No acute fracture in the left hip  Moderate bilateral hip osteoarthritis    Okay for activity as tolerated  · Appreciate orthopedic surgical input  · Status post a PT and OT evaluation-they recommend short-term rehab  · Case management is working on rehab placement options    Pancreatic mass  Assessment & Plan  · Status post an MRCP  · MRCP results appreciated  · Status post a GI evaluation - no further inpatient testing, treatment, and or workup is warranted, patient will need continued surveillance of the pancreatic lesions in the outpatient setting within the next 6-12 months    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 [facility])  Assessment & Plan  Lab Results   Component Value Date    EGFR 8 01/03/2022    EGFR 10 10/01/2021    EGFR 8 04/02/2018    CREATININE 6 09 (H) 01/03/2022    CREATININE 5 15 (H) 10/01/2021    CREATININE 6 08 (H) 04/02/2018     · With secondary hyperparathyroidism  · Continue PhosLo, Renagel  · Continue dialysis on Mondays, Wednesdays, and Fridays  · Nephrology services are following    Chronic systolic congestive heart failure ([facility] )  Assessment & Plan  Wt Readings from Last 3 Encounters:   01/03/22 113 kg (250 lb)   10/06/21 117 kg (258 lb 6 oz)   09/08/21 117 kg (257 lb)     · Volume management is with dialysis  · Continue Toprol-XL and losartan otherwise          Class 2 severe obesity due to excess calories with serious comorbidity and body mass index (BMI) of 39 0 to 39 9 in adult [facility])  Assessment & Plan  · Dietary, weight loss, and lifestyle modification counseling was provided    Elevated d-dimer  Assessment & Plan  · Status post treatment with heparin drip that has since been discontinued  · Bilateral lower extremity Dopplers negative for DVT    Essential hypertension  Assessment & Plan  · Continue pre-hospital Toprol XL 50 mg p o  B i d , Cozaar 50 mg p o  Daily, hydralazine 100 mg p o  B i d  And Cardura 2 mg p o  Daily    Magnesium deficiency  Assessment & Plan  · Continue pre-hospital magnesium oxide 400 mg p o  Daily        VTE Prophylaxis:  Heparin    Patient Centered Rounds: I have performed bedside rounds with nursing staff today  Discussions with Specialists or Other Care Team Provider:  Nephrology, GI, orthopedic surgery, case management, nursing, pharmacy  Education and Discussions with Family / Patient:  Patient was brought up to par with the plan of care for today    Current Length of Stay: 1 day(s)    Current Patient Status: Inpatient   Certification Statement: The patient will continue to require additional inpatient hospital stay due to The need for placement    Discharge Plan:  Patient is medically stable for discharge, case management is working on rehab placement options    Code Status: Level 1 - Full Code    Subjective:   Patient seen and examined, sitting up in a chair, no new complaints no distress, feels fatigued    Objective:     Vitals:   Temp (24hrs), Av 7 °F (37 1 °C), Min:98 1 °F (36 7 °C), Max:99 3 °F (37 4 °C)    Temp:  [98 1 °F (36 7 °C)-99 3 °F (37 4 °C)] 98 7 °F (37 1 °C)  HR:  [51-68] 57  Resp:  [16-20] 18  BP: ()/() 101/50  SpO2:  [92 %-97 %] 92 %  Body mass index is 38 65 kg/m²  Input and Output Summary (last 24 hours): Intake/Output Summary (Last 24 hours) at 2022 1422  Last data filed at 2022 0700  Gross per 24 hour   Intake 640 ml   Output 2783 ml   Net -2143 ml       Physical Exam:   Physical Exam  Vitals and nursing note reviewed     Constitutional: General: He is not in acute distress  Appearance: Normal appearance  He is not ill-appearing  HENT:      Head: Normocephalic and atraumatic  Comments: Left eye bruising noted     Nose: Nose normal    Eyes:      Extraocular Movements: Extraocular movements intact  Pupils: Pupils are equal, round, and reactive to light  Cardiovascular:      Rate and Rhythm: Normal rate and regular rhythm  Pulses: Normal pulses  Heart sounds: Normal heart sounds  No murmur heard  No friction rub  No gallop  Pulmonary:      Effort: Pulmonary effort is normal       Breath sounds: Normal breath sounds  Comments: Decreased breath sounds bilaterally at the bases  Abdominal:      General: There is no distension  Palpations: Abdomen is soft  There is no mass  Tenderness: There is no abdominal tenderness  There is no guarding or rebound  Musculoskeletal:         General: No swelling or tenderness  Normal range of motion  Cervical back: Normal range of motion and neck supple  No rigidity  No muscular tenderness  Right lower leg: No edema  Left lower leg: No edema  Comments: Two to 3+ bilateral lower extremity chronic lymphedema, left greater than right   Skin:     General: Skin is warm  Capillary Refill: Capillary refill takes less than 2 seconds  Findings: No erythema or rash  Neurological:      General: No focal deficit present  Mental Status: He is alert and oriented to person, place, and time  Mental status is at baseline     Psychiatric:         Mood and Affect: Mood normal          Behavior: Behavior normal          Additional Data:     Labs:    Results from last 7 days   Lab Units 01/05/22  0514   WBC Thousand/uL 5 78   HEMOGLOBIN g/dL 8 0*   HEMATOCRIT % 24 5*   PLATELETS Thousands/uL 258   NEUTROS PCT % 65   LYMPHS PCT % 19   MONOS PCT % 13*   EOS PCT % 2     Results from last 7 days   Lab Units 01/05/22  0514   SODIUM mmol/L 135   POTASSIUM mmol/L 4 1   CHLORIDE mmol/L 95*   CO2 mmol/L 31   BUN mg/dL 40*   CREATININE mg/dL 5 90*   CALCIUM mg/dL 8 1*     Results from last 7 days   Lab Units 01/03/22 2011   INR  1 01               * I Have Reviewed All Lab Data Listed Above  * Additional Pertinent Lab Tests Reviewed: [facility] Admission  Reviewed    Imaging:  Imaging Reports Reviewed Today Include:  None    Recent Cultures (last 7 days):           Last 24 Hours Medication List:   Current Facility-Administered Medications   Medication Dose Route Frequency Provider Last Rate    acetaminophen  650 mg Oral Q6H PRN Marifer Cervantes, PA-C      allopurinol  200 mg Oral Daily Marifer Cervantes, PA-C      calcium acetate  1,334 mg Oral TID With Meals Marifer Cervantes, PA-C      co-enzyme Q-10  200 mg Oral Daily Marifer Cervantes, PA-C      doxazosin  2 mg Oral Daily Valarietrice Helen, PA-C      hydrALAZINE  50 mg Oral BID Nadja Contreras DO      HYDROcodone-acetaminophen  1 tablet Oral Q6H PRN Marifer Cervantes, PA-C      HYDROmorphone  1 mg Intravenous Q4H PRN Marifer Cervantes, PA-C      losartan  50 mg Oral Daily Marifer Cervantes, PA-C      magnesium oxide  400 mg Oral Daily Deatrice Helen, PA-C      metoprolol succinate  50 mg Oral BID Valarietrice Helen, PA-C      ondansetron  4 mg Intravenous Q6H PRN Marifer Cervantes, PA-C      sevelamer  800 mg Oral TID With Meals Marifer Cervantes, PA-C          Today, Patient Was Seen By: Destinee Mckeon MD    ** Please Note: Dictation voice to text software may have been used in the creation of this document   **

## 2022-01-05 NOTE — PLAN OF CARE
Problem: INFECTION - ADULT  Goal: Absence or prevention of progression during hospitalization  Description: INTERVENTIONS:  - Assess and monitor for signs and symptoms of infection  - Monitor lab/diagnostic results  - Monitor all insertion sites, i e  indwelling lines, tubes, and drains  - Monitor endotracheal if appropriate and nasal secretions for changes in amount and color  - [place] appropriate cooling/warming therapies per order  - Administer medications as ordered  - Instruct and encourage patient and family to use good hand hygiene technique  - Identify and instruct in appropriate isolation precautions for identified infection/condition  Outcome: Progressing     Problem: METABOLIC, FLUID AND ELECTROLYTES - ADULT  Goal: Electrolytes maintained within normal limits  Description: INTERVENTIONS:  - Monitor labs and assess patient for signs and symptoms of electrolyte imbalances  - Administer electrolyte replacement as ordered  - Monitor response to electrolyte replacements, including repeat lab results as appropriate  - Instruct patient on fluid and nutrition as appropriate  Outcome: Progressing     Problem: MOBILITY - ADULT  Goal: Maintain or return to baseline ADL function  Description: INTERVENTIONS:  -  Assess patient's ability to carry out ADLs; assess patient's baseline for ADL function and identify physical deficits which impact ability to perform ADLs (bathing, care of mouth/teeth, toileting, grooming, dressing, etc )  - Assess/evaluate cause of self-care deficits   - Assess range of motion  - Assess patient's mobility; develop plan if impaired  - Assess patient's need for assistive devices and provide as appropriate  - Encourage maximum independence but intervene and supervise when necessary  - Involve family in performance of ADLs  - Assess for home care needs following discharge   - Consider OT consult to assist with ADL evaluation and planning for discharge  - Provide patient education as appropriate  Outcome: Progressing     Problem: Knowledge Deficit  Goal: Patient/family/caregiver demonstrates understanding of disease process, treatment plan, medications, and discharge instructions  Description: Complete learning assessment and assess knowledge base    Interventions:  - Provide teaching at level of understanding  - Provide teaching via preferred learning methods  Outcome: Progressing     Problem: DISCHARGE PLANNING  Goal: Discharge to home or other facility with appropriate resources  Description: INTERVENTIONS:  - Identify barriers to discharge w/patient and caregiver  - Arrange for needed discharge resources and transportation as appropriate  - Identify discharge learning needs (meds, wound care, etc )  - Arrange for interpretive services to assist at discharge as needed  - Refer to Case Management Department for coordinating discharge planning if the patient needs post-hospital services based on physician/advanced practitioner order or complex needs related to functional status, cognitive ability, or social support system  Outcome: Progressing

## 2022-01-05 NOTE — CASE MANAGEMENT
Case Management Discharge Planning Note    Patient name Ana Paula Jason  Location [facility] MRN [id number]  : 1945 Date 2022       Current Admission Date: 1/3/2022  Current Admission Diagnosis:Closed fracture of neck of left femur [facility])   Patient Active Problem List    Diagnosis Date Noted    Elevated d-dimer 2022    Pancreatic mass 2022    Closed fracture of neck of left femur ([facility] ) 2022    Primary osteoarthritis of both knees 2021    Hyperlipidemia 2021    Class 2 severe obesity due to excess calories with serious comorbidity and body mass index (BMI) of 39 0 to 39 9 in adult [facility]) 2021    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 ([facility] ) 2021    Secondary hyperparathyroidism of renal origin ([place] [address] ) 2021    Chronic renal failure 2021    Other fluid overload 2020    Abnormal nuclear stress test 2020    Panlobular emphysema ([place] [address] ) 2020    Disorder of phosphorus metabolism, unspecified 2019    Essential hypertension 2019    Hemodialysis-associated hypotension 2019    AAA (abdominal aortic aneurysm) ([place] [address] ) 2019    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, initial encounter ([place] [address] ) 2019    Anemia in chronic kidney disease 2018    Benign prostatic hyperplasia without lower urinary tract symptoms 2018    Chronic gout, unspecified, with tophus (tophi) 2018    Iron deficiency anemia 2018    Kidney stone 2018    Magnesium deficiency 2018    Other disorders of electrolyte and fluid balance, not elsewhere classified 2018    Personal history of nicotine dependence 2018    Proteinuria, unspecified 2018    Unspecified protein-calorie malnutrition ([facility] ) 2018    Skin lesion of face 2018    Chronic systolic congestive heart failure ([place] [address] ) 2018    ASCVD (arteriosclerotic cardiovascular disease) 12/11/2017    Carotid stenosis, right 12/11/2017    Gastroesophageal reflux disease 12/11/2017    Vitamin D deficiency 12/11/2017    Osteoarthritis of both knees 09/02/2015      LOS (days): 1  Geometric Mean LOS (GMLOS) (days):   Days to GMLOS:     OBJECTIVE:  Risk of Unplanned Readmission Score: 17         Current admission status: Inpatient   Preferred Pharmacy:   [id number]- [address], [address]  [place]   [address] [place] [address], [address]  Phone: [phone] Fax: [address] [place] [address]  Phone: [phone] Fax: [phone]    Primary Care Provider: Kemal Metz DO    Primary Insurance: Pete Verma [facility]  Secondary Insurance:     DISCHARGE DETAILS:    Discharge planning discussed with[de-identified] patient and wife was called at 17:01 and I had to leave a message  Freedom of Choice: Yes  Comments - Freedom of Choice: rehab has been recommended, pt is in agreement to a blank referrals to facilities that accept his insurance  CM contacted family/caregiver?: Yes             Contacts  Patient Contacts: Marquez Ware  Relationship to Patient[de-identified] Family  Contact Method: Phone  Phone Number: [phone]  message was left  Reason/Outcome: Discharge Planning              Other Referral/Resources/Interventions Provided:  Interventions: Short Term Rehab,Dialysis  Referral Comments: referrasl to [facility] [place] [facility]  NIKE   pt will need an auth ans may need their dialysis chair moved

## 2022-01-05 NOTE — OCCUPATIONAL THERAPY NOTE
Occupational Therapy Evaluation      Beata Bowser    1/5/2022    Patient Active Problem List   Diagnosis    Essential hypertension    Hemodialysis-associated hypotension    AAA (abdominal aortic aneurysm) ([facility])    Chronic renal failure    Primary osteoarthritis of both knees    Hyperlipidemia    Class 2 severe obesity due to excess calories with serious comorbidity and body mass index (BMI) of 39 0 to 39 9 in adult [facility])    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 [facility])    Secondary hyperparathyroidism of renal origin ([facility] )    Abnormal nuclear stress test    Anemia in chronic kidney disease    ASCVD (arteriosclerotic cardiovascular disease)    Benign prostatic hyperplasia without lower urinary tract symptoms    Carotid stenosis, right    Chronic gout, unspecified, with tophus (tophi)    Chronic systolic congestive heart failure ([facility])    Disorder of phosphorus metabolism, unspecified    Gastroesophageal reflux disease    Iron deficiency anemia    Kidney stone    Magnesium deficiency    Osteoarthritis of both knees    Other disorders of electrolyte and fluid balance, not elsewhere classified    Other fluid overload    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, initial encounter ([facility]    Panlobular emphysema ([facility] )    Personal history of nicotine dependence    Proteinuria, unspecified    Skin lesion of face    Unspecified protein-calorie malnutrition ([facility])    Vitamin D deficiency    Elevated d-dimer    Pancreatic mass    Closed fracture of neck of left femur ([facility])       Past Medical History:   Diagnosis Date    Abdominal aortic aneurysm (AAA) ([facility] )     s/p repair    Anemia     due to kidney disease    Arthritis     CHF (congestive heart failure) ([facility])     stable at present    Chronic kidney disease     on hemodialysis Tu Th Sat    Chronic pain disorder     knees    Edema     GERD (gastroesophageal reflux disease)     no meds     Gout     History of echocardiogram 03/02/2018    EF 55%, mild LVH, technically difficult study   Hyperlipidemia     borderline no meds    Hyperparathyroidism ([address] )     Hypertension     Kidney stone     Seasonal allergies     Shortness of breath     mild exertional    Skin cancer     Vitamin D deficiency        Past Surgical History:   Procedure Laterality Date    ABDOMINAL AORTIC ANEURYSM REPAIR  2004    CARDIAC CATHETERIZATION  05/01/2020    no blockages    CARPAL TUNNEL RELEASE Right     COLONOSCOPY      DIALYSIS FISTULA CREATION Right 2017    GLAUCOMA SURGERY      LEG SURGERY      removal splinter    PARATHYROIDECTOMY      SPLIT THICKNESS SKIN GRAFT N/A 12/4/2019    Procedure: THIGH STSG;  Surgeon: Chandrakant Stinson MD;  Location: [address] [place] [address];  Service: Plastics    SQUAMOUS CELL CARCINOMA EXCISION Right 12/4/2019    Procedure: REMOVE SKIN CA FROM EAR & CHEEK;  Surgeon: Chandrakant Stinson MD;  Location: [address] [place] [address];  Service: Plastics        01/05/22 1052   OT Last Visit   OT Visit Date 01/05/22   Note Type   Note type Evaluation   Additional Comments Pt agreeable to OT eval  Upon arrival, pt supine in bed with HOB elevated    Restrictions/Precautions   Weight Bearing Precautions Per Order Yes   RLE Weight Bearing Per Order WBAT   LLE Weight Bearing Per Order WBAT   Other Precautions WBS; Fall Risk;Pain   Pain Assessment   Pain Assessment Tool 0-10   Pain Score 3  (0/10 at rest, 3/10 c mobility )   Pain Location/Orientation Orientation: Left; Location: Hip;Location: Leg   Pain Onset/Description Onset: Ongoing; Descriptor: Discomfort; Descriptor: [address] Pain Intervention(s) Ambulation/increased activity;Repositioned;Medication (See MAR)   Home Living   Type of [address] [place] [address] Two level;Bed/bath upstairs;Stairs to enter with rails  (3 KARUNA, FOS to 2nd floor, full bath on 1st floor )   Bathroom Shower/Tub Walk-in shower   Bathroom Toilet Raised   Bathroom Equipment Grab bars in shower;Built-in shower seat   [address] Walker;Cane  ([facility] in [facility], rollator in house)   Additional Comments pt reports only navigating to upstairs level for nighttime only for sleeping   Prior Function   Level of [place] Independent with ADLs and functional mobility   Lives With Spouse   Receives Help From Family   ADL Assistance Independent   IADLs Independent   Falls in the last 6 months 1 to 4  (1 fall on [address])   Vocational Retired  (package )   Comments pt drives; pt receives HD session T/Th/Sa   Lifestyle   Autonomy Patient reporting being independent with ADLs/IADLs, ambulatory with SPC or rollator and lives with wife in a two story house    Reciprocal Relationships pt cares for wife   Service to Others retired     ADL   [address] 6  Modified independent   [address] 6  Modified Independent   [address]  [address] [place] [address] Dr 3  Moderate Assistance   [address] 4  [address] 3  [address]  3  Moderate Assistance   Bed Mobility   Supine to [address]   Additional items Assist x 1;HOB elevated; Bedrails; Increased time required;Verbal cues;LE management   Sit to Supine   (DNT: pt seated OOB in recliner at end of eval )   Additional Comments Pt reported (+) lightheadedness upon sitting at EOB  BP: 153/63  Pt reported that symptoms decreased with ~5 minutes of seated rest     Transfers   Sit to Stand 4  Minimal assistance   Additional items Assist x 1; Increased time required;Verbal cues   Stand to Sit 4  Minimal assistance   Additional items Assist x 1; Armrests; Increased time required;Verbal cues   Stand pivot 3  Moderate assistance   Additional items Assist x 1; Increased time required;Verbal cues  (c RW usage for stability )   Functional Mobility   Functional Mobility 3  Moderate assistance Additional Comments Pt ambulated short distance from bed>recliner with no overt SOB  Left knee buckeling noted  Pt reported "feeling off" and stated multiple times "I just can't put weight through that leg"  Additional items Rolling walker   Balance   Static Sitting Good   Dynamic Sitting Fair +   Static Standing Fair -   Dynamic Standing Poor +   Activity Tolerance   Activity Tolerance Patient limited by fatigue;Patient limited by pain   Medical Staff Made Aware Pt seen as a co-eval with PT due to the patient's co-morbidities, clinically unstable presentation, and present impairments which are a regression from the patient's baseline  Nurse Made Aware HERBERT Johnson verbalized pt appropriate for therapy and made aware of outcomes    RUE Assessment   RUE Assessment WFL  (4/5 MMT based on functional assessment )   LUE Assessment   LUE Assessment WFL  (4/5 MMT based on functional assessment )   Hand Function   Gross Motor Coordination Functional   Fine Motor Coordination Functional   Sensation   Light Touch Severe deficits in the RLE; Severe deficits in the LLE  (neuropathy in B feet reported )   Cognition   Overall Cognitive Status WFL   Arousal/Participation Alert; Responsive; Cooperative   Attention Within functional limits   Orientation Level Oriented X4   Memory Within functional limits   Following Commands Follows all commands and directions without difficulty   Assessment   Limitation Decreased ADL status; Decreased UE strength;Decreased endurance;Decreased sensation;Decreased self-care trans;Decreased high-level ADLs   Prognosis Good   Assessment Patient is a 76 y o  male seen for OT evaluation s/p [facility] w/Closed fracture of neck of left femur ([facility] )  Commorbidities affecting patient's functional performance at time of assessment include: CKD, CHF, obesity, HTN, and pancreatic mass  Orders placed for OT evaluation and treatment and activity as tolerated    Performed at least two patient identifiers during session including name and wristband  Prior to admission, Patient reporting being independent with ADLs/IADLs, ambulatory with [facility] or [facility] and lives with wife in a two story house  Personal factors affecting patient at time of initial evaluation include: steps to enter, difficulty performing ADLs and difficulty performing IADLs  Upon evaluation, patient requires supervision and minimal  assist for UB ADLs, moderate assist for LB ADLs, transfers and functional ambulation in room and bathroom with minimal -moderate assist, with the use of Rolling Walker  Patient is oriented x 4 and presents with ability to recognize a problem, define a problem, identify alternative plan, select a plan, organize steps in a plan, implement plan and evaluate outcome (problem solving)  Occupational performance is affected by the following deficits: decreased muscle strength, dynamic sit/ stand balance deficit with poor standing tolerance time for self care and functional mobility, decreased activity tolerance, impaired sensation, increased pain and delayed righting and equilibrium reactions  Based on the mentioned OT evaluation outcomes, functional performance deficits, and assessment findings, pt has been identified as a high complexity evaluation  Patient to benefit from continued Occupational Therapy treatment while in the hospital to address deficits as defined above and maximize level of functional independence with ADLs and functional mobility  Occupational Performance areas to address include: grooming , bathing/ shower, dressing, toilet hygiene, transfer to all surfaces, functional ambulation, medication routine/ management, IADLS: Household maintenance, IADLs: safety procedures and IADLs: meal prep/ clean up  From OT standpoint, recommendation at time of d/c would be Post acute rehabilitation services      Goals   Patient Goals to be able to walk again    Plan   Treatment Interventions ADL retraining;Functional transfer training;UE strengthening/ROM; Endurance training;Patient/family training;Equipment evaluation/education; Compensatory technique education; Energy conservation; Activityengagement   Goal Expiration Date 01/15/22   OT Treatment Day 0   OT Frequency 3-5x/wk   Recommendation   OT Discharge Recommendation Post acute rehabilitation services   OT - OK to Discharge Yes  (Once medically cleared )   Additional Comments  At end of eval, pt seated OOB in recliner with all needs met and call bell within reach    Additional Comments 2 The patient's raw score on the AM-PAC Daily Activity inpatient short form is 17, standardized score is 37 26, less than 39 4  Patients at this level are likely to benefit from discharge to post-acute rehabilitation services  Please refer to the recommendation of the Occupational Therapist for safe discharge planning     AM-PAC Daily Activity Inpatient   Lower Body Dressing 2   Bathing 2   Toileting 2   Upper Body Dressing 3   Grooming 4   Eating 4   Daily Activity Raw Score 17   Daily Activity Standardized Score (Calc for Raw Score >=11) 37 26   AM-[facility] Applied Cognition Inpatient   Following a Speech/Presentation 4   Understanding Ordinary Conversation 4   Taking Medications 4   Remembering Where Things Are Placed or Put Away 4   Remembering List of 4-5 Errands 4   Taking Care of Complicated Tasks 4   Applied Cognition Raw Score 24   Applied Cognition Standardized Score 62 21     GOALS:    *ADL transfers with (S) for inc'd independence with ADLs/purposeful tasks    *UB ADL with (S) for inc'd independence with self cares    *LB ADL with CGA using AE prn for inc'd independence with self cares    *Toileting with (S) for clothing management and hygiene for return to PLOF with personal care    *Increase stand tolerance x5 m for inc'd tolerance with standing purposeful tasks    *Participate in 10m UE therex to increase overall stamina/activity tolerance for purposeful tasks    *Bed mobility- (S) for inc'd independence to manage own comfort and initiate EOB & OOB purposeful tasks    *Patient will verbalize 3 safety awareness/ principles to prevent falls in the home setting  *Patient will verbalize and demonstrate use of energy conservation/deep breathing techniques and work simplification skills during functional activities with no verbal cues  *Patient will increase OOB/sitting tolerance to 2-4 hours per day to increase participation in self-care and leisure tasks with no s/s of exertion        *Pt will demonstrate use of long handled AE during 100% of tx sessions for increased ADL safety and independence following D/C     GILBERTO Rosado, OTR/L

## 2022-01-05 NOTE — PLAN OF CARE
Problem: OCCUPATIONAL THERAPY ADULT  Goal: Performs self-care activities at highest level of function for planned discharge setting  See evaluation for individualized goals  Description: Treatment Interventions: ADL retraining,Functional transfer training,UE strengthening/ROM,Endurance training,Patient/family training,Equipment evaluation/education,Compensatory technique education,Energy conservation,Activityengagement          See flowsheet documentation for full assessment, interventions and recommendations  Note: Limitation: Decreased ADL status,Decreased UE strength,Decreased endurance,Decreased sensation,Decreased self-care trans,Decreased high-level ADLs  Prognosis: Good  Assessment: Patient is a 76 y o  male seen for OT evaluation s/p admit to [place] [address] on 1/3/2022 w/Closed fracture of neck of left femur ([address] )  Commorbidities affecting patient's functional performance at time of assessment include: CKD, CHF, obesity, HTN, and pancreatic mass  Orders placed for OT evaluation and treatment and activity as tolerated   Performed at least two patient identifiers during session including name and wristband  Prior to admission, Patient reporting being independent with ADLs/IADLs, ambulatory with [facility] or [facility] and lives with wife in a two story house  Personal factors affecting patient at time of initial evaluation include: steps to enter, difficulty performing ADLs and difficulty performing IADLs  Upon evaluation, patient requires supervision and minimal  assist for UB ADLs, moderate assist for LB ADLs, transfers and functional ambulation in room and bathroom with minimal -moderate assist, with the use of Rolling Walker  Patient is oriented x 4 and presents with ability to recognize a problem, define a problem, identify alternative plan, select a plan, organize steps in a plan, implement plan and evaluate outcome (problem solving)    Occupational performance is affected by the following deficits: decreased muscle strength, dynamic sit/ stand balance deficit with poor standing tolerance time for self care and functional mobility, decreased activity tolerance, impaired sensation, increased pain and delayed righting and equilibrium reactions  Based on the mentioned OT evaluation outcomes, functional performance deficits, and assessment findings, pt has been identified as a high complexity evaluation  Patient to benefit from continued Occupational Therapy treatment while in the hospital to address deficits as defined above and maximize level of functional independence with ADLs and functional mobility  Occupational Performance areas to address include: grooming , bathing/ shower, dressing, toilet hygiene, transfer to all surfaces, functional ambulation, medication routine/ management, IADLS: Household maintenance, IADLs: safety procedures and IADLs: meal prep/ clean up  From OT standpoint, recommendation at time of d/c would be Post acute rehabilitation services        OT Discharge Recommendation: Post acute rehabilitation services  OT - OK to Discharge: Yes (Once medically cleared )     Clarisa Pool OT

## 2022-01-05 NOTE — QUICK NOTE
Patient seen toward the end of his dialysis treatment  He was symptomatic from hypotension  We infused 100 ml of NS over 30 sec and took him off HD  He did not loose consciousness but was lightheaded and appeared confused in a transient manner  Patient regained typical mentation after about 2 min      Marco Keitaing, DO

## 2022-01-05 NOTE — PLAN OF CARE
Problem: PHYSICAL THERAPY ADULT  Goal: Performs mobility at highest level of function for planned discharge setting  See evaluation for individualized goals  Description: Treatment/Interventions: Functional transfer training,LE strengthening/ROM,Elevations,Therapeutic exercise,Endurance training,Patient/family training,Equipment eval/education,Gait training,Spoke to nursing,Spoke to case management  Equipment Recommended:  (continue RW use)       See flowsheet documentation for full assessment, interventions and recommendations  Note: Prognosis: Good  Problem List: Decreased strength,Decreased endurance,Impaired balance,Decreased mobility,Impaired judgement,Decreased safety awareness,Obesity,Pain,Impaired sensation  Assessment: Pt is 76 y o  male seen for high-complexity PT evaluation on 1/5/2022 s/p admit to [facility] on 1/3/2022 w/ Closed fracture of neck of left femur ([facility] )  PT was consulted to assess pt's functional mobility and d/c needs  Order placed for PT eval and tx, w/ activity as tolerated order  PTA, pt lives c wife in 2 SH c 3-5 KARUNA, amb c RW vs SPC at baseline, + fall history, drives, (I) ADLs/IADLs and provides assistance for his wife  At time of eval, pt requires min/mod Ax1 for all OOB mobility tasks, further distance deferred d/t lightheadedness and L knee buckling coupled with pain- increasing pt's risk for falls  Upon evaluation, pt presenting with impaired functional mobility d/t decreased strength, decreased endurance, impaired balance, decreased mobility, impaired judgement, decreased safety awareness, impaired sensation, obesity c BMI of 38 65 kg/m2 and pain  Pertinent PMHx and current co-morbidities affecting pt's physical performance at time of assessment include: HTN, obesity, CKD c ESRD on HD Tu/Th/Sa, CHF, elevated d-dimer, pancreatic mass  Personal factors affecting pt at time of eval include: limited home support, positive fall history and increased age   The following objective measures performed on IE also reveal limitations: AM-PAC 6-Clicks: 76/74  Pt's clinical presentation is currently unstable/unpredictable seen in pt's presentation of + lightheadedness evident during session  Overall, pt's rehab potential and prognosis to return to PLOF is good as impacted by objective findings, warranting pt to receive further skilled PT interventions to address identified impairments, activity limitation(s), and participation restriction(s)  Goal for patient is to improve and return home with his wife  Pt to benefit from continued PT tx to address deficits as defined above and maximize level of functional independent mobility and consistency in order for pt to improve activity tolerance and OOB mobility distance  From PT/mobility standpoint, recommendation at time of d/c would be post acute rehabilitation services pending progress in order to facilitate return to PLOF  Barriers to Discharge: Inaccessible home environment,Decreased caregiver support        PT Discharge Recommendation: Post acute rehabilitation services          See flowsheet documentation for full assessment

## 2022-01-05 NOTE — PHYSICAL THERAPY NOTE
Physical Therapy Evaluation   Time in: 1030  Time out: 1052  Total evaluation time: 22 minutes    Patient's Name: Anabelle Harvey    Admitting Diagnosis  Leg pain [M79 606]  ESRD (end stage renal disease) ([place] [address] ) [N18 6]  Pancreatic mass [K86 89]  Closed fracture of left hip, initial encounter ([place] [address] ) [S72 002A]    Problem List  Patient Active Problem List   Diagnosis    Essential hypertension    Hemodialysis-associated hypotension    [facility]ab[facility]) [facility])    Chronic renal failure    Primary osteoarthritis of both knees    Hyperlipidemia    Class 2 severe obesity due to excess calories with serious comorbidity and body mass index (BMI) of 39 0 to 39 9 in adult [facility])    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 ([place] [address] )    Secondary hyperparathyroidism of renal origin ([place] [address] )    Abnormal nuclear stress test    Anemia in chronic kidney disease    ASCVD (arteriosclerotic cardiovascular disease)    Benign prostatic hyperplasia without lower urinary tract symptoms    Carotid stenosis, right    Chronic gout, unspecified, with tophus (tophi)    Chronic systolic congestive heart failure (HCC)    Disorder of phosphorus metabolism, unspecified    Gastroesophageal reflux disease    Iron deficiency anemia    Kidney stone    Magnesium deficiency    Osteoarthritis of both knees    Other disorders of electrolyte and fluid balance, not elsewhere classified    Other fluid overload    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, initial encounter ([place] [address] )    Panlobular emphysema ([place] [address] )    Personal history of nicotine dependence    Proteinuria, unspecified    Skin lesion of face    Unspecified protein-calorie malnutrition (HCC)    Vitamin D deficiency    Elevated d-dimer    Pancreatic mass    Closed fracture of neck of left femur [facility])       Past Medical History  Past Medical History:   Diagnosis Date    Abdominal aortic aneurysm (AAA) ([place] [address] ) s/p repair    Anemia     due to kidney disease    Arthritis     CHF (congestive heart failure) (HCC)     stable at present    Chronic kidney disease     on hemodialysis Tu Th Sat    Chronic pain disorder     knees    Edema     GERD (gastroesophageal reflux disease)     no meds     Gout     History of echocardiogram 03/02/2018    EF 55%, mild LVH, technically difficult study   Hyperlipidemia     borderline no meds    Hyperparathyroidism ([address] )     Hypertension     Kidney stone     Seasonal allergies     Shortness of breath     mild exertional    Skin cancer     Vitamin D deficiency        Past Surgical History  Past Surgical History:   Procedure Laterality Date    ABDOMINAL AORTIC ANEURYSM REPAIR  2004    CARDIAC CATHETERIZATION  05/01/2020    no blockages    CARPAL TUNNEL RELEASE Right     COLONOSCOPY      DIALYSIS FISTULA CREATION Right 2017    GLAUCOMA SURGERY      LEG SURGERY      removal splinter    PARATHYROIDECTOMY      SPLIT THICKNESS SKIN GRAFT N/A 12/4/2019    Procedure: THIGH STSG;  Surgeon: Hallie Mcclure MD;  Location: [address] [place] [address];  Service: Plastics    SQUAMOUS CELL CARCINOMA EXCISION Right 12/4/2019    Procedure: REMOVE SKIN CA FROM EAR & CHEEK;  Surgeon: Hallie Mcclure MD;  Location: [address] [place] [address];  Service: Plastics       PT performed at least 2 patient identifiers during session: Name and wristband  01/05/22 1031   PT Last Visit   PT Visit Date 01/05/22   Note Type   Note type Evaluation   Pain Assessment   Pain Assessment Tool 0-10   Pain Score 3  (at rest = 0/10)   Pain Location/Orientation Orientation: Left; Location: Hip;Location: Leg   Pain Onset/Description Onset: Ongoing;Frequency: Intermittent   Patient's Stated Pain Goal No pain   Hospital Pain Intervention(s) Repositioned;Elevated; Emotional support   Restrictions/Precautions   Weight Bearing Precautions Per Order Yes   RLE Weight Bearing Per Order WBAT   LLE Weight Bearing Per Order WBAT   Other Precautions WBS; Fall Risk;Pain;Multiple lines   Home Living   Type of Home House  (3 vs 5 KARUNA c HR; FF to 2nd floor bedroom)   Home Layout Two level;Performs ADLs on one level;Stairs to enter with rails  (1st floor setup - pt sleeps upstairs)   Bathroom Shower/Tub Walk-in shower  (on 1st floor)   Bathroom Toilet Raised   Bathroom Equipment Grab bars in shower;Built-in shower seat   [address] Walker;Cane  ([facility] for outdoor mobility, upright walker for indoor use)   Additional Comments pt reports only navigating to upstairs level for nighttime only for sleeping   Prior Function   Level of [place] Independent with ADLs and functional mobility   Lives With [place] Help From Family   ADL Assistance Independent   IADLs Independent   Falls in the last 6 months 1 to 4  (1 fall on [place])   Vocational Retired  (package )   Comments pt drives; pt receives HD session T/Th/Sa   General   Family/Caregiver Present No   Cognition   Overall Cognitive Status WFL   Arousal/Participation Alert   Orientation Level Oriented X4   Memory Within functional limits   Following Commands Follows all commands and directions without difficulty   Comments pt agreeable to PT session   Subjective   Subjective "I can try and sit up in the recliner for a bit"   RLE Assessment   RLE Assessment X   Strength RLE   RLE Overall Strength 4-/5   LLE Assessment   LLE Assessment X   Strength LLE   L Hip Flexion 3+/5   L Knee Extension 4-/5   L Ankle Dorsiflexion 4-/5   Coordination   Movements are Fluid and Coordinated 1   Sensation X  (neuropathy B feet)   Bed Mobility   Supine to Sit 5  Supervision   Additional items Assist x 1;HOB elevated; Bedrails; Increased time required   Additional Comments Pt reported (+) lightheadedness upon sitting at EOB  BP: 153/63   Pt reported that symptoms decreased with ~5 minutes of seated rest     Transfers   Sit to Stand 4  Minimal assistance Additional items Assist x 1; Increased time required;Armrests   Stand to Sit 4  Minimal assistance   Additional items Assist x 1; Armrests; Increased time required   Ambulation/Elevation   Gait pattern Improper Weight shift; Poor UE support;L Knee Jah;Narrow MIKKI; Forward Flexion;Decreased foot clearance;Decreased L stance   Gait Assistance 3  Moderate assist   Additional items Assist x 1   Assistive Device Rolling walker   Distance 5 ft from bed>recliner   Stair Management Assistance Not tested   Balance   Static Sitting Good   Dynamic Sitting Fair +   Static Standing Fair -   Dynamic Standing Poor +   Ambulatory Poor +   Endurance Deficit   Endurance Deficit Yes   Activity Tolerance   Activity Tolerance Patient limited by fatigue;Patient limited by pain   Medical Staff Made Aware Pt seen as a co-eval with OT due to the patient's co-morbidities, clinically unstable presentation, and present impairments which are a regression from the patient's baseline  Nurse Made Aware HERBERT Clifford verbalized pt appropriate for therapy and made aware of outcomes    Assessment   Prognosis Good   Problem List Decreased strength;Decreased endurance; Impaired balance;Decreased mobility; Impaired judgement;Decreased safety awareness; Obesity;Pain; Impaired sensation   Assessment Pt is 76 y o  male seen for high-complexity PT evaluation on 1/5/2022 s/p admit to [facility] on 1/3/2022 w/ Closed fracture of neck of left femur ([address] )  PT was consulted to assess pt's functional mobility and d/c needs  Order placed for PT eval and tx, w/ activity as tolerated order  PTA, pt lives c wife in 2 SH c 3-5 KARUNA, amb c RW vs SPC at baseline, + fall history, drives, (I) ADLs/IADLs and provides assistance for his wife  At time of eval, pt requires min/mod Ax1 for all OOB mobility tasks, further distance deferred d/t lightheadedness and L knee buckling coupled with pain- increasing pt's risk for falls   Upon evaluation, pt presenting with impaired functional mobility d/t decreased strength, decreased endurance, impaired balance, decreased mobility, impaired judgement, decreased safety awareness, impaired sensation, obesity c BMI of 38 65 kg/m2 and pain  Pertinent PMHx and current co-morbidities affecting pt's physical performance at time of assessment include: HTN, obesity, CKD c ESRD on HD Tu/Th/Sa, CHF, elevated d-dimer, pancreatic mass  Personal factors affecting pt at time of eval include: limited home support, positive fall history and increased age  The following objective measures performed on IE also reveal limitations: AM-PAC 6-Clicks: 14/33  Pt's clinical presentation is currently unstable/unpredictable seen in pt's presentation of + lightheadedness evident during session  Overall, pt's rehab potential and prognosis to return to PLOF is good as impacted by objective findings, warranting pt to receive further skilled PT interventions to address identified impairments, activity limitation(s), and participation restriction(s)  Goal for patient is to improve and return home with his wife  Pt to benefit from continued PT tx to address deficits as defined above and maximize level of functional independent mobility and consistency in order for pt to improve activity tolerance and OOB mobility distance  From PT/mobility standpoint, recommendation at time of d/c would be post acute rehabilitation services pending progress in order to facilitate return to PLOF     Barriers to Discharge Inaccessible home environment;Decreased caregiver support   Goals   Patient Goals "to be able to walk again"   [facility] Expiration Date 01/15/22   Short Term Goal #1 In 7-10 days: Increase bilateral LE strength 1/2 grade to facilitate independent mobility, Perform all bed mobility tasks modified independent to decrease caregiver burden, Perform all transfers modified independent to improve independence, Ambulate > 75  ft  with least restrictive assistive device modified independent w/o LOB and w/ normalized gait pattern 100% of the time, Navigate 5 stairs with distant S with unilateral handrail to facilitate return to previous living environment, Increase all balance 1/2 grade to decrease risk for falls, Complete exercise program independently and Tolerate 4 hr OOB to faciliate upright tolerance   PT Treatment Day 0   Plan   Treatment/Interventions Functional transfer training;LE strengthening/ROM; Elevations; Therapeutic exercise; Endurance training;Patient/family training;Equipment eval/education;Gait training;Spoke to nursing;Spoke to case management   PT Frequency 3-5x/wk   Recommendation   PT Discharge Recommendation Post acute rehabilitation services   Equipment Recommended   (continue RW use)   Additional Comments Pt's raw score on the [facility] Basic Mobility inpatient short form is 17, standardized score is 39 67  Patients at this level are likely to benefit from DC to [address], however, please refer to therapist recommendation for safe DC planning  [facility] Basic Mobility Inpatient   Turning in Bed Without Bedrails 4   Lying on Back to Sitting on Edge of Flat Bed 3   Moving Bed to Chair 3   Standing Up From Chair 3   Walk in Room 2   Climb 3-5 Stairs 2   Basic Mobility Inpatient Raw Score 17   Basic Mobility Standardized Score 39 67   Highest Level Of Mobility   JH-HLM Goal 5: Stand one or more mins   JH-HLM Highest Level of Mobility 4: Move to chair/commode   JH-HLM Goal Achieved No   End of Consult   Patient Position at End of Consult Bedside chair; All needs within reach       Lisa Greene, PT, DPT

## 2022-01-06 NOTE — ASSESSMENT & PLAN NOTE
Wt Readings from Last 3 Encounters:   01/06/22 115 kg (254 lb 3 1 oz)   10/06/21 117 kg (258 lb 6 oz)   09/08/21 117 kg (257 lb)     · Volume management is with dialysis  · Continue Toprol-XL and losartan otherwise

## 2022-01-06 NOTE — CASE MANAGEMENT
Case Management Discharge Planning Note    Patient name Emmanuel Constable  Location [facility] MRN [id number]  : 1945 Date 2022       Current Admission Date: 1/3/2022  Current Admission Diagnosis:Closed fracture of neck of left femur [facility])   Patient Active Problem List    Diagnosis Date Noted    Elevated d-dimer 2022    Pancreatic mass 2022    Closed fracture of neck of left femur ([place] [address] ) 2022    Primary osteoarthritis of both knees 2021    Hyperlipidemia 2021    Class 2 severe obesity due to excess calories with serious comorbidity and body mass index (BMI) of 39 0 to 39 9 in adult [facility]) 2021    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 ([place] [address] ) 2021    Secondary hyperparathyroidism of renal origin ([place] [address] ) 2021    Chronic renal failure 2021    Other fluid overload 2020    Abnormal nuclear stress test 2020    Panlobular emphysema ([place] [address] ) 2020    Disorder of phosphorus metabolism, unspecified 2019    Essential hypertension 2019    Hemodialysis-associated hypotension 2019    AAA (abdominal aortic aneurysm) ([place] [address] ) 2019    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, initial encounter ([place] [address] ) 2019    Anemia in chronic kidney disease 2018    Benign prostatic hyperplasia without lower urinary tract symptoms 2018    Chronic gout, unspecified, with tophus (tophi) 2018    Iron deficiency anemia 2018    Kidney stone 2018    Magnesium deficiency 2018    Other disorders of electrolyte and fluid balance, not elsewhere classified 2018    Personal history of nicotine dependence 2018    Proteinuria, unspecified 2018    Unspecified protein-calorie malnutrition ([facility] ) 2018    Skin lesion of face 2018    Chronic systolic congestive heart failure ([place] [address] ) 2018    ASCVD (arteriosclerotic cardiovascular disease) 12/11/2017    Carotid stenosis, right 12/11/2017    Gastroesophageal reflux disease 12/11/2017    Vitamin D deficiency 12/11/2017    Osteoarthritis of both knees 09/02/2015      LOS (days): 2  Geometric Mean LOS (GMLOS) (days):   Days to GMLOS:     OBJECTIVE:  Risk of Unplanned Readmission Score: 15         Current admission status: Inpatient   Preferred Pharmacy:   [facility]/pharmacy #[id number]- [address]  [address]   [address] [facility] [address]  Phone: [phone] Fax: [address] [facility], [address] [place] [address] [address] [address]  Phone: [phone] Fax: [phone]    Primary Care Provider: Valerie Paz DO    Primary Insurance: [facility]  Secondary Insurance:     DISCHARGE DETAILS:    Discharge planning discussed with[de-identified] message was left for the patient wife at 10:31 am  Freedom of Choice: Yes  Comments - Freedom of Choice: rehab recommended referrals sent  CM contacted family/caregiver?: Yes             Contacts  Patient Contacts: Jeremias Negrete  Relationship to Patient[de-identified] Family (wife)  Contact Method: Phone  Phone Number: [phone]  Reason/Outcome: Discharge Planning              Other Referral/Resources/Interventions Provided:  Interventions: [address]  Term Rehab,Acute Rehab  Referral Comments: leta, Yomaira Haney, [facility], [facility] ,vallewy alina are intersested, pt may need his dialysis chair moved,  aru is also reviewing, pt will need authorization

## 2022-01-06 NOTE — UTILIZATION REVIEW
Inpatient Admission Authorization Request   NOTIFICATION OF INPATIENT ADMISSION/INPATIENT AUTHORIZATION REQUEST   [facility]   [facility]  [address], [address]  Tax ID: [id number]  NPI: [id number]  Place of Service: Inpatient [address]  Place of Service Code: 24     ATTENDING PROVIDER:  Attending Name and NPI#: Rosamaria Deras [[id number]]  Address: [address] [place] [address], [address]  Phone: [phone]     UTILIZATION REVIEW CONTACT:  Cindy Flores, Utilization Review Supervisor  Network Utilization Review Department  Phone: [phone]  Fax [phone]  Email: [email or web address]     PHYSICIAN ADVISORY SERVICES:  FOR [id number] REVIEW - MEDICAL NECESSITY DENIAL  Phone: [phone]  Fax: [phone]  Email: [email or web address]     TYPE OF REQUEST:  Inpatient Status     ADMISSION INFORMATION:  ADMISSION DATE/TIME: 1/4/22  4:03 PM  PATIENT DIAGNOSIS CODE/DESCRIPTION:  Leg pain [M79 606]  ESRD (end stage renal disease) ([facility] ) [N18 6]  Pancreatic mass [K86 89]  Closed fracture of left hip, initial encounter ([facility] ) [S72 002A]  DISCHARGE DATE/TIME: No discharge date for patient encounter  DISCHARGE DISPOSITION (IF DISCHARGED): Home/Self Care     IMPORTANT INFORMATION:  Please contact the Cindy Flores directly with any questions or concerns regarding this request  Department voicemails are confidential     Send requests for admission clinical reviews, concurrent reviews, approvals, and administrative denials due to lack of clinical to fax [phone]

## 2022-01-06 NOTE — ASSESSMENT & PLAN NOTE
· This condition has been ruled out  · Left lower extremity pain is secondary to musculoskeletal reasons and or the possibility of a ruptured Baker's cyst  · Left lower extremity venous Doppler:-There is a complex, non-vascularized fluid collection noted in the left popliteal fossa  · Case reviewed with orthopedic surgery-supportive therapy only  · MRI of the left hip as follows:-No acute fracture in the left hip  Moderate bilateral hip osteoarthritis    Okay for activity as tolerated  · Appreciate orthopedic surgical input  · Status post a PT and OT evaluation-they recommend short-term rehab  · Case management is working on rehab placement options

## 2022-01-06 NOTE — ASSESSMENT & PLAN NOTE
Lab Results   Component Value Date    EGFR 6 01/06/2022    EGFR 8 01/05/2022    EGFR 6 01/04/2022    CREATININE 7 64 (H) 01/06/2022    CREATININE 5 90 (H) 01/05/2022    CREATININE 7 16 (H) 01/04/2022     · With secondary hyperparathyroidism  · Continue PhosLo, Renagel  · Continue dialysis as per Nephrology  · Nephrology services are following

## 2022-01-06 NOTE — PROGRESS NOTES
[address]  Progress Note - Tamela Breaux 1945, 76 y o  male MRN: [id number]  Unit/Bed#: [facility] 05 Encounter: [id number]  Primary Care Provider: Josie Motley DO   Date and time admitted to hospital: 1/3/2022  6:54 PM    * [facility]  Assessment & Plan  · This condition has been ruled out  · Left lower extremity pain is secondary to musculoskeletal reasons and or the possibility of a ruptured Baker's cyst  · Left lower extremity venous Doppler:-There is a complex, non-vascularized fluid collection noted in the left popliteal fossa  · Case reviewed with orthopedic surgery-supportive therapy only  · MRI of the left hip as follows:-No acute fracture in the left hip  Moderate bilateral hip osteoarthritis    Okay for activity as tolerated  · Appreciate orthopedic surgical input  · Status post a PT and OT evaluation-they recommend short-term rehab  · Case management is working on rehab placement options    Pancreatic mass  Assessment & Plan  · Status post an MRCP  · MRCP results appreciated  · Status post a GI evaluation - no further inpatient testing, treatment, and or workup is warranted, patient will need continued surveillance of the pancreatic lesions in the outpatient setting within the next 6-12 months    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 [facility])  Assessment & Plan  Lab Results   Component Value Date    EGFR 6 01/06/2022    EGFR 8 01/05/2022    EGFR 6 01/04/2022    CREATININE 7 64 (H) 01/06/2022    CREATININE 5 90 (H) 01/05/2022    CREATININE 7 16 (H) 01/04/2022     · With secondary hyperparathyroidism  · Continue PhosLo, Renagel  · Continue dialysis as per Nephrology  · Nephrology services are following    Chronic systolic congestive heart failure ([facility] )  Assessment & Plan  Wt Readings from Last 3 Encounters:   01/06/22 115 kg (254 lb 3 1 oz)   10/06/21 117 kg (258 lb 6 oz)   09/08/21 117 kg (257 lb)     · Volume management is with dialysis  · Continue Toprol-XL and losartan otherwise          Class 2 severe obesity due to excess calories with serious comorbidity and body mass index (BMI) of 39 0 to 39 9 in adult [facility])  Assessment & Plan  · Dietary, weight loss, and lifestyle modification counseling was provided    Elevated d-dimer  Assessment & Plan  · Status post treatment with heparin drip that has since been discontinued  · Bilateral lower extremity Dopplers negative for DVT    Essential hypertension  Assessment & Plan  · Continue pre-hospital Toprol XL 50 mg p o  B i d , Cozaar 50 mg p o  Daily, hydralazine 100 mg p o  B i d  And Cardura 2 mg p o  Daily    Magnesium deficiency  Assessment & Plan  · Continue pre-hospital magnesium oxide 400 mg p o  Daily        VTE Prophylaxis:  Heparin    Patient Centered Rounds: I have performed bedside rounds with nursing staff today      Discussions with Specialists or Other Care Team Provider:  Nephrology, case management, nursing, pharmacy  Education and Discussions with Family / Patient:  Patient's wife Matias Harmon was brought up to par at phone #[phone], all questions answered to her satisfaction    Current Length of Stay: 2 day(s)    Current Patient Status: Inpatient   Certification Statement: The patient will continue to require additional inpatient hospital stay due to The need for placement which case management is working on    Discharge Plan:  Patient remains medically stable for discharge, will discharge as soon as case management secure is a safe rehab disposition    Code Status: Level 1 - Full Code    Subjective:   Patient seen and examined in dialysis, resting, feels tired, has no new complaints otherwise    Objective:     Vitals:   Temp (24hrs), Av °F (36 7 °C), Min:97 5 °F (36 4 °C), Max:99 1 °F (37 3 °C)    Temp:  [97 5 °F (36 4 °C)-99 1 °F (37 3 °C)] 97 5 °F (36 4 °C)  HR:  [52-61] 60  Resp:  [18-20] 18  BP: (113-174)/() 113/57  SpO2:  [92 %-94 %] 94 %  Body mass index is 38 65 kg/m²  Input and Output Summary (last 24 hours): Intake/Output Summary (Last 24 hours) at 1/6/2022 1026  Last data filed at 1/6/2022 0800  Gross per 24 hour   Intake 920 ml   Output 100 ml   Net 820 ml       Physical Exam:   Physical Exam  Vitals and nursing note reviewed  Constitutional:       General: He is not in acute distress  Appearance: Normal appearance  He is not ill-appearing  HENT:      Head: Normocephalic and atraumatic  Nose: Nose normal    Eyes:      Extraocular Movements: Extraocular movements intact  Pupils: Pupils are equal, round, and reactive to light  Cardiovascular:      Rate and Rhythm: Normal rate and regular rhythm  Pulses: Normal pulses  Heart sounds: Normal heart sounds  No murmur heard  No friction rub  No gallop  Pulmonary:      Effort: Pulmonary effort is normal       Breath sounds: Normal breath sounds  Abdominal:      General: There is no distension  Palpations: Abdomen is soft  There is no mass  Tenderness: There is no abdominal tenderness  There is no guarding or rebound  Musculoskeletal:         General: No swelling or tenderness  Normal range of motion  Cervical back: Normal range of motion and neck supple  No rigidity  No muscular tenderness  Right lower leg: No edema  Left lower leg: No edema  Comments: Bilateral lower extremity swelling left greater than right   Skin:     General: Skin is warm  Capillary Refill: Capillary refill takes less than 2 seconds  Findings: No erythema or rash  Neurological:      General: No focal deficit present  Mental Status: He is alert and oriented to person, place, and time  Mental status is at baseline     Psychiatric:         Mood and Affect: Mood normal          Behavior: Behavior normal          Additional Data:     Labs:    Results from last 7 days   Lab Units 01/06/22  0433   WBC Thousand/uL 5 84   HEMOGLOBIN g/dL 7 7*   HEMATOCRIT % 23 8* PLATELETS Thousands/uL 290   NEUTROS PCT % 66   LYMPHS PCT % 20   MONOS PCT % 10   EOS PCT % 3     Results from last 7 days   Lab Units 01/06/22  0433   SODIUM mmol/L 132*   POTASSIUM mmol/L 4 6   CHLORIDE mmol/L 92*   CO2 mmol/L 29   BUN mg/dL 57*   CREATININE mg/dL 7 64*   CALCIUM mg/dL 8 4     Results from last 7 days   Lab Units 01/03/22 2011   INR  1 01               * I Have Reviewed All Lab Data Listed Above  * Additional Pertinent Lab Tests Reviewed: [facility] [facility] Admission  Reviewed    Imaging:  Imaging Reports Reviewed Today Include:  None    Recent Cultures (last 7 days):           Last 24 Hours Medication List:   Current Facility-Administered Medications   Medication Dose Route Frequency Provider Last Rate    acetaminophen  650 mg Oral Q6H PRN Buddy Amauri, PA-C      allopurinol  200 mg Oral Daily Buddy Amauri, PA-C      calcium acetate  1,334 mg Oral TID With Meals Buddy Amauri, PA-C      co-enzyme Q-10  200 mg Oral Daily Buddy Amauri, PA-C      doxazosin  2 mg Oral Daily Buddy Amauri, PA-C      heparin (porcine)  5,000 Units Subcutaneous [facility] Chicho Zamudio MD      hydrALAZINE  50 mg Oral BID [place] Doom, DO      HYDROcodone-acetaminophen  1 tablet Oral Q6H PRN Buddy Amauri, PA-C      HYDROmorphone  1 mg Intravenous Q4H PRN Buddy Amauri, PA-C      losartan  50 mg Oral Daily Buddy Amauri, PA-C      magnesium oxide  400 mg Oral Daily Buddy Amauri, PA-C      metoprolol succinate  50 mg Oral BID Buddy Amauri, PA-C      ondansetron  4 mg Intravenous Q6H PRN Buddy Amauri, PA-C      sevelamer  800 mg Oral TID With Meals Buddy Amauri, PA-C          Today, Patient Was Seen By: Chicho Zamudio MD    ** Please Note: Dictation voice to text software may have been used in the creation of this document   **

## 2022-01-06 NOTE — PROGRESS NOTES
Progress Note - Nephrology   Susan Hodges 76 y o  male MRN: [id number]  Unit/Bed#: [facility] 05 Encounter: [id number]    A/P:  1  End-stage renal disease                patient currently hemodialysis, no specific issues  We reduced patient's goal for ultrafiltration from 2 5 down to 1 5 L  He is currently 115 7 kg, estimated dry weight is 114 5 kg   2  Secondary hyperparathyroidism                continue phosphorus binders, follow phosphorus levels from time to time  3  Anemia of chronic kidney disease                provide packed red blood cells as indicated, patient not a candidate for Epogen as he is on long-acting JARETT in the outpatient setting     4  Hypertension setting of end-stage renal disease              We modify the patient's hydralazine yesterday from 100 down to 50 mg twice a day, continue to monitor blood pressures in the meantime  They are currently appropriate  6  Hypomagnesemia                continue with following magnesium from time to time, supplement as indicated  7  No fracture of the left hip region, bilateral hip osteoarthritis  8  Ambulatory dysfunction               patient will need to be transfered to rehabilitation center, follow-up PT/OT notes  9  Pancreatic mass   Repeat MRI in 6 months to further evaluate, mass is currently 1 9 cm       Follow up reason for today's visit:  End-stage renal disease/secondary hyperparathyroidism/anemia chronic kidney disease     Closed fracture of neck of left femur [facility])    Patient Active Problem List   Diagnosis    Essential hypertension    Hemodialysis-associated hypotension    AAA (abdominal aortic aneurysm) (HCC)    Chronic renal failure    Primary osteoarthritis of both knees    Hyperlipidemia    Class 2 severe obesity due to excess calories with serious comorbidity and body mass index (BMI) of 39 0 to 39 9 in adult [facility])    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 ([facility] [address] )    Secondary hyperparathyroidism of renal origin ([place] [address] )    Abnormal nuclear stress test    Anemia in chronic kidney disease    ASCVD (arteriosclerotic cardiovascular disease)    Benign prostatic hyperplasia without lower urinary tract symptoms    Carotid stenosis, right    Chronic gout, unspecified, with tophus (tophi)    Chronic systolic congestive heart failure (HCC)    Disorder of phosphorus metabolism, unspecified    Gastroesophageal reflux disease    Iron deficiency anemia    Kidney stone    Magnesium deficiency    Osteoarthritis of both knees    Other disorders of electrolyte and fluid balance, not elsewhere classified    Other fluid overload    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, initial encounter ([place] [address] )    Panlobular emphysema ([place] [address] )    Personal history of nicotine dependence    Proteinuria, unspecified    Skin lesion of face    Unspecified protein-calorie malnutrition (HCC)    Vitamin D deficiency    Elevated d-dimer    Pancreatic mass    Closed fracture of neck of left femur (HCC)         Subjective:   No acute events overnight, patient currently on hemodialysis with no complaint  Objective:     Vitals: Blood pressure 113/57, pulse 60, temperature 97 5 °F (36 4 °C), resp  rate 18, height 5' 8" (1 727 m), weight 115 kg (254 lb 3 1 oz), SpO2 94 %  ,Body mass index is 38 65 kg/m²  Weight (last 2 days)     Date/Time Weight    01/06/22 0600 115 (254 19)    01/05/22 0534 115 (254 19)    01/05/22 0500 115 (254 19)            Intake/Output Summary (Last 24 hours) at 1/6/2022 0946  Last data filed at 1/6/2022 0800  Gross per 24 hour   Intake 920 ml   Output 100 ml   Net 820 ml     I/O last 3 completed shifts:   In: 840 [P O :840]  Out: -          Physical Exam: /57 Comment: UF off  Pulse 60   Temp 97 5 °F (36 4 °C)   Resp 18   Ht 5' 8" (1 727 m)   Wt 115 kg (254 lb 3 1 oz)   SpO2 94%   BMI 38 65 kg/m²     General Appearance:    Alert, cooperative, no distress, appears stated age Head:    Normocephalic, without obvious abnormality, atraumatic   Eyes:    Conjunctiva/corneas clear   Ears:    Normal external ears   Nose:   Nares normal, septum midline, mucosa normal, no drainage    or sinus tenderness   Throat:   Lips, mucosa, and tongue normal; teeth and gums normal   Neck:   Supple   Back:     Symmetric, no curvature, ROM normal, no CVA tenderness   Lungs:     Reduced bilaterally but otherwise clear   Chest wall:    No tenderness or deformity   Heart:    Regular rate and rhythm, S1 and S2 normal, no murmur, rub   or gallop   Abdomen:     Soft, non-tender, bowel sounds active   Extremities:   Extremities normal, atraumatic, no cyanosis, +1 right lower extremity edema, +3 left lower extremity edema   Skin:   Skin color, texture, turgor normal, no rashes or lesions   Lymph nodes:   Cervical normal   Neurologic:   CNII-XII intact            Lab, Imaging and other studies: I have personally reviewed pertinent labs  CBC:   Lab Results   Component Value Date    WBC 5 84 01/06/2022    HGB 7 7 (L) 01/06/2022    HCT 23 8 (L) 01/06/2022     (H) 01/06/2022     01/06/2022    MCH 32 2 01/06/2022    MCHC 32 4 01/06/2022    RDW 13 7 01/06/2022    MPV 9 4 01/06/2022    NRBC 0 01/06/2022     CMP:   Lab Results   Component Value Date    K 4 6 01/06/2022    CL 92 (L) 01/06/2022    CO2 29 01/06/2022    BUN 57 (H) 01/06/2022    CREATININE 7 64 (H) 01/06/2022    CALCIUM 8 4 01/06/2022    EGFR 6 01/06/2022           Results from last 7 days   Lab Units 01/06/22  0433 01/05/22  0514 01/04/22  0556   POTASSIUM mmol/L 4 6 4 1 3 6   CHLORIDE mmol/L 92* 95* 91*   CO2 mmol/L 29 31 30   BUN mg/dL 57* 40* 55*   CREATININE mg/dL 7 64* 5 90* 7 16*   CALCIUM mg/dL 8 4 8 1* 8 2*         Phosphorus: No results found for: PHOS  Magnesium: No results found for: MG  Urinalysis: No results found for: COLORU, CLARITYU, SPECGRAV, PHUR, LEUKOCYTESUR, NITRITE, PROTEINUA, GLUCOSEU, KETONESU, BILIRUBINUR, BLOODU  Ionized Calcium: No results found for: CAION  Coagulation: No results found for: PT, INR, APTT  Troponin: No results found for: TROPONINI  ABG: No results found for: PHART, HGE1SWD, PO2ART, RZA8VGB, E7ARHYTK, BEART, SOURCE  Radiology review:     IMAGING  Procedure: CT chest abdomen pelvis wo contrast    Result Date: 1/3/2022  Narrative: CT CHEST, ABDOMEN AND PELVIS WITHOUT IV CONTRAST INDICATION:   TRAUMA  COMPARISON:  MRI dated May 29, 2020  TECHNIQUE: CT examination of the chest, abdomen and pelvis was performed without intravenous contrast   Axial, sagittal, and coronal 2D reformatted images were created from the source data and submitted for interpretation  Radiation dose length product (DLP) for this visit:  1201 16 mGy-cm   This examination, like all CT scans performed in the [facility], was performed utilizing techniques to minimize radiation dose exposure, including the use of iterative reconstruction and automated exposure control  Enteric contrast was not administered  FINDINGS: Study is limited due to lack of intravenous and oral contrast to evaluate for solid abdominal organs and vascular structures CHEST LUNGS:  The trachea and central bronchial tree are patent  Atelectasis seen within the lung bases  PLEURA:  Unremarkable  HEART/GREAT VESSELS: Coronary artery calcifications  the heart is enlarged  MEDIASTINUM AND ROBINSON:  Unremarkable  CHEST WALL AND LOWER NECK:   Unremarkable  ABDOMEN LIVER/BILIARY TREE:  Unremarkable  GALLBLADDER:  No calcified gallstones  No pericholecystic inflammatory change  SPLEEN:  Unremarkable  PANCREAS:  There is a 2 4 x 1 9 cm hypodense lesion seen within the pancreatic head ADRENAL GLANDS:  Unremarkable  KIDNEYS/URETERS:  One or more simple renal cyst(s) is noted  Otherwise unremarkable kidneys  No hydronephrosis  STOMACH AND BOWEL:  There is colonic diverticulosis without evidence of acute diverticulitis  APPENDIX:  No findings to suggest appendicitis  ABDOMINOPELVIC CAVITY:  No ascites  No pneumoperitoneum  No lymphadenopathy  VESSELS:  Focal prominence of the infrarenal abdominal aorta is seen  PELVIS REPRODUCTIVE ORGANS:  Unremarkable for patient's age  URINARY BLADDER:  Inflammatory changes around the urinary bladder are seen  ABDOMINAL WALL/INGUINAL REGIONS:  Unremarkable  OSSEOUS STRUCTURES:  Nondisplaced left femoral neck fracture  Impression: Hypodense lesion within the pancreatic head more prominent compared to prior study  Further evaluation with a MRI/MRCP is recommended  Partially visualized nondisplaced left femoral neck fracture  Recommend MRI for further evaluation  I personally discussed this study with Magda Bronw on 1/3/2022 at 10:03 PM  Workstation performed: [id number]     Procedure: XR Trauma chest portable    Result Date: 1/4/2022  Narrative: CHEST INDICATION:   TRAUMA  COMPARISON:  None EXAM PERFORMED/VIEWS:  XR CHEST PORTABLE 1 image FINDINGS: Cardiomediastinal silhouette appears enlarged  The right hemidiaphragm is elevated  Minimal right base atelectasis  No pneumothorax or pleural effusion  Osseous structures appear within normal limits for patient age  Impression: Minimal right base atelectasis  Workstation performed: [id number]     Procedure: XR knee 4+ vw left injury    Result Date: 1/4/2022  Narrative: LEFT KNEE INDICATION:   r/o fx  Yumiko Serum on Chandni ioana  Today was putting wash away and heard a pop in the left knee and now has sharp pain and swelling  Cannot bear weight  COMPARISON:  None VIEWS:  XR KNEE 4+ VW LEFT INJURY FINDINGS: Bones are intact  Alignment is anatomic  Small tricompartment osteophytes with joint space narrowing  No suspicious lytic or blastic bone lesion  Medial meniscus chondrocalcinosis  Soft tissue vascular calcifications  No evidence of joint effusion  Impression: No acute osseous abnormality  Degenerative changes    Workstation performed: [id number]     Procedure: TRAUMA - CT head wo contrast    Result Date: 1/3/2022  Narrative: CT BRAIN - WITHOUT CONTRAST INDICATION:   TRAUMA  COMPARISON:  None  TECHNIQUE:  CT examination of the brain was performed  In addition to axial images, sagittal and coronal 2D reformatted images were created and submitted for interpretation  Radiation dose length product (DLP) for this visit:  887 mGy-cm   This examination, like all CT scans performed in the [facility], was performed utilizing techniques to minimize radiation dose exposure, including the use of iterative reconstruction and automated exposure control  IMAGE QUALITY:  Diagnostic  FINDINGS: PARENCHYMA: Decreased attenuation is noted in periventricular and subcortical white matter demonstrating an appearance that is statistically most likely to represent mild microangiopathic change  No CT signs of acute infarction  No intracranial mass, mass effect or midline shift  No acute parenchymal hemorrhage  VENTRICLES AND EXTRA-AXIAL SPACES:  Normal for the patient's age  VISUALIZED ORBITS AND PARANASAL SINUSES:  Unremarkable  CALVARIUM AND EXTRACRANIAL SOFT TISSUES:  Normal      Impression: No acute intracranial abnormality  Workstation performed: [id number]     Procedure: TRAUMA - CT facial bones wo contrast    Result Date: 1/3/2022  Narrative: CT FACIAL BONES WITHOUT INTRAVENOUS CONTRAST INDICATION:   TRAUMA  COMPARISON: None  TECHNIQUE:  Axial CT images were obtained through the facial bones with additional sagittal and coronal reconstructions  Radiation dose length product (DLP) for this visit:  353 mGy-cm   This examination, like all CT scans performed in the [facility], was performed utilizing techniques to minimize radiation dose exposure, including the use of iterative reconstruction and automated exposure control  IMAGE QUALITY:  Diagnostic  FINDINGS: FACIAL BONES:   No facial bone fracture identified  Normal alignment of the temporomandibular joints    No lytic or blastic lesion  ORBITS:  Orbital globes, optic nerves, and extraocular muscles appear symmetric and normal  There is no evidence of retrobulbar mass, abscess, or hematoma  SINUSES:  Normal  SOFT TISSUES:  Normal      Impression: No evidence of acute traumatic injury to the facial bones  Workstation performed: [id number]     Procedure: TRAUMA - CT spine cervical wo contrast    Result Date: 1/3/2022  Narrative: CT CERVICAL SPINE - WITHOUT CONTRAST INDICATION:   TRAUMA  COMPARISON:  None  TECHNIQUE:  CT examination of the cervical spine was performed without intravenous contrast   Contiguous axial images were obtained  Sagittal and coronal reconstructions were performed  Radiation dose length product (DLP) for this visit:  483 mGy-cm   This examination, like all CT scans performed in the [facility], was performed utilizing techniques to minimize radiation dose exposure, including the use of iterative reconstruction and automated exposure control  IMAGE QUALITY:  Diagnostic  FINDINGS: ALIGNMENT:  Normal alignment of the cervical spine  Anterolisthesis of C3 on C4 and C4 on C5 is seen  VERTEBRAL BODIES:  No fracture  DEGENERATIVE CHANGES:  Moderate multilevel cervical degenerative changes are noted  No critical central canal stenosis  PREVERTEBRAL AND PARASPINAL SOFT TISSUES:  Unremarkable  THORACIC INLET:  Please refer to the concurrent chest, abdomen, and pelvic CT report for description of the thoracic inlet findings  Impression: No cervical spine fracture or traumatic malalignment  Workstation performed: [id number]     Procedure: MRI hip left wo contrast    Result Date: 1/4/2022  Narrative: MRI LEFT HIP INDICATION:   MRI of left hip rule out fracture, MRCP evaluate pancreatic mass   COMPARISON:  CT chest/abdomen/pelvis 1/3/2022 TECHNIQUE:  MR sequences were obtained of the left hip and pelvis including: Localizer, axial T2 fat sat, coronal T1/STIR, cone-down axial oblique PD of the affected hip, coronal PD of the affected hip, sagittal T2 fat sat of the affected hip  Gadolinium was not used  FINDINGS: LEFT HIP: -JOINT EFFUSION: There is a moderate joint effusion  -BONES: Normal marrow signal demonstrated without hip fracture or AVN  -ARTICULAR SURFACE: Moderate osteoarthritis  -ACETABULAR LABRUM: Macerated, degenerative-type tear  -TROCHANTERIC BURSA: Normal  RIGHT HIP: (please note dedicated small field of view images were not made of the right hip joint limiting its evaluation ) -JOINT EFFUSION: There is a moderate joint effusion  Mild iliopsoas bursitis  -BONES: Normal marrow signal demonstrated without hip fracture or AVN  -ARTICULAR SURFACE:  Moderate osteoarthritis  -ACETABULAR LABRUM: Lacerated, degenerative-type tear  -TROCHANTERIC BURSA: Normal  REST OF PELVIS: -BONES: Normal  -SI JOINTS AND SYMPHYSIS PUBIS:  Intact  -VISUALIZED LUMBAR SPINE:  Unremarkable  -MUSCULATURE: Low-grade, degenerative-type partial-thickness tears at the hamstring origins  Normal muscle signal  -PELVIC SOFT TISSUES: Normal  SUBCUTANEOUS TISSUES: Normal     Impression: No acute fracture in the left hip  Moderate bilateral hip osteoarthritis  Workstation performed: [id number]     Procedure: MRI abdomen wo contrast and mrcp    Result Date: 1/4/2022  Narrative: MRI OF THE ABDOMEN WITHOUT CONTRAST WITH MRCP INDICATION:  Evaluate pancreatic cyst  COMPARISON: TECHNIQUE:  MRI of the abdomen was performed without the administration of contrast using the following  using sequences: Axial T1 weighted in/out of phase images, multiplanar T2 weighted images, diffusion weighted and fat suppressed T1 weighted images  3D MRCP images were obtained with radial thick slabs and projections  3D rendering was performed from the acquisition scanner  FINDINGS: LOWER CHEST:   Unremarkable  LIVER:  Normal in size and configuration  Stable subcentimeter T2 hyperintense liver lesions that likely represent cysts or hemangiomas   BILE DUCTS:  No intrahepatic or extrahepatic bile duct dilation  Common bile duct is normal in caliber  No choledocholithiasis, biliary stricture or suspicious mass  GALLBLADDER:  Normal  PANCREAS:  1 7 x 1 7 x 1 9 cm (AP, transverse, craniocaudal) cystic lesion in the head the pancreas has increased from 1 4 on previous MRI  No obvious solid component however evaluation is limited due to lack of contrast  No communication with the pancreatic duct  No pancreatic duct dilatation  ADRENAL GLANDS:  1 3 cm right adrenal nodule without signal dropout on out of phase imaging is stable compared with CT dated 5/7/2013 and likely represents a  lipid poor adenoma  Left adrenal gland is unremarkable  SPLEEN:  Normal  KIDNEYS/PROXIMAL URETERS:  Kidneys are atrophic  No hydronephrosis  Multiple stable bilateral renal cysts  A few of the subcentimeter cysts are hemorrhagic or proteinaceous  BOWEL:  No dilated loops of bowel  PERITONEAL CAVITY/RETROPERITONEUM:  No ascites  No mass  LYMPH NODES:  No abdominal lymphadenopathy  VASCULAR STRUCTURES:  Unremarkable  No aneurysm  ABDOMINAL WALL:  Unremarkable  OSSEOUS STRUCTURES:  No suspicious osseous lesion  Impression: 1 9 cm (craniocaudal) cystic lesion in the head the pancreas is increased from 1 4 cm in 2020  For simple cyst(s) that have demonstrated significant interval growth (>20% in longest dimension) because it is still <2 0 cm, recommend continued followup per  protocol  Next followup in 6 months   Preferred imaging modality: abdomen MRI and MRCP with and without IV contrast, or triple phase abdomen CT with IV contrast, or abdomen MRI and MRCP without IV contrast  The recommendations regarding pancreatic findings assumes that patient does not have family history of pancreatic cancer nor have any symptoms potentially attributable to pancreatic cystic lesions (hyperamylasemia, recent-onset diabetes, severe epigastric pain, weight loss, steatorrhea, or jaundice ) If these conditions are not true, then management should be deferred to judgement of specialists such as gastroenterologists or oncologic surgeons  Recommendations are based on recent consensus statements on management of pancreatic cystic lesions from [address] [facility], [facility], the journal Pancreatology, and our own institutional consensus  Other stable findings as above  The study was marked in EPIC for significant notification  Workstation performed: [id number]     Procedure: VAS lower limb venous duplex study, complete bilateral    Result Date: 1/4/2022  Narrative:  [facility] REPORT CLINICAL: Indications: Patient presents with left knee and lower leg pain and swelling since a fall on 12/24/21  Patient states he felt a pop in his left knee when he fell  FINDINGS:  Segment  Right            Left              Impression       Impression       CFV      Normal (Patent)  Normal (Patent)     CONCLUSION:  Impression: RIGHT LOWER LIMB: Limited No gross evidence of acute deep vein thrombosis in the CFV, FV, Popliteal Vein or posterior tibial veins  LEFT LOWER LIMB: Limited No gross evidence of acute deep vein thrombosis in the CFV, FV, Popliteal Vein or posterior tibial veins  There is a complex, non-vascularized fluid collection noted in the left popliteal fossa  Technically difficult/limited study to body habitus, edema and poor visualization of calf veins    SIGNATURE: Electronically Signed by: Femi Canales MD on 2022-01-04 11:03:12 PM      Current Facility-Administered Medications   Medication Dose Route Frequency    acetaminophen (TYLENOL) tablet 650 mg  650 mg Oral Q6H PRN    allopurinol (ZYLOPRIM) tablet 200 mg  200 mg Oral Daily    calcium acetate (PHOSLO) capsule 1,334 mg  1,334 mg Oral TID With Meals    co-enzyme Q-10 capsule 200 mg  200 mg Oral Daily    doxazosin (CARDURA) tablet 2 mg  2 mg Oral Daily    heparin (porcine) subcutaneous injection 5,000 Units 5,000 Units Subcutaneous Q8H [address]    hydrALAZINE (APRESOLINE) tablet 50 mg  50 mg Oral BID    HYDROcodone-acetaminophen (NORCO) 5-325 mg per tablet 1 tablet  1 tablet Oral Q6H PRN    HYDROmorphone (DILAUDID) injection 1 mg  1 mg Intravenous Q4H PRN    losartan (COZAAR) tablet 50 mg  50 mg Oral Daily    magnesium oxide (MAG-OX) tablet 400 mg  400 mg Oral Daily    metoprolol succinate (TOPROL-XL) 24 hr tablet 50 mg  50 mg Oral BID    ondansetron (ZOFRAN) injection 4 mg  4 mg Intravenous Q6H PRN    sevelamer (RENAGEL) tablet 800 mg  800 mg Oral TID With Meals     Medications Discontinued During This Encounter   Medication Reason    Red Yeast Rice CAPS 1,200 mg     enoxaparin (LOVENOX) subcutaneous injection 90 mg     heparin (VTE/PE) high     heparin (porcine) 25,000 units in 0 45% NaCl 250 mL infusion (premix)     heparin (porcine) injection 8,800 Units     heparin (porcine) injection 4,400 Units     hydrALAZINE (APRESOLINE) tablet 100 mg        Josh Quiroz,       This progress note was produced in part using a dictation device which may document imprecise wording from author's original intent

## 2022-01-06 NOTE — PLAN OF CARE
Problem: PAIN - ADULT  Goal: Verbalizes/displays adequate comfort level or baseline comfort level  Description: Interventions:  - Encourage patient to monitor pain and request assistance  - Assess pain using appropriate pain scale  - Administer analgesics based on type and severity of pain and evaluate response  - Implement non-pharmacological measures as appropriate and evaluate response  - Consider cultural and social influences on pain and pain management  - Notify physician/advanced practitioner if interventions unsuccessful or patient reports new pain  Outcome: Progressing     Problem: INFECTION - ADULT  Goal: Absence or prevention of progression during hospitalization  Description: INTERVENTIONS:  - Assess and monitor for signs and symptoms of infection  - Monitor lab/diagnostic results  - Monitor all insertion sites, i e  indwelling lines, tubes, and drains  - Monitor endotracheal if appropriate and nasal secretions for changes in amount and color  - [place] appropriate cooling/warming therapies per order  - Administer medications as ordered  - Instruct and encourage patient and family to use good hand hygiene technique  - Identify and instruct in appropriate isolation precautions for identified infection/condition  Outcome: Progressing     Problem: Knowledge Deficit  Goal: Patient/family/caregiver demonstrates understanding of disease process, treatment plan, medications, and discharge instructions  Description: Complete learning assessment and assess knowledge base    Interventions:  - Provide teaching at level of understanding  - Provide teaching via preferred learning methods  Outcome: Progressing     Problem: DISCHARGE PLANNING  Goal: Discharge to home or other facility with appropriate resources  Description: INTERVENTIONS:  - Identify barriers to discharge w/patient and caregiver  - Arrange for needed discharge resources and transportation as appropriate  - Identify discharge learning needs (meds, wound care, etc )  - Arrange for interpretive services to assist at discharge as needed  - Refer to Case Management Department for coordinating discharge planning if the patient needs post-hospital services based on physician/advanced practitioner order or complex needs related to functional status, cognitive ability, or social support system  Outcome: Progressing

## 2022-01-06 NOTE — PLAN OF CARE
Pre-tx:  UF 1 L as tolerated per discussion with Nephrologist Armando  /106 to start tx  Will cont to monitor  Post-Dialysis RN Treatment Note    Blood Pressure:  Pre 160/102 mm/Hg  Post 139/78 mmHg   EDW  NA kg    Weight:  Pre 115 7 kg   Post 114 7 kg   Mode of weight measurement: Bed Scale   Volume Removed  1000 ml    Treatment duration 2 5 hours    NS given  No    Treatment shortened?  No   Medications given during Rx Not Applicable   Estimated Kt/V  Not Applicable   Access type: AV fistula   Access Issues: No    Report called to primary nurse   Yes       Problem: METABOLIC, FLUID AND ELECTROLYTES - ADULT  Goal: Electrolytes maintained within normal limits  Description: INTERVENTIONS:  - Monitor labs and assess patient for signs and symptoms of electrolyte imbalances  - Administer electrolyte replacement as ordered  - Monitor response to electrolyte replacements, including repeat lab results as appropriate  - Instruct patient on fluid and nutrition as appropriate  Outcome: Progressing  Goal: Fluid balance maintained  Description: INTERVENTIONS:  - Monitor labs   - Monitor I/O and WT  - Instruct patient on fluid and nutrition as appropriate  - Assess for signs & symptoms of volume excess or deficit  Outcome: Progressing

## 2022-01-07 NOTE — PROGRESS NOTES
Aline [address]  Progress Note - Mark Zhu 1945, 76 y o  male MRN: [id number]  Unit/Bed#: APU 05 Encounter: [id number]  Primary Care Provider: Dilip Schmidt DO   Date and time admitted to hospital: 1/3/2022  6:54 PM    * Closed fracture of neck of left femur ([facility] [address] )  Assessment & Plan  · This condition has been ruled out  · Left lower extremity pain is secondary to musculoskeletal reasons and or the possibility of a ruptured Baker's cyst  · Left lower extremity venous Doppler:-There is a complex, non-vascularized fluid collection noted in the left popliteal fossa  · Case reviewed with orthopedic surgery-supportive therapy only  · MRI of the left hip as follows:-No acute fracture in the left hip  Moderate bilateral hip osteoarthritis    Okay for activity as tolerated  · Appreciate orthopedic surgical input  · Status post a PT and OT evaluation-they recommend short-term rehab  · Case management is working on rehab placement options  · Patient is medically stable for discharge, will discharge as soon as case management has a rehab placement facility set up    Pancreatic mass  Assessment & Plan  · Status post an MRCP  · MRCP results appreciated  · Status post a GI evaluation - no further inpatient testing, treatment, and or workup is warranted, patient will need continued surveillance of the pancreatic lesions in the outpatient setting within the next 6-12 months    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 [facility])  Assessment & Plan  Lab Results   Component Value Date    EGFR 6 01/06/2022    EGFR 8 01/05/2022    EGFR 6 01/04/2022    CREATININE 7 64 (H) 01/06/2022    CREATININE 5 90 (H) 01/05/2022    CREATININE 7 16 (H) 01/04/2022     · With secondary hyperparathyroidism  · Continue PhosLo, Renagel  · Continue dialysis as per Nephrology  · Nephrology services are following    Chronic systolic congestive heart failure ([facility] [address] )  Assessment & Plan  Wt Readings from Last 3 Encounters:   22 115 kg (254 lb 3 1 oz)   10/06/21 117 kg (258 lb 6 oz)   21 117 kg (257 lb)     · Volume management is with dialysis  · Continue Toprol-XL and losartan otherwise          Class 2 severe obesity due to excess calories with serious comorbidity and body mass index (BMI) of 39 0 to 39 9 in adult [facility])  Assessment & Plan  · Dietary, weight loss, and lifestyle modification counseling was provided    Elevated d-dimer  Assessment & Plan  · Status post treatment with heparin drip that has since been discontinued  · Bilateral lower extremity Dopplers negative for DVT    Essential hypertension  Assessment & Plan  · Continue pre-hospital Toprol XL 50 mg p o  B i d , Cozaar 50 mg p o  Daily, hydralazine 100 mg p o  B i d  And Cardura 2 mg p o  Daily    Magnesium deficiency  Assessment & Plan  · Continue pre-hospital magnesium oxide 400 mg p o  Daily        VTE Prophylaxis:  Heparin    Patient Centered Rounds: I have performed bedside rounds with nursing staff today      Discussions with Specialists or Other Care Team Provider:  Case Management, Nephrology, nursing, pharmacy  Education and Discussions with Family / Patient:  The patient's wife Juliette Vuong was brought up to [facility] at phone #[phone] at 11:15 a m , all questions answered to her satisfaction    Current Length of Stay: 3 day(s)    Current Patient Status: Inpatient   Certification Statement: The patient will continue to require additional inpatient hospital stay due to The need placement    Discharge Plan:  Patient is medically stable for discharge, will discharge as soon as case management has a safe disposition    Code Status: Level 1 - Full Code    Subjective:   Patient seen and examined, sitting up in a chair, watching TV , no complaints, no distress    Objective:     Vitals:   Temp (24hrs), Av 8 °F (37 1 °C), Min:97 6 °F (36 4 °C), Max:99 8 °F (37 7 °C)    Temp:  [97 6 °F (36 4 °C)-99 8 °F (37 7 °C)] 99 °F (37 2 °C)  HR:  [55-64] 55  Resp:  [17-20] 18  BP: (130-164)/(60-72) 164/72  SpO2:  [95 %] 95 %  Body mass index is 38 65 kg/m²  Input and Output Summary (last 24 hours): Intake/Output Summary (Last 24 hours) at 1/7/2022 1112  Last data filed at 1/7/2022 0951  Gross per 24 hour   Intake 360 ml   Output 600 ml   Net -240 ml       Physical Exam:   Physical Exam  Vitals and nursing note reviewed  Constitutional:       General: He is not in acute distress  Appearance: Normal appearance  He is not ill-appearing  HENT:      Head: Normocephalic and atraumatic  Nose: Nose normal    Eyes:      Extraocular Movements: Extraocular movements intact  Pupils: Pupils are equal, round, and reactive to light  Cardiovascular:      Rate and Rhythm: Normal rate and regular rhythm  Pulses: Normal pulses  Heart sounds: Normal heart sounds  No murmur heard  No friction rub  No gallop  Pulmonary:      Effort: Pulmonary effort is normal       Breath sounds: Normal breath sounds  Abdominal:      General: There is no distension  Palpations: Abdomen is soft  There is no mass  Tenderness: There is no abdominal tenderness  There is no guarding or rebound  Musculoskeletal:         General: No swelling or tenderness  Normal range of motion  Cervical back: Normal range of motion and neck supple  No rigidity  No muscular tenderness  Right lower leg: No edema  Left lower leg: No edema  Comments: Improved bilateral lower extremity swelling left greater than right   Skin:     General: Skin is warm  Capillary Refill: Capillary refill takes less than 2 seconds  Findings: No erythema or rash  Neurological:      General: No focal deficit present  Mental Status: He is alert and oriented to person, place, and time  Mental status is at baseline     Psychiatric:         Mood and Affect: Mood normal          Behavior: Behavior normal          Additional Data:     Labs:    Results from last 7 days   Lab Units 01/06/22  0433   WBC Thousand/uL 5 84   HEMOGLOBIN g/dL 7 7*   HEMATOCRIT % 23 8*   PLATELETS Thousands/uL 290   NEUTROS PCT % 66   LYMPHS PCT % 20   MONOS PCT % 10   EOS PCT % 3     Results from last 7 days   Lab Units 01/06/22  0433   SODIUM mmol/L 132*   POTASSIUM mmol/L 4 6   CHLORIDE mmol/L 92*   CO2 mmol/L 29   BUN mg/dL 57*   CREATININE mg/dL 7 64*   CALCIUM mg/dL 8 4     Results from last 7 days   Lab Units 01/03/22 2011   INR  1 01               * I Have Reviewed All Lab Data Listed Above  * Additional Pertinent Lab Tests Reviewed: [address] Admission  Reviewed    Imaging:  Imaging Reports Reviewed Today Include:  None    Recent Cultures (last 7 days):           Last 24 Hours Medication List:   Current Facility-Administered Medications   Medication Dose Route Frequency Provider Last Rate    acetaminophen  650 mg Oral Q6H PRN Rana Elizabeth, PA-C      allopurinol  200 mg Oral Daily Rana Elizabeth, PA-C      calcium acetate  1,334 mg Oral TID With Meals Rana Elizabeth, PA-C      co-enzyme Q-10  200 mg Oral Daily Rana Elizabeth, PA-C      doxazosin  2 mg Oral Daily Rana Elizabeth, PA-C      heparin (porcine)  5,000 Units Subcutaneous [facility] Dick Todd MD      hydrALAZINE  50 mg Oral BID Uriel Bidding, DO      HYDROcodone-acetaminophen  1 tablet Oral Q6H PRN Rana Elizabeth, PA-C      HYDROmorphone  1 mg Intravenous Q4H PRN Rana Elizabeth, PA-C      losartan  50 mg Oral Daily Rana Elizabeth, PA-C      magnesium oxide  400 mg Oral Daily Rana Elizabeth, PA-C      metoprolol succinate  50 mg Oral BID Rana Elizabeth, PA-C      ondansetron  4 mg Intravenous Q6H PRN Rana Elizabeth, PA-C      sevelamer  800 mg Oral TID With Meals Gisell Johnson PA-C          Today, Patient Was Seen By: Dick Todd MD    ** Please Note: Dictation voice to text software may have been used in the creation of this document   **

## 2022-01-07 NOTE — ASSESSMENT & PLAN NOTE
· This condition has been ruled out  · Left lower extremity pain is secondary to musculoskeletal reasons and or the possibility of a ruptured Baker's cyst  · Left lower extremity venous Doppler:-There is a complex, non-vascularized fluid collection noted in the left popliteal fossa  · Case reviewed with orthopedic surgery-supportive therapy only  · MRI of the left hip as follows:-No acute fracture in the left hip  Moderate bilateral hip osteoarthritis    Okay for activity as tolerated  · Appreciate orthopedic surgical input  · Status post a PT and OT evaluation-they recommend short-term rehab  · Case management is working on rehab placement options  · Patient is medically stable for discharge, will discharge as soon as case management has a rehab placement facility set up

## 2022-01-07 NOTE — PLAN OF CARE
Problem: PHYSICAL THERAPY ADULT  Goal: Performs mobility at highest level of function for planned discharge setting  See evaluation for individualized goals  Description: Treatment/Interventions: Functional transfer training,LE strengthening/ROM,Elevations,Therapeutic exercise,Endurance training,Patient/family training,Equipment eval/education,Gait training,Spoke to nursing,Spoke to case management  Equipment Recommended:  (continue RW use)     See flowsheet documentation for full assessment, interventions and recommendations  Outcome: Progressing  Note: Prognosis: Good  Problem List: Decreased strength,Decreased endurance,Impaired balance,Decreased mobility,Obesity,Pain,Impaired sensation  Assessment: Pt seen for PT treatment session this date with interventions consisting of gait training to reduce the risk of medical complications w/ emphasis on improving pt's ability to ambulate level surfaces x 75 feet with CGA provided by therapist with RW, Therapeutic exercise to increase BLE stability w/WB tasks consisting of: AROM 10 reps and 20 reps B LE in sitting position and therapeutic activity to reduce fall risk consisting of training: supine<>sit transfers, sit<>stand transfers, static sitting tolerance at EOB for 10 minutes w/ o UE support, static standing tolerance for 2 minutes w/ B UE support, vc and tactile cues for static sitting posture faciliation, vc and tactile cues for static standing posture faciliation, stand pivot transfers towards B direction and continued environmental awareness education specifically directional changes  Pt agreeable to PT treatment session upon arrival, pt found supine in bed w/ HOB elevated, A&O x 4  In comparison to previous session, pt with significant improvements in ambulatory distance, balance, assistance levels  Post session: pt returned back to recliner, all needs in reach and RN notified of session findings/recommendations   Updated recommendation of home with home health rehabilitation at time of d/c in order to maximize pt's functional independence and safety w/ mobility  Pt continues to be functioning below baseline level, and remains limited 2* factors listed above and including pain, decreased endurance, gait deviations, impaired balance  PT will continue to see pt during current hospitalization in order to address the deficits listed above and provide interventions consistent w/ POC in effort to achieve STGs  Barriers to Discharge: Inaccessible home environment,Decreased caregiver support        PT Discharge Recommendation: (S) Home with home health rehabilitation (updated recommendation)          See flowsheet documentation for full assessment

## 2022-01-07 NOTE — NURSING NOTE
Awake and pleasant  R/a  resp easy  Lungs clear  No sob  Heart is regular  Denies pain  Bruises to face from hx fall pre hospital  Heart is regular  Plus 2 pedal /radial pulses   Plus 1 ble non pitting edema  Rue fistula positive bruit/thrill  Dressing dry and intact same   Call bell given and voices no complaints

## 2022-01-07 NOTE — ASSESSMENT & PLAN NOTE
Wt Readings from Last 3 Encounters:   01/07/22 115 kg (254 lb 3 1 oz)   10/06/21 117 kg (258 lb 6 oz)   09/08/21 117 kg (257 lb)     · Volume management is with dialysis  · Continue Toprol-XL and losartan otherwise

## 2022-01-07 NOTE — OCCUPATIONAL THERAPY NOTE
Occupational Therapy Evaluation      Rishi Spears    1/7/2022    Patient Active Problem List   Diagnosis    Essential hypertension    Hemodialysis-associated hypotension    AAA [facility]    Chronic renal failure    Primary osteoarthritis of both knees    Hyperlipidemia    Class 2 severe obesity due to excess calories with serious comorbidity and body mass index (BMI) of 39 0 to 39 9 in adult [facility])    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 [facility])    Secondary hyperparathyroidism of renal origin ([facility] [address] )    Abnormal nuclear stress test    Anemia in chronic kidney disease    ASCVD (arteriosclerotic cardiovascular disease)    Benign prostatic hyperplasia without lower urinary tract symptoms    Carotid stenosis, right    Chronic gout, unspecified, with tophus (tophi)    Chronic systolic congestive heart failure ([facility])    Disorder of phosphorus metabolism, unspecified    Gastroesophageal reflux disease    Iron deficiency anemia    Kidney stone    Magnesium deficiency    Osteoarthritis of both knees    Other disorders of electrolyte and fluid balance, not elsewhere classified    Other fluid overload    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[facility]    Panlobular emphysema ([facility]    Personal history of nicotine dependence    Proteinuria, unspecified    Skin lesion of face    Unspecified protein-calorie malnutrition ([facility])    Vitamin D deficiency    Elevated d-dimer    Pancreatic mass    Closed fracture of neck of left femur ([facility])       Past Medical History:   Diagnosis Date    Abdominal aortic aneurysm (AAA) ([facility] [address] )     s/p repair    Anemia     due to kidney disease    Arthritis     CHF (congestive heart failure) ([facility])     stable at present    Chronic kidney disease     on hemodialysis Tu Th Sat    Chronic pain disorder     knees    Edema     GERD (gastroesophageal reflux disease)     no meds     Gout     History of echocardiogram 03/02/2018    EF 55%, mild LVH, technically difficult study   Hyperlipidemia     borderline no meds    Hyperparathyroidism ([address] )     Hypertension     Kidney stone     Seasonal allergies     Shortness of breath     mild exertional    Skin cancer     Vitamin D deficiency        Past Surgical History:   Procedure Laterality Date    ABDOMINAL AORTIC ANEURYSM REPAIR  2004    CARDIAC CATHETERIZATION  05/01/2020    no blockages    CARPAL TUNNEL RELEASE Right     COLONOSCOPY      DIALYSIS FISTULA CREATION Right 2017    GLAUCOMA SURGERY      LEG SURGERY      removal splinter    PARATHYROIDECTOMY      SPLIT THICKNESS SKIN GRAFT N/A 12/4/2019    Procedure: THIGH STSG;  Surgeon: Juan Francisco Mar MD;  Location: [address] [place] [address];  Service: Plastics    SQUAMOUS CELL CARCINOMA EXCISION Right 12/4/2019    Procedure: REMOVE SKIN CA FROM EAR & CHEEK;  Surgeon: Juan Francisco Mar MD;  Location: [address] [place] [address];  Service: Plastics        01/07/22 0951   OT Last Visit   OT Visit Date 01/07/22   Note Type   Note Type Treatment   Restrictions/Precautions   Weight Bearing Precautions Per Order Yes   RLE Weight Bearing Per Order WBAT   LLE Weight Bearing Per Order WBAT   Other Precautions Fall Risk;Pain   General   Response to Previous Treatment Patient with no complaints from previous session   Lifestyle   Autonomy Patient reporting being independent with ADLs/IADLs, ambulatory with SPC or rollator and lives with wife in a two story house    Reciprocal Relationships pt cares for wife   Service to Others retired     Pain Assessment   Pain Assessment Tool 0-10   Pain Score 3   Pain Location/Orientation Orientation: Left; Location: Leg   Pain Onset/Description Onset: Ongoing; Descriptor: Aching;Descriptor: Sore   Hospital Pain Intervention(s) Ambulation/increased activity;Repositioned;Medication (See MAR)   ADL   Where Assessed   (Seated on toilet )   [address] Supervision/Setup   UB Bathing Deficit Setup;Verbal cueing;Supervision/safety; Increased time to complete   LB Bathing Assistance 4  Minimal Assistance   LB Bathing Deficit Setup;Steadying;Verbal cueing;Supervision/safety; Increased time to complete; Buttocks;Right lower leg including foot; Left lower leg including foot   UB Dressing Assistance 5  Supervision/Setup   UB Dressing Deficit Setup;Verbal cueing;Supervision/safety; Increased time to complete;Pull down in back   UB Dressing Comments Pt doffed/donned overhead T-shirt    LB Dressing Assistance 4  Minimal Assistance   LB Dressing Deficit Setup;Steadying;Verbal cueing;Supervision/safety; Increased time to complete; Don/doff R sock; Don/doff L sock   LB Dressing Comments Pt donned socks, underwear and pants    Toileting Assistance  4  Minimal Assistance   Toileting Deficit Setup;Steadying;Verbal cueing;Supervison/safety; Increased time to complete;Grab bar use;Perineal hygiene   Bed Mobility   Supine to Sit 5  Supervision   Additional items Assist x 1;HOB elevated; Bedrails; Increased time required;Verbal cues   Sit to Supine   (DNT: pt seated OOB in recliner at end of session )   Additional Comments Pt reported (+) lightheadedness upon sitting at EOB  symptoms resolved after ~1 minute of seated rest    Transfers   Sit to Stand   (CGA)   Additional items Assist x 1; Increased time required;Verbal cues   Stand to Sit   (CGA)   Additional items Assist x 1; Increased time required;Verbal cues   Functional Mobility   Functional Mobility   (CGA)   Additional Comments Pt ambulated in room and from bathroom with no overt LOB  Pt noted to have significant SOB  SpO2 >90% throughout session     Additional items Rolling walker   Toilet Transfers   Toilet Transfer From Wheelchair   Toilet Transfer Type To and from   Toilet Transfer to [employer] guard   Cognition   Overall Cognitive Status [facility]   Arousal/Participation Alert; Responsive; Cooperative   Attention Within functional limits   Orientation Level Oriented X4   Memory Within functional limits   Following Commands Follows all commands and directions without difficulty   Comments pt agreeable to OT session   Activity Tolerance   Activity Tolerance Patient limited by pain   Assessment   Assessment Patient participated in Skilled OT session this date with interventions consisting of ADL re training with the use of correct body mechnaics,  therapeutic activities to: increase activity tolerance, increase dynamic sit/ stand balance during functional activity  and increase OOB/ sitting tolerance  Pt seen as a co-treat with PT due to the patient's co-morbidities, clinically unstable presentation, and present impairments which are a regression from the patient's baseline  Patient agreeable to OT treatment session, upon arrival patient was found supine in bed and in no apparent distress  Patient completed bed mobility with supervision assist and functional transfers with CGA  While seated on toilet, patient required sup for UB ADLs and min assist for LB ADLs  Patient completed toilet transfer with CGA and required min assist for rolf care  In comparison to previous session, patient with improvements in functional mobility, balance, and endurance  Patient requiring verbal cues for pacing thru activity steps, one step directives and frequent rest periods  Patient continues to be functioning below baseline level, occupational performance remains limited secondary to factors listed above and increased risk for falls and injury  From OT standpoint, recommendation at time of d/c would be Home with home health rehabilitation  Patient to benefit from continued Occupational Therapy treatment while in the hospital to address deficits as defined above and maximize level of functional independence with ADLs and functional mobility      Plan   Treatment Interventions ADL retraining;Functional transfer training; Endurance training;Patient/family training; Compensatory technique education   Goal Expiration Date 01/15/22   OT Treatment Day 1   OT Frequency 3-5x/wk   Recommendation   OT Discharge Recommendation Home with home health rehabilitation   Equipment Recommended Bedside commode   Commode Type Standard   OT - OK to Discharge Yes  (Once medically cleared )   Additional Comments  At end of session, pt seated OOB in recliner with all needs met and call bell within reach    Additional Comments 2 The patient's raw score on the AM-PAC Daily Activity inpatient short form is 20, standardized score is 42 03, greater than 39 4  Patients at this level are likely to benefit from discharge to home  Please refer to the recommendation of the Occupational Therapist for safe discharge planning     AM-PAC Daily Activity Inpatient   Lower Body Dressing 3   Bathing 3   Toileting 3   Upper Body Dressing 3   Grooming 4   Eating 4   Daily Activity Raw Score 20   Daily Activity Standardized Score (Calc for Raw Score >=11) 42 03   AM-PAC Applied Cognition Inpatient   Following a Speech/Presentation 4   Understanding Ordinary Conversation 4   Taking Medications 4   Remembering Where Things Are Placed or Put Away 4   Remembering List of 4-5 Errands 4   Taking Care of Complicated Tasks 4   Applied Cognition Raw Score 24   Applied Cognition Standardized Score 62 21     Yennifer Rios, OT

## 2022-01-07 NOTE — NURSING NOTE
Assisted to stand to scale for am weight  He did well  No complaints pain voiced since tylenol po was given earlier  Call bell is near

## 2022-01-07 NOTE — PHYSICAL THERAPY NOTE
Physical Therapy Treatment Session Note    Patient's Name: Ye Shukla    Admitting Diagnosis  Leg pain [M79 606]  ESRD (end stage renal disease) ([facility] ) [N18 6]  Pancreatic mass [K86 89]  Closed fracture of left hip, initial encounter ([facility] ) [S72 [id number]]    Problem List  Patient Active Problem List   Diagnosis    Essential hypertension    Hemodialysis-associated hypotension    AAA (abdominal aortic aneurysm) (HCC)    Chronic renal failure    Primary osteoarthritis of both knees    Hyperlipidemia    Class 2 severe obesity due to excess calories with serious comorbidity and body mass index (BMI) of 39 0 to 39 9 in adult [facility])    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 ([facility] )    Secondary hyperparathyroidism of renal origin ([facility] )    Abnormal nuclear stress test    Anemia in chronic kidney disease    ASCVD (arteriosclerotic cardiovascular disease)    Benign prostatic hyperplasia without lower urinary tract symptoms    Carotid stenosis, right    Chronic gout, unspecified, with tophus (tophi)    Chronic systolic congestive heart failure (HCC)    Disorder of phosphorus metabolism, unspecified    Gastroesophageal reflux disease    Iron deficiency anemia    Kidney stone    Magnesium deficiency    Osteoarthritis of both knees    Other disorders of electrolyte and fluid balance, not elsewhere classified    Other fluid overload    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, initial encounter ([facility] )    Panlobular emphysema ([facility] )    Personal history of nicotine dependence    Proteinuria, unspecified    Skin lesion of face    Unspecified protein-calorie malnutrition (HCC)    Vitamin D deficiency    Elevated d-dimer    Pancreatic mass    Closed fracture of neck of left femur ([facility])       Past Medical History  Past Medical History:   Diagnosis Date    Abdominal aortic aneurysm (AAA) ([facility] )     s/p repair    Anemia     due to kidney disease    Arthritis     CHF (congestive heart failure) (HCC)     stable at present    Chronic kidney disease     on hemodialysis Tu Th Sat    Chronic pain disorder     knees    Edema     GERD (gastroesophageal reflux disease)     no meds     Gout     History of echocardiogram 03/02/2018    EF 55%, mild LVH, technically difficult study   Hyperlipidemia     borderline no meds    Hyperparathyroidism ([address] )     Hypertension     Kidney stone     Seasonal allergies     Shortness of breath     mild exertional    Skin cancer     Vitamin D deficiency        Past Surgical History  Past Surgical History:   Procedure Laterality Date    ABDOMINAL AORTIC ANEURYSM REPAIR  2004    CARDIAC CATHETERIZATION  05/01/2020    no blockages    CARPAL TUNNEL RELEASE Right     COLONOSCOPY      DIALYSIS FISTULA CREATION Right 2017    GLAUCOMA SURGERY      LEG SURGERY      removal splinter    PARATHYROIDECTOMY      SPLIT THICKNESS SKIN GRAFT N/A 12/4/2019    Procedure: THIGH STSG;  Surgeon: Lenore Sheppard MD;  Location: [address] [place] [address];  Service: Plastics    SQUAMOUS CELL CARCINOMA EXCISION Right 12/4/2019    Procedure: REMOVE SKIN CA FROM EAR & CHEEK;  Surgeon: Lenore Sheppard MD;  Location: [address] [place] [address];  Service: Plastics        01/07/22 0923   PT Last Visit   PT Visit Date 01/07/22   Note Type   Note Type Treatment   Pain Assessment   Pain Assessment Tool 0-10  ("2 or 3")   Pain Location/Orientation Orientation: Left; Location: Hip   Pain Radiating Towards yes, L leg/thigh   Pain Onset/Description Onset: Ongoing;Frequency: Constant/Continuous; Descriptor: Aching;Descriptor: Sore  ("I know its there", tender to touch)   Effect of Pain on Daily Activities yes   Patient's Stated Pain Goal No pain   Hospital Pain Intervention(s) Medication (See MAR); Repositioned; Ambulation/increased activity; Emotional support   Multiple Pain Sites No   Restrictions/Precautions   Weight Bearing Precautions Per Order Yes   RLE Weight Bearing Per Order WBAT   LLE Weight Bearing Per Order WBAT   Other Precautions Chair Alarm; Bed Alarm; Fall Risk;Pain   General   Chart Reviewed Yes   Additional Pertinent History Guillermoprashant García OT present for co-treatment due to medical complexity, required skilled interventions of 2 clinicians for care coordination  Response to Previous Treatment Patient with no complaints from previous session  Family/Caregiver Present No   Cognition   Overall Cognitive Status WFL   Arousal/Participation Alert; Responsive; Cooperative   Attention Within functional limits   Orientation Level Oriented to person   Memory Within functional limits   Following Commands Follows all commands and directions without difficulty   Comments Pt  agreeable to PT tx session,pleasant  Subjective   Subjective when asked about moving around & getting washed up "that would be great"   Bed Mobility   Supine to Sit 5  Supervision   Additional items Assist x 1;HOB elevated; Bedrails; Increased time required   Sit to Supine   (DNT as pt  was sitting OOB on recliner upon conclusion )   Additional Comments Pt  reported dizziness with positional change to bedside  Symptomatic resolution w/increased time  Transfers   Sit to Stand   (CGA)   Additional items Assist x 1;Bedrails; Increased time required;Verbal cues   Stand to Sit   (CGA)   Additional items Assist x 1;Bedrails; Increased time required;Verbal cues   Stand pivot   (CGA)   Additional items Assist x 1; Increased time required;Verbal cues   Toilet transfer 4  Minimal assistance   Additional items Assist x 1; Increased time required;Verbal cues;Standard toilet; Other  (L grab bar)   Additional Comments Verbal cues for appropriate hand placement with transfers, environmental awareness w/directional changes x 3  OT completed full ADL-please see detailed note for additional information  Ambulation/Elevation   Gait pattern Improper Weight shift; Antalgic;Decreased L stance; Short stride   Gait Assistance   (CGA)   Additional items Assist x 1;Verbal cues; Tactile cues   Assistive Device Rolling walker   Distance 75 feet total  (to transport chair,in/out bathroom, in hallway)   Stair Management Assistance Not tested   Balance   Static Sitting Good   Dynamic Sitting Fair +   Static Standing Fair   Dynamic Standing Fair   Ambulatory Fair   Endurance Deficit   Endurance Deficit Yes   Endurance Deficit Description MARTIN exhibited with task progression  Symptomatic resolution w/increased time and breathing conservation technique utilization  Activity Tolerance   Activity Tolerance Patient tolerated treatment well   Nurse Made Aware yes, Jayla PCA   Exercises   Hip Abduction Sitting;20 reps;AROM; Bilateral   Hip Adduction Sitting;20 reps;AROM; Bilateral   Knee AROM Long Arc Quad Sitting;20 reps;Right;10 reps; Left;AROM   Ankle Pumps Sitting;20 reps;AROM; Bilateral   Assessment   Prognosis Good   Problem List Decreased strength;Decreased endurance; Impaired balance;Decreased mobility;Obesity;Pain; Impaired sensation   Assessment Pt seen for PT treatment session this date with interventions consisting of gait training to reduce the risk of medical complications w/ emphasis on improving pt's ability to ambulate level surfaces x 75 feet with CGA provided by therapist with RW, Therapeutic exercise to increase BLE stability w/WB tasks consisting of: AROM 10 reps and 20 reps B LE in sitting position and therapeutic activity to reduce fall risk consisting of training: supine<>sit transfers, sit<>stand transfers, static sitting tolerance at EOB for 10 minutes w/ o UE support, static standing tolerance for 2 minutes w/ B UE support, vc and tactile cues for static sitting posture faciliation, vc and tactile cues for static standing posture faciliation, stand pivot transfers towards B direction and continued environmental awareness education specifically directional changes   Pt agreeable to PT treatment session upon arrival, pt found supine in bed w/ HOB elevated, A&O x 4  In comparison to previous session, pt with significant improvements in ambulatory distance, balance, assistance levels  Post session: pt returned back to recliner, all needs in reach and RN notified of session findings/recommendations  Updated recommendation of home with home health rehabilitation at time of d/c in order to maximize pt's functional independence and safety w/ mobility  Pt continues to be functioning below baseline level, and remains limited 2* factors listed above and including pain, decreased endurance, gait deviations, impaired balance  PT will continue to see pt during current hospitalization in order to address the deficits listed above and provide interventions consistent w/ POC in effort to achieve STGs  Goals   Patient Goals to get better soon   STG Expiration Date 01/15/22   Short Term Goal #1 STGs remain appropriate   PT Treatment Day 1   Plan   Treatment/Interventions Functional transfer training;LE strengthening/ROM; Therapeutic exercise; Endurance training;Equipment eval/education;Patient/family training;Bed mobility;Gait training;Spoke to nursing;OT   Progress Progressing toward goals   PT Frequency 3-5x/wk   Recommendation   PT Discharge Recommendation Home with home health rehabilitation  (updated recommendation)   Equipment Recommended Walker  (continued usage)   Additional Comments Upon conclusion, pt  was resting OOB on recliner w/all needs within reach  Additional Comments 2 Pt's raw score on the [facility] Basic Mobility inpatient short form is 19, standardized score is 42 48  Patients at this level are likely to benefit from DC to home  However, please refer to therapist recommendation for safe DC planning     [facility] Basic Mobility Inpatient   Turning in Bed Without Bedrails 4   Lying on Back to Sitting on Edge of Flat Bed 3   Moving Bed to Chair 3   Standing Up From Chair 3   Walk in Room 3   Climb 3-5 Stairs 3   Basic Mobility Inpatient Raw Score 19   Basic Mobility Standardized Score 42 48   Highest Level Of Mobility   -[facility] Goal 6: Walk 10 steps or more     Treatment Time: [id number]    Aj Watson, PT

## 2022-01-07 NOTE — PROGRESS NOTES
Progress Note - Nephrology   Suzanne Orozco 76 y o  male MRN: [id number]  Unit/Bed#: APU 05 Encounter: [id number]    Assessment and Plan    1  End-stage renal disease on hemodialysis   Outpatient dialysis schedule:  Tuesday Thursday Saturday   Nephrologist is Dr Joanne Collier routine hemodialysis with next treatment Saturday, 01/08/2022    5 kg    Potassium trending up, request dietary potassium restriction   Has receiving magnesium supplementation  Check Mag level tomorrow    2  Dialysis access   AV fistula    3  Anemia of end-stage renal disease   Avoid Epogen due to long acting [facility]  4  Hyperparathyroidism, secondary to renal disease   Request phosphorus tomorrow  Continue binders or modify as indicated    5  Hypertension in patient with end-stage renal disease   Further adjust antihypertensives if indicated    6  Volume status of dialysis patient   Continue ultrafiltration on hemodialysis      7  Ambulatory dysfunction  · rehab    Follow up reason for today's visit:     Closed fracture of neck of left femur [facility])    Patient Active Problem List   Diagnosis    Essential hypertension    Hemodialysis-associated hypotension    AAA (abdominal aortic aneurysm) (HCC)    Chronic renal failure    Primary osteoarthritis of both knees    Hyperlipidemia    Class 2 severe obesity due to excess calories with serious comorbidity and body mass index (BMI) of 39 0 to 39 9 in adult [facility])    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 ([facility] )    Secondary hyperparathyroidism of renal origin ([facility] )    Abnormal nuclear stress test    Anemia in chronic kidney disease    ASCVD (arteriosclerotic cardiovascular disease)    Benign prostatic hyperplasia without lower urinary tract symptoms    Carotid stenosis, right    Chronic gout, unspecified, with tophus (tophi)    Chronic systolic congestive heart failure (HCC)    Disorder of phosphorus metabolism, unspecified    Gastroesophageal reflux disease    Iron deficiency anemia    Kidney stone    Magnesium deficiency    Osteoarthritis of both knees    Other disorders of electrolyte and fluid balance, not elsewhere classified    Other fluid overload    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    Panlobular emphysema (HCC)    Personal history of nicotine dependence    Proteinuria, unspecified    Skin lesion of face    Unspecified protein-calorie malnutrition (HCC)    Vitamin D deficiency    Elevated d-dimer    Pancreatic mass    Closed fracture of neck of left femur (HCC)         Subjective:   Denies complaints  For dialysis tomorrow  Objective:     Vitals: Blood pressure 164/72, pulse 55, temperature 99 °F (37 2 °C), temperature source Temporal, resp  rate 18, height 5' 8" (1 727 m), weight 115 kg (254 lb 3 1 oz), SpO2 95 %  ,Body mass index is 38 65 kg/m²  Weight (last 2 days)     Date/Time Weight    01/07/22 0600 115 (254 19)    01/06/22 0600 115 (254 19)    01/05/22 0534 115 (254 19)    01/05/22 0500 115 (254 19)            Intake/Output Summary (Last 24 hours) at 1/7/2022 1458  Last data filed at 1/7/2022 0951  Gross per 24 hour   Intake 240 ml   Output 600 ml   Net -360 ml     I/O last 3 completed shifts: In: 740 [P O :240; I V :500]  Out: 2100 [Urine:600;  Other:1500]         Physical Exam: /72 (BP Location: Left arm)   Pulse 55   Temp 99 °F (37 2 °C) (Temporal)   Resp 18   Ht 5' 8" (1 727 m)   Wt 115 kg (254 lb 3 1 oz)   SpO2 95%   BMI 38 65 kg/m²     General Appearance:    Alert, cooperative, no distress, appears stated age   Head:    Normocephalic, without obvious abnormality, atraumatic   Eyes:    Conjunctiva/corneas clear, no scleral icterus    Ears:    Normal external ears   Nose:   Nares normal, septum midline, mucosa normal, no drainage  or sinus tenderness   Throat:   Lips, mucosa, and tongue normal   Neck:   Supple, symmetrical   Back:     Symmetric, no curvature, ROM normal, no CVA tenderness   Lungs:     Clear to auscultation bilaterally, respirations unlabored   Chest wall:    No tenderness or deformity   Heart:    Regular rate and rhythm, S1 and S2 normal, no murmur, rub or gallop   Abdomen:     Soft, non-tender, bowel sounds active   Extremities:   Extremities normal, atraumatic, no cyanosis, no edema   Skin:   Skin color, texture, turgor normal, no rashes, no lesions   Lymph nodes:   Cervical normal   Neurologic:   CNII-XII intact            Lab, Imaging and other studies: I have personally reviewed pertinent labs  CBC: No results found for: WBC, HGB, HCT, MCV, PLT, ADJUSTEDWBC, MCH, MCHC, RDW, MPV, NRBC  CMP: No results found for: NA, K, CL, CO2, ANIONGAP, BUN, CREATININE, GLUCOSE, CALCIUM, AST, ALT, ALKPHOS, PROT, BILITOT, EGFR      Results from last 7 days   Lab Units 01/06/22  0433 01/05/22  0514 01/04/22  0556   POTASSIUM mmol/L 4 6 4 1 3 6   CHLORIDE mmol/L 92* 95* 91*   CO2 mmol/L 29 31 30   BUN mg/dL 57* 40* 55*   CREATININE mg/dL 7 64* 5 90* 7 16*   CALCIUM mg/dL 8 4 8 1* 8 2*         Phosphorus: No results found for: PHOS  Magnesium: No results found for: MG  Urinalysis: No results found for: COLORU, CLARITYU, SPECGRAV, PHUR, LEUKOCYTESUR, NITRITE, PROTEINUA, GLUCOSEU, KETONESU, BILIRUBINUR, BLOODU  Ionized Calcium: No results found for: CAION  Coagulation: No results found for: PT, INR, APTT  Troponin: No results found for: TROPONINI  ABG: No results found for: PHART, ZJX8RZC, PO2ART, KOT4OQN, H8QZVFJA, BEART, SOURCE  Radiology review:     IMAGING  No results found      Current Facility-Administered Medications   Medication Dose Route Frequency    acetaminophen (TYLENOL) tablet 650 mg  650 mg Oral Q6H PRN    allopurinol (ZYLOPRIM) tablet 200 mg  200 mg Oral Daily    calcium acetate (PHOSLO) capsule 1,334 mg  1,334 mg Oral TID With Meals    co-enzyme Q-10 capsule 200 mg  200 mg Oral Daily    doxazosin (CARDURA) tablet 2 mg  2 mg Oral Daily    heparin (porcine) subcutaneous injection 5,000 Units  5,000 Units Subcutaneous Q8H [address]    hydrALAZINE (APRESOLINE) tablet 50 mg  50 mg Oral BID    HYDROcodone-acetaminophen (NORCO) 5-325 mg per tablet 1 tablet  1 tablet Oral Q6H PRN    HYDROmorphone (DILAUDID) injection 1 mg  1 mg Intravenous Q4H PRN    losartan (COZAAR) tablet 50 mg  50 mg Oral Daily    magnesium oxide (MAG-OX) tablet 400 mg  400 mg Oral Daily    metoprolol succinate (TOPROL-XL) 24 hr tablet 50 mg  50 mg Oral BID    ondansetron (ZOFRAN) injection 4 mg  4 mg Intravenous Q6H PRN    sevelamer (RENAGEL) tablet 800 mg  800 mg Oral TID With Meals     Medications Discontinued During This Encounter   Medication Reason    Red Yeast Rice CAPS 1,200 mg     enoxaparin (LOVENOX) subcutaneous injection 90 mg     heparin (VTE/PE) high     heparin (porcine) 25,000 units in 0 45% NaCl 250 mL infusion (premix)     heparin (porcine) injection 8,800 Units     heparin (porcine) injection 4,400 Units     hydrALAZINE (APRESOLINE) tablet 100 mg        Aleyda Salazar PA-C    Portions of the record may have been created with voice recognition software  Occasional wrong word or "sound a like" substitutions may have occurred due to the inherent limitations of voice recognition software  Read the chart carefully and recognize, using context, where substitutions have occurred

## 2022-01-07 NOTE — PLAN OF CARE
Problem: OCCUPATIONAL THERAPY ADULT  Goal: Performs self-care activities at highest level of function for planned discharge setting  See evaluation for individualized goals  Description: Treatment Interventions: ADL retraining,Functional transfer training,UE strengthening/ROM,Endurance training,Patient/family training,Equipment evaluation/education,Compensatory technique education,Energy conservation,Activityengagement          See flowsheet documentation for full assessment, interventions and recommendations  Outcome: Progressing  Note: Limitation: Decreased ADL status,Decreased UE strength,Decreased endurance,Decreased sensation,Decreased self-care trans,Decreased high-level ADLs  Prognosis: Good  Assessment: Patient participated in Skilled OT session this date with interventions consisting of ADL re training with the use of correct body mechnaics,  therapeutic activities to: increase activity tolerance, increase dynamic sit/ stand balance during functional activity  and increase OOB/ sitting tolerance  Pt seen as a co-treat with PT due to the patient's co-morbidities, clinically unstable presentation, and present impairments which are a regression from the patient's baseline  Patient agreeable to OT treatment session, upon arrival patient was found supine in bed and in no apparent distress  Patient completed bed mobility with supervision assist and functional transfers with CGA  While seated on toilet, patient required sup for UB ADLs and min assist for LB ADLs  Patient completed toilet transfer with CGA and required min assist for rolf care  In comparison to previous session, patient with improvements in functional mobility, balance, and endurance  Patient requiring verbal cues for pacing thru activity steps, one step directives and frequent rest periods   Patient continues to be functioning below baseline level, occupational performance remains limited secondary to factors listed above and increased risk for falls and injury  From OT standpoint, recommendation at time of d/c would be Home with home health rehabilitation  Patient to benefit from continued Occupational Therapy treatment while in the hospital to address deficits as defined above and maximize level of functional independence with ADLs and functional mobility        OT Discharge Recommendation: Home with home health rehabilitation  OT - OK to Discharge: Yes (Once medically cleared )     Klarissa Jerome, OT

## 2022-01-07 NOTE — CASE MANAGEMENT
Case Management Discharge Planning Note    Patient name Suzanne Ship  Location [facility] 05/[facility]U 05 MRN [id number]  : 1945 Date 2022       Current Admission Date: 1/3/2022  Current Admission Diagnosis:Closed fracture of neck of left femur [facility])   Patient Active Problem List    Diagnosis Date Noted    Elevated d-dimer 2022    Pancreatic mass 2022    Closed fracture of neck of left femur ([facility] ) 2022    Primary osteoarthritis of both knees 2021    Hyperlipidemia 2021    Class 2 severe obesity due to excess calories with serious comorbidity and body mass index (BMI) of 39 0 to 39 9 in adult [facility]) 2021    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 ([place] [address] ) 2021    Secondary hyperparathyroidism of renal origin ([place] [address] ) 2021    Chronic renal failure 2021    Other fluid overload 2020    Abnormal nuclear stress test 2020    Panlobular emphysema ([place] [address] ) 2020    Disorder of phosphorus metabolism, unspecified 2019    Essential hypertension 2019    Hemodialysis-associated hypotension 2019    AAA (abdominal aortic aneurysm) ([place] [address] ) 2019    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, initial encounter ([place] [address] ) 2019    Anemia in chronic kidney disease 2018    Benign prostatic hyperplasia without lower urinary tract symptoms 2018    Chronic gout, unspecified, with tophus (tophi) 2018    Iron deficiency anemia 2018    Kidney stone 2018    Magnesium deficiency 2018    Other disorders of electrolyte and fluid balance, not elsewhere classified 2018    Personal history of nicotine dependence 2018    Proteinuria, unspecified 2018    Unspecified protein-calorie malnutrition ([facility] ) 2018    Skin lesion of face 2018    Chronic systolic congestive heart failure ([place] [address] ) 2018    ASCVD (arteriosclerotic cardiovascular disease) 12/11/2017    Carotid stenosis, right 12/11/2017    Gastroesophageal reflux disease 12/11/2017    Vitamin D deficiency 12/11/2017    Osteoarthritis of both knees 09/02/2015      LOS (days): 3  Geometric Mean LOS (GMLOS) (days): 4 60  Days to GMLOS:1 5     OBJECTIVE:  Risk of Unplanned Readmission Score: 15         Current admission status: Inpatient   Preferred Pharmacy:   [facility]/pharmacy #[id number]- [address]  [address]   [address], [address]  Phone: [phone] [facility]: [phone]  [address] [address]  Phone: [phone] Fax: [phone]    Primary Care Provider: Radha Gay DO    [facility]  Secondary Insurance:     DISCHARGE DETAILS:    Discharge planning discussed with[de-identified] patient and wife  Freedom of Choice: Yes  Comments - [place] of Choice: hhc is now recommended  pt requested sl[facility]na  if they are not able to accept he will acept what ever agnecy accepts his insurance  CM contacted family/caregiver?: Yes  Were Treatment Team discharge recommendations reviewed with patient/caregiver?: Yes  Did patient/caregiver verbalize understanding of patient care needs?: Yes  Were patient/caregiver advised of the risks associated with not following Treatment Team discharge recommendations?: Yes    Contacts  Patient Contacts: Lidia Cortez  Relationship to Patient[de-identified] Family (wife)  Contact Method: Phone  Phone Number: [phone]  Reason/Outcome: Discharge [address]         Is the patient interested in [place] at discharge?: Yes  [address] requested[de-identified] [address] Name[de-identified] Other  Ashley2 Kristen  Provider[de-identified] PCP  Home Health Services Needed[de-identified] Strengthening/Theraputic Exercises to Improve Function,Heart Failure Management  Homebound Criteria Met[de-identified] Uses an Assist Device (i e  cane, walker, etc)  Supporting Clincal Findings[de-identified] Limited Endurance    DME Referral Provided  Referral made for DME?: No    Other Referral/Resources/Interventions Provided:  Interventions: HHC,Dialysis (home with wife and hhc)  Referral Comments: referrals sent as requested  wilfrido [facility]   pt will continue wth outpt dialysis  tue-thur-sat 10:40 am chair    Would you like to participate in our [facility] service program?  : No - Declined       Discharge Destination Plan[de-identified] [facility]  Transport at Discharge : Automobile,Family                             IMM Given (Date):: 01/07/22  IMM Given to[de-identified] Patient  Family notified[de-identified] wife was called

## 2022-01-08 NOTE — ASSESSMENT & PLAN NOTE
Lab Results   Component Value Date    EGFR 6 01/08/2022    EGFR 6 01/06/2022    EGFR 8 01/05/2022    CREATININE 7 33 (H) 01/08/2022    CREATININE 7 64 (H) 01/06/2022    CREATININE 5 90 (H) 01/05/2022     · With secondary hyperparathyroidism  · Continue PhosLo, Renagel post discharge  · Continued dialysis in the outpatient setting as the patient was receiving prior to arrival

## 2022-01-08 NOTE — ASSESSMENT & PLAN NOTE
Wt Readings from Last 3 Encounters:   01/08/22 119 kg (261 lb 14 5 oz)   10/06/21 117 kg (258 lb 6 oz)   09/08/21 117 kg (257 lb)     · Volume management is with dialysis  · Continue Toprol-XL and candesartan otherwise post discharge

## 2022-01-08 NOTE — PLAN OF CARE
Pre-tx:  UF 1 L as tolerated to EDW  BP stable, will cont to monitor  Post-Dialysis RN Treatment Note    Blood Pressure:  Pre 164/94 mm/Hg  Post 158/71 mmHg   EDW  TBD kg    Weight:  Pre 115 8 kg   Post 114 7 kg   Mode of weight measurement: Bed Scale   Volume Removed  1100 ml    Treatment duration 2 5 hours    NS given  No    Treatment shortened?  No   Medications given during Rx Not Applicable   Estimated Kt/V  Not Applicable   Access type: AV fistula   Access Issues: No    Report called to primary nurse   Yes       Problem: METABOLIC, FLUID AND ELECTROLYTES - ADULT  Goal: Electrolytes maintained within normal limits  Description: INTERVENTIONS:  - Monitor labs and assess patient for signs and symptoms of electrolyte imbalances  - Administer electrolyte replacement as ordered  - Monitor response to electrolyte replacements, including repeat lab results as appropriate  - Instruct patient on fluid and nutrition as appropriate  Outcome: Progressing  Goal: Fluid balance maintained  Description: INTERVENTIONS:  - Monitor labs   - Monitor I/O and WT  - Instruct patient on fluid and nutrition as appropriate  - Assess for signs & symptoms of volume excess or deficit  Outcome: Progressing

## 2022-01-08 NOTE — ASSESSMENT & PLAN NOTE
· Continue pre-hospital Toprol XL 50 mg p o  B i d , Cozaar 50 mg p o  Daily, hydralazine 100 mg p o  B i d  And Cardura 2 mg p o   Daily post discharge

## 2022-01-08 NOTE — DISCHARGE INSTR - AVS FIRST PAGE
Dear Madelyn Seay,     It was our pleasure to care for you here at [facility], [facility]  It is our hope that we were always able to exceed the expected standards for your care during your stay  You were hospitalized due to ambulatory dysfunction, left lower extremity pain and swelling  You were cared for on the medical/surgical floor by Darlene Salvador MD with the [facility] who covers for your primary care physician (PCP), She Agosto DO, while you were hospitalized  You were additionally seen by [facility] Nephrology associates, and by [facility] GI associates  If you have any questions or concerns related to this hospitalization, you may contact us at [phone]  For follow up as well as any medication refills, we recommend that you follow up with your primary care physician  A registered nurse will reach out to you by phone within a few days after your discharge to answer any additional questions that you may have after going home  However, at this time we provide for you here, the most important instructions / recommendations at discharge:     Notable Medication Adjustments -   New prescription Percocet has been called in pain  Okay to resume all other pre-admit meds at pre-admit dosages  Testing Required after Discharge -   To be further determined in the outpatient setting by your PCP, Nephrology, and or GI  Important follow up information -   Please follow-up with the providers as outlined in this discharge package  Other Instructions -   Please ensure that you follow-up with gastroenterology as outlined in this discharge package, you need surveillance testing of your pancreas within 6-12 months with repeat MRI testing    Please review this entire after visit summary as additional general instructions including medication list, appointments, activity, diet, any pertinent wound care, and other additional recommendations from your care team that may be provided for you        Sincerely,     Destinee Mckeon MD

## 2022-01-08 NOTE — DISCHARGE SUMMARY
Aline 43  Discharge- Anuja Gann 1945, 76 y o  male MRN: [id number]  Unit/Bed#: [facility] 05 Encounter: [id number]  Primary Care Provider: Aaron iHll DO   Date and time admitted to hospital: 1/3/2022  6:54 PM    * Closed fracture of neck of left femur ([address] )  Assessment & Plan  · This condition has been ruled out  · Left lower extremity pain is secondary to musculoskeletal reasons and or the possibility of a ruptured Baker's cyst  · Left lower extremity venous Doppler:-There is a complex, non-vascularized fluid collection noted in the left popliteal fossa  · Case reviewed with orthopedic surgery-supportive therapy only  · MRI of the left hip as follows:-No acute fracture in the left hip  Moderate bilateral hip osteoarthritis  Okay for activity as tolerated  · Appreciate orthopedic surgical input  · Initially, the patient was Status post a PT and OT evaluation-they recommend short-term rehab, however upon re-evaluation, they now feel that the patient can go home with home health rehabilitation services  This was set up by case management  Okay for discharge home today on 01/08/2022  · Norco prescription has been provided  · Outpatient follow-up with PCP, GI, and Nephrology    Pancreatic mass  Assessment & Plan  · Status post an MRCP  · MRCP results appreciated  · Status post a GI evaluation - no further inpatient testing, treatment, and or workup is warranted, patient will need continued surveillance of the pancreatic lesions in the outpatient setting within the next 6-12 months  · Patient was counseled extensively on establishing care with GI in the outpatient setting by his primary care physician  · His primary care physician was notified of these findings via epic      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 [facility])  Assessment & Plan  Lab Results   Component Value Date    EGFR 6 01/08/2022    EGFR 6 01/06/2022    EGFR 8 01/05/2022    CREATININE 7 33 (H) 01/08/2022    CREATININE 7 64 (H) 01/06/2022    CREATININE 5 90 (H) 01/05/2022     · With secondary hyperparathyroidism  · Continue PhosLo, Renagel post discharge  · Continued dialysis in the outpatient setting as the patient was receiving prior to arrival    Chronic systolic congestive heart failure ([address] )  Assessment & Plan  Wt Readings from Last 3 Encounters:   01/08/22 119 kg (261 lb 14 5 oz)   10/06/21 117 kg (258 lb 6 oz)   09/08/21 117 kg (257 lb)     · Volume management is with dialysis  · Continue Toprol-XL and candesartan otherwise post discharge          Class 2 severe obesity due to excess calories with serious comorbidity and body mass index (BMI) of 39 0 to 39 9 in [facility])  Assessment & Plan  · Dietary, weight loss, and lifestyle modification counseling was provided    Elevated d-dimer  Assessment & Plan  · Status post treatment with heparin drip that has since been discontinued  · Bilateral lower extremity Dopplers negative for DVT    Essential hypertension  Assessment & Plan  · Continue pre-hospital Toprol XL 50 mg p o  B i d , Cozaar 50 mg p o  Daily, hydralazine 100 mg p o  B i d  And Cardura 2 mg p o  Daily post discharge    Magnesium deficiency  Assessment & Plan  · Continue pre-hospital magnesium oxide 400 mg p o   Daily post discharge        Discharging Physician / Practitioner: Julia Howell MD  PCP: Shilpi Rodney DO  Admission Date:   Admission Orders (From admission, onward)     Ordered        01/04/22 1603  Inpatient Admission  Once            01/03/22 2244  Place in Observation  Once                      Discharge Date: 01/08/22    Medical Problems             Resolved Problems  Date Reviewed: 1/3/2022    None                Consultations During Hospital Stay:  · Nephrology  · Gastroenterology  · Curbside Orthopedic surgery    Procedures Performed:   · None    Significant Findings / Test Results:   · X-ray trauma chest-minimal right base atelectasis  · X-ray left knee-no acute osseous abnormality  Degenerative changes  · CT scan chest, abdomen, and pelvis without IV contrast:-Hypodense lesion within the pancreatic head more prominent compared to prior study   Further evaluation with a MRI/MRCP is recommended  Partially visualized nondisplaced left femoral neck fracture   Recommend MRI for further evaluation  · CT scan facial bones:-no evidence of acute traumatic injury to the facial bones  · CT scan cervical spine without contrast:-no cervical spine fracture or traumatic malalignment  · CT scan head-no acute intercranial abnormality  · MRI left hip:-no acute fracture in the left hip  Moderate bilateral hip osteoarthritis  · MRI abdomen and MRCP:-1 9 cm (craniocaudal) cystic lesion in the head the pancreas is increased from 1 4 cm in 2020  For simple cyst(s) that have demonstrated significant interval growth (>20% in longest dimension) because it is still <2 0 cm, recommend continued followup per  protocol  Next followup in 6 months  Preferred imaging modality: abdomen MRI and MRCP with and without IV contrast, or triple phase abdomen CT with IV contrast, or abdomen MRI and MRCP without IV contrast  The recommendations regarding pancreatic findings assumes that patient does not have family history of pancreatic cancer nor have any symptoms potentially attributable to pancreatic cystic lesions (hyperamylasemia, recent-onset diabetes, severe epigastric pain, weight loss, steatorrhea, or jaundice ) If these conditions are not true, then management should be deferred to judgement of specialists such as gastroenterologists or oncologic surgeons  Recommendations are based on recent consensus statements on management of pancreatic cystic lesions from [facility], [facility], the journal Pancreatology, and our own institutional consensus  Other stable findings as above     · Bilateral lower extremity venous Doppler testing:-There is a complex, non-vascularized fluid collection noted in the left  popliteal fossa  There was no DVT noted otherwise    Incidental Findings:   · None     Test Results Pending at Discharge (will require follow up): · None     Outpatient Tests Requested:  · None    Complications:     None    Reason for Admission:  Initial suspected left hip fracture, ambulatory dysfunction    Hospital Course:     Carlos A Wood is a 76 y o  male patient who originally presented to the hospital on 1/3/2022 due to left leg pain and swelling  Please refer to the initial history and physical examination completed by Vinita HARO for the initial presenting features and complaints  In brief, the patient is a 15-year-old male, who was admitted the hospital, after he came in with left lower extremity pain and weakness  A few days prior to arrival, the patient had had a mechanical fall at home  Upon arrival into the emergency room he had extensive imaging as outlined above  Via CT scan of the abdomen, and pelvis, there was concern for the possibility of a left hip fracture  An orthopedic surgical evaluation was obtained  Patient went down for MRI testing of the left hip, however there was no finding of a fracture on the MRI  At that point Orthopedic surgery backed away  The patient was also found to have an abnormal pancreatic lesion as outlined above on the CT scan of the abdomen and pelvis, this was followed up with an MRI of the abdomen and an MRCP  Patient was seen in conjunction with GI, they felt that there was no further need for any type of acute phase inpatient testing, treatment, and or workup  They recommended that the patient follow-up with GI in the outpatient setting for continued monitoring of his pancreatic lesions    It was felt that the patient's pain in his left lower extremity was most likely secondary to a ruptured Baker cyst   Patient was initially seen by PT and OT, they recommended short-term rehab, case management had a difficult time getting a rehab facility since the patient is also a dialysis patient  Patient was seen in conjunction with Nephrology who continued his regular dialysis sessions in the setting of him having end-stage renal disease  Ultimately, the patient was re-evaluated on 01/07/2022, and this time PT and OT recommended home with home rehab  Patient was discharged home on 01/08/2022  He was given prescription for Norco, otherwise he was discharged on all of his other pre-admission medications, at the preadmission dosages  Case management set up home health rehab services, and visiting nurse services prior to discharge  Please refer to the assessment/plan portion of this discharge summary as outlined above for the remainder of the details  Please see above list of diagnoses and related plan for additional information  Condition at Discharge: good     Discharge Day Visit / Exam:     Subjective:  Patient seen examined in dialysis, no complaints no distress  Vitals: Blood Pressure: 147/68 (01/08/22 1100)  Pulse: (!) 50 (01/08/22 1100)  Temperature: (!) 97 3 °F (36 3 °C) (01/08/22 0900)  Temp Source: Tympanic (01/08/22 0700)  Respirations: 18 (01/08/22 1100)  Height: 5' 8" (172 7 cm) (01/03/22 1900)  Weight - Scale: 119 kg (261 lb 14 5 oz) (01/08/22 0600)  SpO2: 98 % (01/08/22 0700)  Exam:   Physical Exam  Vitals and nursing note reviewed  Constitutional:       General: He is not in acute distress  Appearance: Normal appearance  He is not ill-appearing  HENT:      Head: Normocephalic and atraumatic  Nose: Nose normal    Eyes:      Extraocular Movements: Extraocular movements intact  Pupils: Pupils are equal, round, and reactive to light  Cardiovascular:      Rate and Rhythm: Normal rate and regular rhythm  Pulses: Normal pulses  Heart sounds: Normal heart sounds  No murmur heard  No friction rub  No gallop      Pulmonary:      Effort: Pulmonary effort is normal       Breath sounds: Normal breath sounds  Abdominal:      General: There is no distension  Palpations: Abdomen is soft  There is no mass  Tenderness: There is no abdominal tenderness  There is no guarding or rebound  Musculoskeletal:         General: No swelling or tenderness  Normal range of motion  Cervical back: Normal range of motion and neck supple  No rigidity  No muscular tenderness  Right lower leg: No edema  Left lower leg: No edema  Comments: Improved bilateral lower extremity swelling left greater than right   Skin:     General: Skin is warm  Capillary Refill: Capillary refill takes less than 2 seconds  Findings: No erythema or rash  Neurological:      General: No focal deficit present  Mental Status: He is alert and oriented to person, place, and time  Mental status is at baseline  Psychiatric:         Mood and Affect: Mood normal          Behavior: Behavior normal          Discussion with Family:  Patient's wife was brought up to par    Discharge instructions/Information to patient and family:   See after visit summary for information provided to patient and family  Provisions for Follow-Up Care:  See after visit summary for information related to follow-up care and any pertinent home health orders  Disposition:     Home with VNA Services (Reminder: Complete face to face encounter)      Planned Readmission:    None     Discharge Statement:  I spent 45 minutes discharging the patient  This time was spent on the day of discharge  I had direct contact with the patient on the day of discharge  Greater than 50% of the total time was spent examining patient, answering all patient questions, arranging and discussing plan of care with patient as well as directly providing post-discharge instructions  Additional time then spent on discharge activities      Discharge Medications:  See after visit summary for reconciled discharge medications provided to patient and family        ** Please Note: This note has been constructed using a voice recognition system **

## 2022-01-08 NOTE — ASSESSMENT & PLAN NOTE
· This condition has been ruled out  · Left lower extremity pain is secondary to musculoskeletal reasons and or the possibility of a ruptured Baker's cyst  · Left lower extremity venous Doppler:-There is a complex, non-vascularized fluid collection noted in the left popliteal fossa  · Case reviewed with orthopedic surgery-supportive therapy only  · MRI of the left hip as follows:-No acute fracture in the left hip  Moderate bilateral hip osteoarthritis  Okay for activity as tolerated  · Appreciate orthopedic surgical input  · Initially, the patient was Status post a PT and OT evaluation-they recommend short-term rehab, however upon re-evaluation, they now feel that the patient can go home with home health rehabilitation services  This was set up by case management  Okay for discharge home today on 01/08/2022    · Norco prescription has been provided  · Outpatient follow-up with PCP, GI, and Nephrology

## 2022-01-08 NOTE — PLAN OF CARE
Problem: METABOLIC, FLUID AND ELECTROLYTES - ADULT  Goal: Electrolytes maintained within normal limits  Description: INTERVENTIONS:  - Monitor labs and assess patient for signs and symptoms of electrolyte imbalances  - Administer electrolyte replacement as ordered  - Monitor response to electrolyte replacements, including repeat lab results as appropriate  - Instruct patient on fluid and nutrition as appropriate  Outcome: Progressing  Goal: Fluid balance maintained  Description: INTERVENTIONS:  - Monitor labs   - Monitor I/O and WT  - Instruct patient on fluid and nutrition as appropriate  - Assess for signs & symptoms of volume excess or deficit  Outcome: Progressing     Problem: PAIN - ADULT  Goal: Verbalizes/displays adequate comfort level or baseline comfort level  Description: Interventions:  - Encourage patient to monitor pain and request assistance  - Assess pain using appropriate pain scale  - Administer analgesics based on type and severity of pain and evaluate response  - Implement non-pharmacological measures as appropriate and evaluate response  - Consider cultural and social influences on pain and pain management  - Notify physician/advanced practitioner if interventions unsuccessful or patient reports new pain  Outcome: Progressing

## 2022-01-08 NOTE — PROGRESS NOTES
Progress Note - Nephrology   Giorgio Dale 76 y o  male MRN: [id number]  Unit/Bed#: [facility] 05 Encounter: [id number]    A/P:  1  ESRD hemodialysis dependent  HEMODIALYSIS PROCEDURE NOTE  The patient was seen and examined on hemodialysis  Time: 2 5 hours  Sodium: 138 Blood flow: 400   Dialyzer: F160 Potassium: 2 -> 3 Dialysate flow: 600   Access: Right AVF Bicarbonate: 35 Ultrafiltration goal: 1   Medications on HD: none   Blood pressure is 124/68    2  Dialysis access   - Right AVF with good function    3  Anemia of chronic kidney disease   -  Hemoglobin is low and he received long acting erythropoietic agent   - Trend hemoglobin and transfuse if needed    4  Chronic systolic HF   - UF 1 kg    5   Closed fracture of neck of the left femur - No fracture   - He has musculoskeletal pain   - Will be transferred to rehab for physical therapy        Follow up reason for today's visit: ESRD hemodialysis dependent    Closed fracture of neck of left femur [facility])    Patient Active Problem List   Diagnosis    Essential hypertension    Hemodialysis-associated hypotension    AAA (abdominal aortic aneurysm) (HCC)    Chronic renal failure    Primary osteoarthritis of both knees    Hyperlipidemia    Class 2 severe obesity due to excess calories with serious comorbidity and body mass index (BMI) of 39 0 to 39 9 in adult [facility])    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 ([facility] [address] )    Secondary hyperparathyroidism of renal origin ([facility] [address] )    Abnormal nuclear stress test    Anemia in chronic kidney disease    ASCVD (arteriosclerotic cardiovascular disease)    Benign prostatic hyperplasia without lower urinary tract symptoms    Carotid stenosis, right    Chronic gout, unspecified, with tophus (tophi)    Chronic systolic congestive heart failure (HCC)    Disorder of phosphorus metabolism, unspecified    Gastroesophageal reflux disease    Iron deficiency anemia    Kidney stone    Magnesium deficiency    Osteoarthritis of both knees    Other disorders of electrolyte and fluid balance, not elsewhere classified    Other fluid overload    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, initial encounter ([facility] [address] )    Panlobular emphysema (HCC)    Personal history of nicotine dependence    Proteinuria, unspecified    Skin lesion of face    Unspecified protein-calorie malnutrition (HCC)    Vitamin D deficiency    Elevated d-dimer    Pancreatic mass    Closed fracture of neck of left femur (HCC)         Subjective:   A 10 point ROS is negative today      Objective:     Vitals: Blood pressure 124/68, pulse (!) 52, temperature (!) 97 3 °F (36 3 °C), resp  rate 18, height 5' 8" (1 727 m), weight 119 kg (261 lb 14 5 oz), SpO2 98 %  ,Body mass index is 39 82 kg/m²  Weight (last 2 days)     Date/Time Weight    01/08/22 0600 119 (261 91)    01/07/22 0600 115 (254 19)    01/06/22 0600 115 (254 19)            Intake/Output Summary (Last 24 hours) at 1/8/2022 0956  Last data filed at 1/8/2022 0900  Gross per 24 hour   Intake 560 ml   Output 75 ml   Net 485 ml     I/O last 3 completed shifts:   In: 600 [P O :600]  Out: 675 [Urine:675]         Physical Exam: /68   Pulse (!) 52   Temp (!) 97 3 °F (36 3 °C)   Resp 18   Ht 5' 8" (1 727 m)   Wt 119 kg (261 lb 14 5 oz)   SpO2 98%   BMI 39 82 kg/m²     General Appearance:    Alert, cooperative, no distress, appears stated age   Head:    Normocephalic, without obvious abnormality, atraumatic   Eyes:    Conjunctiva/corneas clear   Ears:    Normal external ears   Nose:   Nares normal, septum midline, mucosa normal, no drainage    or sinus tenderness   Throat:   Lips, mucosa, and tongue normal; teeth and gums normal   Neck:   Supple, symmetrical, trachea midline, no adenopathy;        thyroid:  No enlargement/tenderness/nodules; no carotid    bruit or JVD   Back:     Symmetric, no curvature, ROM normal, no CVA tenderness   Lungs:     Clear to auscultation bilaterally, respirations unlabored   Chest wall:    No tenderness or deformity   Heart:    Regular rate and rhythm, S1 and S2 normal, no murmur, rub   or gallop   Abdomen:     Soft, non-tender, bowel sounds active   Extremities:   Extremities normal, atraumatic, no cyanosis min edema   Skin:   Skin color,- bruising over left temple and periorbital area   Lymph nodes:   Cervical normal   Neurologic:   CNII-XII intact            Lab, Imaging and other studies: I have personally reviewed pertinent labs  CBC:   Lab Results   Component Value Date    WBC 5 05 01/08/2022    HGB 7 5 (L) 01/08/2022    HCT 22 5 (L) 01/08/2022    MCV 99 (H) 01/08/2022     01/08/2022    MCH 33 0 01/08/2022    MCHC 33 3 01/08/2022    RDW 14 2 01/08/2022    MPV 9 0 01/08/2022    NRBC 0 01/08/2022     CMP:   Lab Results   Component Value Date    K 4 2 01/08/2022    CL 94 (L) 01/08/2022    CO2 29 01/08/2022    BUN 49 (H) 01/08/2022    CREATININE 7 33 (H) 01/08/2022    CALCIUM 8 1 (L) 01/08/2022    EGFR 6 01/08/2022         Results from last 7 days   Lab Units 01/08/22  0907 01/06/22  0433 01/05/22  0514   POTASSIUM mmol/L 4 2 4 6 4 1   CHLORIDE mmol/L 94* 92* 95*   CO2 mmol/L 29 29 31   BUN mg/dL 49* 57* 40*   CREATININE mg/dL 7 33* 7 64* 5 90*   CALCIUM mg/dL 8 1* 8 4 8 1*         Phosphorus: No results found for: PHOS  Magnesium:   Lab Results   Component Value Date    MG 2 1 01/08/2022     Urinalysis: No results found for: COLORU, CLARITYU, SPECGRAV, PHUR, LEUKOCYTESUR, NITRITE, PROTEINUA, GLUCOSEU, KETONESU, BILIRUBINUR, BLOODU  Ionized Calcium: No results found for: CAION  Coagulation: No results found for: PT, INR, APTT  Troponin: No results found for: TROPONINI  ABG: No results found for: PHART, MUK7DBQ, PO2ART, DEH7XAJ, G4GEHZIK, BEART, SOURCE  Radiology review:     IMAGING  No results found      Current Facility-Administered Medications   Medication Dose Route Frequency    acetaminophen (TYLENOL) tablet 650 mg  650 mg Oral Q6H PRN    allopurinol (ZYLOPRIM) tablet 200 mg  200 mg Oral Daily    calcium acetate (PHOSLO) capsule 1,334 mg  1,334 mg Oral TID With Meals    co-enzyme Q-10 capsule 200 mg  200 mg Oral Daily    doxazosin (CARDURA) tablet 2 mg  2 mg Oral Daily    heparin (porcine) subcutaneous injection 5,000 Units  5,000 Units Subcutaneous Q8H [address]    hydrALAZINE (APRESOLINE) tablet 50 mg  50 mg Oral BID    HYDROcodone-acetaminophen (NORCO) 5-325 mg per tablet 1 tablet  1 tablet Oral Q6H PRN    HYDROmorphone (DILAUDID) injection 1 mg  1 mg Intravenous Q4H PRN    losartan (COZAAR) tablet 50 mg  50 mg Oral Daily    magnesium oxide (MAG-OX) tablet 400 mg  400 mg Oral Daily    metoprolol succinate (TOPROL-XL) 24 hr tablet 50 mg  50 mg Oral BID    ondansetron (ZOFRAN) injection 4 mg  4 mg Intravenous Q6H PRN    sevelamer (RENAGEL) tablet 800 mg  800 mg Oral TID With Meals     Medications Discontinued During This Encounter   Medication Reason    Red Yeast Rice CAPS 1,200 mg     enoxaparin (LOVENOX) subcutaneous injection 90 mg     heparin (VTE/PE) high     heparin (porcine) 25,000 units in 0 45% NaCl 250 mL infusion (premix)     heparin (porcine) injection 8,800 Units     heparin (porcine) injection 4,400 Units     hydrALAZINE (APRESOLINE) tablet 100 mg        Emmanuel Ambriz MD      This progress note was produced in part using a dictation device which may document imprecise wording from author's original intent

## 2022-01-08 NOTE — ASSESSMENT & PLAN NOTE
· Status post an MRCP  · MRCP results appreciated  · Status post a GI evaluation - no further inpatient testing, treatment, and or workup is warranted, patient will need continued surveillance of the pancreatic lesions in the outpatient setting within the next 6-12 months  · Patient was counseled extensively on establishing care with GI in the outpatient setting by his primary care physician  · His primary care physician was notified of these findings via epic

## 2022-01-19 PROBLEM — S72.002A CLOSED FRACTURE OF NECK OF LEFT FEMUR (HCC): Status: RESOLVED | Noted: 2022-01-01 | Resolved: 2022-01-01

## 2022-01-19 NOTE — PROGRESS NOTES
Assessment/Plan:     No problem-specific Assessment & Plan notes found for this encounter  Diagnoses and all orders for this visit:    Essential hypertension  Comments: Well controlled    Fall in home, subsequent encounter    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 [facility])    Elevated d-dimer    Pancreatic cyst  Comments: Follow with GI for surveillance  Orders:  -     Ambulatory Referral to Gastroenterology; Future    Primary osteoarthritis of left hip    Primary osteoarthritis of left knee  Comments:  Continue home PT    Other orders  -     lidocaine-prilocaine (EMLA) cream         Subjective:     Patient ID: Tami Avila is a 76 y o  male  Patient presents for transition of care management, he was admitted to the hospital from January 4th to January 8th following a fall at home, once on 12/24 with an extensive trauma evaluation and again January 4th when he had significant L  Hip pain with lower extremity weakness and pain  He was thought initially to have a left hip fracture however no fracture was identified on MRI of the hip he does have osteoarthritis and a ruptured bakers cyst   He was also found to have an enlarged pancreatic cyst that had increased in size from 1 4-1 9 cm  He will require outpatient GI surveillance  He was also noted to have elevated D-dimers, venous Dopplers of the lower extremities were negative for DVT  Nephrology followed the patient Hospital continued his routine hemodialysis for end-stage renal disease  He is currently receiving home PT and VNA  He continues with L  LE swelling  OT evaluation is still pending  Review of Systems   Constitutional: Negative for activity change, appetite change, chills, fatigue and fever  HENT: Negative  Respiratory: Negative for cough, chest tightness, shortness of breath and wheezing  Cardiovascular: Positive for leg swelling  Negative for chest pain and palpitations     Gastrointestinal: Negative for abdominal pain, blood in stool, constipation, diarrhea, nausea and vomiting  Genitourinary: Negative  Musculoskeletal: Positive for arthralgias and gait problem  L  Hip and knee   Skin: Negative  Neurological: Negative for dizziness, syncope, light-headedness and headaches  Objective:     Physical Exam  Vitals and nursing note reviewed  Constitutional:       General: He is not in acute distress  Appearance: Normal appearance  He is not ill-appearing or diaphoretic  HENT:      Head: Normocephalic and atraumatic  Eyes:      Conjunctiva/sclera: Conjunctivae normal    Cardiovascular:      Rate and Rhythm: Normal rate and regular rhythm  Heart sounds: Normal heart sounds  No murmur heard  Pulmonary:      Effort: Pulmonary effort is normal  No respiratory distress  Breath sounds: Normal breath sounds  No wheezing, rhonchi or rales  Abdominal:      General: There is no distension  Palpations: Abdomen is soft  There is no mass  Tenderness: There is no abdominal tenderness  There is no guarding or rebound  Musculoskeletal:         General: Swelling present  Cervical back: Normal range of motion and neck supple  No rigidity  Right lower leg: No edema  Left lower leg: No edema  Comments: Left calf swelling and tightness, R  Calf normal  No LE edema   Lymphadenopathy:      Cervical: No cervical adenopathy  Neurological:      General: No focal deficit present  Mental Status: He is alert and oriented to person, place, and time  Cranial Nerves: No cranial nerve deficit  Motor: No weakness  Gait: Gait abnormal    Psychiatric:         Mood and Affect: Mood normal          Behavior: Behavior normal          Thought Content:  Thought content normal          Judgment: Judgment normal            Vitals:    01/19/22 1047   BP: 118/68   BP Location: Left arm   Patient Position: Sitting   Cuff Size: Large   Pulse: 56   Temp: (!) 97 °F (36 1 °C)   TempSrc: Tympanic   SpO2: 98%   Weight: 115 kg (254 lb)   Height: 5' 8" (1 727 m)       Transitional Care Management Review:  Beata Bowser is a 76 y o  male here for TCM follow up  During the TCM phone call patient stated:    TCM Call (since 12/19/2021)     Date and time call was made  1/10/2022  9:22 9 Rae Waldrop care reviewed  Records reviewed        Patient was hospitialized at  [address]        Date of Admission  01/03/22    Date of discharge  01/08/22    Diagnosis  closed fracture of neck of left femur    Disposition  Home      TCM Call (since 12/19/2021)     Scheduled for follow up?   Yes    Did you obtain your prescribed medications  Yes    Do you need help managing your prescriptions or medications  No    Is transportation to your appointment needed  No    I have advised the patient to call PCP with any new or worsening symptoms  perez Tolbert, DO

## 2022-04-13 NOTE — PROGRESS NOTES
Assessment/Plan:    No problem-specific Assessment & Plan notes found for this encounter  Diagnoses and all orders for this visit:    Gastroesophageal reflux disease, unspecified whether esophagitis present    Essential hypertension  -     Comprehensive metabolic panel; Future    Hemodialysis-associated hypotension    Primary osteoarthritis of both knees  -     Ambulatory Referral to Orthopedic Surgery; Future    Anemia in chronic kidney disease, on chronic dialysis (HCC)  -     Comprehensive metabolic panel; Future    Hyperlipidemia, unspecified hyperlipidemia type  -     Lipid panel; Future    Hyperglycemia  -     HEMOGLOBIN A1C W/ EAG ESTIMATION; Future          PHQ-2/9 Depression Screening    Little interest or pleasure in doing things: 0 - not at all  Feeling down, depressed, or hopeless: 0 - not at all  PHQ-2 Score: 0  PHQ-2 Interpretation: Negative depression screen            Subjective:      Patient ID: Jesús Redman is a 76 y o  male  Presents for 6m follow up, he feels well, no complaints  BP high in the office today however patient reports his BP goes down after HD treatments  His BP is managed by nephrology  I reviewed his labs with him,  H/H 7 5/22 5  - ACD secondary to CRF Managed by Nephrology  Labs are from January Hx of carotid stenosis, had doppler done June 2021  No records available regarding recent labs or doppler  He has everything done [facility]  The following portions of the patient's history were reviewed and updated as appropriate: allergies, current medications, past family history, past medical history, past social history, past surgical history and problem list     Review of Systems   Constitutional: Positive for fatigue  Negative for appetite change, chills and fever  HENT: Negative  Respiratory: Negative for cough, chest tightness, shortness of breath and wheezing  Cardiovascular: Negative for chest pain, palpitations and leg swelling  Gastrointestinal: Negative for abdominal pain, constipation, diarrhea, nausea and vomiting  Genitourinary: Negative for difficulty urinating, dysuria, frequency, hematuria and urgency  Neurological: Negative for dizziness, syncope, light-headedness and headaches  Objective:    BP (!) 178/70 (BP Location: Left arm, Patient Position: Sitting, Cuff Size: Large)   Pulse (!) 51   Temp (!) 96 8 °F (36 °C) (Tympanic)   Ht 5' 8" (1 727 m)   Wt 112 kg (246 lb 2 oz)   SpO2 95%   BMI 37 42 kg/m²      Physical Exam  Vitals and nursing note reviewed  Constitutional:       General: He is not in acute distress  Appearance: Normal appearance  He is well-developed  He is not ill-appearing, toxic-appearing or diaphoretic  HENT:      Head: Normocephalic and atraumatic  Right Ear: Tympanic membrane, ear canal and external ear normal  There is no impacted cerumen  Left Ear: Tympanic membrane, ear canal and external ear normal  There is no impacted cerumen  Nose: Nose normal  No congestion or rhinorrhea  Mouth/Throat:      Mouth: Mucous membranes are moist       Pharynx: Oropharynx is clear  No oropharyngeal exudate or posterior oropharyngeal erythema  Eyes:      General: No scleral icterus  Right eye: No discharge  Left eye: No discharge  Extraocular Movements: Extraocular movements intact  Conjunctiva/sclera: Conjunctivae normal       Pupils: Pupils are equal, round, and reactive to light  Cardiovascular:      Rate and Rhythm: Normal rate and regular rhythm  Pulses: Normal pulses  Heart sounds: Normal heart sounds  No murmur heard  No friction rub  No gallop  Pulmonary:      Effort: Pulmonary effort is normal  No respiratory distress  Breath sounds: Normal breath sounds  No wheezing, rhonchi or rales  Abdominal:      General: Bowel sounds are normal  There is no distension  Palpations: Abdomen is soft  There is no mass  Tenderness: There is no abdominal tenderness  There is no guarding or rebound  Musculoskeletal:         General: No swelling or deformity  Normal range of motion  Cervical back: Normal range of motion and neck supple  No rigidity  Right lower leg: No edema  Left lower leg: No edema  Lymphadenopathy:      Cervical: No cervical adenopathy  Skin:     General: Skin is warm and dry  Findings: No rash  Neurological:      General: No focal deficit present  Mental Status: He is alert and oriented to person, place, and time  Sensory: No sensory deficit  Motor: No weakness  Coordination: Coordination normal       Gait: Gait normal       Deep Tendon Reflexes: Reflexes are normal and symmetric  Reflexes normal    Psychiatric:         Mood and Affect: Mood normal          Behavior: Behavior normal          Thought Content:  Thought content normal          Judgment: Judgment normal

## 2022-04-22 NOTE — PROGRESS NOTES
Assessment/Plan:   Diagnoses and all orders for this visit:    Primary osteoarthritis of left knee  -     Ambulatory referral to [facility]; Future  -     Ambulatory referral to Physical Therapy; Future  -     Case request operating room: ARTHROPLASTY KNEE TOTAL; Standing  -     folic acid (FOLVITE) 1 mg tablet; Take 1 tablet (1 mg total) by mouth daily  -     ferrous sulfate 324 (65 Fe) mg; Take 1 tablet (324 mg total) by mouth 2 (two) times a day before meals  -     ascorbic acid (VITAMIN C) 500 MG tablet; Take 1 tablet (500 mg total) by mouth 2 (two) times a day  -     Case request operating room: ARTHROPLASTY KNEE TOTAL  -     Large joint arthrocentesis    Pain in both knees, unspecified chronicity  -     XR knee 3 vw right non injury; Future  -     XR knee 3 vw left non injury; Future    Primary osteoarthritis of right knee  -     Ambulatory Referral to Orthopedic Surgery  -     Large joint arthrocentesis    Pre-operative laboratory examination  -     Comprehensive metabolic panel; Future  -     Hemoglobin A1C W/EAG Estimation; Future  -     CBC and differential; Future  -     Anemia Panel w/Reflex; Future  -     Protime-INR; Future  -     APTT; Future  -     Type and screen; Future  -     EKG 12 lead; Future    Special screening examination for viral disease  -     PAT Covid Screening; Future       Reviewed today's physical exam findings and x-ray findings with patient at time of visit  His x-rays demonstrate advanced primary osteoarthritis of the bilateral knees, more so on the left than the right  Discussed continued conservative management via corticosteroid injection or Visco injections  He states that he is in favor of moving forward with surgical intervention via total knee arthroplasty  Procedure, prognosis, benefits, and risks were discussed with patient in great detail  He expresses understanding and detailed consent was obtained    Is tentatively scheduled for left total knee arthroplasty to be performed 6/29/2022  He was informed that he will need medical clearance prior to moving forward with surgery  Once he is satisfactorily recuperated from the left knee surgery, we will consider proceeding with total knee arthroplasty for the right lower extremity  He will be see 10-14 days postoperatively for follow-up  Patient was understanding is in agreement with treatment plan  Under aseptic technique, the right knee was injected with Kenalog and Marcaine and the left knee aspirated of 90 cc of synovial fluid  X-rays show advanced arthritic changes along both knees  Treatment options were discussed  He has opted for elective left total knee replacement  All risks, complications, benefits were discussed with the patient and his wife in great detail including bleeding, infection, blood clots, pain, stiffness, neurovascular damage, fractures, dislocations, the possibility of loss of life limb for surgery, heart attack, stroke, etc   His surgery scheduled for June 29, 2022    Subjective:   Patient ID: Latrice Fuentes  1945     HPI  Patient is a 76 y o  male who presents for initial evaluation of bilateral osteoarthritis of the knees  Patient was previously treated under [facility], treatment strategies included viscosupplementation injections, corticosteroid injections, physical therapy, all with declining impact  On today's presentation, he states that he has fallen 3 times since [place] as result of knee instability  He states that his left knee gives out, and his right knee does not have the strength or the stability to maintain right posture when this happens  He reports significant swelling of the left knee, mild swelling of the right knee  He reports pain with weight-bearing and significant crepitus  He denies any numbness or tingling        The following portions of the patient's history were reviewed and updated as appropriate:  Past medical history, past surgical history, Family history, social history, current medications and allergies    Past Medical History:   Diagnosis Date    Abdominal aortic aneurysm (AAA) ([address] )     s/p repair    Anemia     due to kidney disease    Arthritis     CHF (congestive heart failure) ([facility])     stable at present    Chronic kidney disease     on hemodialysis Tu Th Sat    Chronic pain disorder     knees    Edema     GERD (gastroesophageal reflux disease)     no meds     Gout     History of echocardiogram 2018    EF 55%, mild LVH, technically difficult study      Hyperlipidemia     borderline no meds    Hyperparathyroidism ([address] )     Hypertension     Kidney stone     Seasonal allergies     Shortness of breath     mild exertional    Skin cancer     Vitamin D deficiency        Past Surgical History:   Procedure Laterality Date    ABDOMINAL AORTIC ANEURYSM REPAIR      CARDIAC CATHETERIZATION  2020    no blockages    CARPAL TUNNEL RELEASE Right     COLONOSCOPY      DIALYSIS FISTULA CREATION Right 2017    GLAUCOMA SURGERY      LEG SURGERY      removal splinter    PARATHYROIDECTOMY      SPLIT THICKNESS SKIN GRAFT N/A 2019    Procedure: THIGH STSG;  Surgeon: Jignesh Love MD;  Location: [address] [place] [address];  Service: Plastics    SQUAMOUS CELL CARCINOMA EXCISION Right 2019    Procedure: REMOVE SKIN CA FROM EAR & CHEEK;  Surgeon: Jignesh Love MD;  Location: [address] [place] [address];  Service: Plastics       Family History   Problem Relation Age of Onset    Kidney disease Mother     Leukemia Father        Social History     Socioeconomic History    Marital status: /Civil Union     Spouse name: None    Number of children: None    Years of education: None    Highest education level: None   Occupational History    None   Tobacco Use    Smoking status: Former Smoker     Types: Cigarettes     Quit date: 2004     Years since quittin 9    Smokeless tobacco: Never Used   Vaping Use    Vaping Use: Never used   Substance and Sexual Activity    Alcohol use: Not Currently    Drug use: Never    Sexual activity: None   Other Topics Concern    None   Social History Narrative    None     Social Determinants of Health     Financial Resource Strain: Not on file   Food Insecurity: No Food Insecurity    Worried About Running Out of Food in the Last Year: Never true    Leonidas of Food in the Last Year: Never true   Transportation Needs: No Transportation Needs    Lack of Transportation (Medical): No    Lack of Transportation (Non-Medical): No   Physical Activity: Not on file   Stress: Not on file   Social Connections: Not on file   Intimate Partner Violence: Not on file   Housing Stability: Low Risk     Unable to Pay for Housing in the Last Year: No    Number of Places Lived in the Last Year: 1    Unstable Housing in the Last Year: No         Current Outpatient Medications:     Acetaminophen 325 MG CAPS, Take 650 mg by mouth, Disp: , Rfl:     calcium acetate (PHOSLO) capsule, 667 mg Take 2 capsules by oral route before each meal , Disp: , Rfl: 11    candesartan (ATACAND) 8 MG tablet, Take 8 mg by mouth daily at bedtime , Disp: , Rfl:     Coenzyme Q-10 200 MG CAPS, Take 200 mg by mouth daily, Disp: , Rfl:     hydrALAZINE (APRESOLINE) 100 MG tablet, Take 1 tablet (100 mg total) by mouth 2 (two) times a day Take 1 tablet twice a day; 1/2 tablet in the morning and 1 in the evening on dialysis days  , Disp: 180 tablet, Rfl: 1    lidocaine-prilocaine (EMLA) cream, , Disp: , Rfl:     magnesium oxide (MAG-OX) 400 mg tablet, Take 400 mg by mouth daily , Disp: , Rfl:     Methoxy PEG-Epoetin Beta (MIRCERA IJ), 75 mcg Once every four weeks, Disp: , Rfl:     metoprolol succinate (TOPROL-XL) 50 mg 24 hr tablet, Take 50 mg by mouth 2 (two) times a day, Disp: , Rfl:     Red Yeast Rice Extract (RED YEAST RICE PO), Take 1,200 mg by mouth daily, Disp: , Rfl:     sevelamer carbonate (RENVELA) 800 mg tablet, TAKE 2 TABLET BY MOUTH THREE TIMES A DAY WITH MEALS 1 WITH SNACKS, Disp: , Rfl:     ULORIC 80 MG TABS, Take 80 mg by mouth daily with dinner, Disp: , Rfl:     [START ON 5/29/2022] ascorbic acid (VITAMIN C) 500 MG tablet, Take 1 tablet (500 mg total) by mouth 2 (two) times a day, Disp: 60 tablet, Rfl: 1    [START ON 5/29/2022] ferrous sulfate 324 (65 Fe) mg, Take 1 tablet (324 mg total) by mouth 2 (two) times a day before meals, Disp: 60 tablet, Rfl: 1    [START ON 5/27/8194] folic acid (FOLVITE) 1 mg tablet, Take 1 tablet (1 mg total) by mouth daily, Disp: 30 tablet, Rfl: 1    Allergies   Allergen Reactions    Iodine - Food Allergy      Other reaction(s): Respiratory Distress  "swelling of throat"    Levofloxacin      Other reaction(s): Respiratory Distress  "swelling of throat"    Lovastatin      Muscle weakness    Medical Tape Other (See Comments)     Skin breaks down    Shellfish-Derived Products - Food Allergy        Review of Systems   Constitutional: Negative for chills, fever and unexpected weight change  HENT: Negative for hearing loss, nosebleeds and sore throat  Eyes: Negative for pain, redness and visual disturbance  Respiratory: Negative for cough, shortness of breath and wheezing  Cardiovascular: Negative for chest pain, palpitations and leg swelling  Gastrointestinal: Negative for abdominal pain, nausea and vomiting  Endocrine: Negative for polydipsia and polyuria  Genitourinary: Negative for dysuria and hematuria  Musculoskeletal:        As noted in HPI   Skin: Negative for rash and wound  Neurological: Negative for dizziness, numbness and headaches  Psychiatric/Behavioral: Negative for decreased concentration and suicidal ideas  The patient is not nervous/anxious           Objective:  BP (!) 197/74 Comment: just took BP med  Pulse 58   Ht 5' 8" (1 727 m)   Wt 112 kg (246 lb)   BMI 37 40 kg/m²     Ortho Exam  Left knee -  Patient ambulates with antalgic gait pattern  Uses Rollator walker as assistive device  Genu valgus anatomical deformity  Skin is warm and dry to touch with no signs of erythema, ecchymosis, infection  Mild soft tissue swelling, large effusion noted  ROM 5°-90°, improved to 115° of flexion following aspiration  TTP over medial joint line, TTP over lateral joint line, palpable Baker cyst posteriorly  Flexor and extensor mechanisms intact  Knee is stable to varus and valgus stress  - Lachman's  - Anterior Drawer, - Posterior Drawer  Painful medial Rommel's, painful lateral Rommel's  Painful Pivot Shift  Patella tracks centrally with significant palpable crepitus  Calf compartments are soft and supple  2+ TP and DP pulses with brisk capillary refill to the toes  Sural, saphenous, tibial, superficial and deep peroneal motor and sensory distributions intact  Sensation light touch intact distally    Right knee -   Genu valgus anatomical deformity  Skin is warm and dry to touch with no signs of erythema, ecchymosis, infection  Mild soft tissue swelling, mild effusion noted  ROM 5°-115 degree  TTP over medial joint line, TTP over lateral joint line  Flexor and extensor mechanisms intact  Knee is stable to varus and valgus stress  - Lachman's  - Anterior Drawer, - Posterior Drawer  Painful medial Rommel's, painful lateral Rommel's  Painful Pivot Shift  Patella tracks centrally with palpable crepitus  Calf compartments are soft and supple  2+ TP and DP pulses with brisk capillary refill to the toes  Sural, saphenous, tibial, superficial and deep peroneal motor and sensory distributions intact  Sensation light touch intact distally    Physical Exam  Vitals reviewed  Constitutional:       Appearance: He is well-developed  HENT:      Head: Normocephalic and atraumatic  Nose: Nose normal    Eyes:      Conjunctiva/sclera: Conjunctivae normal    Cardiovascular:      Rate and Rhythm: Normal rate     Pulmonary:      Effort: Pulmonary effort is normal    Musculoskeletal:      Cervical back: Neck supple  Skin:     General: Skin is warm and dry  Capillary Refill: Capillary refill takes less than 2 seconds  Neurological:      Mental Status: He is alert and oriented to person, place, and time  Psychiatric:         Mood and Affect: Mood normal          Behavior: Behavior normal           Diagnostic Test Review:  Attending Physician has personally reviewed pertinent imaging in PACS, impression is as follows:    Review of radiographic series taken 4/22/2022 of the left knee shows advanced tricompartmental osteoarthritis with evidence of chondrocalcinosis affecting the medial compartment, complete joint space loss in the lateral compartment, and moderate osteophyte formation    Review of radiographic series taken 4/22/2022 of the right knee shows advanced tricompartmental osteoarthritis with complete joint space loss of the lateral compartment and small osteophyte formation diffusely    Large joint arthrocentesis: L knee  Universal Protocol:  Consent: Verbal consent obtained  Risks and benefits: risks, benefits and alternatives were discussed  Consent given by: patient  Time out: Immediately prior to procedure a "time out" was called to verify the correct patient, procedure, equipment, support staff and site/side marked as required    Timeout called at: 4/22/2022 8:39 AM   Patient understanding: patient states understanding of the procedure being performed  Site marked: the operative site was marked  Patient identity confirmed: verbally with patient    Supporting Documentation  Indications: pain and joint swelling   Procedure Details  Location: knee - L knee  Preparation: Patient was prepped and draped in the usual sterile fashion  Needle size: 22 G  Ultrasound guidance: no  Approach: anterolateral    Aspirate amount: 90 mL  Aspirate: blood-tinged    Patient tolerance: patient tolerated the procedure well with no immediate complications  Dressing:  Sterile dressing applied    Large joint arthrocentesis: R knee  Universal Protocol:  Consent: Verbal consent obtained  Risks and benefits: risks, benefits and alternatives were discussed  Consent given by: patient  Time out: Immediately prior to procedure a "time out" was called to verify the correct patient, procedure, equipment, support staff and site/side marked as required    Timeout called at: 4/22/2022 8:39 AM   Patient understanding: patient states understanding of the procedure being performed  Site marked: the operative site was marked  Patient identity confirmed: verbally with patient    Supporting Documentation  Indications: pain and joint swelling   Procedure Details  Location: knee - R knee  Preparation: Patient was prepped and draped in the usual sterile fashion  Needle size: 22 G  Ultrasound guidance: no  Approach: anterolateral  Medications administered: 4 mL bupivacaine 0 25 %; 80 mg triamcinolone acetonide 40 mg/mL    Patient tolerance: patient tolerated the procedure well with no immediate complications  Dressing:  Sterile dressing applied          Scribe Attestation    I,:  Amy Ruiz am acting as a scribe while in the presence of the attending physician :       I,:  Alexandria Billings DO personally performed the services described in this documentation    as scribed in my presence :

## 2022-05-25 NOTE — TELEPHONE ENCOUNTER
Preoperative Elective Admission Assessment    Living Situation:    Who does pt live with: spouse  And daughter  What kind of home: [facility]  How do they enter the home: front  How many levels in home: 2   # of steps to enter home: 3  # of steps to second floor: n/a, one floor living for patient  Are there handrails: Yes  Are there landings: No  Sleeping arrangement: first/entry floor  Where is Bathroom: entry level  Where is the tub or shower: Walk in shower with grab bars and shower seat  Dogs or ther pets: n/a     First Floor Setup:   Is there a bathroom: Yes  Where would pt sleep: bed     DME: rolling walker, frame walker and cane     Patient's Current Level of Function: Ambulates with walker, Ambulates with cane and ADLs: Independent    Post-op Caregiver: child  Caregiver Name and phone number for Inpatient discharge needs: Ana Huston [phone]  Currently receive any HHC/aides/community supports: No     Post-op Transport: child  To/from hospital: child  To/from PT 2-3x/week: child  Uses community transport now: No     Outpatient Physical Therapy Site:  Site: [facility]  pre and post-op appts scheduled? Yes     Medication Management: self and pillbox  Preferred Pharmacy for Post-op Medications: [facility] in [place]  Blood Management Vitamin Regimen: Surgeon advised patient not to take pre-op vitamins since he is on dialysis  Post-op anticoagulant: to be determined by surgical team postoperatively     DC Plan: Pt plans to be discharged home      Barriers to DC identified preoperatively: none identified    BMI: 37 40    [employer]: declined enrollment    Patient Education:  Pt educated on post-op pain, early mobilization (POD0), Average inpt LOS, OP PT goal   Patient educated that our goal is to appropriately discharge patient based off their post-op function while striving to maintain maximal independence   The goal is to discharge patient to home and for them to attend outpatient physical therapy      Assigned to care team? Yes

## 2022-06-13 NOTE — PATIENT INSTRUCTIONS
Call Family doctor today and inform them of positive COVID test   Continue to monitor symptoms  Go to ER if new or worsening symptoms occur  Patient Instructions   COVID testing initiated  Results may take up to 2-3 days to return, but often come back sooner (1-2 days)     If the patient has a [facility] My Chart account, results may be accessed on line  If the patient does not have the [facility] Chart account, please establish one so results can be accessed  This will be the easiest and quickest way to get a copy of your test results if you require printed documentation  If patient is symptomatic and until results are obtained, home quarantine / self isolation strongly encouraged  If testing is done for screening purposes and patient is not symptomatic, we still recommend masking, social distancing, good hygiene practices be followed  If COVID test is positive, patient / care giver will be contacted by ordering provider or designated staff  If COVID test is positive, please call the primary care provider office to inform of positive test and request follow up evaluation appointment  (Generally, primary care providers are doing telemedicine visits with their positive COVID patients )  If COVID test is positive, please again review all information below  Further questions may be addressed by the primary care provider or the [address] [place], [place] [address]d at [phone]  If the patient / caregiver has not heard about test results or has been unable to access results on the patient My Chart account in a timely fashion, please call the provider's office where test was ordered (or Hot Line if applicable)  to inquire about results  If results are negative and patient / care giver has been found to have already accessed results through the [facility], no call will be made  Until results are obtained, home quarantine / self isolation strongly encouraged       If the patient would develop profound weakness, chest pain, shortness of breath please proceed to an emergency room for further evaluation otherwise we do recommend that patient follow-up with their primary care provider in the next 5-7 days if not improving  Symptomatic treatment as needed for symptoms relief based on age / medical status of patient  Things like warm salt water gargles, Tylenol or Ibuprofen (if not contraindicated), drinking plenty of fluids, nasal saline rinses / spray, warm tea with honey (not for patients less than 1 year of age),  etc may provide symptoms relief  [address]     Your healthcare provider and/or public health staff have evaluated you and have determined that you do not need to remain in the hospital at this time  At this time you can be isolated at home where you will be monitored by staff from your local or state health department  You should carefully follow the prevention and isolation steps below until a healthcare provider or local or state health department says that you can return to your normal activities  Stay home except to get medical care     People who are mildly ill with COVID-19 are able to isolate at home during their illness  You should restrict activities outside your home, except for getting medical care  Do not go to work, school, or public areas  Avoid using public transportation, ride-sharing, or taxis  Separate yourself from other people and animals in your home     People: As much as possible, you should stay in a specific room and away from other people in your home  Also, you should use a separate bathroom, if available  Animals: You should restrict contact with pets and other animals while you are sick with COVID-19, just like you would around other people   Although there have not been reports of pets or other animals becoming sick with COVID-19, it is still recommended that people sick with COVID-19 limit contact with animals until more information is known about the virus  When possible, have another member of your household care for your animals while you are sick  If you are sick with COVID-19, avoid contact with your pet, including petting, snuggling, being kissed or licked, and sharing food  If you must care for your pet or be around animals while you are sick, wash your hands before and after you interact with pets and wear a facemask  See COVID-19 and Animals for more information  Call ahead before visiting your doctor     If you have a medical appointment, call the healthcare provider and tell them that you have or may have COVID-19  This will help the healthcare providers office take steps to keep other people from getting infected or exposed  Wear a facemask     You should wear a facemask when you are around other people (e g , sharing a room or vehicle) or pets and before you enter a healthcare providers office  If you are not able to wear a facemask (for example, because it causes trouble breathing), then people who live with you should not stay in the same room with you, or they should wear a facemask if they enter your room  Cover your coughs and sneezes     Cover your mouth and nose with a tissue when you cough or sneeze  Throw used tissues in a lined trash can  Immediately wash your hands with soap and water for at least 20 seconds or, if soap and water are not available, clean your hands with an alcohol-based hand  that contains at least 60% alcohol  Clean your hands often     Wash your hands often with soap and water for at least 20 seconds, especially after blowing your nose, coughing, or sneezing; going to the bathroom; and before eating or preparing food  If soap and water are not readily available, use an alcohol-based hand  with at least 60% alcohol, covering all surfaces of your hands and rubbing them together until they feel dry    Soap and water are the best option if hands are visibly dirty  Avoid touching your eyes, nose, and mouth with unwashed hands  Avoid sharing personal household items     You should not share dishes, drinking glasses, cups, eating utensils, towels, or bedding with other people or pets in your home  After using these items, they should be washed thoroughly with soap and water  Clean all high-touch surfaces everyday     High touch surfaces include counters, tabletops, doorknobs, bathroom fixtures, toilets, phones, keyboards, tablets, and bedside tables  Also, clean any surfaces that may have blood, stool, or body fluids on them  Use a household cleaning spray or wipe, according to the label instructions  Labels contain instructions for safe and effective use of the cleaning product including precautions you should take when applying the product, such as wearing gloves and making sure you have good ventilation during use of the product  Monitor your symptoms     Seek prompt medical attention if your illness is worsening (e g , difficulty breathing)  Before seeking care, call your healthcare provider and tell them that you have, or are being evaluated for, COVID-19  Put on a facemask before you enter the facility  These steps will help the healthcare providers office to keep other people in the office or waiting room from getting infected or exposed  Ask your healthcare provider to call the local or state health department  Persons who are placed under active monitoring or facilitated self-monitoring should follow instructions provided by their local health department or occupational health professionals, as appropriate  If you have a medical emergency and need to call 911, notify the dispatch personnel that you have, or are being evaluated for COVID-19  If possible, put on a facemask before emergency medical services arrive       Discontinuing home isolation     Patients with confirmed COVID-19 should remain under home isolation precautions until the following conditions are met: They have had no fever for at least 24 hours (that is one full day of no fever without the use medicine that reduces fevers)  AND  other symptoms have improved (for example, when their cough or shortness of breath have improved)  AND  at least 10 days have passed since their symptoms first appeared     Patients with confirmed COVID-19 should also notify close contacts (including their workplace) and ask that they self-quarantine  Currently, close contact is defined as being within 6 feet for for a cumulative total of 15 minutes or more over a 24 hour period starting from 2 days before illness onset  (or, for asymptomatic patients, 2 days prior to test specimen collection)  Close contacts of patients diagnosed with COVID-19 should be instructed by the patient to self-quarantine for 14 days from the last time of their last contact with the patient        Source: [employer]

## 2022-06-13 NOTE — PROGRESS NOTES
[facility]        NAME: Bennett Barragan is a 76 y o  male  : 1945    MRN: [id number]  DATE: 2022  TIME: 12:35 PM    Assessment and Plan   Acute cough [R05 1]  1  Acute cough  Poct Covid 19 Rapid Antigen Test     Pt appears clinically stable  No acute distress  No new or worsening SOB  Paxlovid contraindicated due to ESRD eGFR=9 pt on dialysis  Educated pt to call PCP today  Educated pt on indications to go to ED  Pt states he understand and agrees to plan  Patient Instructions     Call Family doctor today and inform them of positive COVID test   Continue to monitor symptoms  Go to ER if new or worsening symptoms occur  Chief Complaint     Chief Complaint   Patient presents with    Cough     Cough and wheezing  History of Present Illness       Cough  This is a new problem  Episode onset: 4 days ago  The problem has been gradually worsening  The problem occurs every few minutes  The cough is non-productive  Associated symptoms include myalgias, nasal congestion, postnasal drip and a sore throat  Pertinent negatives include no chest pain, chills, ear pain, fever, headaches, rash, rhinorrhea or wheezing  He has tried nothing for the symptoms  The treatment provided no relief  PMH Emphysema, CHF, ESRD, Dialysis, Obesity  Pt has chronic mild SOB  No worsening since sx began  Review of Systems   Review of Systems   Constitutional: Positive for fatigue  Negative for chills, diaphoresis and fever  HENT: Positive for postnasal drip, sinus pressure, sinus pain and sore throat  Negative for congestion, ear pain, rhinorrhea, sneezing and trouble swallowing  Eyes: Negative  Respiratory: Positive for cough  Negative for chest tightness and wheezing  Cardiovascular: Negative  Negative for chest pain, palpitations and leg swelling  Gastrointestinal: Negative for abdominal distention, abdominal pain, diarrhea, nausea and vomiting  Endocrine: Negative  Genitourinary: Negative for dysuria  Musculoskeletal: Positive for myalgias  Negative for back pain, neck pain and neck stiffness  Skin: Negative for pallor and rash  Allergic/Immunologic: Negative  Neurological: Negative  Negative for light-headedness and headaches  Hematological: Negative  Psychiatric/Behavioral: Negative  Current Medications       Current Outpatient Medications:     Acetaminophen 325 MG CAPS, Take 650 mg by mouth, Disp: , Rfl:     ascorbic acid (VITAMIN C) 500 MG tablet, Take 1 tablet (500 mg total) by mouth 2 (two) times a day, Disp: 60 tablet, Rfl: 1    calcium acetate (PHOSLO) capsule, 667 mg Take 2 capsules by oral route before each meal , Disp: , Rfl: 11    candesartan (ATACAND) 8 MG tablet, Take 8 mg by mouth daily at bedtime , Disp: , Rfl:     Coenzyme Q-10 200 MG CAPS, Take 200 mg by mouth daily, Disp: , Rfl:     ferrous sulfate 324 (65 Fe) mg, Take 1 tablet (324 mg total) by mouth 2 (two) times a day before meals, Disp: 60 tablet, Rfl: 1    folic acid (FOLVITE) 1 mg tablet, Take 1 tablet (1 mg total) by mouth daily, Disp: 30 tablet, Rfl: 1    hydrALAZINE (APRESOLINE) 100 MG tablet, Take 1 tablet (100 mg total) by mouth 2 (two) times a day Take 1 tablet twice a day; 1/2 tablet in the morning and 1 in the evening on dialysis days  , Disp: 180 tablet, Rfl: 1    lidocaine-prilocaine (EMLA) cream, , Disp: , Rfl:     magnesium oxide (MAG-OX) 400 mg tablet, Take 400 mg by mouth daily , Disp: , Rfl:     Methoxy PEG-Epoetin Beta (MIRCERA IJ), 75 mcg Once every four weeks, Disp: , Rfl:     metoprolol succinate (TOPROL-XL) 50 mg 24 hr tablet, Take 50 mg by mouth 2 (two) times a day, Disp: , Rfl:     ondansetron (ZOFRAN) 4 mg tablet, Take 1 tablet (4 mg total) by mouth every 8 (eight) hours as needed for nausea or vomiting, Disp: 10 tablet, Rfl: 0    Red Yeast Rice Extract (RED YEAST RICE PO), Take 1,200 mg by mouth daily, Disp: , Rfl:     sevelamer carbonate (RENVELA) 800 mg tablet, TAKE 2 TABLET BY MOUTH THREE TIMES A DAY WITH MEALS 1 WITH SNACKS, Disp: , Rfl:     ULORIC 80 MG TABS, Take 80 mg by mouth daily with dinner, Disp: , Rfl:     Current Allergies     Allergies as of 06/13/2022 - Reviewed 06/13/2022   Allergen Reaction Noted    Iodine - food allergy  04/25/2019    Levofloxacin  05/12/2015    Lovastatin  06/01/2018    Medical tape Other (See Comments) 05/12/2015    Shellfish-derived products - food allergy  04/25/2019            The following portions of the patient's history were reviewed and updated as appropriate: allergies, current medications, past family history, past medical history, past social history, past surgical history and problem list      Past Medical History:   Diagnosis Date    Abdominal aortic aneurysm (AAA) ([facility] )     s/p repair    Anemia     due to kidney disease    Arthritis     CHF (congestive heart failure) ([facility] )     stable at present    Chronic kidney disease     on hemodialysis Tu Th Sat    Chronic pain disorder     knees    Edema     GERD (gastroesophageal reflux disease)     no meds     Gout     History of echocardiogram 03/02/2018    EF 55%, mild LVH, technically difficult study      Hyperlipidemia     borderline no meds    Hyperparathyroidism ([facility] [facility] )     Hypertension     Kidney stone     Seasonal allergies     Shortness of breath     mild exertional    Skin cancer     Vitamin D deficiency        Past Surgical History:   Procedure Laterality Date    ABDOMINAL AORTIC ANEURYSM REPAIR  2004    CARDIAC CATHETERIZATION  05/01/2020    no blockages    CARPAL TUNNEL RELEASE Right     COLONOSCOPY      DIALYSIS FISTULA CREATION Right 2017    GLAUCOMA SURGERY      LEG SURGERY      removal splinter    PARATHYROIDECTOMY      SPLIT THICKNESS SKIN GRAFT N/A 12/4/2019    Procedure: THIGH STSG;  Surgeon: Jignesh Love MD;  Location: [address] [place] [address];  Service: Plastics    SQUAMOUS CELL CARCINOMA EXCISION Right 12/4/2019 Procedure: REMOVE SKIN CA FROM EAR & CHEEK;  Surgeon: Jignesh Love MD;  Location: [address] [place] [address];  Service: Plastics       Family History   Problem Relation Age of Onset    Kidney disease Mother     Leukemia Father          Medications have been verified  Objective   Pulse 66   Temp 97 5 °F (36 4 °C)   Resp 20   SpO2 94%        Physical Exam     Physical Exam  Vitals and nursing note reviewed  Constitutional:       General: He is not in acute distress  Appearance: Normal appearance  He is well-developed  He is not ill-appearing or diaphoretic  HENT:      Head: Normocephalic and atraumatic  Right Ear: Tympanic membrane, ear canal and external ear normal       Left Ear: Tympanic membrane, ear canal and external ear normal       Nose: Congestion present  No rhinorrhea  Mouth/Throat:      Pharynx: No oropharyngeal exudate or posterior oropharyngeal erythema  Eyes:      General: No scleral icterus  Right eye: No discharge  Left eye: No discharge  Extraocular Movements: Extraocular movements intact  Conjunctiva/sclera: Conjunctivae normal       Pupils: Pupils are equal, round, and reactive to light  Cardiovascular:      Rate and Rhythm: Normal rate and regular rhythm  Heart sounds: Normal heart sounds  Pulmonary:      Effort: Pulmonary effort is normal  No respiratory distress  Breath sounds: Normal breath sounds  No wheezing, rhonchi or rales  Musculoskeletal:      Cervical back: Normal range of motion and neck supple  Right lower leg: No edema  Left lower leg: No edema  Lymphadenopathy:      Cervical: No cervical adenopathy  Skin:     General: Skin is warm  Capillary Refill: Capillary refill takes less than 2 seconds  Coloration: Skin is not pale  Findings: No rash  Neurological:      Mental Status: He is alert

## 2022-06-13 NOTE — TELEPHONE ENCOUNTER
Pt daughter would to know if you can send the pt some medication for his COVID symptoms  Pt is on dialysis and is limited to what he can take  Can you please send him something over  Thank you

## 2022-06-13 NOTE — TELEPHONE ENCOUNTER
PT called wanted to know if he can get zofran prescribed to him  Has been feeling nauseas  Unable to come in due to suspected Covid

## 2022-06-14 PROBLEM — U07.1 COVID-19: Status: ACTIVE | Noted: 2022-01-01

## 2022-06-14 PROBLEM — J96.01 ACUTE RESPIRATORY FAILURE WITH HYPOXIA (HCC): Status: ACTIVE | Noted: 2022-01-01

## 2022-06-14 NOTE — ASSESSMENT & PLAN NOTE
· COVID-19 positive on 06/13/2022  · Vaccinated x2 against COVID-19  · Endorses cough, shortness of breath  · Complicated by acute hypoxic respiratory failure  · Remdesivir day 1  · Decadron day 1  · Heparin infusion in the setting of elevated D-dimer and high risk for venous thromboembolism  · Respiratory protocol  · Up and out of bed as tolerated  · Incentive spirometry  · Ambulate patient

## 2022-06-14 NOTE — ASSESSMENT & PLAN NOTE
· Present on admission likely secondary to COVID -19 viral pneumonia  · Requiring 3 L of nasal cannula supplemental O2 on presentation  · Does not use oxygen in the outpatient setting  · Wean O2 as tolerated  · Goal SpO2 > 90%  · Respiratory protocol

## 2022-06-14 NOTE — ASSESSMENT & PLAN NOTE
· Present on admission as evidenced by BMI greater than 35  · Endorse lifestyle modifications and weight loss

## 2022-06-14 NOTE — ASSESSMENT & PLAN NOTE
· Hypertensive thus far during hospital course  · Continue home hydralazine, losartan, metoprolol  · Labetalol as needed for SBP > 180 mmHg  · Monitor blood pressures

## 2022-06-14 NOTE — RESPIRATORY THERAPY NOTE
RT Protocol Note  Yossi Walters 76 y o  male MRN: [id number]  Unit/Bed#: ED 23 Encounter: [id number]    Assessment    Principal Problem:    COVID-19  Active Problems:    Essential hypertension    Class 2 severe obesity due to excess calories with serious comorbidity and body mass index (BMI) of 39 0 to 39 9 in adult [facility])    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 ([facility])    Anemia in chronic kidney disease    Acute respiratory failure with hypoxia ([facility])      Home Pulmonary Medications:  none       Past Medical History:   Diagnosis Date    Abdominal aortic aneurysm (AAA) ([facility] [address] )     s/p repair    Anemia     due to kidney disease    Arthritis     CHF (congestive heart failure) ([facility])     stable at present    Chronic kidney disease     on hemodialysis  Sat    Chronic pain disorder     knees    Edema     GERD (gastroesophageal reflux disease)     no meds     Gout     History of echocardiogram 2018    EF 55%, mild LVH, technically difficult study      Hyperlipidemia     borderline no meds    Hyperparathyroidism ([facility] )     Hypertension     Kidney stone     Seasonal allergies     Shortness of breath     mild exertional    Skin cancer     Vitamin D deficiency      Social History     Socioeconomic History    Marital status: /Civil Union     Spouse name: None    Number of children: None    Years of education: None    Highest education level: None   Occupational History    None   Tobacco Use    Smoking status: Former Smoker     Types: Cigarettes     Quit date: 2004     Years since quittin 0    Smokeless tobacco: Never Used   Vaping Use    Vaping Use: Never used   Substance and Sexual Activity    Alcohol use: Not Currently    Drug use: Never    Sexual activity: None   Other Topics Concern    None   Social History Narrative    None     Social Determinants of Health     Financial Resource Strain: Not on file   Food Insecurity: No Food Insecurity    Worried About Running Out of Food in the Last Year: Never true    [address] [facility] [address] in the Last Year: Never true   Transportation Needs: No Transportation Needs    Lack of Transportation (Medical): No    Lack of Transportation (Non-Medical): No   Physical Activity: Not on file   Stress: Not on file   Social Connections: Not on file   Intimate Partner Violence: Not on file   Housing Stability: Low Risk     Unable to Pay for Housing in the Last Year: No    Number of Places Lived in the Last Year: 1    Unstable Housing in the Last Year: No       Subjective         Objective    Physical Exam:   O2 Device: (P) nc    Vitals:  Blood pressure (!) 191/84, pulse 69, temperature 98 5 °F (36 9 °C), temperature source Oral, resp  rate (!) 26, height 5' 8" (1 727 m), weight 109 kg (240 lb 4 8 oz), SpO2 93 %  Imaging and other studies: I have personally reviewed pertinent reports  O2 Device: (P) nc     Plan    Respiratory Plan: (P) Discontinue Protocol        Resp Comments: (P) Pt admitted for SOB  +covid  No pulm hx or meds  Per covid protocol MDI ordered for SOB and wheezing at this time

## 2022-06-14 NOTE — ASSESSMENT & PLAN NOTE
Lab Results   Component Value Date    EGFR 13 06/14/2022    EGFR 9 04/29/2022    EGFR 6 01/08/2022    CREATININE 3 87 (H) 06/14/2022    CREATININE 5 44 (H) 04/29/2022    CREATININE 7 33 (H) 01/08/2022   · Tuesday/Thursday/Saturday outpatient hemodialysis schedule  · Nephrology consult ; appreciate recommendations  · Continue home allopurinol, PhosLo, sevelamer  · Trend BMP  · Avoid nephrotoxic agents

## 2022-06-14 NOTE — TELEPHONE ENCOUNTER
Pt is aware and will go to ED  I did advise him to call the ED once he has arrived and let them know he is covid positive before he goes in

## 2022-06-14 NOTE — ED PROVIDER NOTES
History  Chief Complaint   Patient presents with    Shortness of Breath     Short of breath, covid positive, O2 at home 86-87%  Had dialysis today  [place] worse, PCP sent patient to ER as he was feeling poorly  Patient is a 25-year-old male with a history of CHF, CKD on dialysis, who presents for evaluation of shortness of breath, hypoxia, generalized weakness  Patient tested positive for COVID on   Patient says that he started with some sinus congestion and a sore throat on Thursday  He says that his daughter then tested positive so he got tested himself  Patient says that since  he has been having progressing shortness of breath  According to the patient, his oxygen levels were 85% at home today  He did go to his dialysis session today and had a full session  He said that he was feeling worse so he came into the hospital   He admits to a productive cough  He also admits to a decreased appetite it over the last few days  He denies any fevers, chills, chest pain, abdominal pain  Prior to Admission Medications   Prescriptions Last Dose Informant Patient Reported? Taking?    Acetaminophen 325 MG CAPS  Self Yes No   Sig: Take 650 mg by mouth   Coenzyme Q-10 200 MG CAPS  Self Yes No   Sig: Take 200 mg by mouth daily   Methoxy PEG-Epoetin Beta (MIRCERA IJ)  Self Yes No   Si mcg Once every four weeks   Red Yeast Rice Extract (RED YEAST RICE PO)  Self Yes No   Sig: Take 1,200 mg by mouth daily   ULORIC 80 MG TABS  Self Yes No   Sig: Take 80 mg by mouth daily with dinner   ascorbic acid (VITAMIN C) 500 MG tablet   No No   Sig: Take 1 tablet (500 mg total) by mouth 2 (two) times a day   calcium acetate (PHOSLO) capsule  Self Yes No   Si mg Take 2 capsules by oral route before each meal    candesartan (ATACAND) 8 MG tablet  Self Yes No   Sig: Take 8 mg by mouth daily at bedtime    ferrous sulfate 324 (65 Fe) mg   No No   Sig: Take 1 tablet (324 mg total) by mouth 2 (two) times a day before meals   folic acid (FOLVITE) 1 mg tablet   No No   Sig: Take 1 tablet (1 mg total) by mouth daily   hydrALAZINE (APRESOLINE) 100 MG tablet   No No   Sig: Take 1 tablet (100 mg total) by mouth 2 (two) times a day Take 1 tablet twice a day; 1/2 tablet in the morning and 1 in the evening on dialysis days  lidocaine-prilocaine (EMLA) cream  Self Yes No   magnesium oxide (MAG-OX) 400 mg tablet  Self Yes No   Sig: Take 400 mg by mouth daily    metoprolol succinate (TOPROL-XL) 50 mg 24 hr tablet  Self Yes No   Sig: Take 50 mg by mouth 2 (two) times a day   ondansetron (ZOFRAN) 4 mg tablet   No No   Sig: Take 1 tablet (4 mg total) by mouth every 8 (eight) hours as needed for nausea or vomiting   sevelamer carbonate (RENVELA) 800 mg tablet  Self Yes No   Sig: TAKE 2 TABLET BY MOUTH THREE TIMES A DAY WITH MEALS 1 WITH SNACKS      Facility-Administered Medications: None       Past Medical History:   Diagnosis Date    Abdominal aortic aneurysm (AAA) ([facility])     s/p repair    Anemia     due to kidney disease    Arthritis     CHF (congestive heart failure) (Former[facility])     stable at present    Chronic kidney disease     on hemodialysis Tu Th Sat    Chronic pain disorder     knees    Edema     GERD (gastroesophageal reflux disease)     no meds     Gout     History of echocardiogram 03/02/2018    EF 55%, mild LVH, technically difficult study      Hyperlipidemia     borderline no meds    Hyperparathyroidism ([address] )     Hypertension     Kidney stone     Seasonal allergies     Shortness of breath     mild exertional    Skin cancer     Vitamin D deficiency        Past Surgical History:   Procedure Laterality Date    ABDOMINAL AORTIC ANEURYSM REPAIR  2004    CARDIAC CATHETERIZATION  05/01/2020    no blockages    CARPAL TUNNEL RELEASE Right     COLONOSCOPY      DIALYSIS FISTULA CREATION Right 2017    GLAUCOMA SURGERY      LEG SURGERY      removal splinter    PARATHYROIDECTOMY      SPLIT THICKNESS SKIN GRAFT N/A 2019    Procedure: THIGH STSG;  Surgeon: Carolina Soto MD;  Location: [address] [place] [address];  Service: Plastics    SQUAMOUS CELL CARCINOMA EXCISION Right 2019    Procedure: REMOVE SKIN CA FROM EAR & CHEEK;  Surgeon: Carolina Soto MD;  Location: [address] [place] [address];  Service: Plastics       Family History   Problem Relation Age of Onset    Kidney disease Mother     Leukemia Father      I have reviewed and agree with the history as documented  E-Cigarette/Vaping    E-Cigarette Use Never User      E-Cigarette/Vaping Substances    Nicotine No     THC No     CBD No     Flavoring No     Other No     Unknown No      Social History     Tobacco Use    Smoking status: Former Smoker     Types: Cigarettes     Quit date: 2004     Years since quittin 0    Smokeless tobacco: Never Used   Vaping Use    Vaping Use: Never used   Substance Use Topics    Alcohol use: Not Currently    Drug use: Never       Review of Systems   Constitutional: Negative for chills, fever and unexpected weight change  HENT: Negative for congestion, sore throat and trouble swallowing  Eyes: Negative for pain, discharge and itching  Respiratory: Positive for cough and shortness of breath  Negative for chest tightness and wheezing  Cardiovascular: Negative for chest pain, palpitations and leg swelling  Gastrointestinal: Negative for abdominal pain, blood in stool, diarrhea, nausea and vomiting  Endocrine: Negative for polyuria  Genitourinary: Negative for difficulty urinating, dysuria, frequency and hematuria  Musculoskeletal: Negative for arthralgias and back pain  Neurological: Positive for weakness  Negative for dizziness, syncope, light-headedness and headaches  Physical Exam  Physical Exam  Vitals and nursing note reviewed  Constitutional:       General: He is not in acute distress  Appearance: He is well-developed  HENT:      Head: Normocephalic and atraumatic        Right Ear: External ear normal       Left Ear: External ear normal    Eyes:      Extraocular Movements: Extraocular movements intact  Conjunctiva/sclera: Conjunctivae normal       Pupils: Pupils are equal, round, and reactive to light  Cardiovascular:      Rate and Rhythm: Normal rate and regular rhythm  Heart sounds: Normal heart sounds  No murmur heard  No friction rub  No gallop  Pulmonary:      Effort: Pulmonary effort is normal  No respiratory distress  Breath sounds: Rhonchi present  No wheezing or rales  Abdominal:      General: Bowel sounds are normal  There is no distension  Palpations: Abdomen is soft  Tenderness: There is no abdominal tenderness  There is no guarding  Musculoskeletal:         General: No tenderness or deformity  Normal range of motion  Cervical back: Normal range of motion  Lymphadenopathy:      Cervical: No cervical adenopathy  Skin:     General: Skin is warm and dry  Neurological:      General: No focal deficit present  Mental Status: He is alert and oriented to person, place, and time  Mental status is at baseline  Cranial Nerves: No cranial nerve deficit  Sensory: No sensory deficit  Motor: No weakness or abnormal muscle tone     Psychiatric:         Behavior: Behavior normal          Vital Signs  ED Triage Vitals   Temperature Pulse Respirations Blood Pressure SpO2   06/14/22 1518 06/14/22 1357 06/14/22 1357 06/14/22 1357 06/14/22 1357   98 5 °F (36 9 °C) 72 16 (!) 232/98 98 %      Temp Source Heart Rate Source Patient Position - Orthostatic VS BP Location FiO2 (%)   06/14/22 1518 06/14/22 1357 06/14/22 1357 06/14/22 1357 --   Oral Monitor Lying Left arm       Pain Score       06/14/22 1357       No Pain           Vitals:    06/14/22 1357 06/14/22 1400 06/14/22 1430 06/14/22 1500   BP: (!) 232/98 (!) 219/100 (!) 210/94 (!) 192/89   Pulse: 72 72 68 77   Patient Position - Orthostatic VS: Lying            Visual Acuity      ED Medications  Medications   ascorbic acid (VITAMIN C) tablet 500 mg (has no administration in time range)   calcium acetate (PHOSLO) capsule 1,334 mg (has no administration in time range)   losartan (COZAAR) tablet 50 mg (has no administration in time range)   co-enzyme Q-10 capsule 200 mg (has no administration in time range)   ferrous sulfate tablet 325 mg (has no administration in time range)   folic acid (FOLVITE) tablet 1 mg (has no administration in time range)   hydrALAZINE (APRESOLINE) tablet 100 mg (has no administration in time range)   magnesium oxide (MAG-OX) tablet 400 mg (has no administration in time range)   metoprolol succinate (TOPROL-XL) 24 hr tablet 50 mg (has no administration in time range)   ondansetron (ZOFRAN-ODT) dispersible tablet 4 mg (has no administration in time range)   sevelamer (RENAGEL) tablet 1,600 mg (has no administration in time range)   allopurinol (ZYLOPRIM) tablet 200 mg (has no administration in time range)   acetaminophen (TYLENOL) tablet 650 mg (has no administration in time range)   dexamethasone (DECADRON) injection 6 mg (has no administration in time range)   remdesivir (Veklury) 200 mg in sodium chloride 0 9 % 290 mL IVPB (has no administration in time range)     Followed by   remdesivir Mary Manges) 100 mg in sodium chloride 0 9 % 270 mL IVPB (has no administration in time range)   heparin (porcine) 25,000 units in 0 45% NaCl 250 mL infusion (premix) (has no administration in time range)       Diagnostic Studies  Results Reviewed     Procedure Component Value Units Date/Time    Lactic acid, plasma [[id number]]  (Normal) Collected: 06/14/22 1425    Lab Status: Final result Specimen: Blood from Arm, Left Updated: 06/14/22 1558     LACTIC ACID 1 0 mmol/L     Narrative:      Result may be elevated if tourniquet was used during collection      Procalcitonin [[id number]]  (Abnormal) Collected: 06/14/22 1425    Lab Status: Final result Specimen: Blood from Arm, Left Updated: 06/14/22 1509     Procalcitonin 0 90 ng/ml     B-Type Natriuretic Peptide(BNP) AN, CA, EA Campuses Only [[id number]]  (Abnormal) Collected: 06/14/22 1425    Lab Status: Final result Specimen: Blood from Arm, Left Updated: 06/14/22 1504      pg/mL     CBC and differential [[id number]]  (Abnormal) Collected: 06/14/22 1425    Lab Status: Final result Specimen: Blood from Arm, Left Updated: 06/14/22 1503     WBC 9 74 Thousand/uL      RBC 3 12 Million/uL      Hemoglobin 10 3 g/dL      Hematocrit 32 2 %       fL      MCH 33 0 pg      MCHC 32 0 g/dL      RDW 13 8 %      MPV 9 6 fL      Platelets 341 Thousands/uL     Narrative: This is an appended report  These results have been appended to a previously verified report      Manual Differential(PHLEBS Do Not Order) [[id number]]  (Abnormal) Collected: 06/14/22 1425    Lab Status: Final result Specimen: Blood from Arm, Left Updated: 06/14/22 1503     Segmented % 91 %      Lymphocytes % 8 %      Monocytes % 0 %      Eosinophils, % 1 %      Basophils % 0 %      Absolute Neutrophils 8 86 Thousand/uL      Lymphocytes Absolute 0 78 Thousand/uL      Monocytes Absolute 0 00 Thousand/uL      Eosinophils Absolute 0 10 Thousand/uL      Basophils Absolute 0 00 Thousand/uL      Total Counted --     RBC Morphology Present     Anisocytosis Present     Platelet Estimate Adequate    Comprehensive metabolic panel [[id number]]  (Abnormal) Collected: 06/14/22 1425    Lab Status: Final result Specimen: Blood from Arm, Left Updated: 06/14/22 1501     Sodium 135 mmol/L      Potassium 3 9 mmol/L      Chloride 91 mmol/L      CO2 30 mmol/L      ANION GAP 14 mmol/L      BUN 24 mg/dL      Creatinine 3 87 mg/dL      Glucose 87 mg/dL      Calcium 8 4 mg/dL      AST 31 U/L      ALT 17 U/L      Alkaline Phosphatase 51 U/L      Total Protein 6 7 g/dL      Albumin 3 9 g/dL      Total Bilirubin 0 55 mg/dL      eGFR 13 ml/min/1 73sq m     Narrative:      [place] guidelines for Chronic Kidney Disease (CKD):     Stage 1 with normal or high GFR (GFR > 90 mL/min/1 73 square meters)    Stage 2 Mild CKD (GFR = 60-89 mL/min/1 73 square meters)    Stage 3A Moderate CKD (GFR = 45-59 mL/min/1 73 square meters)    Stage 3B Moderate CKD (GFR = 30-44 mL/min/1 73 square meters)    Stage 4 Severe CKD (GFR = 15-29 mL/min/1 73 square meters)    Stage 5 End Stage CKD (GFR <15 mL/min/1 73 square meters)  Note: GFR calculation is accurate only with a steady state creatinine    Magnesium [[id number]]  (Normal) Collected: 06/14/22 1425    Lab Status: Final result Specimen: Blood from Arm, Left Updated: 06/14/22 1501     Magnesium 2 0 mg/dL     C-reactive protein [[id number]]  (Abnormal) Collected: 06/14/22 1425    Lab Status: Final result Specimen: Blood from Arm, Left Updated: 06/14/22 1501      9 mg/L     Triglycerides [[id number]]  (Abnormal) Collected: 06/14/22 1425    Lab Status: Final result Specimen: Blood from Arm, Left Updated: 06/14/22 1501     Triglycerides 153 mg/dL     CK (with reflex to MB) [[id number]]  (Normal) Collected: 06/14/22 1425    Lab Status: Final result Specimen: Blood from Arm, Left Updated: 06/14/22 1501     Total  U/L     D-dimer, quantitative [[id number]]  (Abnormal) Collected: 06/14/22 1425    Lab Status: Final result Specimen: Blood from Arm, Left Updated: 06/14/22 1457     D-Dimer, Quant 2 39 ug/ml FEU     Narrative: In the evaluation for possible pulmonary embolism, in the appropriate (Well's Score of 4 or less) patient, the age adjusted d-dimer cutoff for this patient can be calculated as:    Age x 0 01 (in ug/mL) for Age-adjusted D-dimer exclusion threshold for a patient over 50 years      Roderick Velez [[id number]]  (Normal) Collected: 06/14/22 1425    Lab Status: Final result Specimen: Blood from Arm, Left Updated: 06/14/22 1452     Protime 12 9 seconds      INR 0 98    Calcium, ionized [[id number]]  (Abnormal) Collected: 06/14/22 1425    Lab Status: Final result Specimen: Blood from Arm, Left Updated: 06/14/22 1440     Calcium, Ionized 1 00 mmol/L     Ferritin [[id number]] Collected: 06/14/22 1425    Lab Status: In process Specimen: Blood from Arm, Left Updated: 06/14/22 1433    Glucose 6 phosphate dehydrogenase [[id number]] Collected: 06/14/22 1425    Lab Status: In process Specimen: Blood from Arm, Left Updated: 06/14/22 1432    Blood culture #2 [[id number]] Collected: 06/14/22 1425    Lab Status: In process Specimen: Blood from Arm, Left Updated: 06/14/22 1432    Blood culture #1 [[id number]] Collected: 06/14/22 1426    Lab Status: In process Specimen: Blood from Arm, Left Updated: 06/14/22 1432                 XR chest 1 view portable   Final Result by Beecher Gaucher, MD (06/14 1529)      No definite acute infiltrate  Workstation performed: [id number]                    Procedures  Procedures         ED Course  ED Course as of 06/14/22 1543   Tue Jun 14, 2022   1413 SpO2: 98 %  On 4 L nasal cannula   1459 D-Dimer, Quant(!): 2 39  D-Dimer ordered for COVID patient to allow for clinical pathway to be followed as an in-patient  I estimate a low suspicion for PE at this time  Will defer imaging decision to inpatient team  No CTA PE study indicated at this per [facility] guidelines  "D-DIMER FOR ADMITTED COVID PATIENTS: As a reminder, D-dimers are ordered for COVID-19 patients who are being admitted to the hospital to help drive the inpatient clinical pathways  The inpatient team will follow-up on these studies  Please remember that the D-dimer is one of the many labs used to drive clinical pathways for admitted patients and is not being used specifically for PE screening in this application    An elevated D-dimer performed for routine admission markers on a COVID positive patient does not require us to reflexively order a CTA for PE solely due to the presence of an elevated D-Dimer in this scenario " MDM  Number of Diagnoses or Management Options  Diagnosis management comments: 77 yo M presenting for hypoxia, sob  Tested positive on 6/13, symptoms started Thursday  Hypoxic at home to 85%  Completed dialysis earlier today  Denies chest pain, fever, abdominal pain  Patient sating 98% on 4L  Rest of vitals WNL, work of breathing not increased  Will obtain Covid lab panel  Boris Burton will require admission for hypoxia  Disposition  Final diagnoses:   COVID-19   Hypoxia     Time reflects when diagnosis was documented in both MDM as applicable and the Disposition within this note     Time User Action Codes Description Comment    6/14/2022  3:18 PM Flores Stewart Add [U07 1] COVID-19     6/14/2022  3:18 PM Flores Stewart Add [R09 02] Hypoxia       ED Disposition     ED Disposition   Admit    Condition   Stable    Date/Time   Tue Jun 14, 2022  3:18 PM    Comment   Case was discussed with SCOTT and the patient's admission status was agreed to be Admission Status: inpatient status to the service of Dr Blue Buck  Follow-up Information    None         Patient's Medications   Discharge Prescriptions    No medications on file       No discharge procedures on file      PDMP Review     None          ED Provider  Electronically Signed by           Laurinda Eisenmenger,   06/14/22 8183

## 2022-06-14 NOTE — TELEPHONE ENCOUNTER
Pt called in because he has covid and is having chest congestion  He would like something called in for him  He also said he is having low oxygen  I asked him what his level is and he said 86%  I did recommend that he go to the ED with an oxygen level that low  He then said he has O2 at home and that brings his oxygen level up to 95%  Please advise

## 2022-06-14 NOTE — H&P
[address]  H&P- Mathews Bun 1945, 76 y o  male MRN: [id number]  Unit/Bed#: ED 23 Encounter: [id number]  Primary Care Provider: Sally Toth DO   Date and time admitted to hospital: 6/14/2022  1:53 PM    * COVID-19  Assessment & Plan  · COVID-19 positive on 06/13/2022  · Vaccinated x2 against COVID-19  · Endorses cough, shortness of breath  · Complicated by acute hypoxic respiratory failure  · Remdesivir day 1  · Decadron day 1  · Heparin infusion in the setting of elevated D-dimer and high risk for venous thromboembolism  · Respiratory protocol  · Up and out of bed as tolerated  · Incentive spirometry  · Ambulate patient    Acute respiratory failure with hypoxia (HCC)  Assessment & Plan  · Present on admission likely secondary to COVID -19 viral pneumonia  · Requiring 3 L of nasal cannula supplemental O2 on presentation  · Does not use oxygen in the outpatient setting  · Wean O2 as tolerated  · Goal SpO2 > 90%  · Respiratory protocol    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 [facility])  Assessment & Plan  Lab Results   Component Value Date    EGFR 13 06/14/2022    EGFR 9 04/29/2022    EGFR 6 01/08/2022    CREATININE 3 87 (H) 06/14/2022    CREATININE 5 44 (H) 04/29/2022    CREATININE 7 33 (H) 01/08/2022   · Tuesday/Thursday/Saturday outpatient hemodialysis schedule  · Nephrology consult ; appreciate recommendations  · Continue home allopurinol, PhosLo, sevelamer  · Trend BMP  · Avoid nephrotoxic agents    Essential hypertension  Assessment & Plan  · Hypertensive thus far during hospital course  · Continue home hydralazine, losartan, metoprolol  · Labetalol as needed for SBP > 180 mmHg  · Monitor blood pressures    Class 2 severe obesity due to excess calories with serious comorbidity and body mass index (BMI) of 39 0 to 39 9 in adult [facility])  Assessment & Plan  · Present on admission as evidenced by BMI greater than 35  · Endorse lifestyle modifications and weight loss    Anemia in chronic kidney disease  Assessment & Plan  · Hemoglobin stable and at baseline  · Continue home ferrous sulfate  · Trend CBC    VTE Prophylaxis: Heparin gtt  Code Status: Level 3 DNR/DNI    Anticipated Length of Stay:  Patient will be admitted on an Inpatient basis with an anticipated length of stay of  2 midnights  Justification for Hospital Stay:  Acute hypoxic respiratory failure secondary to COVID -19    Total Time for Visit, including Counseling / Coordination of Care: 60 minutes  Greater than 50% of this total time spent on direct patient counseling and coordination of care  Chief Complaint:  Shortness of breath      History of Present Illness:    Evan Meyers is a 76 y o  male with a past medical history significant for end-stage renal disease on hemodialysis Tuesday/Thursday/Saturday, secondary hyperparathyroidism, heart failure with preserved ejection fraction, gout who presents with complaints of increasing cough and shortness of breath  The patient was found to be COVID-19 positive on 06/13/2022  He is vaccinated x2 against COVID -19  He was at his outpatient dialysis session earlier today when he was found to be hypoxic  He was instructed to present to the ED  in the ED, he was found to be hypoxic requiring 3 L of nasal cannula supplemental O2  He was admitted to the general medicine service in the setting of acute hypoxic respiratory failure secondary to COVID -19 viral pneumonia  Review of Systems:    Review of Systems   Constitutional: Negative for chills and fever  HENT: Negative for ear pain and sore throat  Eyes: Negative for pain and visual disturbance  Respiratory: Positive for cough and shortness of breath  Cardiovascular: Negative for chest pain and palpitations  Gastrointestinal: Negative for abdominal pain and vomiting  Genitourinary: Negative for dysuria and hematuria  Musculoskeletal: Negative for arthralgias and back pain     Skin: Negative for color change and rash  Neurological: Negative for seizures and syncope  All other systems reviewed and are negative  Past Medical and Surgical History:     Past Medical History:   Diagnosis Date    Abdominal aortic aneurysm (AAA) ([facility] [address] )     s/p repair    Anemia     due to kidney disease    Arthritis     CHF (congestive heart failure) (HCC)     stable at present    Chronic kidney disease     on hemodialysis Tu Th Sat    Chronic pain disorder     knees    Edema     GERD (gastroesophageal reflux disease)     no meds     Gout     History of echocardiogram 03/02/2018    EF 55%, mild LVH, technically difficult study   Hyperlipidemia     borderline no meds    Hyperparathyroidism ([facility] [address] )     Hypertension     Kidney stone     Seasonal allergies     Shortness of breath     mild exertional    Skin cancer     Vitamin D deficiency        Past Surgical History:   Procedure Laterality Date    ABDOMINAL AORTIC ANEURYSM REPAIR  2004    CARDIAC CATHETERIZATION  05/01/2020    no blockages    CARPAL TUNNEL RELEASE Right     COLONOSCOPY      DIALYSIS FISTULA CREATION Right 2017    GLAUCOMA SURGERY      LEG SURGERY      removal splinter    PARATHYROIDECTOMY      SPLIT THICKNESS SKIN GRAFT N/A 12/4/2019    Procedure: THIGH STSG;  Surgeon: Anh Delgadillo MD;  Location: [address] [place] [address];  Service: Plastics    SQUAMOUS CELL CARCINOMA EXCISION Right 12/4/2019    Procedure: REMOVE SKIN CA FROM EAR & CHEEK;  Surgeon: Anh Delgadillo MD;  Location: [address] [place] [address];  Service: Plastics       Meds/Allergies:    Prior to Admission medications    Medication Sig Start Date End Date Taking?  Authorizing Provider   Acetaminophen 325 MG CAPS Take 650 mg by mouth 7/1/21 6/30/22  Historical Provider, MD   ascorbic acid (VITAMIN C) 500 MG tablet Take 1 tablet (500 mg total) by mouth 2 (two) times a day 5/29/22 7/28/22  Shlomo Duque PA-C   calcium acetate (PHOSLO) capsule 667 mg Take 2 capsules by oral route before each meal  4/6/19   Historical Provider, MD   candesartan (ATACAND) 8 MG tablet Take 8 mg by mouth daily at bedtime  3/5/19   Historical Provider, MD   Coenzyme Q-10 200 MG CAPS Take 200 mg by mouth daily    Historical Provider, MD   ferrous sulfate 324 (65 Fe) mg Take 1 tablet (324 mg total) by mouth 2 (two) times a day before meals 5/29/22 7/28/22  Glen Bishop PA-C   folic acid (FOLVITE) 1 mg tablet Take 1 tablet (1 mg total) by mouth daily 5/29/22 7/28/22  Glen Bishop PA-C   hydrALAZINE (APRESOLINE) 100 MG tablet Take 1 tablet (100 mg total) by mouth 2 (two) times a day Take 1 tablet twice a day; 1/2 tablet in the morning and 1 in the evening on dialysis days  4/13/22   Nathalia Orourke DO   lidocaine-prilocaine (EMLA) cream  1/17/22   Historical Provider, MD   magnesium oxide (MAG-OX) 400 mg tablet Take 400 mg by mouth daily     Historical Provider, MD   Methoxy PEG-Epoetin Beta (MIRCERA IJ) 75 mcg Once every four weeks 8/14/21 8/13/22  Historical Provider, MD   metoprolol succinate (TOPROL-XL) 50 mg 24 hr tablet Take 50 mg by mouth 2 (two) times a day 4/16/19   Historical Provider, MD   ondansetron (ZOFRAN) 4 mg tablet Take 1 tablet (4 mg total) by mouth every 8 (eight) hours as needed for nausea or vomiting 6/13/22   Nathalia Orourke DO   Red Yeast Rice Extract (RED YEAST RICE PO) Take 1,200 mg by mouth daily    Historical Provider, MD   sevelamer carbonate (RENVELA) 800 mg tablet TAKE 2 TABLET BY MOUTH THREE TIMES A DAY WITH MEALS 1 WITH SNACKS 6/27/21   Historical Provider, MD   ULORIC 80 MG TABS Take 80 mg by mouth daily with dinner 3/11/19   Historical Provider, MD       Allergies:    Allergies   Allergen Reactions    Iodine - Food Allergy      Other reaction(s): Respiratory Distress  "swelling of throat"    Levofloxacin      Other reaction(s): Respiratory Distress  "swelling of throat"    Lovastatin      Muscle weakness    Medical Tape Other (See Comments)     Skin breaks michaela   [employer] - Food Allergy        Social History:     Marital Status: /Civil Union   Substance Use History:   Social History     Substance and Sexual Activity   Alcohol Use Not Currently     Social History     Tobacco Use   Smoking Status Former Smoker    Types: Cigarettes    Quit date: 2004    Years since quittin 0   Smokeless Tobacco Never Used     Social History     Substance and Sexual Activity   Drug Use Never       Family History:    Pertinent family history reviewed    Physical Exam:     Vitals:   Blood Pressure: (!) 211/97 (22 1530)  Pulse: 67 (22 1530)  Temperature: 98 5 °F (36 9 °C) (22 1518)  Temp Source: Oral (22 1518)  Respirations: (!) 26 (22 1530)  Height: 5' 8" (172 7 cm) (22 1357)  Weight - Scale: 109 kg (240 lb 4 8 oz) (22 1357)  SpO2: 97 % (22 1530)    Physical Exam  Vitals and nursing note reviewed  Constitutional:       Appearance: He is well-developed  He is obese  He is ill-appearing  HENT:      Head: Normocephalic and atraumatic  Eyes:      Conjunctiva/sclera: Conjunctivae normal    Cardiovascular:      Rate and Rhythm: Normal rate and regular rhythm  Heart sounds: No murmur heard  Pulmonary:      Effort: Pulmonary effort is normal  No respiratory distress  Breath sounds: Normal breath sounds  Stridor present  Abdominal:      Palpations: Abdomen is soft  Tenderness: There is no abdominal tenderness  Musculoskeletal:      Cervical back: Neck supple  Skin:     General: Skin is warm and dry  Neurological:      Mental Status: He is alert            Additional Data:     Lab Results: I have reviewed pertinent results     Results from last 7 days   Lab Units 22  1425   WBC Thousand/uL 9 74   HEMOGLOBIN g/dL 10 3*   HEMATOCRIT % 32 2*   PLATELETS Thousands/uL 207   LYMPHO PCT % 8*   MONO PCT % 0*   EOS PCT % 1     Results from last 7 days   Lab Units 22  1425   SODIUM mmol/L 135   POTASSIUM mmol/L 3 9   CHLORIDE mmol/L 91*   CO2 mmol/L 30   BUN mg/dL 24   CREATININE mg/dL 3 87*   ANION GAP mmol/L 14*   CALCIUM mg/dL 8 4   ALBUMIN g/dL 3 9   TOTAL BILIRUBIN mg/dL 0 55   ALK PHOS U/L 51   ALT U/L 17   AST U/L 31   GLUCOSE RANDOM mg/dL 87     Results from last 7 days   Lab Units 06/14/22  1425   INR  0 98             Results from last 7 days   Lab Units 06/14/22  1425   LACTIC ACID mmol/L 1 0   PROCALCITONIN ng/ml 0 90*       Imaging: I have reviewed pertinent imaging     XR chest 1 view portable   Final Result by Mary Weaver MD (06/14 1529)      No definite acute infiltrate  Workstation performed: [id number]             EKG, Pathology, and Other Studies Reviewed on Admission:   · EKG: Reviewed     Allscripts / Epic Records Reviewed    ** Please Note: This note has been constructed using a voice recognition system   **

## 2022-06-14 NOTE — ED NOTES
Gena Zimmer made aware of patients elevated blood pressures   Plan is to hold off on PRN labetalol and see how patients po blood pressures work first      Gladys Ornelas RN  06/14/22 2744

## 2022-06-15 PROBLEM — Z99.2 ANEMIA IN CHRONIC KIDNEY DISEASE, ON CHRONIC DIALYSIS (HCC): Status: ACTIVE | Noted: 2018-08-29

## 2022-06-15 PROBLEM — N18.6 ANEMIA IN CHRONIC KIDNEY DISEASE, ON CHRONIC DIALYSIS (HCC): Status: ACTIVE | Noted: 2018-08-29

## 2022-06-15 NOTE — UTILIZATION REVIEW
Initial Clinical Review    Admission: Date/Time/Statement:   Admission Orders (From admission, onward)     Ordered        06/14/22 1518  Inpatient Admission  Once                      Orders Placed This Encounter   Procedures    Inpatient Admission     Standing Status:   Standing     Number of Occurrences:   1     Order Specific Question:   Level of Care     Answer:   Med Surg [16]     Order Specific Question:   Estimated length of stay     Answer:   More than 2 Midnights     Order Specific Question:   Certification     Answer:   I certify that inpatient services are medically necessary for this patient for a duration of greater than two midnights  See H&P and MD Progress Notes for additional information about the patient's course of treatment  ED Arrival Information     Expected   6/14/2022 13:25    Arrival   6/14/2022 13:41    Acuity   Emergent            Means of arrival   Walk-In    Escorted by   Family Member    Service   Hospitalist    Admission type   Emergency            Arrival complaint   covid positve           Chief Complaint   Patient presents with    Shortness of Breath     Short of breath, covid positive, O2 at home 86-87%  Had dialysis today  [place] worse, PCP sent patient to ER as he was feeling poorly  Initial Presentation: 76 y o  male to the ED from home with complaints of shortness of breath, COVID +, pulseox 86-87% at HD  Admitted to inpatient for COVID 19, acute respiratory failure with hypoxia, CKD  H/O ESRD on HD, hyperparathyroidism, heart failure with preserved ejection fraction, gout  He arrives hypertensive, rhonchi heard in lungs  Procal, CRP, D-dimer elevated  CXR shows no acute abnormality  Placed on 4 LNC  Started on IV remdesivir, decadron  UP and OOB as tolerated  Trend BMP  Nephrology consult  Wean oxygen as able  Trend BMP  Date: 6/15   Day 2:   Started on Heparin drip given elevated d-dimer, and high risk for DVT  Requiring 3 LNC  Rhonchi heard in lungs   HE is ill and pale appearing  Nephrology consult:  Continue with HD  BP not controlled  Increase losartan  Continue with HD while ip      ED Triage Vitals   Temperature Pulse Respirations Blood Pressure SpO2   06/14/22 1518 06/14/22 1357 06/14/22 1357 06/14/22 1357 06/14/22 1357   98 5 °F (36 9 °C) 72 16 (!) 232/98 98 %      Temp Source Heart Rate Source Patient Position - Orthostatic VS BP Location FiO2 (%)   06/14/22 1518 06/14/22 1357 06/14/22 1357 06/14/22 1357 --   Oral Monitor Lying Left arm       Pain Score       06/14/22 1357       No Pain          Wt Readings from Last 1 Encounters:   06/14/22 109 kg (240 lb 4 8 oz)     Additional Vital Signs:   Date/Time Temp Pulse Resp BP MAP (mmHg) SpO2 Calculated FIO2 (%) - Nasal Cannula Nasal Cannula O2 Flow Rate (L/min) O2 Device Patient Position - Orthostatic VS   06/15/22 0818 -- -- -- 217/87 Abnormal  -- -- -- -- -- --   06/15/22 0700 -- 51 Abnormal  -- 154/72 103 95 % -- -- Nasal cannula --   06/15/22 0645 -- 50 Abnormal  -- -- -- 94 % -- -- Nasal cannula --   06/15/22 0615 -- 52 Abnormal  21 178/84 Abnormal  120 95 % 28 2 L/min Nasal cannula Lying   06/15/22 0500 -- 54 Abnormal  21 168/76 109 95 % 28 2 L/min Nasal cannula Lying   06/15/22 0415 98 3 °F (36 8 °C) 58 21 176/83 Abnormal  119 95 % 28 2 L/min Nasal cannula Lying   06/15/22 0330 -- 52 Abnormal  21 157/73 105 96 % 28 2 L/min Nasal cannula Lying   06/15/22 0300 -- 51 Abnormal  20 136/63 91 96 % 28 2 L/min Nasal cannula Lying   06/15/22 0200 -- 58 20 162/78 112 95 % 28 2 L/min Nasal cannula Lying   06/15/22 0000 -- 58 21 139/63 91 95 % 32 3 L/min Nasal cannula Lying   06/14/22 2200 98 °F (36 7 °C) 62 18 161/75 108 94 % 32 3 L/min Nasal cannula --   06/14/22 1930 -- 63 19 165/78 112 95 % 32 3 L/min Nasal cannula Lying   06/14/22 1830 -- 65 22 166/77 110 93 % 32 3 L/min Nasal cannula --   06/14/22 1642 -- 70 -- -- -- 94 % -- -- -- --   06/14/22 1630 -- 69 -- 191/84 Abnormal  121 93 % 32 3 L/min Nasal cannula --   06/14/22 1602 -- -- -- -- -- -- -- -- Nasal cannula --   06/14/22 1600 -- 74 26 Abnormal  222/104 Abnormal  149 94 % 32 3 L/min -- --   06/14/22 1530 -- 67 26 Abnormal  211/97 Abnormal  139 97 % 32 3 L/min -- --   06/14/22 1518 98 5 °F (36 9 °C) -- -- -- -- -- -- -- -- --   06/14/22 1500 -- 77 -- 192/89 Abnormal  128 93 % -- -- -- --   06/14/22 1430 -- 68 -- 210/94 Abnormal  135 97 % -- -- -- --   06/14/22 1400 -- 72 -- 219/100 Abnormal  144 98 % 32 3 L/min Nasal cannula --   06/14/22 1357 -- 72 16 232/98 Abnormal  -- 98 % 32 3 L/min Nasal cannula Lying       Pertinent Labs/Diagnostic Test Results:   6/14 EKG:Sinus rhythm with frequent Premature ventricular complexes  Left axis deviation  Abnormal ECG  When compared with ECG of 29-APR-2022 09:36,  Premature ventricular complexes are now Present  XR chest 1 view portable   Final Result by Thai Tapia MD (06/14 1529)      No definite acute infiltrate                    Workstation performed: [id number]           Results from last 7 days   Lab Units 06/13/22  1433   SARS-COV-2  Positive*     Results from last 7 days   Lab Units 06/15/22  0408 06/14/22  1425   WBC Thousand/uL 9 50 9 74   HEMOGLOBIN g/dL 9 3* 10 3*   HEMATOCRIT % 29 1* 32 2*   PLATELETS Thousands/uL 194 207   NEUTROS ABS Thousands/µL 8 41*  --          Results from last 7 days   Lab Units 06/15/22  0408 06/14/22  1425   SODIUM mmol/L 131* 135   POTASSIUM mmol/L 4 6 3 9   CHLORIDE mmol/L 92* 91*   CO2 mmol/L 29 30   ANION GAP mmol/L 10 14*   BUN mg/dL 36* 24   CREATININE mg/dL 5 13* 3 87*   EGFR ml/min/1 73sq m 9 13   CALCIUM mg/dL 8 5 8 4   CALCIUM, IONIZED mmol/L  --  1 00*   MAGNESIUM mg/dL  --  2 0     Results from last 7 days   Lab Units 06/14/22  1425   AST U/L 31   ALT U/L 17   ALK PHOS U/L 51   TOTAL PROTEIN g/dL 6 7   ALBUMIN g/dL 3 9   TOTAL BILIRUBIN mg/dL 0 55         Results from last 7 days   Lab Units 06/15/22  0408 06/14/22  1425   GLUCOSE RANDOM mg/dL 103 87         Results from last 7 days   Lab Units 06/14/22  1425   CK TOTAL U/L 125         Results from last 7 days   Lab Units 06/14/22  1425   D-DIMER QUANTITATIVE ug/ml FEU 2 39*     Results from last 7 days   Lab Units 06/15/22  0401 06/14/22  2147 06/14/22  1425   PROTIME seconds  --   --  12 9   INR   --   --  0 98   PTT seconds 121* 45*  --          Results from last 7 days   Lab Units 06/15/22  0408 06/14/22  1425   PROCALCITONIN ng/ml 1 28* 0 90*     Results from last 7 days   Lab Units 06/14/22  1425   LACTIC ACID mmol/L 1 0     Results from last 7 days   Lab Units 06/14/22  1425   BNP pg/mL 784*     Results from last 7 days   Lab Units 06/14/22  1425   FERRITIN ng/mL 2,154*     Results from last 7 days   Lab Units 06/14/22  1425   CRP mg/L 173 9*       Results from last 7 days   Lab Units 06/14/22  1426 06/14/22  1425   BLOOD CULTURE  Received in Microbiology Lab  Culture in Progress  Received in Microbiology Lab  Culture in Progress       ED Treatment:   Medication Administration from 06/14/2022 1319 to 06/15/2022 1152       Date/Time Order Dose Route Action     06/15/2022 0819 ascorbic acid (VITAMIN C) tablet 500 mg 500 mg Oral Given     06/14/2022 1722 ascorbic acid (VITAMIN C) tablet 500 mg 500 mg Oral Given     06/15/2022 0818 calcium acetate (PHOSLO) capsule 1,334 mg 1,334 mg Oral Given     06/14/2022 1548 calcium acetate (PHOSLO) capsule 1,334 mg 1,334 mg Oral Given     06/14/2022 1548 losartan (COZAAR) tablet 50 mg 50 mg Oral Given     06/14/2022 1605 co-enzyme Q-10 capsule 200 mg 200 mg Oral Given     06/15/2022 0818 ferrous sulfate tablet 325 mg 325 mg Oral Given     81/19/1866 7163 folic acid (FOLVITE) tablet 1 mg 1 mg Oral Given     61/67/2926 3554 folic acid (FOLVITE) tablet 1 mg 1 mg Oral Given     06/15/2022 0818 hydrALAZINE (APRESOLINE) tablet 100 mg 100 mg Oral Given     06/14/2022 1722 hydrALAZINE (APRESOLINE) tablet 100 mg 100 mg Oral Given     06/15/2022 0819 magnesium oxide (MAG-OX) tablet 400 mg 400 mg Oral Given     06/14/2022 1548 magnesium oxide (MAG-OX) tablet 400 mg 400 mg Oral Given     06/15/2022 0819 metoprolol succinate (TOPROL-XL) 24 hr tablet 50 mg 50 mg Oral Given     06/14/2022 1722 metoprolol succinate (TOPROL-XL) 24 hr tablet 50 mg 50 mg Oral Given     06/15/2022 0818 sevelamer (RENAGEL) tablet 1,600 mg 1,600 mg Oral Given     06/14/2022 1548 sevelamer (RENAGEL) tablet 1,600 mg 1,600 mg Oral Given     06/14/2022 1548 allopurinol (ZYLOPRIM) tablet 200 mg 200 mg Oral Given     06/15/2022 0409 acetaminophen (TYLENOL) tablet 650 mg 650 mg Oral Given     06/14/2022 1548 dexamethasone (DECADRON) injection 6 mg 6 mg Intravenous Given     06/14/2022 1637 remdesivir (Veklury) 200 mg in sodium chloride 0 9 % 290 mL IVPB 200 mg Intravenous Given     06/14/2022 2219 heparin (porcine) 25,000 units in 0 45% NaCl 250 mL infusion (premix) 13 1 Units/kg/hr Intravenous Rate/Dose Change     06/14/2022 1551 heparin (porcine) 25,000 units in 0 45% NaCl 250 mL infusion (premix) 11 1 Units/kg/hr Intravenous New Bag        Past Medical History:   Diagnosis Date    Abdominal aortic aneurysm (AAA) ([facility] [address] )     s/p repair    Anemia     due to kidney disease    Arthritis     CHF (congestive heart failure) (HCC)     stable at present    Chronic kidney disease     on hemodialysis Tu Th Sat    Chronic pain disorder     knees    Edema     GERD (gastroesophageal reflux disease)     no meds     Gout     History of echocardiogram 03/02/2018    EF 55%, mild LVH, technically difficult study      Hyperlipidemia     borderline no meds    Hyperparathyroidism ([facility] [address] )     Hypertension     Kidney stone     Seasonal allergies     Shortness of breath     mild exertional    Skin cancer     Vitamin D deficiency      Admitting Diagnosis: SOB (shortness of breath) [R06 02]  Age/Sex: 76 y o  male  Admission Orders:  Heparin drip with routine PTT  Up and OOB    Scheduled Medications:  allopurinol, 200 mg, Oral, Daily  ascorbic acid, 500 mg, Oral, BID  calcium acetate, 1,334 mg, Oral, TID With Meals  cholecalciferol, 5,000 Units, Oral, Daily  co-enzyme Q-10, 200 mg, Oral, Daily  dexamethasone, 6 mg, Intravenous, Q24H  ferrous sulfate, 325 mg, Oral, Daily With Breakfast  folic acid, 1 mg, Oral, Daily  hydrALAZINE, 100 mg, Oral, BID  losartan, 50 mg, Oral, BID  magnesium oxide, 400 mg, Oral, Daily  metoprolol succinate, 50 mg, Oral, BID  remdesivir, 100 mg, Intravenous, Q24H  sevelamer, 1,600 mg, Oral, TID With Meals  zinc sulfate, 220 mg, Oral, Daily With Lunch      Continuous IV Infusions:  heparin (porcine), 3-20 Units/kg/hr (Order-Specific), Intravenous, Titrated      PRN Meds:  acetaminophen, 650 mg, Oral, Q4H PRN  albuterol, 2 puff, Inhalation, Q4H PRN  labetalol, 10 mg, Intravenous, Q4H PRN  lidocaine, , Topical, Daily PRN  ondansetron, 4 mg, Oral, Q6H PRN        IP CONSULT TO CASE MANAGEMENT  IP CONSULT TO NEPHROLOGY    Network Utilization Review Department  ATTENTION: Please call with any questions or concerns to [phone] and carefully listen to the prompts so that you are directed to the right person  All voicemails are confidential   Roseann Saldana all requests for admission clinical reviews, approved or denied determinations and any other requests to dedicated fax number below belonging to the campus where the patient is receiving treatment   List of dedicated fax numbers for the Facilities:  [address] DENIALS (Administrative/Medical Necessity) [phone]   [address] (Maternity/NICU/Pediatrics) [phone]   [address] [place] [id number] [facility] Roger Morrison [address] [phone]

## 2022-06-15 NOTE — UTILIZATION REVIEW
Inpatient Admission Authorization Request   NOTIFICATION OF INPATIENT ADMISSION/INPATIENT AUTHORIZATION REQUEST   [facility]   [facility]  [address] [facility], [address]  Tax ID: [id number]  NPI: [id number]  Place of Service: Inpatient [address] [facility]  [address]  Place of Service Code: 24     ATTENDING PROVIDER:  Attending Name and NPI#: Lalo Garza [[id number]]  Address: [address] [place] [address], [address]  Phone: [phone]     UTILIZATION REVIEW CONTACT:  Lizandro Chavarria, Utilization   Network Utilization Review Department  Phone: [phone]  Fax [phone]  Email: [email or web address]     PHYSICIAN ADVISORY SERVICES:  FOR UAUQ-KT-LMOZ REVIEW - MEDICAL NECESSITY DENIAL  Phone: [phone]  Fax: [phone]  Email: [email or web address]     TYPE OF REQUEST:  Inpatient Status     ADMISSION INFORMATION:  ADMISSION DATE/TIME: 6/14/22  3:18 PM  PATIENT DIAGNOSIS CODE/DESCRIPTION:  SOB (shortness of breath) [R06 02]  Hypoxia [R09 02]  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 ([facility] [address] ) [N18 6, Z99 2]  COVID-19 [U07 1]  DISCHARGE DATE/TIME: No discharge date for patient encounter  IMPORTANT INFORMATION:  Please contact the Lizandro Chavarria directly with any questions or concerns regarding this request  Department voicemails are confidential     Send requests for admission clinical reviews, concurrent reviews, approvals, and administrative denials due to lack of clinical to fax [phone]

## 2022-06-15 NOTE — ASSESSMENT & PLAN NOTE
· Hypertensive thus far during hospital course  · Continue home hydralazine, losartan, metoprolol  · Increase hydralazine to 100 mg TID   · Labetalol as needed for SBP > 180 mmHg  · Monitor blood pressures

## 2022-06-15 NOTE — ASSESSMENT & PLAN NOTE
Lab Results   Component Value Date    EGFR 9 06/15/2022    EGFR 13 06/14/2022    EGFR 9 04/29/2022    CREATININE 5 13 (H) 06/15/2022    CREATININE 3 87 (H) 06/14/2022    CREATININE 5 44 (H) 04/29/2022   · Tuesday/Thursday/Saturday outpatient hemodialysis schedule  · Nephrology input appreciated  · Continue home allopurinol, PhosLo, sevelamer  · Trend BMP  · Avoid nephrotoxic agents

## 2022-06-15 NOTE — PHYSICAL THERAPY NOTE
Physical Therapy Evaluation     Patient's Name: Yossi Walters    Admitting Diagnosis  SOB (shortness of breath) [R06 02]    Problem List  Patient Active Problem List   Diagnosis    Essential hypertension    Hemodialysis-associated hypotension    AAA (abdominal aortic aneurysm) (HCC)    Chronic renal failure    Primary osteoarthritis of both knees    Hyperlipidemia    Class 2 severe obesity due to excess calories with serious comorbidity and body mass index (BMI) of 39 0 to 39 9 in adult [facility])    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 [facility])    Secondary hyperparathyroidism of renal origin ([facility] )    Abnormal nuclear stress test    Anemia in chronic kidney disease    ASCVD (arteriosclerotic cardiovascular disease)    Benign prostatic hyperplasia without lower urinary tract symptoms    Carotid stenosis, right    Chronic gout, unspecified, with tophus (tophi)    Chronic systolic congestive heart failure (HCC)    Disorder of phosphorus metabolism, unspecified    Gastroesophageal reflux disease    Iron deficiency anemia    Kidney stone    Magnesium deficiency    Osteoarthritis of both knees    Other disorders of electrolyte and fluid balance, not elsewhere classified    Other fluid overload    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, initial encounter ([place] [address] )    Panlobular emphysema ([place] [address] )    Personal history of nicotine dependence    Proteinuria, unspecified    Skin lesion of face    Unspecified protein-calorie malnutrition (HCC)    Vitamin D deficiency    Elevated d-dimer    Pancreatic mass    COVID-19    Acute respiratory failure with hypoxia [facility])       Past Medical History  Past Medical History:   Diagnosis Date    Abdominal aortic aneurysm (AAA) ([facility] )     s/p repair    Anemia     due to kidney disease    Arthritis     CHF (congestive heart failure) ([facility] )     stable at present    Chronic kidney disease     on hemodialysis Tu Th Sat    Chronic pain disorder knees    Edema     GERD (gastroesophageal reflux disease)     no meds     Gout     History of echocardiogram 03/02/2018    EF 55%, mild LVH, technically difficult study  Hyperlipidemia     borderline no meds    Hyperparathyroidism ([address] )     Hypertension     Kidney stone     Seasonal allergies     Shortness of breath     mild exertional    Skin cancer     Vitamin D deficiency        Past Surgical History  Past Surgical History:   Procedure Laterality Date    ABDOMINAL AORTIC ANEURYSM REPAIR  2004    CARDIAC CATHETERIZATION  05/01/2020    no blockages    CARPAL TUNNEL RELEASE Right     COLONOSCOPY      DIALYSIS FISTULA CREATION Right 2017    GLAUCOMA SURGERY      LEG SURGERY      removal splinter    PARATHYROIDECTOMY      SPLIT THICKNESS SKIN GRAFT N/A 12/4/2019    Procedure: THIGH STSG;  Surgeon: Radha Hoang MD;  Location: [address] [place] [address];  Service: Plastics    SQUAMOUS CELL CARCINOMA EXCISION Right 12/4/2019    Procedure: REMOVE SKIN CA FROM EAR & CHEEK;  Surgeon: Radha Hoang MD;  Location: [address] [place] [address];  Service: Plastics        06/15/22 0753   PT Last Visit   PT Visit Date 06/15/22   Note Type   Note type Evaluation   Pain Assessment   Pain Assessment Tool 0-10   Pain Score 2  ("1 or 2")   Pain Location/Orientation Orientation: Mid;Location: Abdomen   Pain Onset/Description Onset: Ongoing;Frequency: Constant/Continuous; Descriptor: Discomfort   Effect of Pain on Daily Activities yes   Patient's Stated Pain Goal No pain   Hospital Pain Intervention(s) Medication (See MAR); Repositioned; Ambulation/increased activity; Emotional support; Environmental changes   Multiple Pain Sites No   Restrictions/Precautions   Weight Bearing Precautions Per Order No   Other Precautions Contact/isolation; Airborne/isolation;Multiple lines;Telemetry;O2;Fall Risk;Pain   Home Living   Type of [address] [place] [address] Ave Two level;Performs ADLs on one level; Able to live on main level with bedroom/bathroom;Stairs to enter with rails  (3 KARUNA, B HRs; 5 steps from garage)   Bathroom Shower/Tub   (walk in tub with small lip)   Bathroom Toilet Raised   Bathroom Equipment Built-in shower seat;Grab bars in shower;Commode   Bathroom Accessibility Accessible   Home Equipment Walker;Cane  (dependent on distance has RW/rollator/SPC; rollator in home)   Prior Function   Level of [place] Independent with ADLs and functional mobility; Needs assistance with IADLs   Lives With Spouse; Family  (daughters alternate staying w/pt  and his spouse)   Hector Sanchez Help From Family   ADL Assistance Independent   IADLs Needs assistance  (cooking, laundry;pt  does drive at times)   Falls in the last 6 months 1 to 4  (x 3  Dec Mar and Apr "knee gaves out"-awaiting TKA)   Vocational Retired   General   Family/Caregiver Present No   Cognition   Overall Cognitive Status WFL   Arousal/Participation Responsive   Orientation Level Oriented X4   Memory Within functional limits   Following Commands Follows one step commands without difficulty   Comments Pt  agreeable to PT assessment,pleasant  RLE Assessment   RLE Assessment X  (3/5 gross musculature)   LLE Assessment   LLE Assessment X  (3/5 gross musculature)   Vision-Basic Assessment   Current Vision Wears glasses only for reading   Patient Visual Report Other (Comment)  (reports blurriness recently)   Vestibular   Spontaneous Nystagmus (-) no evidence of nystagmus at rest in room light   Coordination   Movements are Fluid and Coordinated 0   Coordination and Movement Description Incremental mobility requiring increased time  Proprioception   RLE Proprioception Impaired  (B pedal neuropathy)   LLE Proprioception Impaired   Bed Mobility   Supine to Sit   (CGA)   Additional items Assist x 1;HOB elevated; Bedrails; Increased time required;Verbal cues   Sit to Supine   (DNT pt  was sitting on bedside upon conclusion )   Additional Comments Verbal cues for proper body mechanics, safety awareness with positional changes, and breathing conservation technique education/utilization  Pt  denied lightheadedness/dizziness with positional changes  Transfers   Sit to Stand   (CGA)   Additional items Assist x 1;Bedrails; Increased time required;Verbal cues   Stand to Sit   (CGA)   Additional items Assist x 1;Bedrails; Increased time required;Verbal cues   Stand pivot   (CGA)   Additional items Assist x 1; Increased time required;Verbal cues  (RW usage)   Additional Comments Verbal antoine for proper hand placement with transitional movments, AD usage  Ambulation/Elevation   Gait pattern Improper Weight shift; Forward Flexion; Short stride   Gait Assistance   (CGA)   Additional items Assist x 1;Verbal cues; Tactile cues   Assistive Device Rolling walker   Distance 15 feet x 2   Stair Management Assistance Not tested   Balance   Static Sitting Good   Dynamic Sitting Fair +   Static Standing Fair   Dynamic Standing Fair -   Ambulatory Fair -   Endurance Deficit   Endurance Deficit Yes   Endurance Deficit Description MARTIN exhibited w/ambulatory progression,supplemental O2 maintained  Recovery w/increased time and breathing conservation technique education/utilization  Activity Tolerance   Activity Tolerance Patient limited by fatigue;Treatment limited secondary to medical complications (Comment)  (MARTIN)   Medical Staff Made Aware yes, Meka NIEVES   Nurse Made Aware yes, Keeley Ruiz RN   Assessment   Prognosis Good   Problem List Decreased strength;Decreased endurance; Impaired balance;Decreased mobility;Obesity; Impaired hearing;Decreased coordination   Assessment Pt is 76 y o  male seen for PT evaluation s/p admit to [facility] on 6/14/2022 w/ COVID-19  PT consulted to assess pt's functional mobility and d/c needs  Order placed for PT eval and tx, w/ up and OOB as tolerated order   Comorbidities affecting pt's physical performance at time of assessment include: weakness,COVID-19,acute respiratory failure with hypoxia, anemia in chronic kidney disease, class 3 severe obesity,HTN   PTA, pt was independent w/ all functional mobility w/ AD usage  Personal factors affecting pt at time of IE include: inability to navigate community distances, inability to navigate level surfaces w/o external assistance, unable to perform dynamic tasks in community, positive fall history, hearing impairments, unable to perform physical activity, limited insight into impairments and inability to perform ADLs  Please find objective findings from PT assessment regarding body systems outlined above with impairments and limitations including weakness, impaired balance, decreased endurance, impaired coordination, gait deviations, decreased activity tolerance, decreased functional mobility tolerance, fall risk and SOB upon exertion  From PT/mobility standpoint, recommendation at time of d/c would be home with home health rehabilitation pending progress in order to facilitate return to PLOF  Goals   Patient Goals to feel better soon and to get home to his wife   LTG Expiration Date 06/25/22   Long Term Goal #1 1 )Patient will complete bed mobility supervision of 1 for decrease need for caregiver assistance, decrease burden of care  2 ) Patient will complete transfers supervision of 1 to decrease risk of falls, facilitate upright standing posture  3 ) BLE strength to greater than/equal to 4/5 gross musculature to increase ability to safely transfer, control descent to chair  4 ) Patient will exhibit increase dynamic standing to Good 3-5 minutes without LOB supervision of 1 to improve activity tolerance  5 ) Patient will exhibit increase dynamic ambulatory balance to Fair  feet w/AD supervision of 1 to improve ability to mobilize to toilet, chair and decrease risk for additional medical complications  6 ) Patient will exhibit good self monitoring and ability to follow 2 step commands to increase complexity of tasks and resume ADL's without LOB     PT Treatment Day 1  (please see tx note)   Plan Treatment/Interventions Functional transfer training;LE strengthening/ROM; Elevations; Therapeutic exercise; Endurance training;Patient/family training;Equipment eval/education; Bed mobility;Gait training; Compensatory technique education;Spoke to nursing;Spoke to case management   PT Frequency 3-5x/wk   Recommendation   PT Discharge Recommendation Home with home health rehabilitation   Equipment Recommended Gina Finley  (has own)   Ernesto Davis walker   Change/add to Recombine? No   Additional Comments Upon conclusion, pt  was resting on bedside w/Neha RN present w/medications  Additional Comments 2 Pt's raw score on the AM-[facility] Basic Mobility inpatient short form is 18, standardized score is 41 05  Patients at this level are likely to benefit from DC to [address]  However, please refer to therapist recommendation for safe DC planning  -[facility] Basic Mobility Inpatient   Turning in Bed Without Bedrails 4   Lying on Back to Sitting on Edge of Flat Bed 3   Moving Bed to Chair 3   Standing Up From Chair 3   Walk in Room 3   Climb 3-5 Stairs 2   Basic Mobility Inpatient Raw Score 18   Basic Mobility Standardized Score 41 05   Highest Level Of Mobility   JH-HLM Goal 6: Walk 10 steps or more   JH-HLM Achieved 7: Walk 25 feet or more   Additional Treatment Session   Start Time 0802   End Time 0812   Treatment Assessment Pt seen for PT treatment session this date with interventions consisting of therapeutic activity consisting of training: supine<>sit transfers, sit<>stand transfers, vc and tactile cues for static sitting posture faciliation, vc and tactile cues for static standing posture faciliation and breathing conservation technique education/utilization  Verbal and tactile facilitation for appropriate AD usage, proper body mechanics, breathing conservation technique utilization  Patient acknowledged and demonstrated understanding   PT will continue to see pt during current hospitalization in order to address the deficits listed above and provide interventions consistent w/ POC in effort to achieve LTGs     Equipment Use rolling walker   Additional Treatment Day 1     History/Personal Factors/Comorbidities: weakness,COVID-19,acute respiratory failure with hypoxia, anemia in chronic kidney disease, class 3 severe obesity,HTN     # of body structures/limitations: muscle weakness, activity intolerance,decreased endurance, impaired balance, gait deviations    Clinical presentation: unstable as seen in MARTIN with required supplemental O2 usage, hearing impairment, progressive symptoms prior to hospitalization,high fall risk      Dennie Ona, PT

## 2022-06-15 NOTE — CASE MANAGEMENT
Case Management Assessment & Discharge Planning Note    Patient name Elvia Barney  Location ED 23/ED 23 MRN [id number]  : 1945 Date 6/15/2022       Current Admission Date: 2022  Current Admission Diagnosis:COVID-19   Patient Active Problem List    Diagnosis Date Noted    COVID-19 2022    Acute respiratory failure with hypoxia ([facility] ) 2022    Elevated d-dimer 2022    Pancreatic mass 2022    Primary osteoarthritis of both knees 2021    Hyperlipidemia 2021    Class 2 severe obesity due to excess calories with serious comorbidity and body mass index (BMI) of 39 0 to 39 9 in adult [facility]) 2021    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 ([facility] ) 2021    Secondary hyperparathyroidism of renal origin ([place] [address] ) 2021    Chronic renal failure 2021    Other fluid overload 2020    Abnormal nuclear stress test 2020    Panlobular emphysema ([facility] ) 2020    Disorder of phosphorus metabolism, unspecified 2019    Essential hypertension 2019    Hemodialysis-associated hypotension 2019    AAA (abdominal aortic aneurysm) ([facility] ) 2019    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, initial encounter ([place] [address] ) 2019    Anemia in chronic kidney disease 2018    Benign prostatic hyperplasia without lower urinary tract symptoms 2018    Chronic gout, unspecified, with tophus (tophi) 2018    Iron deficiency anemia 2018    Kidney stone 2018    Magnesium deficiency 2018    Other disorders of electrolyte and fluid balance, not elsewhere classified 2018    Personal history of nicotine dependence 2018    Proteinuria, unspecified 2018    Unspecified protein-calorie malnutrition ([facility] ) 2018    Skin lesion of face 2018    Chronic systolic congestive heart failure ([facility] ) 2018    ASCVD (arteriosclerotic cardiovascular disease) 12/11/2017    Carotid stenosis, right 12/11/2017    Gastroesophageal reflux disease 12/11/2017    Vitamin D deficiency 12/11/2017    Osteoarthritis of both knees 09/02/2015      LOS (days): 1  Geometric Mean LOS (GMLOS) (days): 5 40  Days to GMLOS:4 7     OBJECTIVE:    Risk of Unplanned Readmission Score: 22 95         Current admission status: Inpatient     Preferred Pharmacy:   [facility][id number]- [place] [address]  [address]  [address], [address] [facility] [address]  Phone: [phone] Fax: [phone] [facility], [address] [place]  [address] [facility] [address]  Phone: [phone] Fax: [phone] [facility] [address], [address] [facility]  [address] [facility] [address]  Phone: [phone] Fax: [phone]    Primary Care Provider: Casie Burnette DO    Primary Insurance: [facility] REP  Secondary Insurance:     ASSESSMENT:  [address], [address]   Primary Phone: [phone] (Mobile)               Advance Directives  Does patient have a [address]?: Yes  Does patient have Advance Directives?: Yes  Advance Directives: Living will, Power of  for health care  Primary Contact: Daughters         Readmission Root Cause  30 Day Readmission: No    Patient Information  Admitted from[de-identified] Home  Mental Status: Alert  During Assessment patient was accompanied by: Not accompanied during assessment  Assessment information provided by[de-identified] Daughter  Primary Caregiver: Self  Support Systems: Family members, Daughter  [place] of Residence: [address] do you live in?: Via [employer] entry access options   Select all that apply : Stairs  Number of steps to enter home : 8  Do the steps have railings?: Yes  Type of Current Residence: [address] home  Upon entering residence, is there a bedroom on the main floor (no further steps)?: Yes  Upon entering residence, is there a bathroom on the main floor (no further steps)?: Yes  In the last 12 months, was there a time when you were not able to pay the mortgage or rent on time?: No  In the last 12 months, how many places have you lived?: 1  In the last 12 months, was there a time when you did not have a steady place to sleep or slept in a shelter (including now)?: No  Homeless/housing insecurity resource given?: No  Living Arrangements: Lives w/ Spouse/significant other  Is patient a ?: Yes  Is patient active with [facility])?: No    Activities of Daily Living Prior to Admission  Functional Status: Independent  Completes ADLs independently?: Yes  Ambulates independently?: Yes  Does patient use assisted devices?: Yes  Assisted Devices (DME) used: Pedro Bake  Does patient currently own DME?: Yes  What DME does the patient currently own?: Pedro Bake  Does patient have a history of Outpatient Therapy (PT/OT)?: No  Does the patient have a history of Short-Term Rehab?: No  Does patient have a history of HHC?: Yes ([facility])  Does patient currently have [address]?: No    Patient Information Continued  Income Source: Pension/halfway  Does patient have prescription coverage?: Yes  Within the past 12 months, you worried that your food would run out before you got the money to buy more : Never true  Within the past 12 months, the food you bought just didn't last and you didn't have money to get more : Never true  Food insecurity resource given?: N/A  Does patient receive dialysis treatments?:  ([facility]S [facility] [address] was going to [place] due to [address] or family transport)  Does patient have a history of substance abuse?: No  Does patient have a history of Mental Health Diagnosis?: No    Means of Transportation  Means of Transport to Appts[de-identified] Family transport  In the past 12 months, has lack of transportation kept you from medical appointments or from getting medications?: No  In the past 12 months, has lack of transportation kept you from meetings, work, or from getting things needed for daily living?: No  Was application for public transport provided?: N/A    DISCHARGE DETAILS:    Discharge planning discussed with[de-identified] Manuela Leonard of Choice: Yes  Comments - Freedom of Choice: Humza Cruz (Daughter)   [phone] (Mobile)  CM contacted family/caregiver?: Yes  Were Treatment Team discharge recommendations reviewed with patient/caregiver?: Yes  Did patient/caregiver verbalize understanding of patient care needs?: Yes  Were patient/caregiver advised of the risks associated with not following Treatment Team discharge recommendations?: Yes    Contacts  Patient Contacts: Humza Cruz (Daughter)   [phone] (Mobile)  Relationship to Patient[de-identified] Family  Contact Method: Phone  Phone Number: Laurie Akers (Daughter)   [phone] (Mobile)  Reason/Outcome: Discharge Planning, Emergency [address]         Is the patient interested in [facility] at discharge?: Yes  Via [facility] requested[de-identified] Nursing, Occupational Therapy, Physical [address] [place] [address] Ave Name[de-identified] [facility] Provider[de-identified] PCP  Home Health Services Needed[de-identified] Evaluate Functional Status and Safety, Gait/ADL Training, Strengthening/Theraputic Exercises to Improve Function, Other (comment) (COVID +  Med instruction  CP assessments)  Homebound Criteria Met[de-identified] Uses an Assist Device (i e  cane, walker, etc)  Supporting Clincal Findings[de-identified] Fatigues Easliy in [employer], Limited Endurance    DME Referral Provided  Referral made for DME?: No    Other Referral/Resources/Interventions Provided:  Interventions: Dialysis SW, [facility]    Treatment Team Recommendation: Home with [address] [employer]         Additional Comments: Call to daughter [place] and explained CM role    Per dtr patient's wife is at home on [facility] services  Kobi Muhammad reports that her and her sister taking turns staying with Patient and wife  Patient tested positive 6/12/22 for COVID  Home dialysis clinic is [facility] however started at [address] [place] [address] clinic Tuesday  Call to [address] ([phone]) and spoke with Alan Arambula and updated patient admitted to hospital   Spoke with therapy per recommendations home with Nikita Nobles  Referral made to [facility] via [address]

## 2022-06-15 NOTE — CONSULTS
Durene Curling Netzel 76 y o  male MRN: [id number]  Unit/Bed#: ED 23 Encounter: [id number]        Assessment and Plan:    1  ESRD on maintenance hemodialysis Tuesday, Thursday, Saturday  · Home unit is [address] [place] [address] but now undergoing HD a COVID cohort in [place] per patient  Last HD session was 6/14 and next treatment scheduled for tomorrow, 6/16 3 hours due to COVID-19 positive  Target weight is 110 kg  Dialysis treatments are complicated by intra dialytic hypotension and bradycardia  · Access:  Right AV fistula with appropriate bruit and thrill however aneurysmal   · Add fluid restriction  2  Secondary hyperparathyroidism of renal origin  · Continue current binders for now however may need to deescalate as his oral intake is poor  Not on activated vitamin-D agent  3  Anemia of ESRD  · Defer short-acting agent is he is on long-acting agent protocol in the outpatient setting  Transfuse for hemoglobin less than 7 0 grams/deciliter  No Venofer due to active infection  4  Intra dialytic hypotension  · Hold hydralazine before treatment  Will offer midodrine as needed to maintain appropriate blood pressure  5  Hypertension the setting of ESRD  · Blood pressure is not controlled and do not feel this is volume mediated  He is typically on candesartan as outpatient however not on formulary  Now on less potent losartan while hospitalized will increase to 50 mg b i d   6  COVID-19 pneumonia  · Management per primary team  7  Chronic diastolic congestive heart failure  · Examines compensated  Volume status is managed ultrafiltration    HPI:    Jesús Redman is a 76 y o  male with ESRD on hemodialysis who presented to [facility] emergency department 6/14 with chief complaint shortness of breath and cough  He was recently found to be COVID-19 positive on 06/13/2022      Other pertinent medical problems include hypertension, secondary hyperparathyroidism of renal origin, anemia of ESRD, obesity, CAD, chronic bradycardia, left osteoarthritis knee scheduled for knee replacement in August, CdHF    Upon discussion with the patient he relates HPI as above in addition:  Patient states that he had home test and PCR test which both showed positive earlier this week  He underwent hemodialysis at [facility] positive call port  He had hypoxia and referred to ER  He does still feel short of breath and has poor appetite  No other physical complaints on exam     From a Nephrology perspective undergoes hemodialysis Tuesday, Thursday, Saturday at [address] [place] [address] Drive  Currently he is going to [address] [facility] in [place]  His dry weight is around 110 kg  He does have problems with intra dialytic hypotension  He reduce his antihypertensives prior to dialysis on dialysis days  He has a fistula  He has been on dialysis for 4 years  He follows with Dr Fahad Herrera  Reason for Consult:  ESRD on hemodialysis    Review of Systems:  A complete 10-point review of systems was performed  Aside from what was mentioned in the HPI, it is otherwise negative  Historical Information   Past Medical History:   Diagnosis Date    Abdominal aortic aneurysm (AAA) ([address] )     s/p repair    Anemia     due to kidney disease    Arthritis     CHF (congestive heart failure) (HCC)     stable at present    Chronic kidney disease     on hemodialysis Tu Th Sat    Chronic pain disorder     knees    Edema     GERD (gastroesophageal reflux disease)     no meds     Gout     History of echocardiogram 03/02/2018    EF 55%, mild LVH, technically difficult study      Hyperlipidemia     borderline no meds    Hyperparathyroidism ([address] )     Hypertension     Kidney stone     Seasonal allergies     Shortness of breath     mild exertional    Skin cancer     Vitamin D deficiency      Past Surgical History:   Procedure Laterality Date    ABDOMINAL AORTIC ANEURYSM REPAIR  2004    CARDIAC CATHETERIZATION  05/01/2020    no blockages    CARPAL TUNNEL RELEASE Right     COLONOSCOPY      DIALYSIS FISTULA CREATION Right 2017    GLAUCOMA SURGERY      LEG SURGERY      removal splinter    PARATHYROIDECTOMY      SPLIT THICKNESS SKIN GRAFT N/A 2019    Procedure: THIGH STSG;  Surgeon: Shannon Fortune MD;  Location: [address] [place] [address];  Service: Plastics    SQUAMOUS CELL CARCINOMA EXCISION Right 2019    Procedure: REMOVE SKIN CA FROM EAR & CHEEK;  Surgeon: Shannon Fortune MD;  Location: [address] [place] [address];  Service: Plastics     Social History   Social History     Substance and Sexual Activity   Alcohol Use Not Currently     Social History     Substance and Sexual Activity   Drug Use Never     Social History     Tobacco Use   Smoking Status Former Smoker    Types: Cigarettes    Quit date: 2004    Years since quittin 0   Smokeless Tobacco Never Used       Family History:   Family History   Problem Relation Age of Onset    Kidney disease Mother     Leukemia Father        Medications:  Pertinent medications were reviewed  Current Facility-Administered Medications   Medication Dose Route Frequency Provider Last Rate    acetaminophen  650 mg Oral Q4H PRN Loco Fling, DO      albuterol  2 puff Inhalation Q4H PRN Loco Fling, DO      allopurinol  200 mg Oral Daily Loco Fling, DO      ascorbic acid  500 mg Oral BID Loco Fling, DO      calcium acetate  1,334 mg Oral TID With Meals Loco Fling, DO      co-enzyme Q-10  200 mg Oral Daily Loco Fling, DO      dexamethasone  6 mg Intravenous Q24H Loco Fling, DO      ferrous sulfate  325 mg Oral Daily With Breakfast Loco Fling, DO      folic acid  1 mg Oral Daily Loco Fling, DO      heparin (porcine)  3-20 Units/kg/hr (Order-Specific) Intravenous Titrated Loco Fling, DO 10 1 Units/kg/hr (06/15/22 0600)    hydrALAZINE  100 mg Oral BID Loco Fling, DO      labetalol  10 mg Intravenous Q4H PRN Loco Fling, DO      losartan  50 mg Oral Daily Curt Garcia, DO      magnesium oxide  400 mg Oral Daily Curt Garcia, DO      metoprolol succinate  50 mg Oral BID Curt Garcia, DO      ondansetron  4 mg Oral Q6H PRN Curt Garcia, DO      remdesivir  100 mg Intravenous Q24H Curt Garcia, DO      sevelamer  1,600 mg Oral TID With Meals Curt Garcia, DO           Allergies   Allergen Reactions    Iodine - Food Allergy      Other reaction(s): Respiratory Distress  "swelling of throat"    Levofloxacin      Other reaction(s): Respiratory Distress  "swelling of throat"    Lovastatin      Muscle weakness    Medical Tape Other (See Comments)     Skin breaks down    Shellfish-Derived Products - Food Allergy          Vitals:   BP (!) 217/87   Pulse (!) 51   Temp 98 3 °F (36 8 °C)   Resp 21   Ht 5' 8" (1 727 m)   Wt 109 kg (240 lb 4 8 oz)   SpO2 95%   BMI 36 54 kg/m²   Body mass index is 36 54 kg/m²  SpO2: 95 %,   SpO2 Activity: At Rest,   O2 Device: Nasal cannula    No intake or output data in the 24 hours ending 06/15/22 1009  Invasive Devices  Report    Peripheral Intravenous Line  Duration           Peripheral IV 06/14/22 Left Antecubital 1 day    Peripheral IV 06/14/22 Left Wrist 1 day                Physical Exam:  General: conscious, cooperative, in no acute distress  Eyes: conjunctivae pink, anicteric sclerae  ENT: lips and mucous membranes moist  Neck: supple, no JVD, no masses  Chest:  Very diminished breath sounds bilaterally, scattered rhonchi  CVS: S1 & S2, normal rate, regular rhythm  Abdomen: soft, non-tender, non-distended, normoactive bowel sounds  Extremities: no edema of both legs  Skin: no rash  Neuro: awake, alert, oriented      Diagnostic Data:  Lab: I have personally reviewed pertinent lab results  ,   CBC:  Results from last 7 days   Lab Units 06/15/22  0408   WBC Thousand/uL 9 50   HEMOGLOBIN g/dL 9 3*   HEMATOCRIT % 29 1*   PLATELETS Thousands/uL 194      CMP:   Lab Results   Component Value Date    SODIUM 131 (L) 06/15/2022    K 4 6 06/15/2022    CL 92 (L) 06/15/2022    CO2 29 06/15/2022    BUN 36 (H) 06/15/2022    CREATININE 5 13 (H) 06/15/2022    CALCIUM 8 5 06/15/2022    AST 31 06/14/2022    ALT 17 06/14/2022    ALKPHOS 51 06/14/2022    EGFR 9 06/15/2022   ,   PT/INR:   Lab Results   Component Value Date    INR 0 98 06/14/2022   ,   Magnesium: No components found for: MAG,  Phosphorous: No results found for: PHOS    Microbiology:  @LABNT,(urinecx:7)@        REMY Rosas    Portions of the record may have been created with voice recognition software  Occasional wrong word or "sound a like" substitutions may have occurred due to the inherent limitations of voice recognition software  Read the chart carefully and recognize, using context, where substitutions have occurred

## 2022-06-15 NOTE — ASSESSMENT & PLAN NOTE
· COVID-19 positive on 06/13/2022  · Vaccinated x2 against COVID-19  · Endorses cough, shortness of breath  · Complicated by acute hypoxic respiratory failure  · Remdesivir day 2/5  · Decadron day 2/10  · Heparin infusion in the setting of elevated D-dimer and high risk for venous thromboembolism  · Respiratory protocol  · Up and out of bed as tolerated  · Incentive spirometry  · Ambulate patient

## 2022-06-15 NOTE — PLAN OF CARE
Problem: PHYSICAL THERAPY ADULT  Goal: Performs mobility at highest level of function for planned discharge setting  See evaluation for individualized goals  Description: Treatment/Interventions: Functional transfer training, LE strengthening/ROM, Elevations, Therapeutic exercise, Endurance training, Patient/family training, Equipment eval/education, Bed mobility, Gait training, Compensatory technique education, Spoke to nursing, Spoke to case management  Equipment Recommended: Fela Tang (has own)       See flowsheet documentation for full assessment, interventions and recommendations  Note: Prognosis: Good  Problem List: Decreased strength, Decreased endurance, Impaired balance, Decreased mobility, Obesity, Impaired hearing, Decreased coordination  Assessment: Pt is 76 y o  male seen for PT evaluation [facility] w/ COVID-19  PT consulted to assess pt's functional mobility and d/c needs  Order placed for PT eval and tx, w/ up and OOB as tolerated order  Comorbidities affecting pt's physical performance at time of assessment include: weakness,COVID-19,acute respiratory failure with hypoxia, anemia in chronic kidney disease, class 3 severe obesity,HTN   PTA, pt was independent w/ all functional mobility w/ AD usage  Personal factors affecting pt at time of IE include: inability to navigate community distances, inability to navigate level surfaces w/o external assistance, unable to perform dynamic tasks in community, positive fall history, hearing impairments, unable to perform physical activity, limited insight into impairments and inability to perform ADLs  Please find objective findings from PT assessment regarding body systems outlined above with impairments and limitations including weakness, impaired balance, decreased endurance, impaired coordination, gait deviations, decreased activity tolerance, decreased functional mobility tolerance, fall risk and SOB upon exertion  From PT/mobility standpoint, recommendation at time of d/c would be home with home health rehabilitation pending progress in order to facilitate return to PLOF  PT Discharge Recommendation: Home with home health rehabilitation          See flowsheet documentation for full assessment

## 2022-06-15 NOTE — PROGRESS NOTES
[address]  Progress Note - Bonilla Pereyra 1945, 76 y o  male MRN: [id number]  Unit/Bed#: -01 Encounter: [id number]  Primary Care Provider: Miranda Jean DO   Date and time admitted to hospital: 6/14/2022  1:53 PM    * COVID-19 virus infection  Assessment & Plan  · COVID-19 positive on 06/13/2022  · Vaccinated x2 against COVID-19  · Endorses cough, shortness of breath  · Complicated by acute hypoxic respiratory failure  · Remdesivir day 2/5  · Decadron day 2/10  · Heparin infusion in the setting of elevated D-dimer and high risk for venous thromboembolism  · Respiratory protocol  · Up and out of bed as tolerated  · Incentive spirometry  · Ambulate patient    Acute respiratory failure with hypoxia (HCC)  Assessment & Plan  · Present on admission likely secondary to COVID -19 viral pneumonia  · Requiring 3 L of nasal cannula supplemental O2 on presentation  · Does not use oxygen in the outpatient setting  · Wean O2 as tolerated  · Goal SpO2 > 90%   · Respiratory protocol    Anemia in chronic kidney disease  Assessment & Plan  · Hemoglobin stable and at baseline  · Continue home ferrous sulfate  · Trend CBC     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 [facility])  Assessment & Plan  Lab Results   Component Value Date    EGFR 9 06/15/2022    EGFR 13 06/14/2022    EGFR 9 04/29/2022    CREATININE 5 13 (H) 06/15/2022    CREATININE 3 87 (H) 06/14/2022    CREATININE 5 44 (H) 04/29/2022   · Tuesday/Thursday/Saturday outpatient hemodialysis schedule  · Nephrology input appreciated  · Continue home allopurinol, PhosLo, sevelamer  · Trend BMP  · Avoid nephrotoxic agents    Class 2 severe obesity due to excess calories with serious comorbidity and body mass index (BMI) of 39 0 to 39 9 in adult [facility])  Assessment & Plan  · Present on admission as evidenced by BMI greater than 35  · Endorse lifestyle modifications and weight loss     Essential hypertension  Assessment & Plan  · Hypertensive thus far during hospital course  · Continue home hydralazine, losartan, metoprolol  · Increase hydralazine to 100 mg TID   · Labetalol as needed for SBP > 180 mmHg  · Monitor blood pressures      VTE Prophylaxis:  Heparin Drip    Patient Centered Rounds: I have performed bedside rounds with nursing staff today  Discussions with Specialists or Other Care Team Provider: renal   Education and Discussions with Family / Patient: patient and daughter via phone     Current Length of Stay: 1 day(s)    Current Patient Status: Inpatient   Certification Statement: The patient will continue to require additional inpatient hospital stay due to covid 19 infection     Discharge Plan: pending hospital course     Code Status: Level 3 - DNAR and DNI    Subjective:   Feeling "lousy" daughter states on the phone that he "sounds much better today"  Patient denies any pain  Objective:     Vitals:   Temp (24hrs), Av 4 °F (36 9 °C), Min:98 °F (36 7 °C), Max:98 6 °F (37 °C)    Temp:  [98 °F (36 7 °C)-98 6 °F (37 °C)] 98 6 °F (37 °C)  HR:  [50-77] 59  Resp:  [18-26] 22  BP: (136-222)/() 188/90  SpO2:  [93 %-97 %] 97 %  Body mass index is 36 54 kg/m²  Input and Output Summary (last 24 hours):     No intake or output data in the 24 hours ending 06/15/22 1416    Physical Exam:   Physical Exam  Vitals and nursing note reviewed  Constitutional:       General: He is not in acute distress  Appearance: Normal appearance  He is ill-appearing  HENT:      Head: Normocephalic and atraumatic  Right Ear: External ear normal       Left Ear: External ear normal       Nose: Nose normal       Mouth/Throat:      Mouth: Mucous membranes are moist       Pharynx: Oropharynx is clear  Eyes:      General:         Right eye: No discharge  Left eye: No discharge  Extraocular Movements: Extraocular movements intact  Pupils: Pupils are equal, round, and reactive to light     Cardiovascular: Rate and Rhythm: Normal rate and regular rhythm  Pulses: Normal pulses  Heart sounds: Normal heart sounds  No murmur heard  Pulmonary:      Effort: Pulmonary effort is normal  No respiratory distress  Breath sounds: Rhonchi present  No wheezing or rales  Abdominal:      General: Bowel sounds are normal  There is no distension  Palpations: Abdomen is soft  There is no mass  Tenderness: There is no abdominal tenderness  Musculoskeletal:         General: No swelling, tenderness or deformity  Normal range of motion  Cervical back: Normal range of motion and neck supple  No rigidity  Skin:     General: Skin is warm and dry  Capillary Refill: Capillary refill takes less than 2 seconds  Coloration: Skin is pale  Findings: No erythema  Neurological:      General: No focal deficit present  Mental Status: He is alert and oriented to person, place, and time  Mental status is at baseline  Psychiatric:         Mood and Affect: Mood normal          Behavior: Behavior normal          Thought Content: Thought content normal          Judgment: Judgment normal          Additional Data:     Labs:    Results from last 7 days   Lab Units 06/15/22  0408   WBC Thousand/uL 9 50   HEMOGLOBIN g/dL 9 3*   HEMATOCRIT % 29 1*   PLATELETS Thousands/uL 194   NEUTROS PCT % 88*   LYMPHS PCT % 5*   MONOS PCT % 6   EOS PCT % 0     Results from last 7 days   Lab Units 06/15/22  0408 06/14/22  1425   SODIUM mmol/L 131* 135   POTASSIUM mmol/L 4 6 3 9   CHLORIDE mmol/L 92* 91*   CO2 mmol/L 29 30   BUN mg/dL 36* 24   CREATININE mg/dL 5 13* 3 87*   CALCIUM mg/dL 8 5 8 4   ALK PHOS U/L  --  51   ALT U/L  --  17   AST U/L  --  31     Results from last 7 days   Lab Units 06/14/22  1425   INR  0 98               * I Have Reviewed All Lab Data Listed Above  * Additional Pertinent Lab Tests Reviewed:  All Labs For Current Hospital Admission  Reviewed    Imaging:  Imaging Reports Reviewed Today Include: cxr     Recent Cultures (last 7 days):     Results from last 7 days   Lab Units 06/14/22  1426 06/14/22  1425   BLOOD CULTURE  Received in Microbiology Lab  Culture in Progress  Received in Microbiology Lab  Culture in Progress  Last 24 Hours Medication List:   Current Facility-Administered Medications   Medication Dose Route Frequency Provider Last Rate    acetaminophen  650 mg Oral Q4H PRN Yvonnie Shave, DO      albuterol  2 puff Inhalation Q4H PRN Yvonnie Shave, DO      allopurinol  200 mg Oral Daily Yvonnie Shave, DO      ascorbic acid  500 mg Oral BID Yvonnie Shave, DO      calcium acetate  1,334 mg Oral TID With Meals Yvonnie Shave, DO      cholecalciferol  5,000 Units Oral Daily [place] HalMAYA wrightNP      co-enzyme Q-10  200 mg Oral Daily Yvonnie Shave, DO      dexamethasone  6 mg Intravenous Q24H Yvonnie Shave, DO      ferrous sulfate  325 mg Oral Daily With Breakfast Yvonnie Shave, DO      folic acid  1 mg Oral Daily Yvonnie Shave, DO      heparin (porcine)  3-20 Units/kg/hr (Order-Specific) Intravenous Titrated Yvonnie Shave, DO 8 1 Units/kg/hr (06/15/22 1347)    hydrALAZINE  100 mg Oral Q8H [facility] REMY Aranda      labetalol  10 mg Intravenous Q4H PRN Yvonnie Shave, DO      lidocaine   Topical Daily PRN [place] Halt, MAYANP      losartan  50 mg Oral BID [place] Halt, MAYANP      magnesium oxide  400 mg Oral Daily Yvonnie Shave, DO      metoprolol succinate  50 mg Oral BID Yvonnie Shave, DO      ondansetron  4 mg Oral Q6H PRN Yvonnie Shave, DO      remdesivir  100 mg Intravenous Q24H Yvonnie Shave, DO      sevelamer  1,600 mg Oral TID With Meals Ygrayie Shave, DO      zinc sulfate  220 mg Oral Daily With Lunch REMY Sepulveda          Today, Patient Was Seen By: REMY Aranda    ** Please Note: Dictation voice to text software may have been used in the creation of this document   **

## 2022-06-15 NOTE — ED NOTES
Pt  hold infusion for 1 hr and will decrease drip by 3 u/kg/hr per protocol  [facility]  06/15/22 5340

## 2022-06-15 NOTE — PLAN OF CARE
Problem: Prexisting or High Potential for Compromised Skin Integrity  Goal: Skin integrity is maintained or improved  Description: INTERVENTIONS:  - Identify patients at risk for skin breakdown  - Assess and monitor skin integrity  - Assess and monitor nutrition and hydration status  - Monitor labs   - Assess for incontinence   - Turn and reposition patient  - Assist with mobility/ambulation  - Relieve pressure over bony prominences  - Avoid friction and shearing  - Provide appropriate hygiene as needed including keeping skin clean and dry  - Evaluate need for skin moisturizer/barrier cream  - Collaborate with interdisciplinary team   - Patient/family teaching  - Consider wound care consult   Outcome: Progressing     Problem: Potential for Falls  Goal: Patient will remain free of falls  Description: INTERVENTIONS:  - Educate patient/family on patient safety including physical limitations  - Instruct patient to call for assistance with activity   - Consult OT/PT to assist with strengthening/mobility   - Keep Call bell within reach  - Keep bed low and locked with side rails adjusted as appropriate  - Keep care items and personal belongings within reach  - Initiate and maintain comfort rounds  - Make Fall Risk Sign visible to staff  - Offer Toileting every 2 Hours, in advance of need  - Initiate/Maintain bed alarm  - Obtain necessary fall risk management equipment: walker   - Apply yellow socks and bracelet for high fall risk patients  - Consider moving patient to room near nurses station  Outcome: Progressing     Problem: PAIN - ADULT  Goal: Verbalizes/displays adequate comfort level or baseline comfort level  Description: Interventions:  - Encourage patient to monitor pain and request assistance  - Assess pain using appropriate pain scale  - Administer analgesics based on type and severity of pain and evaluate response  - Implement non-pharmacological measures as appropriate and evaluate response  - Consider cultural and social influences on pain and pain management  - Notify physician/advanced practitioner if interventions unsuccessful or patient reports new pain  Outcome: Progressing     Problem: INFECTION - ADULT  Goal: Absence or prevention of progression during hospitalization  Description: INTERVENTIONS:  - Assess and monitor for signs and symptoms of infection  - Monitor lab/diagnostic results  - Monitor all insertion sites, i e  indwelling lines, tubes, and drains  - Monitor endotracheal if appropriate and nasal secretions for changes in amount and color  - [place] appropriate cooling/warming therapies per order  - Administer medications as ordered  - Instruct and encourage patient and family to use good hand hygiene technique  - Identify and instruct in appropriate isolation precautions for identified infection/condition  Outcome: Progressing  Goal: Absence of fever/infection during neutropenic period  Description: INTERVENTIONS:  - Monitor WBC    Outcome: Progressing     Problem: SAFETY ADULT  Goal: Maintain or return to baseline ADL function  Description: INTERVENTIONS:  -  Assess patient's ability to carry out ADLs; assess patient's baseline for ADL function and identify physical deficits which impact ability to perform ADLs (bathing, care of mouth/teeth, toileting, grooming, dressing, etc )  - Assess/evaluate cause of self-care deficits   - Assess range of motion  - Assess patient's mobility; develop plan if impaired  - Assess patient's need for assistive devices and provide as appropriate  - Encourage maximum independence but intervene and supervise when necessary  - Involve family in performance of ADLs  - Assess for home care needs following discharge   - Consider OT consult to assist with ADL evaluation and planning for discharge  - Provide patient education as appropriate  Outcome: Progressing  Goal: Maintains/Returns to pre admission functional level  Description: INTERVENTIONS:  - Perform BMAT or MOVE assessment daily    - Set and communicate daily mobility goal to care team and patient/family/caregiver  - Collaborate with rehabilitation services on mobility goals if consulted  - Perform Range of Motion 3 times a day  - Reposition patient every 2 hours  - Dangle patient 3 times a day  - Stand patient 3 times a day  - Ambulate patient 3 times a day  - Out of bed to chair 3 times a day   - Out of bed for meals 3 times a day  - Out of bed for toileting  - Record patient progress and toleration of activity level   Outcome: Progressing     Problem: DISCHARGE PLANNING  Goal: Discharge to home or other facility with appropriate resources  Description: INTERVENTIONS:  - Identify barriers to discharge w/patient and caregiver  - Arrange for needed discharge resources and transportation as appropriate  - Identify discharge learning needs (meds, wound care, etc )  - Arrange for interpretive services to assist at discharge as needed  - Refer to Case Management Department for coordinating discharge planning if the patient needs post-hospital services based on physician/advanced practitioner order or complex needs related to functional status, cognitive ability, or social support system  Outcome: Progressing     Problem: Knowledge Deficit  Goal: Patient/family/caregiver demonstrates understanding of disease process, treatment plan, medications, and discharge instructions  Description: Complete learning assessment and assess knowledge base    Interventions:  - Provide teaching at level of understanding  - Provide teaching via preferred learning methods  Outcome: Progressing     Problem: MOBILITY - ADULT  Goal: Maintain or return to baseline ADL function  Description: INTERVENTIONS:  -  Assess patient's ability to carry out ADLs; assess patient's baseline for ADL function and identify physical deficits which impact ability to perform ADLs (bathing, care of mouth/teeth, toileting, grooming, dressing, etc )  - Assess/evaluate cause of self-care deficits   - Assess range of motion  - Assess patient's mobility; develop plan if impaired  - Assess patient's need for assistive devices and provide as appropriate  - Encourage maximum independence but intervene and supervise when necessary  - Involve family in performance of ADLs  - Assess for home care needs following discharge   - Consider OT consult to assist with ADL evaluation and planning for discharge  - Provide patient education as appropriate  Outcome: Progressing  Goal: Maintains/Returns to pre admission functional level  Description: INTERVENTIONS:  - Perform BMAT or MOVE assessment daily    - Set and communicate daily mobility goal to care team and patient/family/caregiver  - Collaborate with rehabilitation services on mobility goals if consulted  - Perform Range of Motion 3 times a day  - Reposition patient every 2 hours    - Dangle patient 3 times a day  - Stand patient 3 times a day  - Ambulate patient 3 times a day  - Out of bed to chair 3 times a day   - Out of bed for meals 3 times a day  - Out of bed for toileting  - Record patient progress and toleration of activity level   Outcome: Progressing

## 2022-06-16 NOTE — PROGRESS NOTES
NEPHROLOGY PROGRESS NOTE   Amy Allred 76 y o  male MRN: [id number]  Unit/Bed#: -01 Encounter: [id number]    Assessment/Plan:    Amy Allred is a 76 y o  male with ESRD on hemodialysis admitted 06/14 for acute hypoxic respiratory failure secondary to COVID-19 pneumonia  Nephrology following along for ESRD on dialysis-plan outlined below  1  ESRD on maintenance hemodialysis Tuesday, Thursday, Saturday at [address] [place] [address]  ? Target weight 110 kg and this is current weight today  Will ultrafiltrate around 1 L as tolerated to maximize volume status in the setting of COVID-19 pneumonia and acute hypoxic respiratory failure  Next hemodialysis session will be Saturday, 6/18  Monitor patient for intra dialytic hypotension bradycardia as is the common occurrence in the outpatient setting  Antihypertensive medications have been on hold per nursing staff  ? Continue fluid restriction  ? Access:  Right AV fistula aneurysmal with appropriate bruit and thrill  2  Secondary hyperparathyroidism of renal origin  ? Not on activated vitamin-D agent as an outpatient     Periodically monitor phosphorus level as binders may need to be deescalated  3  Anemia of ESRD  ? Hemoglobin 10 6 grams/deciliter  Receives long-acting agent as an outpatient therefore will defer short-acting agent  Not a candidate for Venofer due to active infection  4  Intra dialytic hypotension  ? Antihypertensive agents on hold and will receive dialysis  Monitor for intra dialytic hypotension    5  Hypertension the setting of ESRD  ? Blood pressure is elevated however will not treat due to upcoming dialysis session  Of note, On losartan 100 mg daily while inpatient however typically on more potent candesartan as an outpatient  6  COVID-19 pneumonia  ? Management per primary team   Placed on vitamins  7  Chronic diastolic congestive heart failure  ? Continue to manage volume status with ultrafiltration  Did receive Lasix yesterday  Examines compensated      ROS  States he feels better after self proning  A complete 10 point review of systems have been performed and are otherwise negative  Historical Information   Past Medical History:   Diagnosis Date    Abdominal aortic aneurysm (AAA) ([facility] [address] )     s/p repair    Anemia     due to kidney disease    Arthritis     CHF (congestive heart failure) (HCC)     stable at present    Chronic kidney disease     on hemodialysis  Sat    Chronic pain disorder     knees    Edema     GERD (gastroesophageal reflux disease)     no meds     Gout     History of echocardiogram 2018    EF 55%, mild LVH, technically difficult study      Hyperlipidemia     borderline no meds    Hyperparathyroidism ([facility] [address] )     Hypertension     Kidney stone     Seasonal allergies     Shortness of breath     mild exertional    Skin cancer     Vitamin D deficiency      Past Surgical History:   Procedure Laterality Date    ABDOMINAL AORTIC ANEURYSM REPAIR      CARDIAC CATHETERIZATION  2020    no blockages    CARPAL TUNNEL RELEASE Right     COLONOSCOPY      DIALYSIS FISTULA CREATION Right 2017    GLAUCOMA SURGERY      LEG SURGERY      removal splinter    PARATHYROIDECTOMY      SPLIT THICKNESS SKIN GRAFT N/A 2019    Procedure: THIGH STSG;  Surgeon: Boris Nielson MD;  Location: [facility] MAIN OR;  Service: Plastics    SQUAMOUS CELL CARCINOMA EXCISION Right 2019    Procedure: REMOVE SKIN CA FROM EAR & CHEEK;  Surgeon: Boris Nielson MD;  Location: [facility] MAIN OR;  Service: Plastics     Social History   Social History     Substance and Sexual Activity   Alcohol Use Not Currently     Social History     Substance and Sexual Activity   Drug Use Never     Social History     Tobacco Use   Smoking Status Former Smoker    Types: Cigarettes    Quit date: 2004    Years since quittin 0   Smokeless Tobacco Never Used       Family History:   Family History   Problem Relation Age of Onset  Kidney disease Mother     Leukemia Father        Medications:  Pertinent medications were reviewed  Current Facility-Administered Medications   Medication Dose Route Frequency Provider Last Rate    acetaminophen  650 mg Oral Q4H PRN Kelsie Speaker, DO      albuterol  2 puff Inhalation Q4H PRN Kelsie Speaker, DO      allopurinol  200 mg Oral Daily Kelsie Speaker, DO      ascorbic acid  500 mg Oral BID Kelsie Speaker, DO      benzonatate  100 mg Oral TID VenREMY Bender      calcium acetate  1,334 mg Oral TID With Meals Kelsie Speaker, DO      cholecalciferol  5,000 Units Oral Daily Allred Dunks, MAYANP      co-enzyme Q-10  200 mg Oral Daily Kelsie Speaker, DO      dexamethasone  6 mg Intravenous Q24H Kelsie Speaker, DO      ferrous sulfate  325 mg Oral Daily With Breakfast Kelsie Speaker, DO      folic acid  1 mg Oral Daily Kelsie Speaker, DO      heparin (porcine)  3-20 Units/kg/hr (Order-Specific) Intravenous Titrated Kelsie Speaker, DO 12 1 Units/kg/hr (06/16/22 1155)    hydrALAZINE  100 mg Oral Q8H [address] [place] REMY Knight      labetalol  10 mg Intravenous Q4H PRN Kelsie Speaker, DO      lidocaine   Topical Daily PRN Allred Dunks, CRNP      losartan  50 mg Oral BID Allred Dunks, MAYANP      magnesium oxide  400 mg Oral Daily Kelsie Speaker, DO      metoprolol succinate  50 mg Oral BID Kelsie Speaker, DO      ondansetron  4 mg Oral Q6H PRN Kelsie Speaker, DO      remdesivir  100 mg Intravenous Q24H Kelsie Speaker, DO      sevelamer  1,600 mg Oral TID With Meals Kelsie Speaker, DO      zinc sulfate  220 mg Oral Daily With Lunch MAYA RiddleNP           Allergies   Allergen Reactions    Iodine - Food Allergy      Other reaction(s): Respiratory Distress  "swelling of throat"    Levofloxacin      Other reaction(s): Respiratory Distress  "swelling of throat"    Lovastatin      Muscle weakness    Medical Tape Other (See Comments)     Skin breaks down    Shellfish-Derived Products - Food Allergy          Vitals:   /70   Pulse (!) 54   Temp 98 3 °F (36 8 °C)   Resp 18   Ht 5' 8" (1 727 m)   Wt 110 kg (242 lb 8 1 oz)   SpO2 95%   BMI 36 87 kg/m²   Body mass index is 36 87 kg/m²  SpO2: 95 %,   SpO2 Activity: At Rest,   O2 Device: Nasal cannula      Intake/Output Summary (Last 24 hours) at 6/16/2022 1351  Last data filed at 6/16/2022 0811  Gross per 24 hour   Intake 600 ml   Output 400 ml   Net 200 ml     Invasive Devices  Report    Peripheral Intravenous Line  Duration           Peripheral IV 06/16/22 Dorsal (posterior); Left Hand <1 day                Physical Exam  General: conscious, cooperative, in no acute distress  Eyes: conjunctivae pink, anicteric sclerae  ENT: lips and mucous membranes moist  Neck: supple, no JVD, no masses  Chest:  Very diminished breath sounds bilaterally, scattered rhonchi, nasal cannula  CVS: S1 & S2, normal rate, regular rhythm  Abdomen: soft, non-tender, non-distended, normoactive bowel sounds  Extremities: no edema of both legs right AV fistula  Skin: no rash  Neuro: awake, alert, oriented      Diagnostic Data:  Lab: I have personally reviewed pertinent lab results  ,   CBC:  Results from last 7 days   Lab Units 06/16/22  1043   WBC Thousand/uL 12 40*   HEMOGLOBIN g/dL 10 6*   HEMATOCRIT % 32 6*   PLATELETS Thousands/uL 293      CMP:   Lab Results   Component Value Date    SODIUM 130 (L) 06/16/2022    K 4 6 06/16/2022    CL 88 (L) 06/16/2022    CO2 28 06/16/2022    BUN 73 (H) 06/16/2022    CREATININE 7 16 (H) 06/16/2022    CALCIUM 8 5 06/16/2022    AST 32 06/16/2022    ALT 21 06/16/2022    ALKPHOS 51 06/16/2022    EGFR 6 06/16/2022   ,   PT/INR: No results found for: PT, INR,   Magnesium: No components found for: MAG,  Phosphorous: No results found for: PHOS    Microbiology:  @LABRCNTIP,(urinecx:7)@        Matt Russ, REMY    Portions of the record may have been created with voice recognition software   Occasional wrong word or "sound a like" substitutions may have occurred due to the inherent limitations of voice recognition software  Read the chart carefully and recognize, using context, where substitutions have occurred

## 2022-06-16 NOTE — OCCUPATIONAL THERAPY NOTE
Occupational Therapy Evaluation      Viviane Navarro    6/16/2022    Patient Active Problem List   Diagnosis    Essential hypertension    Hemodialysis-associated hypotension    AAA (abdominal aortic aneurysm) ([facility])    Chronic renal failure    Primary osteoarthritis of both knees    Hyperlipidemia    Class 2 severe obesity due to excess calories with serious comorbidity and body mass index (BMI) of 39 0 to 39 9 in adult [facility])    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 [facility])    Secondary hyperparathyroidism of renal origin ([facility] [address] )    Abnormal nuclear stress test    Anemia in chronic kidney disease, on chronic dialysis ([facility])    ASCVD (arteriosclerotic cardiovascular disease)    Benign prostatic hyperplasia without lower urinary tract symptoms    Carotid stenosis, right    Chronic gout, unspecified, with tophus (tophi)    Chronic systolic congestive heart failure ([facility])    Disorder of phosphorus metabolism, unspecified    Gastroesophageal reflux disease    Iron deficiency anemia    Kidney stone    Magnesium deficiency    Osteoarthritis of both knees    Other disorders of electrolyte and fluid balance, not elsewhere classified    Other fluid overload    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, initial encounter ([facility] )    Panlobular emphysema ([facility] )    Personal history of nicotine dependence    Proteinuria, unspecified    Skin lesion of face    Unspecified protein-calorie malnutrition ([facility])    Vitamin D deficiency    Elevated d-dimer    Pancreatic mass    COVID-19 virus infection    Acute respiratory failure with hypoxia ([facility])       Past Medical History:   Diagnosis Date    Abdominal aortic aneurysm (AAA) ([facility] [address] )     s/p repair    Anemia     due to kidney disease    Arthritis     CHF (congestive heart failure) ([facility])     stable at present    Chronic kidney disease     on hemodialysis Tu Th Sat    Chronic pain disorder     knees    Edema     GERD (gastroesophageal reflux disease)     no meds     Gout     History of echocardiogram 03/02/2018    EF 55%, mild LVH, technically difficult study  Hyperlipidemia     borderline no meds    Hyperparathyroidism ([address] )     Hypertension     Kidney stone     Seasonal allergies     Shortness of breath     mild exertional    Skin cancer     Vitamin D deficiency        Past Surgical History:   Procedure Laterality Date    ABDOMINAL AORTIC ANEURYSM REPAIR  2004    CARDIAC CATHETERIZATION  05/01/2020    no blockages    CARPAL TUNNEL RELEASE Right     COLONOSCOPY      DIALYSIS FISTULA CREATION Right 2017    GLAUCOMA SURGERY      LEG SURGERY      removal splinter    PARATHYROIDECTOMY      SPLIT THICKNESS SKIN GRAFT N/A 12/4/2019    Procedure: THIGH STSG;  Surgeon: Carolina Soto MD;  Location: [facility] MAIN OR;  Service: Plastics    SQUAMOUS CELL CARCINOMA EXCISION Right 12/4/2019    Procedure: REMOVE SKIN CA FROM EAR & CHEEK;  Surgeon: Carolina Soto MD;  Location: [facility] MAIN OR;  Service: Plastics        06/16/22 1403   OT Last Visit   OT Visit Date 06/16/22   Note Type   Note type Evaluation   Additional Comments Pt agreeable to OT eval  Upon arrival pt seated at EOB  Restrictions/Precautions   Weight Bearing Precautions Per Order No   Other Precautions Contact/isolation; Airborne/isolation;Multiple lines;Telemetry;O2;Fall Risk;Pain  (2 L O2 via NC)   Pain Assessment   Pain Assessment Tool 0-10   Pain Score No Pain   Home Living   Type of Home House   Home Layout Two level;Performs ADLs on one level; Able to live on main level with bedroom/bathroom;Stairs to enter with rails  (3 KARUNA)   Bathroom Shower/Tub Walk-in shower   Bathroom Toilet Raised   Bathroom Equipment Built-in shower seat;Grab bars in shower;Commode   [address] Walker;Cane  (utilizes rollator in house)   Prior Function   Level of [place] Independent with ADLs and functional mobility; Needs assistance with IADLs   Lives With Spouse; Family Receives Help From Family   ADL Assistance Independent   IADLs Needs assistance  (daughter does cooking and laundry)   Falls in the last 6 months 1 to 4  (3 falls reported)   Vocational Retired   Lifestyle   Autonomy Patient reporting being independent with ADLs, ambulatory with rollator and lives with family in a two story house with 1st floor set-up   Reciprocal Relationships Daughters and wife   Service to Others Retired   [address] Enjoys reading   ADL   Eating Assistance 6  Modified independent   [address] [facility] 6  [address] 5  [address] [facility] 4  [address] 5  [facility] 66  3  [address]  4  Minimal Assistance   Additional Comments ADL levels based on functional performance during OT eval    Bed Mobility   Additional Comments DNT bed mobility: pt seated at EOB upon arrival and OOB in recliner at end of eval   Transfers   Sit to Stand 4  Minimal assistance   Additional items Assist x 1;Bedrails; Increased time required;Verbal cues   Stand to Sit 4  Minimal assistance   Additional items Assist x 1; Increased time required;Verbal cues;Armrests   Additional Comments Verbal cues provided for proper body mechanics and safe hand placement during transitional movements  Functional Mobility   Functional Mobility 4  Minimal assistance   Additional Comments Pt ambulated short distance from bed>recliner with no overt LOB or SOB  Pt grossly unsteady and noted to have trunk flexion with mobility     Additional items Hand hold assistance   Balance   Static Sitting Good   Dynamic Sitting Fair +   Static Standing Fair   Dynamic Standing Fair -   Activity Tolerance   Activity Tolerance Patient limited by fatigue   Nurse Made Aware RN Jennine Skiff made aware of outcomes   RUE Assessment   RUE Assessment WFL  (grossly 4-/5 MMT)   LUE Assessment   LUE Assessment WFL  (grossly 4-/5 MMT)   Hand Function   Gross Motor Coordination Functional   Fine Motor Coordination Functional   Sensation   Light Touch No apparent deficits   Vision-Basic Assessment   Current Vision Wears glasses only for reading   Patient Visual Report Blurring of print when reading   Cognition   Overall Cognitive Status [facility]   Arousal/Participation Alert; Responsive; Cooperative   Attention Within functional limits   Orientation Level Oriented X4   Memory Within functional limits   Following Commands Follows one step commands without difficulty   Assessment   Limitation Decreased ADL status; Decreased UE strength;Decreased endurance;Decreased high-level ADLs; Decreased self-care trans   Prognosis Good   Assessment Patient is a 76 y o  male seen for OT evaluation s/p admit to [place] [id number] on 6/14/2022 w/COVID-19 virus infection  Commorbidities affecting patient's functional performance at time of assessment include: acute respiratory failure, anemia, CKD, obesity and HTN  Orders placed for OT evaluation and treatment and activity as tolerated   Performed at least two patient identifiers during session including name and wristband  Prior to admission, Patient reporting being independent with ADLs, ambulatory with rollator and lives with family in a two story house with 1st floor set-up  Personal factors affecting patient at time of initial evaluation include: steps to enter, difficulty performing ADLs and difficulty performing IADLs  Upon evaluation, patient requires supervision assist for UB ADLs, minimal  and moderate assist for LB ADLs, transfers and functional ambulation in room and bathroom with minimal  assist, with the use of HHA  Patient is oriented x 4    Occupational performance is affected by the following deficits: decreased muscle strength, dynamic sit/ stand balance deficit with poor standing tolerance time for self care and functional mobility, decreased activity tolerance and delayed righting and equilibrium reactions  Patient to benefit from continued Occupational Therapy treatment while in the hospital to address deficits as defined above and maximize level of functional independence with ADLs and functional mobility  Occupational Performance areas to address include: grooming , bathing/ shower, dressing, toilet hygiene, transfer to all surfaces, functional ambulation, medication routine/ management, IADLS: Household maintenance, IADLs: safety procedures and IADLs: meal prep/ clean up  From OT standpoint, recommendation at time of d/c would be Home with home health rehabilitation  Goals   Patient Goals to get stronger   Plan   Treatment Interventions ADL retraining;Functional transfer training; Endurance training;UE strengthening/ROM; Patient/family training;Equipment evaluation/education; Compensatory technique education; Activityengagement; Energy conservation   Goal Expiration Date 06/26/22   OT Treatment Day 0   OT Frequency 3-5x/wk   Recommendation   OT Discharge Recommendation Home with home health rehabilitation   OT - OK to Discharge Yes  (Once medically cleared)   Additional Comments  At end of eval, pt seated OOB in recliner with all needs met   Additional Comments 2 The patient's raw score on the AM-PAC Daily Activity inpatient short form is 19, standardized score is 40 22, greater than 39 4  Patients at this level are likely to benefit from discharge to home  Please refer to the recommendation of the Occupational Therapist for safe discharge planning     AM-PAC Daily Activity Inpatient   Lower Body Dressing 2   Bathing 3   Toileting 3   Upper Body Dressing 3   Grooming 4   Eating 4   Daily Activity Raw Score 19   Daily Activity Standardized Score (Calc for Raw Score >=11) 40 22   AM-PAC Applied Cognition Inpatient   Following a Speech/Presentation 4   Understanding Ordinary Conversation 4   Taking Medications 4   Remembering Where Things Are Placed or Put Away 4   Remembering List of 4-5 Errands 4   Taking Care of Complicated Tasks 4   Applied Cognition Raw Score 24   Applied Cognition Standardized Score 62 21     GOALS:    *ADL transfers with (I) for inc'd independence with ADLs/purposeful tasks    *UB ADL with (I) for inc'd independence with self cares    *LB ADL with (I) using AE prn for inc'd independence with self cares    *Toileting with (I) for clothing management and hygiene for return to PLOF with personal care    *Increase stand tolerance x 5  m for inc'd tolerance with standing purposeful tasks    *Participate in 10m UE therex to increase overall stamina/activity tolerance for purposeful tasks    *Bed mobility- (I) for inc'd independence to manage own comfort and initiate EOB & OOB purposeful tasks    *Patient will verbalize 3 safety awareness/ principles to prevent falls in the home setting  *Patient will verbalize and demonstrate use of energy conservation/deep breathing techniques and work simplification skills during functional activities with no verbal cues  *Patient will increase OOB/sitting tolerance to 2-4 hours per day to increase participation in self-care and leisure tasks with no s/s of exertion        *Pt will demonstrate use of long handled AE during 100% of tx sessions for increased ADL safety and independence following D/C     Mere Rios OTJOSH, OTR/L

## 2022-06-16 NOTE — PROGRESS NOTES
[address]  Progress Note - Aminata Roles 1945, 76 y o  male MRN: [id number]  Unit/Bed#: -01 Encounter: [id number]  Primary Care Provider: Dutch Manzanares DO   Date and time admitted to hospital: 6/14/2022  1:53 PM    * COVID-19 virus infection  Assessment & Plan  · COVID-19 positive on 06/13/2022  · Vaccinated x2 against COVID-19  · Endorses cough, shortness of breath  · Complicated by acute hypoxic respiratory failure  · Remdesivir day 3/5  · Decadron day 3/10  · Heparin infusion in the setting of elevated D-dimer and high risk for venous thromboembolism  · Leukocytosis- likely due to steroid induced   · Respiratory protocol  · Up and out of bed as tolerated  · Incentive spirometry  · Ambulate patient     Acute respiratory failure with hypoxia (HCC)  Assessment & Plan  · Present on admission likely secondary to COVID -19 viral pneumonia  · Requiring 3 L of nasal cannula supplemental O2 on presentation--> currently down to 1L   · Does not use oxygen in the outpatient setting  · Wean O2 as tolerated  · Goal SpO2 > 90%   · Respiratory protocol    Anemia in chronic kidney disease, on chronic dialysis (HCC)  Assessment & Plan  · Hemoglobin stable and at baseline  · Continue home ferrous sulfate  · Trend CBC      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 [facility])  Assessment & Plan  Lab Results   Component Value Date    EGFR 6 06/16/2022    EGFR 9 06/15/2022    EGFR 13 06/14/2022    CREATININE 7 16 (H) 06/16/2022    CREATININE 5 13 (H) 06/15/2022    CREATININE 3 87 (H) 06/14/2022   · Tuesday/Thursday/Saturday outpatient hemodialysis schedule  · Nephrology input appreciated  · Continue home allopurinol, PhosLo, sevelamer  · Trend BMP  · Avoid nephrotoxic agents     Class 2 severe obesity due to excess calories with serious comorbidity and body mass index (BMI) of 39 0 to 39 9 in adult [facility])  Assessment & Plan  · Present on admission as evidenced by BMI greater than 35  · Endorse lifestyle modifications and weight loss      Essential hypertension  Assessment & Plan  · Hypertensive thus far during hospital course  · Continue home hydralazine, losartan, metoprolol  · Increase hydralazine to 100 mg TID   · Labetalol as needed for SBP > 180 mmHg  · Monitor blood pressures         VTE Prophylaxis:  Heparin Drip    Patient Centered Rounds: I have performed bedside rounds with nursing staff today  Discussions with Specialists or Other Care Team Provider: renal   Education and Discussions with Family / Patient: patient and daughter via phone     Current Length of Stay: 2 day(s)    Current Patient Status: Inpatient   Certification Statement: The patient will continue to require additional inpatient hospital stay due to covid 19 infection     Discharge Plan: pending hospital course     Code Status: Level 3 - DNAR and DNI    Subjective:   Feeling somewhat better  Objective:     Vitals:   Temp (24hrs), Av 2 °F (24 6 °C), Min:32 °F (0 °C), Max:98 3 °F (36 8 °C)    Temp:  [32 °F (0 °C)-98 3 °F (36 8 °C)] 98 3 °F (36 8 °C)  HR:  [54-63] 54  Resp:  [18-22] 20  BP: (150-182)/(70-81) 157/70  SpO2:  [95 %-96 %] 95 %  Body mass index is 36 87 kg/m²  Input and Output Summary (last 24 hours): Intake/Output Summary (Last 24 hours) at 2022 1617  Last data filed at 2022 0811  Gross per 24 hour   Intake 600 ml   Output 400 ml   Net 200 ml       Physical Exam:   Physical Exam  Vitals and nursing note reviewed  Constitutional:       General: He is not in acute distress  Appearance: Normal appearance  Comments: Frail and elderly      HENT:      Head: Normocephalic and atraumatic  Right Ear: External ear normal       Left Ear: External ear normal       Nose: Nose normal       Mouth/Throat:      Mouth: Mucous membranes are moist       Pharynx: Oropharynx is clear  Eyes:      General:         Right eye: No discharge  Left eye: No discharge  Extraocular Movements: Extraocular movements intact  Pupils: Pupils are equal, round, and reactive to light  Cardiovascular:      Rate and Rhythm: Normal rate and regular rhythm  Pulses: Normal pulses  Heart sounds: Normal heart sounds  No murmur heard  Pulmonary:      Effort: Pulmonary effort is normal  No respiratory distress  Breath sounds: Rhonchi present  No wheezing or rales  Abdominal:      General: Bowel sounds are normal  There is no distension  Palpations: Abdomen is soft  There is no mass  Tenderness: There is no abdominal tenderness  Musculoskeletal:         General: No swelling, tenderness or deformity  Normal range of motion  Cervical back: Normal range of motion and neck supple  No rigidity  Skin:     General: Skin is warm and dry  Capillary Refill: Capillary refill takes less than 2 seconds  Coloration: Skin is not pale  Findings: No erythema  Neurological:      General: No focal deficit present  Mental Status: He is alert and oriented to person, place, and time  Mental status is at baseline  Psychiatric:         Mood and Affect: Mood normal          Behavior: Behavior normal          Thought Content: Thought content normal          Judgment: Judgment normal          Additional Data:     Labs:    Results from last 7 days   Lab Units 06/16/22  1043   WBC Thousand/uL 12 40*   HEMOGLOBIN g/dL 10 6*   HEMATOCRIT % 32 6*   PLATELETS Thousands/uL 293   NEUTROS PCT % 88*   LYMPHS PCT % 4*   MONOS PCT % 7   EOS PCT % 0     Results from last 7 days   Lab Units 06/16/22  1043   SODIUM mmol/L 130*   POTASSIUM mmol/L 4 6   CHLORIDE mmol/L 88*   CO2 mmol/L 28   BUN mg/dL 73*   CREATININE mg/dL 7 16*   CALCIUM mg/dL 8 5   ALK PHOS U/L 51   ALT U/L 21   AST U/L 32     Results from last 7 days   Lab Units 06/14/22  1425   INR  0 98               * I Have Reviewed All Lab Data Listed Above  * Additional Pertinent Lab Tests Reviewed:  All Labs For Current Hospital Admission  Reviewed    Imaging:  Imaging Reports Reviewed Today Include: n/a     Recent Cultures (last 7 days):     Results from last 7 days   Lab Units 06/14/22  1426 06/14/22  1425   BLOOD CULTURE  No Growth at 24 hrs  No Growth at 24 hrs         Last 24 Hours Medication List:   Current Facility-Administered Medications   Medication Dose Route Frequency Provider Last Rate    acetaminophen  650 mg Oral Q4H PRN Hettie Saltness, DO      albuterol  2 puff Inhalation Q4H PRN Hettie Saltness, DO      allopurinol  200 mg Oral Daily Hettie Saltness, DO      ascorbic acid  500 mg Oral BID Hettie Saltness, DO      benzonatate  100 mg Oral TID REMY Hart      calcium acetate  1,334 mg Oral TID With Meals Hettie Saltness, DO      cholecalciferol  5,000 Units Oral Daily REMY Kelley      co-enzyme Q-10  200 mg Oral Daily Hettie Saltness, DO      dexamethasone  6 mg Intravenous Q24H Hettie Saltness, DO      ferrous sulfate  325 mg Oral Daily With Breakfast Hettie Saltness, DO      folic acid  1 mg Oral Daily Hettie Saltness, DO      heparin (porcine)  3-20 Units/kg/hr (Order-Specific) Intravenous Titrated Hettie Saltness, DO 12 1 Units/kg/hr (06/16/22 1155)    hydrALAZINE  100 mg Oral Q8H [facility] REMY Hart      labetalol  10 mg Intravenous Q4H PRN Hettie Saltness, DO      lidocaine   Topical Daily PRN REMY Kelley      losartan  50 mg Oral BID REMY Kelley      magnesium oxide  400 mg Oral Daily Hettie Saltness, DO      metoprolol succinate  50 mg Oral BID Hettie Saltness, DO      ondansetron  4 mg Oral Q6H PRN Hettie Saltness, DO      remdesivir  100 mg Intravenous Q24H Hettie Saltness, DO      sevelamer  1,600 mg Oral TID With Meals Hettie Saltness, DO      zinc sulfate  220 mg Oral Daily With Lunch REMY Kellye          Today, Patient Was Seen By: REMY Hart    ** Please Note: Dictation voice to text software may have been used in the creation of this document   **

## 2022-06-16 NOTE — ASSESSMENT & PLAN NOTE
· Present on admission likely secondary to COVID -19 viral pneumonia  · Requiring 3 L of nasal cannula supplemental O2 on presentation--> currently down to 1L   · Does not use oxygen in the outpatient setting  · Wean O2 as tolerated  · Goal SpO2 > 90%   · Respiratory protocol

## 2022-06-16 NOTE — PLAN OF CARE
Problem: Prexisting or High Potential for Compromised Skin Integrity  Goal: Skin integrity is maintained or improved  Description: INTERVENTIONS:  - Identify patients at risk for skin breakdown  - Assess and monitor skin integrity  - Assess and monitor nutrition and hydration status  - Monitor labs   - Assess for incontinence   - Turn and reposition patient  - Assist with mobility/ambulation  - Relieve pressure over bony prominences  - Avoid friction and shearing  - Provide appropriate hygiene as needed including keeping skin clean and dry  - Evaluate need for skin moisturizer/barrier cream  - Collaborate with interdisciplinary team   - Patient/family teaching  - Consider wound care consult   Outcome: Progressing     Problem: Potential for Falls  Goal: Patient will remain free of falls  Description: INTERVENTIONS:  - Educate patient/family on patient safety including physical limitations  - Instruct patient to call for assistance with activity   - Consult OT/PT to assist with strengthening/mobility   - Keep Call bell within reach  - Keep bed low and locked with side rails adjusted as appropriate  - Keep care items and personal belongings within reach  - Initiate and maintain comfort rounds  - Make Fall Risk Sign visible to staff  - Offer Toileting every 2 Hours, in advance of need  - Initiate/Maintain bed alarm  - Obtain necessary fall risk management equipment: walker   - Apply yellow socks and bracelet for high fall risk patients  - Consider moving patient to room near nurses station  Outcome: Progressing     Problem: PAIN - ADULT  Goal: Verbalizes/displays adequate comfort level or baseline comfort level  Description: Interventions:  - Encourage patient to monitor pain and request assistance  - Assess pain using appropriate pain scale  - Administer analgesics based on type and severity of pain and evaluate response  - Implement non-pharmacological measures as appropriate and evaluate response  - Consider cultural and social influences on pain and pain management  - Notify physician/advanced practitioner if interventions unsuccessful or patient reports new pain  Outcome: Progressing     Problem: INFECTION - ADULT  Goal: Absence or prevention of progression during hospitalization  Description: INTERVENTIONS:  - Assess and monitor for signs and symptoms of infection  - Monitor lab/diagnostic results  - Monitor all insertion sites, i e  indwelling lines, tubes, and drains  - Monitor endotracheal if appropriate and nasal secretions for changes in amount and color  - [place] appropriate cooling/warming therapies per order  - Administer medications as ordered  - Instruct and encourage patient and family to use good hand hygiene technique  - Identify and instruct in appropriate isolation precautions for identified infection/condition  Outcome: Progressing  Goal: Absence of fever/infection during neutropenic period  Description: INTERVENTIONS:  - Monitor WBC    Outcome: Progressing     Problem: SAFETY ADULT  Goal: Maintain or return to baseline ADL function  Description: INTERVENTIONS:  -  Assess patient's ability to carry out ADLs; assess patient's baseline for ADL function and identify physical deficits which impact ability to perform ADLs (bathing, care of mouth/teeth, toileting, grooming, dressing, etc )  - Assess/evaluate cause of self-care deficits   - Assess range of motion  - Assess patient's mobility; develop plan if impaired  - Assess patient's need for assistive devices and provide as appropriate  - Encourage maximum independence but intervene and supervise when necessary  - Involve family in performance of ADLs  - Assess for home care needs following discharge   - Consider OT consult to assist with ADL evaluation and planning for discharge  - Provide patient education as appropriate  Outcome: Progressing  Goal: Maintains/Returns to pre admission functional level  Description: INTERVENTIONS:  - Perform BMAT or MOVE assessment daily    - Set and communicate daily mobility goal to care team and patient/family/caregiver  - Collaborate with rehabilitation services on mobility goals if consulted  - Perform Range of Motion 3 times a day  - Reposition patient every 2 hours  - Dangle patient 3 times a day  - Stand patient 3 times a day  - Ambulate patient 3 times a day  - Out of bed to chair 3 times a day   - Out of bed for meals 3 times a day  - Out of bed for toileting  - Record patient progress and toleration of activity level   Outcome: Progressing     Problem: DISCHARGE PLANNING  Goal: Discharge to home or other facility with appropriate resources  Description: INTERVENTIONS:  - Identify barriers to discharge w/patient and caregiver  - Arrange for needed discharge resources and transportation as appropriate  - Identify discharge learning needs (meds, wound care, etc )  - Arrange for interpretive services to assist at discharge as needed  - Refer to Case Management Department for coordinating discharge planning if the patient needs post-hospital services based on physician/advanced practitioner order or complex needs related to functional status, cognitive ability, or social support system  Outcome: Progressing     Problem: Knowledge Deficit  Goal: Patient/family/caregiver demonstrates understanding of disease process, treatment plan, medications, and discharge instructions  Description: Complete learning assessment and assess knowledge base    Interventions:  - Provide teaching at level of understanding  - Provide teaching via preferred learning methods  Outcome: Progressing     Problem: MOBILITY - ADULT  Goal: Maintain or return to baseline ADL function  Description: INTERVENTIONS:  -  Assess patient's ability to carry out ADLs; assess patient's baseline for ADL function and identify physical deficits which impact ability to perform ADLs (bathing, care of mouth/teeth, toileting, grooming, dressing, etc )  - Assess/evaluate cause of self-care deficits   - Assess range of motion  - Assess patient's mobility; develop plan if impaired  - Assess patient's need for assistive devices and provide as appropriate  - Encourage maximum independence but intervene and supervise when necessary  - Involve family in performance of ADLs  - Assess for home care needs following discharge   - Consider OT consult to assist with ADL evaluation and planning for discharge  - Provide patient education as appropriate  Outcome: Progressing  Goal: Maintains/Returns to pre admission functional level  Description: INTERVENTIONS:  - Perform BMAT or MOVE assessment daily    - Set and communicate daily mobility goal to care team and patient/family/caregiver  - Collaborate with rehabilitation services on mobility goals if consulted  - Perform Range of Motion 3 times a day  - Reposition patient every 2 hours    - Dangle patient 3 times a day  - Stand patient 3 times a day  - Ambulate patient 3 times a day  - Out of bed to chair 3 times a day   - Out of bed for meals 3 times a day  - Out of bed for toileting  - Record patient progress and toleration of activity level   Outcome: Progressing

## 2022-06-16 NOTE — ASSESSMENT & PLAN NOTE
Lab Results   Component Value Date    EGFR 6 06/16/2022    EGFR 9 06/15/2022    EGFR 13 06/14/2022    CREATININE 7 16 (H) 06/16/2022    CREATININE 5 13 (H) 06/15/2022    CREATININE 3 87 (H) 06/14/2022   · Tuesday/Thursday/Saturday outpatient hemodialysis schedule  · Nephrology input appreciated  · Continue home allopurinol, PhosLo, sevelamer  · Trend BMP  · Avoid nephrotoxic agents

## 2022-06-16 NOTE — PLAN OF CARE
Problem: OCCUPATIONAL THERAPY ADULT  Goal: Performs self-care activities at highest level of function for planned discharge setting  See evaluation for individualized goals  Description: Treatment Interventions: ADL retraining, Functional transfer training, Endurance training, UE strengthening/ROM, Patient/family training, Equipment evaluation/education, Compensatory technique education, Activityengagement, Energy conservation          See flowsheet documentation for full assessment, interventions and recommendations  Note: Limitation: Decreased ADL status, Decreased UE strength, Decreased endurance, Decreased high-level ADLs, Decreased self-care trans  Prognosis: Good  Assessment: Patient is a 76 y o  male seen for OT evaluation s/p admit to [place] [id number] on 6/14/2022 w/COVID-19 virus infection  Commorbidities affecting patient's functional performance at time of assessment include: acute respiratory failure, anemia, CKD, obesity and HTN  Orders placed for OT evaluation and treatment and activity as tolerated   Performed at least two patient identifiers during session including name and wristband  Prior to admission, Patient reporting being independent with ADLs, ambulatory with rollator and lives with family in a two story house with 1st floor set-up  Personal factors affecting patient at time of initial evaluation include: steps to enter, difficulty performing ADLs and difficulty performing IADLs  Upon evaluation, patient requires supervision assist for UB ADLs, minimal  and moderate assist for LB ADLs, transfers and functional ambulation in room and bathroom with minimal  assist, with the use of HHA  Patient is oriented x 4    Occupational performance is affected by the following deficits: decreased muscle strength, dynamic sit/ stand balance deficit with poor standing tolerance time for self care and functional mobility, decreased activity tolerance and delayed righting and equilibrium reactions  Patient to benefit from continued Occupational Therapy treatment while in the hospital to address deficits as defined above and maximize level of functional independence with ADLs and functional mobility  Occupational Performance areas to address include: grooming , bathing/ shower, dressing, toilet hygiene, transfer to all surfaces, functional ambulation, medication routine/ management, IADLS: Household maintenance, IADLs: safety procedures and IADLs: meal prep/ clean up  From OT standpoint, recommendation at time of d/c would be Home with home health rehabilitation       OT Discharge Recommendation: Home with home health rehabilitation  OT - OK to Discharge: Yes (Once medically cleared)     Beata Allen OT

## 2022-06-16 NOTE — ASSESSMENT & PLAN NOTE
· COVID-19 positive on 06/13/2022  · Vaccinated x2 against COVID-19  · Endorses cough, shortness of breath  · Complicated by acute hypoxic respiratory failure  · Remdesivir day 3/5  · Decadron day 3/10  · Heparin infusion in the setting of elevated D-dimer and high risk for venous thromboembolism  · Leukocytosis- likely due to steroid induced   · Respiratory protocol  · Up and out of bed as tolerated  · Incentive spirometry  · Ambulate patient

## 2022-06-17 NOTE — PLAN OF CARE
Problem: Prexisting or High Potential for Compromised Skin Integrity  Goal: Skin integrity is maintained or improved  Description: INTERVENTIONS:  - Identify patients at risk for skin breakdown  - Assess and monitor skin integrity  - Assess and monitor nutrition and hydration status  - Monitor labs   - Assess for incontinence   - Turn and reposition patient  - Assist with mobility/ambulation  - Relieve pressure over bony prominences  - Avoid friction and shearing  - Provide appropriate hygiene as needed including keeping skin clean and dry  - Evaluate need for skin moisturizer/barrier cream  - Collaborate with interdisciplinary team   - Patient/family teaching  - Consider wound care consult   Outcome: Progressing     Problem: Potential for Falls  Goal: Patient will remain free of falls  Description: INTERVENTIONS:  - Educate patient/family on patient safety including physical limitations  - Instruct patient to call for assistance with activity   - Consult OT/PT to assist with strengthening/mobility   - Keep Call bell within reach  - Keep bed low and locked with side rails adjusted as appropriate  - Keep care items and personal belongings within reach  - Initiate and maintain comfort rounds  - Make Fall Risk Sign visible to staff  - Offer Toileting every 1 Hours, in advance of need  - Initiate/Maintain bed alarm  - Obtain necessary fall risk management equipment: walker   - Apply yellow socks and bracelet for high fall risk patients  - Consider moving patient to room near nurses station  Outcome: Progressing     Problem: PAIN - ADULT  Goal: Verbalizes/displays adequate comfort level or baseline comfort level  Description: Interventions:  - Encourage patient to monitor pain and request assistance  - Assess pain using appropriate pain scale  - Administer analgesics based on type and severity of pain and evaluate response  - Implement non-pharmacological measures as appropriate and evaluate response  - Consider cultural and social influences on pain and pain management  - Notify physician/advanced practitioner if interventions unsuccessful or patient reports new pain  Outcome: Progressing     Problem: INFECTION - ADULT  Goal: Absence or prevention of progression during hospitalization  Description: INTERVENTIONS:  - Assess and monitor for signs and symptoms of infection  - Monitor lab/diagnostic results  - Monitor all insertion sites, i e  indwelling lines, tubes, and drains  - Monitor endotracheal if appropriate and nasal secretions for changes in amount and color  - [place] appropriate cooling/warming therapies per order  - Administer medications as ordered  - Instruct and encourage patient and family to use good hand hygiene technique  - Identify and instruct in appropriate isolation precautions for identified infection/condition  Outcome: Progressing  Goal: Absence of fever/infection during neutropenic period  Description: INTERVENTIONS:  - Monitor WBC    Outcome: Progressing     Problem: SAFETY ADULT  Goal: Maintain or return to baseline ADL function  Description: INTERVENTIONS:  -  Assess patient's ability to carry out ADLs; assess patient's baseline for ADL function and identify physical deficits which impact ability to perform ADLs (bathing, care of mouth/teeth, toileting, grooming, dressing, etc )  - Assess/evaluate cause of self-care deficits   - Assess range of motion  - Assess patient's mobility; develop plan if impaired  - Assess patient's need for assistive devices and provide as appropriate  - Encourage maximum independence but intervene and supervise when necessary  - Involve family in performance of ADLs  - Assess for home care needs following discharge   - Consider OT consult to assist with ADL evaluation and planning for discharge  - Provide patient education as appropriate  Outcome: Progressing  Goal: Maintains/Returns to pre admission functional level  Description: INTERVENTIONS:  - Perform BMAT or MOVE assessment daily    - Set and communicate daily mobility goal to care team and patient/family/caregiver  - Collaborate with rehabilitation services on mobility goals if consulted  - Perform Range of Motion 3 times a day  - Reposition patient every 2 hours  - Dangle patient 3 times a day  - Stand patient 3 times a day  - Ambulate patient 3 times a day  - Out of bed to chair 3 times a day   - Out of bed for meals 3 times a day  - Out of bed for toileting  - Record patient progress and toleration of activity level   Outcome: Progressing     Problem: Knowledge Deficit  Goal: Patient/family/caregiver demonstrates understanding of disease process, treatment plan, medications, and discharge instructions  Description: Complete learning assessment and assess knowledge base    Interventions:  - Provide teaching at level of understanding  - Provide teaching via preferred learning methods  Outcome: Progressing

## 2022-06-17 NOTE — PLAN OF CARE
Target UF goal 1L per orders as tolerated  Patient dialzying for 3 houes on 2K bath for serum K of 4 9 per protocol per Dr Radha Topete;       Post-Dialysis RN Treatment Note    Blood Pressure:  Pre 169/75 mm/Hg  Post 131/62 mmHg   EDW  110 kg    Weight:  Pre 111 4 kg   Post 108 9 kg   Mode of weight measurement: Bed Scale   Volume Removed  2000 ml    Treatment duration 3 hours    NS given  No    Treatment shortened? Yes, describe: covid, needle dislodgement, new system set up bc air sucked into system  Medications given during Rx None Reported   Estimated Kt/V  1 08   Access type: AV fistula   Access Issues: Yes, describe: Patient had multiple needles, first arterial needle dislodged and high venous pressure, Pt later informed up higher on AVF they use 1 5 inch needles at outpatient clinic, no evidence of infiltration, but began bleeding profusely needle pulled and recannulated; during needle dislodgment  Patient began bleeding due to anticoagulant drip and air was sucked in the system tx was paused a new tx was started and patient was recannulated later with no issues the rest of treatment       Report called to primary nurse  Yes, Beatrice Rodriguez RN       Problem: METABOLIC, FLUID AND ELECTROLYTES - ADULT  Goal: Electrolytes maintained within normal limits  Description: INTERVENTIONS:  - Monitor labs and assess patient for signs and symptoms of electrolyte imbalances  - Administer electrolyte replacement as ordered  - Monitor response to electrolyte replacements, including repeat lab results as appropriate  - Instruct patient on fluid and nutrition as appropriate  Outcome: Progressing  Goal: Fluid balance maintained  Description: INTERVENTIONS:  - Monitor labs   - Monitor I/O and WT  - Instruct patient on fluid and nutrition as appropriate  - Assess for signs & symptoms of volume excess or deficit  Outcome: Progressing

## 2022-06-17 NOTE — ASSESSMENT & PLAN NOTE
· Present on admission likely secondary to COVID -19 viral  · Requiring 3 L of nasal cannula supplemental O2 on presentation--> currently down to 1L   · Does not use oxygen in the outpatient setting   · Wean O2 as tolerated  · Goal SpO2 > 90%   · Respiratory protocol

## 2022-06-17 NOTE — ASSESSMENT & PLAN NOTE
· Hypertensive thus far during hospital course  · Continue home hydralazine, losartan, metoprolol  · Increased hydralazine to 100 mg TID   · Labetalol as needed for SBP > 180 mmHg  · Monitor blood pressures

## 2022-06-17 NOTE — PROGRESS NOTES
Progress Note - Nephrology   Skip Wilber 76 y o  male MRN: [id number]  Unit/Bed#: -01 Encounter: [id number]    A/P:  1  End-stage renal disease hemodialysis dependent on Tuesday Thursday Saturday at [address] [place] [address]   - dry weight is 110 which he was at yesterday   - dialysis was held yesterday due to stability of the labs and weight and primarily due to staffing issues   - will be dialyzed today and remove 2 5 kg of volume   - he will be dialyzed against a 2 potassium bath    2  Secondary hyperparathyroidism of renal origin   - monitor phosphorus and start Pro[employer] supplements    3  Anemia of end-stage renal disease    - hold Epogen as hemoglobin is adequate  He receives Mircera as an outpatient   -  no IV iron to be given due to active COVID infection    4  Intra dialytic hypotension   - blood pressure is high this morning at 173/75   - antihypertensive agents were held    5  COVID-19 pneumonia   - receiving Decadron 6 mg IV Q 24 hours, remdesivir 100 mg every 24 hours, zinc 243 mg daily and folic acid 1 mg daily   - chest x-ray June 14th was clear    6  Chronic diastolic congestive heart failure   - will need volume removal today on dialysis    7   Access   - functional right upper extremity AV fistula          Follow up reason for today's visit:  End-stage renal disease hemodialysis dependent    COVID-19 virus infection    Patient Active Problem List   Diagnosis    Essential hypertension    Hemodialysis-associated hypotension    AAA (abdominal aortic aneurysm) (HCC)    Chronic renal failure    Primary osteoarthritis of both knees    Hyperlipidemia    Class 2 severe obesity due to excess calories with serious comorbidity and body mass index (BMI) of 39 0 to 39 9 in adult [facility])    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 ([facility] )    Secondary hyperparathyroidism of renal origin ([facility] )    Abnormal nuclear stress test    Anemia in chronic kidney disease, on chronic dialysis ([place] [address] )    ASCVD (arteriosclerotic cardiovascular disease)    Benign prostatic hyperplasia without lower urinary tract symptoms    Carotid stenosis, right    Chronic gout, unspecified, with tophus (tophi)    Chronic systolic congestive heart failure (HCC)    Disorder of phosphorus metabolism, unspecified    Gastroesophageal reflux disease    Iron deficiency anemia    Kidney stone    Magnesium deficiency    Osteoarthritis of both knees    Other disorders of electrolyte and fluid balance, not elsewhere classified    Other fluid overload    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, initial encounter ([place] [address] )    Panlobular emphysema ([place] [address] )    Personal history of nicotine dependence    Proteinuria, unspecified    Skin lesion of face    Unspecified protein-calorie malnutrition (HCC)    Vitamin D deficiency    Elevated d-dimer    Pancreatic mass    COVID-19 virus infection    Acute respiratory failure with hypoxia (HCC)         Subjective:   Feels poorly  He complains of decreased appetite, lack of taste and smell, shortness of breath and cough as well as a severe headache  He has no nausea vomiting  He is voiding minimally  Objective:     Vitals: Blood pressure (!) 173/75, pulse 61, temperature 98 1 °F (36 7 °C), resp  rate 21, height 5' 8" (1 727 m), weight 112 kg (247 lb 12 8 oz), SpO2 95 %  ,Body mass index is 37 68 kg/m²  Weight (last 2 days)     Date/Time Weight    06/17/22 0549 112 (247 8)    06/16/22 0600 110 (242 51)            Intake/Output Summary (Last 24 hours) at 6/17/2022 1124  Last data filed at 6/17/2022 0512  Gross per 24 hour   Intake 752 7 ml   Output 350 ml   Net 402 7 ml     I/O last 3 completed shifts: In: 1512 7 [P O :1500;  I V :12 7]  Out: 750 [Urine:750]         Physical Exam: BP (!) 173/75   Pulse 61   Temp 98 1 °F (36 7 °C)   Resp 21   Ht 5' 8" (1 727 m)   Wt 112 kg (247 lb 12 8 oz)   SpO2 95%   BMI 37 68 kg/m²     General Appearance:    Weak and ill appearing appears stated age   Head:    Normocephalic, without obvious abnormality, atraumatic   Eyes:    Conjunctiva/corneas clear   Ears:    Normal external ears   Nose:   Nares normal, septum midline, mucosa normal, no drainage    or sinus tenderness   Throat:   Lips, mucosa, and tongue normal; teeth and gums normal   Neck:   Supple, symmetrical, trachea midline, no adenopathy;        thyroid:  No enlargement/tenderness/nodules; no carotid    bruit or JVD   Back:     Symmetric, no curvature, ROM normal, no CVA tenderness   Lungs:      Exp rhonchi and upper airway congestion respirations increased    Chest wall:    No tenderness or deformity   Heart:    Regular rate and rhythm, S1 and S2 normal, no murmur, rub   or gallop   Abdomen:     Soft, non-tender, bowel sounds active   Extremities:   Extremities normal, atraumatic, no cyanosis has bilateral ankle/foot edema RUE AVF with aneurysmal change and thrill and bruit   Skin:   Skin color,palke texture, turgor normal, no rashes or lesions   Lymph nodes:   Cervical normal   Neurologic:   CNII-XII intact            Lab, Imaging and other studies: I have personally reviewed pertinent labs  CBC:   Lab Results   Component Value Date    WBC 13 00 (H) 06/17/2022    HGB 9 2 (L) 06/17/2022    HCT 27 9 (L) 06/17/2022     (H) 06/17/2022     06/17/2022    MCH 33 5 06/17/2022    MCHC 33 0 06/17/2022    RDW 13 4 06/17/2022    MPV 9 9 06/17/2022     CMP:   Lab Results   Component Value Date    K 4 9 06/17/2022    CL 91 (L) 06/17/2022    CO2 25 06/17/2022    BUN 86 (H) 06/17/2022    CREATININE 8 29 (H) 06/17/2022    CALCIUM 7 7 (L) 06/17/2022    AST 21 06/17/2022    ALT 16 06/17/2022    ALKPHOS 41 06/17/2022    EGFR 5 06/17/2022           Results from last 7 days   Lab Units 06/17/22  0509 06/16/22  1043 06/15/22  0408 06/14/22  1425   POTASSIUM mmol/L 4 9 4 6 4 6 3 9   CHLORIDE mmol/L 91* 88* 92* 91*   CO2 mmol/L 25 28 29 30   BUN mg/dL 86* 73* 36* 24   CREATININE mg/dL 8 29* 7 16* 5 13* 3 87*   CALCIUM mg/dL 7 7* 8 5 8 5 8 4   ALK PHOS U/L 41 51  --  51   ALT U/L 16 21  --  17   AST U/L 21 32  --  31         Phosphorus: No results found for: PHOS  Magnesium: No results found for: MG  Urinalysis: No results found for: COLORU, CLARITYU, SPECGRAV, PHUR, LEUKOCYTESUR, NITRITE, PROTEINUA, GLUCOSEU, KETONESU, BILIRUBINUR, BLOODU  Ionized Calcium: No results found for: CAION  Coagulation: No results found for: PT, INR, APTT  Troponin: No results found for: TROPONINI  ABG: No results found for: PHART, ZPO1AJW, PO2ART, KSE7YHC, O0XKLBJD, BEART, SOURCE  Radiology review:     IMAGING  Procedure: XR chest 1 view portable    Result Date: 6/14/2022  Narrative: CHEST INDICATION:   hypoxia, covid +  COMPARISON:  CT dated 1/3/2022 and chest x-ray dated 1/3/2022 EXAM PERFORMED/VIEWS:  XR CHEST PORTABLE FINDINGS: Right diaphragmatic elevation is visualized associated with adjacent right basilar linear scarring/atelectatic changes    Minimal rotation of the patient is visualized  There is no definite acute infiltrate with no pneumothorax and no pleural effusion  Tortuous atherosclerotic aorta with enlargement of the cardiac silhouette is visualized  Degenerative changes is seen within the spine  Impression: No definite acute infiltrate   Workstation performed: [id number]       Current Facility-Administered Medications   Medication Dose Route Frequency    acetaminophen (TYLENOL) tablet 650 mg  650 mg Oral Q4H PRN    albuterol (PROVENTIL HFA,VENTOLIN HFA) inhaler 2 puff  2 puff Inhalation Q4H PRN    allopurinol (ZYLOPRIM) tablet 200 mg  200 mg Oral Daily    ascorbic acid (VITAMIN C) tablet 500 mg  500 mg Oral BID    benzonatate (TESSALON PERLES) capsule 100 mg  100 mg Oral TID    calcium acetate (PHOSLO) capsule 1,334 mg  1,334 mg Oral TID With Meals    cholecalciferol (VITAMIN D3) tablet 5,000 Units  5,000 Units Oral Daily    co-enzyme Q-10 capsule 200 mg  200 mg Oral Daily    dexamethasone (DECADRON) injection 6 mg  6 mg Intravenous Q24H    ferrous sulfate tablet 325 mg  325 mg Oral Daily With Breakfast    folic acid (FOLVITE) tablet 1 mg  1 mg Oral Daily    heparin (porcine) 25,000 units in 0 45% NaCl 250 mL infusion (premix)  3-20 Units/kg/hr (Order-Specific) Intravenous Titrated    hydrALAZINE (APRESOLINE) tablet 100 mg  100 mg Oral Q8H [facility]    labetalol (NORMODYNE) injection 10 mg  10 mg Intravenous Q4H PRN    lidocaine (LMX) 4 % cream   Topical Daily PRN    losartan (COZAAR) tablet 50 mg  50 mg Oral BID    magnesium oxide (MAG-OX) tablet 400 mg  400 mg Oral Daily    metoprolol succinate (TOPROL-XL) 24 hr tablet 50 mg  50 mg Oral BID    ondansetron (ZOFRAN-ODT) dispersible tablet 4 mg  4 mg Oral Q6H PRN    remdesivir (Veklury) 100 mg in sodium chloride 0 9 % 270 mL IVPB  100 mg Intravenous Q24H    sevelamer (RENAGEL) tablet 1,600 mg  1,600 mg Oral TID With Meals    zinc sulfate (ZINCATE) capsule 220 mg  220 mg Oral Daily With Lunch     Medications Discontinued During This Encounter   Medication Reason    ondansetron (ZOFRAN) injection 4 mg     heparin (ACS LOW)     losartan (COZAAR) tablet 50 mg     hydrALAZINE (APRESOLINE) tablet 100 mg     lidocaine (LMX) 4 % cream        Jessica Anderson MD      This progress note was produced in part using a dictation device which may document imprecise wording from author's original intent

## 2022-06-17 NOTE — ASSESSMENT & PLAN NOTE
Lab Results   Component Value Date    EGFR 5 06/17/2022    EGFR 6 06/16/2022    EGFR 9 06/15/2022    CREATININE 8 29 (H) 06/17/2022    CREATININE 7 16 (H) 06/16/2022    CREATININE 5 13 (H) 06/15/2022   · Tuesday/Thursday/Saturday outpatient hemodialysis schedule  · Nephrology input appreciated  · Continue home allopurinol, PhosLo, sevelamer  · Trend BMP  · Avoid nephrotoxic agents

## 2022-06-17 NOTE — PROGRESS NOTES
[facility]  Progress Note - Skip Wilber 1945, 76 y o  male MRN: [id number]  Unit/Bed#: -01 Encounter: [id number]  Primary Care Provider: Ambika Jones DO   Date and time admitted to hospital: 6/14/2022  1:53 PM    * COVID-19 virus infection  Assessment & Plan  · COVID-19 positive on 06/13/2022  · Vaccinated x2 against COVID-19  · Endorses cough, shortness of breath  · Complicated by acute hypoxic respiratory failure  · Remdesivir day 4/5  · Decadron day 4/10  · Heparin infusion in the setting of elevated D-dimer and high risk for venous thromboembolism  · Leukocytosis- likely due to steroid induced   · Respiratory protocol    · Up and out of bed as tolerated  · Incentive spirometry  · Ambulate patient     Acute respiratory failure with hypoxia (HCC)  Assessment & Plan  · Present on admission likely secondary to COVID -19 viral  · Requiring 3 L of nasal cannula supplemental O2 on presentation--> currently down to 1L   · Does not use oxygen in the outpatient setting   · Wean O2 as tolerated  · Goal SpO2 > 90%   · Respiratory protocol    Anemia in chronic kidney disease, on chronic dialysis (HCC)  Assessment & Plan  · Hemoglobin stable and at baseline  · Continue home ferrous sulfate  · Trend CBC       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 [facility])  Assessment & Plan  Lab Results   Component Value Date    EGFR 5 06/17/2022    EGFR 6 06/16/2022    EGFR 9 06/15/2022    CREATININE 8 29 (H) 06/17/2022    CREATININE 7 16 (H) 06/16/2022    CREATININE 5 13 (H) 06/15/2022   · Tuesday/Thursday/Saturday outpatient hemodialysis schedule  · Nephrology input appreciated  · Continue home allopurinol, PhosLo, sevelamer  · Trend BMP  · Avoid nephrotoxic agents      Class 2 severe obesity due to excess calories with serious comorbidity and body mass index (BMI) of 39 0 to 39 9 in adult [facility])  Assessment & Plan  · Present on admission as evidenced by BMI greater than 35  · Endorse lifestyle modifications and weight loss       Essential hypertension  Assessment & Plan  · Hypertensive thus far during hospital course  · Continue home hydralazine, losartan, metoprolol  · Increased hydralazine to 100 mg TID   · Labetalol as needed for SBP > 180 mmHg  · Monitor blood pressures          VTE Prophylaxis:  Heparin    Patient Centered Rounds: I have performed bedside rounds with nursing staff today  Discussions with Specialists or Other Care Team Provider:  Nephrology  Education and Discussions with Family / Patient:  Patient    Current Length of Stay: 3 day(s)    Current Patient Status: Inpatient   Certification Statement: The patient will continue to require additional inpatient hospital stay due to COVID-19 infection    Discharge Plan:  Pending hospital course    Code Status: Level 3 - DNAR and DNI    Subjective:   Feeling slightly better  Continues to feel weak however  Objective:     Vitals:   Temp (24hrs), Av 2 °F (36 8 °C), Min:98 1 °F (36 7 °C), Max:98 3 °F (36 8 °C)    Temp:  [98 1 °F (36 7 °C)-98 3 °F (36 8 °C)] 98 1 °F (36 7 °C)  HR:  [59-74] 61  Resp:  [20-21] 21  BP: (157-178)/(75-90) 173/75  SpO2:  [93 %-95 %] 95 %  Body mass index is 37 68 kg/m²  Input and Output Summary (last 24 hours): Intake/Output Summary (Last 24 hours) at 2022 1345  Last data filed at 2022 1301  Gross per 24 hour   Intake 272 7 ml   Output 425 ml   Net -152 3 ml       Physical Exam:   Physical Exam  Vitals and nursing note reviewed  Constitutional:       General: He is not in acute distress  Appearance: Normal appearance  Comments: Frail and elderly      HENT:      Head: Normocephalic and atraumatic  Right Ear: External ear normal       Left Ear: External ear normal       Nose: Nose normal       Mouth/Throat:      Mouth: Mucous membranes are moist       Pharynx: Oropharynx is clear  Eyes:      General:         Right eye: No discharge           Left eye: No discharge  Extraocular Movements: Extraocular movements intact  Pupils: Pupils are equal, round, and reactive to light  Cardiovascular:      Rate and Rhythm: Normal rate and regular rhythm  Pulses: Normal pulses  Heart sounds: Normal heart sounds  No murmur heard  Pulmonary:      Effort: Pulmonary effort is normal  No respiratory distress  Breath sounds: Rhonchi present  No wheezing or rales  Abdominal:      General: Bowel sounds are normal  There is no distension  Palpations: Abdomen is soft  There is no mass  Tenderness: There is no abdominal tenderness  Musculoskeletal:         General: No swelling, tenderness or deformity  Normal range of motion  Cervical back: Normal range of motion and neck supple  No rigidity  Skin:     General: Skin is warm and dry  Capillary Refill: Capillary refill takes less than 2 seconds  Coloration: Skin is pale  Findings: No erythema  Neurological:      General: No focal deficit present  Mental Status: He is alert and oriented to person, place, and time  Mental status is at baseline  Psychiatric:         Mood and Affect: Mood normal          Behavior: Behavior normal          Thought Content:  Thought content normal          Judgment: Judgment normal          Additional Data:     Labs:    Results from last 7 days   Lab Units 06/17/22  0509 06/16/22  1043   WBC Thousand/uL 13 00* 12 40*   HEMOGLOBIN g/dL 9 2* 10 6*   HEMATOCRIT % 27 9* 32 6*   PLATELETS Thousands/uL 237 293   NEUTROS PCT %  --  88*   LYMPHS PCT %  --  4*   MONOS PCT %  --  7   EOS PCT %  --  0     Results from last 7 days   Lab Units 06/17/22  0509   SODIUM mmol/L 129*   POTASSIUM mmol/L 4 9   CHLORIDE mmol/L 91*   CO2 mmol/L 25   BUN mg/dL 86*   CREATININE mg/dL 8 29*   CALCIUM mg/dL 7 7*   ALK PHOS U/L 41   ALT U/L 16   AST U/L 21     Results from last 7 days   Lab Units 06/14/22  1425   INR  0 98               * I Have Reviewed All Lab Data Listed Above  * Additional Pertinent Lab Tests Reviewed: [facility] Admission  Reviewed    Imaging:  Imaging Reports Reviewed Today Include: n/a     Recent Cultures (last 7 days):     Results from last 7 days   Lab Units 06/14/22  1426 06/14/22  1425   BLOOD CULTURE  No Growth at 48 hrs  No Growth at 48 hrs         Last 24 Hours Medication List:   Current Facility-Administered Medications   Medication Dose Route Frequency Provider Last Rate    acetaminophen  650 mg Oral Q4H PRN Cristy Alvin, DO      albuterol  2 puff Inhalation Q4H PRN Cristy Alvin, DO      allopurinol  200 mg Oral Daily Cristy Alvin, DO      ascorbic acid  500 mg Oral BID Cristy Alvin, DO      benzonatate  100 mg Oral TID REMY Torres      calcium acetate  1,334 mg Oral TID With Meals Cristy Alvin, DO      cholecalciferol  5,000 Units Oral Daily Shannan Roof, CRNP      co-enzyme Q-10  200 mg Oral Daily Cristy Alvin, DO      dexamethasone  6 mg Intravenous Q24H Cristy Alvin, DO      ferrous sulfate  325 mg Oral Daily With Breakfast Cristy Alvin, DO      folic acid  1 mg Oral Daily Cristy Alvin, DO      heparin (porcine)  3-20 Units/kg/hr (Order-Specific) Intravenous Titrated Cristy Alvin, DO 18 Units/kg/hr (06/17/22 1316)    hydrALAZINE  100 mg Oral Q8H [address] REMY Torres      labetalol  10 mg Intravenous Q4H PRN Cristy Alvin, DO      lidocaine   Topical Daily PRN Shannan Roof, CRNP      losartan  50 mg Oral BID Shannan Roof, CRNP      magnesium oxide  400 mg Oral Daily Cristy Alvin, DO      metoprolol succinate  50 mg Oral BID Cristy Alvin, DO      ondansetron  4 mg Oral Q6H PRN Cristy Alvin, DO      remdesivir  100 mg Intravenous Q24H Cristy Alvin, DO      sevelamer  1,600 mg Oral TID With Meals Cristy Alvin, DO      zinc sulfate  220 mg Oral Daily With Lunch Shannan Roof, REMY          Today, Patient Was Seen By: REMY Torres    ** Please Note: Dictation voice to text software may have been used in the creation of this document   **

## 2022-06-17 NOTE — ASSESSMENT & PLAN NOTE
· COVID-19 positive on 06/13/2022  · Vaccinated x2 against COVID-19  · Endorses cough, shortness of breath  · Complicated by acute hypoxic respiratory failure  · Remdesivir day 4/5  · Decadron day 4/10  · Heparin infusion in the setting of elevated D-dimer and high risk for venous thromboembolism  · Leukocytosis- likely due to steroid induced   · Respiratory protocol    · Up and out of bed as tolerated  · Incentive spirometry  · Ambulate patient

## 2022-06-18 NOTE — ASSESSMENT & PLAN NOTE
Lab Results   Component Value Date    EGFR 7 06/18/2022    EGFR 5 06/17/2022    EGFR 6 06/16/2022    CREATININE 6 34 (H) 06/18/2022    CREATININE 8 29 (H) 06/17/2022    CREATININE 7 16 (H) 06/16/2022   · Tuesday/Thursday/Saturday outpatient hemodialysis schedule  · Nephrology input appreciated  · Continue home allopurinol, PhosLo, sevelamer  · Trend BMP   · Avoid nephrotoxic agents

## 2022-06-18 NOTE — ASSESSMENT & PLAN NOTE
· COVID-19 positive on 06/13/2022  · Vaccinated x2 against COVID-19  · Endorses cough, shortness of breath  · Complicated by acute hypoxic respiratory failure  · Remdesivir day 5/5  · Decadron day 5/10  · Heparin infusion in the setting of elevated D-dimer and high risk for venous thromboembolism  · Leukocytosis- likely due to steroid induced   · Respiratory protocol    · Up and out of bed as tolerated  · Incentive spirometry  · Ambulate patient

## 2022-06-18 NOTE — PROGRESS NOTES
[address]  Progress Note - Herminia Ellington 1945, 76 y o  male MRN: [id number]  Unit/Bed#: -01 Encounter: [id number]  Primary Care Provider: Bimal Vance DO   Date and time admitted to hospital: 6/14/2022  1:53 PM    * COVID-19 virus infection  Assessment & Plan  · COVID-19 positive on 06/13/2022  · Vaccinated x2 against COVID-19  · Endorses cough, shortness of breath  · Complicated by acute hypoxic respiratory failure  · Remdesivir day 5/5  · Decadron day 5/10  · Heparin infusion in the setting of elevated D-dimer and high risk for venous thromboembolism  · Leukocytosis- likely due to steroid induced   · Respiratory protocol    · Up and out of bed as tolerated  · Incentive spirometry  · Ambulate patient      Acute respiratory failure with hypoxia (HCC)  Assessment & Plan  · Present on admission likely secondary to COVID -19 viral  · Requiring 3 L of nasal cannula supplemental O2 on presentation  · Does not use oxygen in the outpatient setting   · Wean O2 as tolerated  · Goal SpO2 > 90%   · Respiratory protocol     Anemia in chronic kidney disease, on chronic dialysis (HCC)  Assessment & Plan  · Hemoglobin stable and at baseline  · Continue home ferrous sulfate  · Trend CBC        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 [facility])  Assessment & Plan  Lab Results   Component Value Date    EGFR 7 06/18/2022    EGFR 5 06/17/2022    EGFR 6 06/16/2022    CREATININE 6 34 (H) 06/18/2022    CREATININE 8 29 (H) 06/17/2022    CREATININE 7 16 (H) 06/16/2022   · Tuesday/Thursday/Saturday outpatient hemodialysis schedule  · Nephrology input appreciated  · Continue home allopurinol, PhosLo, sevelamer  · Trend BMP   · Avoid nephrotoxic agents      Class 2 severe obesity due to excess calories with serious comorbidity and body mass index (BMI) of 39 0 to 39 9 in adult [facility])  Assessment & Plan  · Present on admission as evidenced by BMI greater than 35  · Endorse lifestyle modifications and weight loss        Essential hypertension  Assessment & Plan  · Hypertensive thus far during hospital course  · Continue home hydralazine, losartan, metoprolol  · Increased hydralazine to 100 mg TID   · Labetalol as needed for SBP > 180 mmHg  · Monitor blood pressures         VTE Prophylaxis:  Heparin Drip    Patient Centered Rounds: I have performed bedside rounds with nursing staff today  Discussions with Specialists or Other Care Team Provider: renal   Education and Discussions with Family / Patient: patient and daughter     Current Length of Stay: 4 day(s)    Current Patient Status: Inpatient   Certification Statement: The patient will continue to require additional inpatient hospital stay due to COVID-19 infection     Discharge Plan: pending hospital course  Code Status: Level 3 - DNAR and DNI    Subjective:   Patient is very tired  Had HD today  Dyspnea is improving  Objective:     Vitals:   Temp (24hrs), Av 3 °F (36 8 °C), Min:97 2 °F (36 2 °C), Max:99 2 °F (37 3 °C)    Temp:  [97 2 °F (36 2 °C)-99 2 °F (37 3 °C)] 99 2 °F (37 3 °C)  HR:  [53-85] 77  Resp:  [16-24] 22  BP: (122-187)/(51-84) 122/54  SpO2:  [92 %-96 %] 96 %  Body mass index is 36 77 kg/m²  Input and Output Summary (last 24 hours): Intake/Output Summary (Last 24 hours) at 2022 1647  Last data filed at 2022 1145  Gross per 24 hour   Intake 500 ml   Output 1501 ml   Net -1001 ml       Physical Exam:   Physical Exam  Vitals and nursing note reviewed  Constitutional:       General: He is not in acute distress  Appearance: Normal appearance  Comments: Frail and elderly      HENT:      Head: Normocephalic and atraumatic  Right Ear: External ear normal       Left Ear: External ear normal       Nose: Nose normal       Mouth/Throat:      Mouth: Mucous membranes are moist       Pharynx: Oropharynx is clear  Eyes:      General:         Right eye: No discharge           Left eye: No discharge  Extraocular Movements: Extraocular movements intact  Pupils: Pupils are equal, round, and reactive to light  Cardiovascular:      Rate and Rhythm: Normal rate and regular rhythm  Pulses: Normal pulses  Heart sounds: Normal heart sounds  No murmur heard  Pulmonary:      Effort: Pulmonary effort is normal  No respiratory distress  Breath sounds: Rhonchi present  No wheezing or rales  Abdominal:      General: Bowel sounds are normal  There is no distension  Palpations: Abdomen is soft  There is no mass  Tenderness: There is no abdominal tenderness  Musculoskeletal:         General: No swelling, tenderness or deformity  Normal range of motion  Cervical back: Normal range of motion and neck supple  No rigidity  Skin:     General: Skin is warm and dry  Capillary Refill: Capillary refill takes less than 2 seconds  Coloration: Skin is pale  Findings: No erythema  Neurological:      General: No focal deficit present  Mental Status: He is alert and oriented to person, place, and time  Mental status is at baseline  Psychiatric:         Mood and Affect: Mood normal          Behavior: Behavior normal          Thought Content:  Thought content normal          Additional Data:     Labs:    Results from last 7 days   Lab Units 06/17/22  0509 06/16/22  1043   WBC Thousand/uL 13 00* 12 40*   HEMOGLOBIN g/dL 9 2* 10 6*   HEMATOCRIT % 27 9* 32 6*   PLATELETS Thousands/uL 237 293   NEUTROS PCT %  --  88*   LYMPHS PCT %  --  4*   MONOS PCT %  --  7   EOS PCT %  --  0     Results from last 7 days   Lab Units 06/18/22  0619 06/17/22  0509   SODIUM mmol/L 129* 129*   POTASSIUM mmol/L 4 5 4 9   CHLORIDE mmol/L 94* 91*   CO2 mmol/L 26 25   BUN mg/dL 54* 86*   CREATININE mg/dL 6 34* 8 29*   CALCIUM mg/dL 7 8* 7 7*   ALK PHOS U/L  --  41   ALT U/L  --  16   AST U/L  --  21     Results from last 7 days   Lab Units 06/14/22  1425   INR  0 98               * I Have Reviewed All Lab Data Listed Above  * Additional Pertinent Lab Tests Reviewed: [address] Admission  Reviewed    Imaging:  Imaging Reports Reviewed Today Include: n/a     Recent Cultures (last 7 days):     Results from last 7 days   Lab Units 06/14/22  1426 06/14/22  1425   BLOOD CULTURE  No Growth at 72 hrs  No Growth at 72 hrs         Last 24 Hours Medication List:   Current Facility-Administered Medications   Medication Dose Route Frequency Provider Last Rate    acetaminophen  650 mg Oral Q4H PRN Alberta Poser, DO      albuterol  2 puff Inhalation Q4H PRN Alberta Poser, DO      allopurinol  200 mg Oral Daily Alberta Poser, DO      amLODIPine  5 mg Oral Daily Sabra Valentin MD      ascorbic acid  500 mg Oral BID Alberta Poser, DO      calcium acetate  1,334 mg Oral TID With Meals [facility], [facility]      cholecalciferol  5,000 Units Oral Daily Sherian Ring, CRNP      co-enzyme Q-10  200 mg Oral Daily Alberta Poser, DO      dexamethasone  6 mg Intravenous Q24H Alberta Poser, DO      ferrous sulfate  325 mg Oral Daily With Breakfast Alberta Poser, DO      folic acid  1 mg Oral Daily Alberta Poser, DO      heparin (porcine)  3-20 Units/kg/hr (Order-Specific) Intravenous Titrated Alberta Poser, DO 17 Units/kg/hr (06/18/22 1055)    hydrALAZINE  25 mg Oral Q8H [address] Sabra Valentin MD      HYDROcodone-homatropine  5 mL Oral Q4H PRN Nikole Fontaine PA-C      labetalol  10 mg Intravenous Q4H PRN Alberta Poser, DO      lidocaine   Topical Daily PRN Sherian Ring, CRNP      losartan  50 mg Oral BID Sherian Ring, CRNP      magnesium oxide  400 mg Oral Daily Alberta Poser, DO      metoclopramide  5 mg Intravenous Q6H PRN Nikole Fontaine PA-C      metoprolol succinate  50 mg Oral BID Alberta Poser, DO      ondansetron  4 mg Oral Q6H PRN Alberta Poser, DO      remdesivir  100 mg Intravenous Q24H Alberta Poser, DO      sevelamer  1,600 mg Oral TID With Meals Jose G PALACIOS DO Félix      zinc sulfate  220 mg Oral Daily With Lunch REMY Whitmore          Today, Patient Was Seen By: REMY Whitt    ** Please Note: Dictation voice to text software may have been used in the creation of this document   **

## 2022-06-18 NOTE — PROGRESS NOTES
Progress Note - Nephrology   Di Laurent 76 y o  male MRN: [id number]  Unit/Bed#: -01 Encounter: [id number]    A/P:  1  End-stage renal disease hemodialysis dependent   - had a routine treatment today with removal of 1 L in 3 hours  - patient tolerated procedure well  Blood pressure was stable   - he was dialyzed against a potassium bath of 3K  - blood pressure was stable throughout his treatment and high at the end at 183/81   - next treatment will be Tuesday June 22    2  Anemia of chronic kidney disease   - hemoglobin is 9 2 g   - receives erythropoietic agent in the outpatient unit    3  Secondary hyperparathyroidism of renal origin   - check a phosphorus and albumin    4  COVID-19 pneumonia with acute respiratory failure   - complaining of cough and shortness of breath and feels better lying down     - receiving remdesivir day 5   - receiving heparin infusion   - may benefit from nebulizer   - requires supplemental oxygen and using incentive spirometry      Follow up reason for today's visit: ESRD    COVID-19 virus infection    Patient Active Problem List   Diagnosis    Essential hypertension    Hemodialysis-associated hypotension    AAA (abdominal aortic aneurysm) (HCC)    Chronic renal failure    Primary osteoarthritis of both knees    Hyperlipidemia    Class 2 severe obesity due to excess calories with serious comorbidity and body mass index (BMI) of 39 0 to 39 9 in adult [facility])    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 ([facility] [address] )    Secondary hyperparathyroidism of renal origin ([facility] [address] )    Abnormal nuclear stress test    Anemia in chronic kidney disease, on chronic dialysis ([facility] [address] )    ASCVD (arteriosclerotic cardiovascular disease)    Benign prostatic hyperplasia without lower urinary tract symptoms    Carotid stenosis, right    Chronic gout, unspecified, with tophus (tophi)    Chronic systolic congestive heart failure (HCC)    Disorder of phosphorus metabolism, unspecified    Gastroesophageal reflux disease    Iron deficiency anemia    Kidney stone    Magnesium deficiency    Osteoarthritis of both knees    Other disorders of electrolyte and fluid balance, not elsewhere classified    Other fluid overload    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, initial encounter ([facility] [address] )    Panlobular emphysema (HCC)    Personal history of nicotine dependence    Proteinuria, unspecified    Skin lesion of face    Unspecified protein-calorie malnutrition (HCC)    Vitamin D deficiency    Elevated d-dimer    Pancreatic mass    COVID-19 virus infection    Acute respiratory failure with hypoxia (HCC)         Subjective:   Complains of cough and shortness of breath  His chest discomfort related to this  Denies abdominal pain nausea vomiting diarrhea  No problems urinating he says he is eating  He  tolerated dialysis well    Objective:     Vitals: Blood pressure (!) 183/81, pulse (!) 54, temperature 98 3 °F (36 8 °C), temperature source Oral, resp  rate 20, height 5' 8" (1 727 m), weight 110 kg (241 lb 13 5 oz), SpO2 92 %  ,Body mass index is 36 77 kg/m²  Weight (last 2 days)     Date/Time Weight    06/18/22 0600 110 (241 84)    06/17/22 0549 112 (247 8)    06/16/22 0600 110 (242 51)            Intake/Output Summary (Last 24 hours) at 6/18/2022 1550  Last data filed at 6/18/2022 1145  Gross per 24 hour   Intake 800 ml   Output 4001 ml   Net -3201 ml     I/O last 3 completed shifts: In: 512 7 [I V :512 7]  Out: 9793 [Urine:425;  Other:2500]         Physical Exam: BP (!) 183/81   Pulse (!) 54   Temp 98 3 °F (36 8 °C) (Oral)   Resp 20   Ht 5' 8" (1 727 m)   Wt 110 kg (241 lb 13 5 oz)   SpO2 92%   BMI 36 77 kg/m²     General Appearance:    Alert, cooperative, no distress, appears stated age   Head:    Normocephalic, without obvious abnormality, atraumatic   Eyes:    Conjunctiva/corneas clear   Ears:    Normal external ears   Nose:   Nares normal, septum midline, mucosa normal, no drainage    or sinus tenderness   Throat:   Lips, mucosa, and tongue normal; teeth and gums normal   Neck:   Supple, symmetrical, trachea midline, no adenopathy;        thyroid:  No enlargement/tenderness/nodules; no carotid    bruit or JVD   Back:     Symmetric, no curvature, ROM normal, no CVA tenderness   Lungs:     Expiratory rhonchi and congested upper airway sounds  to auscultation bilaterally, respirations unlabored   Chest wall:    No tenderness or deformity   Heart:    Regular rate and rhythm, S1 and S2 normal, no murmur, rub   or gallop   Abdomen:     Soft, non-tender, bowel sounds active   Extremities:   Extremities normal, atraumatic, no cyanosis or edema   Skin:   Skin color, texture, turgor normal, no rashes or lesions   Lymph nodes:   Cervical normal   Neurologic:   CNII-XII intact            Lab, Imaging and other studies: I have personally reviewed pertinent labs  CBC: No results found for: WBC, HGB, HCT, MCV, PLT, ADJUSTEDWBC, MCH, MCHC, RDW, MPV, NRBC  CMP:   Lab Results   Component Value Date    K 4 5 06/18/2022    CL 94 (L) 06/18/2022    CO2 26 06/18/2022    BUN 54 (H) 06/18/2022    CREATININE 6 34 (H) 06/18/2022    CALCIUM 7 8 (L) 06/18/2022    EGFR 7 06/18/2022         Results from last 7 days   Lab Units 06/18/22  0619 06/17/22  0509 06/16/22  1043 06/15/22  0408 06/14/22  1425   POTASSIUM mmol/L 4 5 4 9 4 6   < > 3 9   CHLORIDE mmol/L 94* 91* 88*   < > 91*   CO2 mmol/L 26 25 28   < > 30   BUN mg/dL 54* 86* 73*   < > 24   CREATININE mg/dL 6 34* 8 29* 7 16*   < > 3 87*   CALCIUM mg/dL 7 8* 7 7* 8 5   < > 8 4   ALK PHOS U/L  --  41 51  --  51   ALT U/L  --  16 21  --  17   AST U/L  --  21 32  --  31    < > = values in this interval not displayed           Phosphorus: No results found for: PHOS  Magnesium: No results found for: MG  Urinalysis: No results found for: [address], [facility], LEUKOCYTESUR, NITRITE, PROTEINUA, GLUCOSEU, KETONESU, Ilsa Nailer  Ionized Calcium: No results found for: CAION  Coagulation: No results found for: PT, INR, APTT  Troponin: No results found for: TROPONINI  ABG: No results found for: PHART, YWT6KSU, PO2ART, SXJ1PNO, W1BLOMOX, BEART, SOURCE  Radiology review:     IMAGING  No results found      Current Facility-Administered Medications   Medication Dose Route Frequency    acetaminophen (TYLENOL) tablet 650 mg  650 mg Oral Q4H PRN    albuterol (PROVENTIL HFA,VENTOLIN HFA) inhaler 2 puff  2 puff Inhalation Q4H PRN    allopurinol (ZYLOPRIM) tablet 200 mg  200 mg Oral Daily    ascorbic acid (VITAMIN C) tablet 500 mg  500 mg Oral BID    calcium acetate (PHOSLO) capsule 1,334 mg  1,334 mg Oral TID With Meals    cholecalciferol (VITAMIN D3) tablet 5,000 Units  5,000 Units Oral Daily    co-enzyme Q-10 capsule 200 mg  200 mg Oral Daily    dexamethasone (DECADRON) injection 6 mg  6 mg Intravenous Q24H    ferrous sulfate tablet 325 mg  325 mg Oral Daily With Breakfast    folic acid (FOLVITE) tablet 1 mg  1 mg Oral Daily    heparin (porcine) 25,000 units in 0 45% NaCl 250 mL infusion (premix)  3-20 Units/kg/hr (Order-Specific) Intravenous Titrated    hydrALAZINE (APRESOLINE) tablet 100 mg  100 mg Oral Q8H [facility]    HYDROcodone-homatropine (HYCODAN) oral syrup 5 mL  5 mL Oral Q4H PRN    labetalol (NORMODYNE) injection 10 mg  10 mg Intravenous Q4H PRN    lidocaine (LMX) 4 % cream   Topical Daily PRN    losartan (COZAAR) tablet 50 mg  50 mg Oral BID    magnesium oxide (MAG-OX) tablet 400 mg  400 mg Oral Daily    metoclopramide (REGLAN) injection 5 mg  5 mg Intravenous Q6H PRN    metoprolol succinate (TOPROL-XL) 24 hr tablet 50 mg  50 mg Oral BID    ondansetron (ZOFRAN-ODT) dispersible tablet 4 mg  4 mg Oral Q6H PRN    remdesivir (Veklury) 100 mg in sodium chloride 0 9 % 270 mL IVPB  100 mg Intravenous Q24H    sevelamer (RENAGEL) tablet 1,600 mg  1,600 mg Oral TID With Meals    zinc sulfate (ZINCATE) capsule 220 mg  220 mg Oral Daily With Lunch     Medications Discontinued During This Encounter   Medication Reason    ondansetron (ZOFRAN) injection 4 mg     heparin (ACS LOW)     losartan (COZAAR) tablet 50 mg     hydrALAZINE (APRESOLINE) tablet 100 mg     lidocaine (LMX) 4 % cream     benzonatate (TESSALON PERLES) capsule 100 mg     metoclopramide (REGLAN) injection 5 mg        Brittany Mason MD      This progress note was produced in part using a dictation device which may document imprecise wording from author's original intent

## 2022-06-18 NOTE — CASE MANAGEMENT
Case Management Discharge Planning Note    Patient name Findlay Foot  Location /-04 MRN [id number]  : 1945 Date 2022       Current Admission Date: 2022  Current Admission Diagnosis:COVID-19 virus infection   Patient Active Problem List    Diagnosis Date Noted    COVID-19 virus infection 2022    Acute respiratory failure with hypoxia ([place] [address] ) 2022    Elevated d-dimer 2022    Pancreatic mass 2022    Primary osteoarthritis of both knees 2021    Hyperlipidemia 2021    Class 2 severe obesity due to excess calories with serious comorbidity and body mass index (BMI) of 39 0 to 39 9 in adult [facility]) 2021    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 ([facility] ) 2021    Secondary hyperparathyroidism of renal origin ([place] [address] ) 2021    Chronic renal failure 2021    Other fluid overload 2020    Abnormal nuclear stress test 2020    Panlobular emphysema ([place] [address] ) 2020    Disorder of phosphorus metabolism, unspecified 2019    Essential hypertension 2019    Hemodialysis-associated hypotension 2019    AAA (abdominal aortic aneurysm) ([facility] ) 2019    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, initial encounter ([place] [address] ) 2019    Anemia in chronic kidney disease, on chronic dialysis ([place] [address] ) 2018    Benign prostatic hyperplasia without lower urinary tract symptoms 2018    Chronic gout, unspecified, with tophus (tophi) 2018    Iron deficiency anemia 2018    Kidney stone 2018    Magnesium deficiency 2018    Other disorders of electrolyte and fluid balance, not elsewhere classified 2018    Personal history of nicotine dependence 2018    Proteinuria, unspecified 2018    Unspecified protein-calorie malnutrition ([facility] ) 2018    Skin lesion of face 2018    Chronic systolic congestive heart failure ([facility] [address] ) 06/01/2018    ASCVD (arteriosclerotic cardiovascular disease) 12/11/2017    Carotid stenosis, right 12/11/2017    Gastroesophageal reflux disease 12/11/2017    Vitamin D deficiency 12/11/2017    Osteoarthritis of both knees 09/02/2015      LOS (days): 3  Geometric Mean LOS (GMLOS) (days): 5 40  Days to GMLOS:2 1     OBJECTIVE:  Risk of Unplanned Readmission Score: 28 11         Current admission status: Inpatient   Preferred Pharmacy:   [facility]/pharmacy #[id number]- [address], [address]  [address]   [address], [address], [address] [place] [address]  Phone: [phone] Fax: [phone] [address], Maggie [address]  [address] [address]  Phone: [phone] Fax: [phone] [facility] [address], [address] [facility] [address] [place] [address]  Phone: [phone] Fax: [phone]    Primary Care Provider: Salomón Taylor DO    [facility]  Secondary Insurance:     DISCHARGE DETAILS:    Discharge planning discussed with[de-identified] cm called Angie Xiong at 17:00  Freedom of Choice: Yes  Comments - Freedom of Choice: family deny any d/c needs  CM contacted family/caregiver?: Yes             Contacts  Patient Contacts: Cicero Grief  Relationship to Patient[de-identified] Family (daughter)  Contact Method: Phone  Phone Number: [phone]  Reason/Outcome: Discharge [address]         Is the patient interested in [address] at discharge?: No              Would you like to participate in our [address] service program?  : No - Declined    Treatment Team Recommendation: Home, Home with [address] (home with family,slvna &outpt dialyis-family will transport)      pt's oxygen has increased, pt does not have home oxygen                             IMM Given (Date):: 06/17/22  IMM Given to[de-identified] Family Kat Heredia was called at 17:00 Imm was reviewed , she stated she understood, copy was mailed)

## 2022-06-18 NOTE — PLAN OF CARE
Problem: Prexisting or High Potential for Compromised Skin Integrity  Goal: Skin integrity is maintained or improved  Description: INTERVENTIONS:  - Identify patients at risk for skin breakdown  - Assess and monitor skin integrity  - Assess and monitor nutrition and hydration status  - Monitor labs   - Assess for incontinence   - Turn and reposition patient  - Assist with mobility/ambulation  - Relieve pressure over bony prominences  - Avoid friction and shearing  - Provide appropriate hygiene as needed including keeping skin clean and dry  - Evaluate need for skin moisturizer/barrier cream  - Collaborate with interdisciplinary team   - Patient/family teaching  - Consider wound care consult   Outcome: Progressing     Problem: Potential for Falls  Goal: Patient will remain free of falls  Description: INTERVENTIONS:  - Educate patient/family on patient safety including physical limitations  - Instruct patient to call for assistance with activity   - Consult OT/PT to assist with strengthening/mobility   - Keep Call bell within reach  - Keep bed low and locked with side rails adjusted as appropriate  - Keep care items and personal belongings within reach  - Initiate and maintain comfort rounds  - Make Fall Risk Sign visible to staff  - Offer Toileting every 2 Hours, in advance of need  - Initiate/Maintain bed alarm  - Apply yellow socks and bracelet for high fall risk patients  - Consider moving patient to room near nurses station  Outcome: Progressing     Problem: PAIN - ADULT  Goal: Verbalizes/displays adequate comfort level or baseline comfort level  Description: Interventions:  - Encourage patient to monitor pain and request assistance  - Assess pain using appropriate pain scale  - Administer analgesics based on type and severity of pain and evaluate response  - Implement non-pharmacological measures as appropriate and evaluate response  - Consider cultural and social influences on pain and pain management  - Notify physician/advanced practitioner if interventions unsuccessful or patient reports new pain  Outcome: Progressing     Problem: INFECTION - ADULT  Goal: Absence or prevention of progression during hospitalization  Description: INTERVENTIONS:  - Assess and monitor for signs and symptoms of infection  - Monitor lab/diagnostic results  - Monitor all insertion sites, i e  indwelling lines, tubes, and drains  - Monitor endotracheal if appropriate and nasal secretions for changes in amount and color  - [place] appropriate cooling/warming therapies per order  - Administer medications as ordered  - Instruct and encourage patient and family to use good hand hygiene technique  - Identify and instruct in appropriate isolation precautions for identified infection/condition  Outcome: Progressing  Goal: Absence of fever/infection during neutropenic period  Description: INTERVENTIONS:  - Monitor WBC    Outcome: Progressing     Problem: SAFETY ADULT  Goal: Maintain or return to baseline ADL function  Description: INTERVENTIONS:  -  Assess patient's ability to carry out ADLs; assess patient's baseline for ADL function and identify physical deficits which impact ability to perform ADLs (bathing, care of mouth/teeth, toileting, grooming, dressing, etc )  - Assess/evaluate cause of self-care deficits   - Assess range of motion  - Assess patient's mobility; develop plan if impaired  - Assess patient's need for assistive devices and provide as appropriate  - Encourage maximum independence but intervene and supervise when necessary  - Involve family in performance of ADLs  - Assess for home care needs following discharge   - Consider OT consult to assist with ADL evaluation and planning for discharge  - Provide patient education as appropriate  Outcome: Progressing  Goal: Maintains/Returns to pre admission functional level  Description: INTERVENTIONS:  - Perform BMAT or MOVE assessment daily    - Set and communicate daily mobility goal to care team and patient/family/caregiver  - Collaborate with rehabilitation services on mobility goals if consulted  - Out of bed for toileting  - Record patient progress and toleration of activity level   Outcome: Progressing     Problem: DISCHARGE PLANNING  Goal: Discharge to home or other facility with appropriate resources  Description: INTERVENTIONS:  - Identify barriers to discharge w/patient and caregiver  - Arrange for needed discharge resources and transportation as appropriate  - Identify discharge learning needs (meds, wound care, etc )  - Arrange for interpretive services to assist at discharge as needed  - Refer to Case Management Department for coordinating discharge planning if the patient needs post-hospital services based on physician/advanced practitioner order or complex needs related to functional status, cognitive ability, or social support system  Outcome: Progressing     Problem: Knowledge Deficit  Goal: Patient/family/caregiver demonstrates understanding of disease process, treatment plan, medications, and discharge instructions  Description: Complete learning assessment and assess knowledge base    Interventions:  - Provide teaching at level of understanding  - Provide teaching via preferred learning methods  Outcome: Progressing     Problem: MOBILITY - ADULT  Goal: Maintain or return to baseline ADL function  Description: INTERVENTIONS:  -  Assess patient's ability to carry out ADLs; assess patient's baseline for ADL function and identify physical deficits which impact ability to perform ADLs (bathing, care of mouth/teeth, toileting, grooming, dressing, etc )  - Assess/evaluate cause of self-care deficits   - Assess range of motion  - Assess patient's mobility; develop plan if impaired  - Assess patient's need for assistive devices and provide as appropriate  - Encourage maximum independence but intervene and supervise when necessary  - Involve family in performance of ADLs  - Assess for home care needs following discharge   - Consider OT consult to assist with ADL evaluation and planning for discharge  - Provide patient education as appropriate  Outcome: Progressing  Goal: Maintains/Returns to pre admission functional level  Description: INTERVENTIONS:  - Perform BMAT or MOVE assessment daily    - Set and communicate daily mobility goal to care team and patient/family/caregiver  - Collaborate with rehabilitation services on mobility goals if consulted  - Out of bed for toileting  - Record patient progress and toleration of activity level   Outcome: Progressing     Problem: METABOLIC, FLUID AND ELECTROLYTES - ADULT  Goal: Electrolytes maintained within normal limits  Description: INTERVENTIONS:  - Monitor labs and assess patient for signs and symptoms of electrolyte imbalances  - Administer electrolyte replacement as ordered  - Monitor response to electrolyte replacements, including repeat lab results as appropriate  - Instruct patient on fluid and nutrition as appropriate  Outcome: Progressing  Goal: Fluid balance maintained  Description: INTERVENTIONS:  - Monitor labs   - Monitor I/O and WT  - Instruct patient on fluid and nutrition as appropriate  - Assess for signs & symptoms of volume excess or deficit  Outcome: Progressing     Problem: Nutrition/Hydration-ADULT  Goal: Nutrient/Hydration intake appropriate for improving, restoring or maintaining nutritional needs  Description: Monitor and assess patient's nutrition/hydration status for malnutrition  Collaborate with interdisciplinary team and initiate plan and interventions as ordered  Monitor patient's weight and dietary intake as ordered or per policy  Utilize nutrition screening tool and intervene as necessary  Determine patient's food preferences and provide high-protein, high-caloric foods as appropriate       INTERVENTIONS:  - Monitor oral intake, urinary output, labs, and treatment plans  - Assess nutrition and hydration status and recommend course of action  - Evaluate amount of meals eaten  - Assist patient with eating if necessary   - Allow adequate time for meals  - Recommend/ encourage appropriate diets, oral nutritional supplements, and vitamin/mineral supplements  - Order, calculate, and assess calorie counts as needed  - Recommend, monitor, and adjust tube feedings and TPN/PPN based on assessed needs  - Assess need for intravenous fluids  - Provide specific nutrition/hydration education as appropriate  - Include patient/family/caregiver in decisions related to nutrition  Outcome: Progressing

## 2022-06-18 NOTE — ASSESSMENT & PLAN NOTE
· Present on admission likely secondary to COVID -19 viral  · Requiring 3 L of nasal cannula supplemental O2 on presentation  · Does not use oxygen in the outpatient setting   · Wean O2 as tolerated  · Goal SpO2 > 90%   · Respiratory protocol

## 2022-06-18 NOTE — PLAN OF CARE
Post-Dialysis RN Treatment Note    Blood Pressure:  Pre 184/76 mm/Hg  Post 183/81 mmHg   EDW  110 kg    Weight:  Pre 104 8 kg   Post bed scale not working kg   Mode of weight measurement: Bed Scale   Volume Removed  1000 ml    Treatment duration 180 minutes    NS given  No    Treatment shortened? No   Medications given during Rx None Reported   Estimated Kt/V  0 94   Access type: AV fistula   Access Issues: No    Report called to primary nurse   Yes Solomon Waller RN  Pt on HD tx for 3 hours with a UF goal of 1 L   Pt on a 3 k 2 5 rigo for a potassium of 4 5 on 06/18/22  Problem: METABOLIC, FLUID AND ELECTROLYTES - ADULT  Goal: Electrolytes maintained within normal limits  Description: INTERVENTIONS:  - Monitor labs and assess patient for signs and symptoms of electrolyte imbalances  - Administer electrolyte replacement as ordered  - Monitor response to electrolyte replacements, including repeat lab results as appropriate  - Instruct patient on fluid and nutrition as appropriate  Outcome: Progressing  Goal: Fluid balance maintained  Description: INTERVENTIONS:  - Monitor labs   - Monitor I/O and WT  - Instruct patient on fluid and nutrition as appropriate  - Assess for signs & symptoms of volume excess or deficit  Outcome: Progressing

## 2022-06-18 NOTE — PLAN OF CARE
Problem: Prexisting or High Potential for Compromised Skin Integrity  Goal: Skin integrity is maintained or improved  Description: INTERVENTIONS:  - Identify patients at risk for skin breakdown  - Assess and monitor skin integrity  - Assess and monitor nutrition and hydration status  - Monitor labs   - Assess for incontinence   - Turn and reposition patient  - Assist with mobility/ambulation  - Relieve pressure over bony prominences  - Avoid friction and shearing  - Provide appropriate hygiene as needed including keeping skin clean and dry  - Evaluate need for skin moisturizer/barrier cream  - Collaborate with interdisciplinary team   - Patient/family teaching  - Consider wound care consult   6/17/2022 2011 by Mora Pérez RN  Outcome: Progressing  6/17/2022 2011 by Mora Pérez RN  Outcome: Progressing     Problem: Potential for Falls  Goal: Patient will remain free of falls  Description: INTERVENTIONS:  - Educate patient/family on patient safety including physical limitations  - Instruct patient to call for assistance with activity   - Consult OT/PT to assist with strengthening/mobility   - Keep Call bell within reach  - Keep bed low and locked with side rails adjusted as appropriate  - Keep care items and personal belongings within reach  - Initiate and maintain comfort rounds  - Make Fall Risk Sign visible to staff  - Offer Toileting every 1 Hours, in advance of need  - Initiate/Maintain bed alarm  - Obtain necessary fall risk management equipment: walker  - Apply yellow socks and bracelet for high fall risk patients  - Consider moving patient to room near nurses station  6/17/2022 2011 by Mora Pérez RN  Outcome: Progressing  6/17/2022 2011 by Mora Pérez RN  Outcome: Progressing     Problem: PAIN - ADULT  Goal: Verbalizes/displays adequate comfort level or baseline comfort level  Description: Interventions:  - Encourage patient to monitor pain and request assistance  - Assess pain using appropriate pain scale  - Administer analgesics based on type and severity of pain and evaluate response  - Implement non-pharmacological measures as appropriate and evaluate response  - Consider cultural and social influences on pain and pain management  - Notify physician/advanced practitioner if interventions unsuccessful or patient reports new pain  6/17/2022 2011 by Roge Shelby RN  Outcome: Progressing  6/17/2022 2011 by Roge Shelby RN  Outcome: Progressing     Problem: INFECTION - ADULT  Goal: Absence or prevention of progression during hospitalization  Description: INTERVENTIONS:  - Assess and monitor for signs and symptoms of infection  - Monitor lab/diagnostic results  - Monitor all insertion sites, i e  indwelling lines, tubes, and drains  - Monitor endotracheal if appropriate and nasal secretions for changes in amount and color  - [place] appropriate cooling/warming therapies per order  - Administer medications as ordered  - Instruct and encourage patient and family to use good hand hygiene technique  - Identify and instruct in appropriate isolation precautions for identified infection/condition  6/17/2022 2011 by Roge Shelby RN  Outcome: Progressing  6/17/2022 2011 by Roge Shelby RN  Outcome: Progressing  Goal: Absence of fever/infection during neutropenic period  Description: INTERVENTIONS:  - Monitor WBC    6/17/2022 2011 by Roge Shelby RN  Outcome: Progressing  6/17/2022 2011 by Roge Shelby RN  Outcome: Progressing     Problem: Knowledge Deficit  Goal: Patient/family/caregiver demonstrates understanding of disease process, treatment plan, medications, and discharge instructions  Description: Complete learning assessment and assess knowledge base    Interventions:  - Provide teaching at level of understanding  - Provide teaching via preferred learning methods  6/17/2022 2011 by Roge Shelby RN  Outcome: Progressing  6/17/2022 2011 by Roge Shelby RN  Outcome: Progressing     Problem: DISCHARGE PLANNING  Goal: Discharge to home or other facility with appropriate resources  Description: INTERVENTIONS:  - Identify barriers to discharge w/patient and caregiver  - Arrange for needed discharge resources and transportation as appropriate  - Identify discharge learning needs (meds, wound care, etc )  - Arrange for interpretive services to assist at discharge as needed  - Refer to Case Management Department for coordinating discharge planning if the patient needs post-hospital services based on physician/advanced practitioner order or complex needs related to functional status, cognitive ability, or social support system  6/17/2022 2011 by Bright Mcgarry RN  Outcome: Progressing  6/17/2022 2011 by Bright Mcgarry RN  Outcome: Progressing     Problem: SAFETY ADULT  Goal: Maintain or return to baseline ADL function  Description: INTERVENTIONS:  -  Assess patient's ability to carry out ADLs; assess patient's baseline for ADL function and identify physical deficits which impact ability to perform ADLs (bathing, care of mouth/teeth, toileting, grooming, dressing, etc )  - Assess/evaluate cause of self-care deficits   - Assess range of motion  - Assess patient's mobility; develop plan if impaired  - Assess patient's need for assistive devices and provide as appropriate  - Encourage maximum independence but intervene and supervise when necessary  - Involve family in performance of ADLs  - Assess for home care needs following discharge   - Consider OT consult to assist with ADL evaluation and planning for discharge  - Provide patient education as appropriate  6/17/2022 2011 by Bright Mcgarry RN  Outcome: Progressing  6/17/2022 2011 by Bright Mcgarry RN  Outcome: Progressing  Goal: Maintains/Returns to pre admission functional level  Description: INTERVENTIONS:  - Perform BMAT or MOVE assessment daily    - Set and communicate daily mobility goal to care team and patient/family/caregiver     - Collaborate with rehabilitation services on mobility goals if consulted  - Perform Range of Motion 3 times a day  - Reposition patient every 2 hours    - Dangle patient 3 times a day  - Stand patient 3 times a day  - Ambulate patient 3 times a day  - Out of bed to chair 3 times a day   - Out of bed for meals 3 times a day  - Out of bed for toileting  - Record patient progress and toleration of activity level   6/17/2022 2011 by Charley Mcbride RN  Outcome: Progressing  6/17/2022 2011 by Chalrey Mcbride RN  Outcome: Progressing

## 2022-06-18 NOTE — NURSING NOTE
Pt expressing he is not feeling well tonight  Pt having increased work of breathing and pain in chest from coughing, SPO2 stable 92% on 2L  Talked with respiratory, encouraged to turn up O2 for pt comfort  Pt now on 4L nasal cannula  Provider paged for different cough medication and nausea medication, pt stating previous meds were not helping much  Pt resting at this time, instructed to ring for RN if he is feeling any worse

## 2022-06-19 PROBLEM — J12.82 PNEUMONIA DUE TO COVID-19 VIRUS: Status: ACTIVE | Noted: 2022-01-01

## 2022-06-19 NOTE — ASSESSMENT & PLAN NOTE
· COVID-19 positive on 06/13/2022  · Vaccinated x2 against COVID-19  · Endorses cough, shortness of breath  · Complicated by acute hypoxic respiratory failure  · Initially did not meet sepsis criteria on admission  However, met sepsis parameters on 6/19 with leukocytosis, tachypnea due to COVID-19 pneumonia  The patient has already been started on cefepime and vancomycin  Appears to be somewhat fluid overloaded  Discussed with nephrology  Will hold off on IVF for now and give IV lasix     · Completed 5 days of remdesivir on 06/18/2022   · Decadron day 6/10  · Hold Heparin infusion for now due to hemoptysis (bright red blood clots mixed in with sputum x 2)   · Leukocytosis with slight worsening procalcitonin  · Likely due to steroids induced and possible bacterial pneumonia rule out gram negative pneumonia   · Started on cefepime and vancomycin day #1   · Trend procalcitonin   · Monitor inflammatory markers  · Blood cultures- NGTD  · MRSA- negative   · Check urine strep and Legionella  · Respiratory protocol    · Up and out of bed as tolerated  · Incentive spirometry, prone position, early ambulation

## 2022-06-19 NOTE — PROGRESS NOTES
Vancomycin Assessment    Belkis Villegas is a 76 y o  male who is currently receiving vancomycin 1750 mg once followed by 750 mg after dialysis Tue, Thur, Sat for Pneumonia     Relevant clinical data and objective history reviewed:  Creatinine   Date Value Ref Range Status   06/19/2022 4 93 (H) 0 60 - 1 30 mg/dL Final     Comment:     Standardized to IDMS reference method   06/18/2022 6 34 (H) 0 60 - 1 30 mg/dL Final     Comment:     Standardized to IDMS reference method   06/17/2022 8 29 (H) 0 60 - 1 30 mg/dL Final     Comment:     Standardized to IDMS reference method     /73   Pulse 76   Temp 100 4 °F (38 °C)   Resp (!) 23   Ht 5' 8" (1 727 m)   Wt 107 kg (236 lb 8 9 oz) Comment: patient unable to stand at this time  SpO2 93%   BMI 35 97 kg/m²   I/O last 3 completed shifts: In: 758 [P O :240; I V :518]  Out: 1501 [Other:1501]  Lab Results   Component Value Date/Time    BUN 42 (H) 06/19/2022 04:55 AM    WBC 23 82 (H) 06/19/2022 04:19 AM    HGB 10 1 (L) 06/19/2022 04:19 AM    HCT 31 5 (L) 06/19/2022 04:19 AM     (H) 06/19/2022 04:19 AM     06/19/2022 04:19 AM     Temp Readings from Last 3 Encounters:   06/19/22 100 4 °F (38 °C)   06/13/22 97 5 °F (36 4 °C)   04/13/22 (!) 96 8 °F (36 °C) (Tympanic)     Vancomycin Days of Therapy: 1    Assessment/Plan  The patient is currently on vancomycin utilizing scheduled dosing based on adjusted body weight (due to obesity)  Baseline risks associated with therapy include: pre-existing renal impairment  The patient is currently receiving 1750 mg once followed by 750 mg after dialysis Tue, Thur, Sat and is clinically appropriate and dose will be continued  Pharmacy will also follow closely for s/sx of nephrotoxicity, infusion reactions, and appropriateness of therapy  BMP and CBC will be ordered per protocol  Plan for pre-HD random level, prior to the 3rd  dose at approximately 7am 6/23     Due to infection severity, will target a trough of 15-20 (appropriate for most indications)   Pharmacy will continue to follow the patients culture results and clinical progress daily      Cornelio Louie, Pharmacist

## 2022-06-19 NOTE — PROGRESS NOTES
Aline [address]  Progress Note - Renée Silva 1945, 76 y o  male MRN: [id number]  Unit/Bed#: -01 Encounter: [id number]  Primary Care Provider: Nathalia Orourke DO   Date and time admitted to hospital: 6/14/2022  1:53 PM    * Pneumonia due to COVID-19 virus  Assessment & Plan  · COVID-19 positive on 06/13/2022  · Vaccinated x2 against COVID-19  · Endorses cough, shortness of breath  · Complicated by acute hypoxic respiratory failure  · Initially did not meet sepsis criteria on admission  However, met sepsis parameters on 6/19 with leukocytosis, tachypnea due to COVID-19 pneumonia  The patient has already been started on cefepime and vancomycin  Appears to be somewhat fluid overloaded  Discussed with nephrology  Will hold off on IVF for now and give IV lasix  · Completed 5 days of remdesivir on 06/18/2022   · Decadron day 6/10  · Hold Heparin infusion for now due to hemoptysis (bright red blood clots mixed in with sputum x 2)   · Leukocytosis with slight worsening procalcitonin  · Likely due to steroids induced and possible bacterial pneumonia rule out gram negative pneumonia   · Started on cefepime and vancomycin day #1   · Trend procalcitonin   · Monitor inflammatory markers  · Blood cultures- NGTD  · MRSA- negative   · Check urine strep and Legionella  · Respiratory protocol    · Up and out of bed as tolerated  · Incentive spirometry, prone position, early ambulation    Acute respiratory failure with hypoxia ([address] )  Assessment & Plan  · Present on admission secondary to COVID -19 pneumonia   · Requiring 3 L of nasal cannula supplemental O2 increased to 4 L but now back down to 3 L   · Does not use oxygen in the outpatient setting   · 6/19 patient developed bright blood clot mixed in with sputum x 2 combined with left sided producible chest pain  CTA chest obtain which shows multifocal pneumonia  With increased work of breathing without increasing requirement of O2    Discussed case with Critical Care  Recommend to continue with current treatment  Patient does not want intubation  · Started on Xopenex Atrovent t i d  With hypertonic saline nebs   · Wean O2 as tolerated  · Goal SpO2 > 90%   · Respiratory protocol     Anemia in chronic kidney disease, on chronic dialysis (HCC)  Assessment & Plan  · Hemoglobin stable and at baseline  · Continue home ferrous sulfate  · Trend CBC         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 [facility])  Assessment & Plan  Lab Results   Component Value Date    EGFR 10 06/19/2022    EGFR 7 06/18/2022    EGFR 5 06/17/2022    CREATININE 4 93 (H) 06/19/2022    CREATININE 6 34 (H) 06/18/2022    CREATININE 8 29 (H) 06/17/2022   · Tuesday/Thursday/Saturday outpatient hemodialysis schedule  · Nephrology input appreciated  · Continue home allopurinol, PhosLo, sevelamer  · Trend BMP   · Avoid nephrotoxic agents    · 6/19 due to increased work of breathing, will give lasix 100 mg once today  Renal will dialyze again tomorrow  Essential hypertension  Assessment & Plan  · Hypertensive thus far during hospital course  · Continue home hydralazine, losartan, metoprolol  · Increased hydralazine to 100 mg TID   · Labetalol as needed for SBP > 180 mmHg  · Monitor blood pressures          VTE Prophylaxis:  SCDs only  hold heparin for now due to hemoptysis  Patient Centered Rounds: I have performed bedside rounds with nursing staff today  Discussions with Specialists or Other Care Team Provider:  Nephrology and critical care  Education and Discussions with Family / Patient:  Patient and daughter via phone    Current Length of Stay: 5 day(s)    Current Patient Status: Inpatient   Certification Statement: The patient will continue to require additional inpatient hospital stay due to COVID-19 pneumonia    Discharge Plan:  Pending hospital course    Code Status: Level 3 - DNAR and DNI    Subjective:   Feeling short of breath unable to catch his breath    He denies any chest pain palpitation  States having left-sided chest wall pain which is producible with cough  Objective:     Vitals:   Temp (24hrs), Av °F (37 2 °C), Min:98 1 °F (36 7 °C), Max:100 4 °F (38 °C)    Temp:  [98 1 °F (36 7 °C)-100 4 °F (38 °C)] 100 4 °F (38 °C)  HR:  [64-79] 76  Resp:  [18-23] 23  BP: (122-195)/(39-93) 138/73  SpO2:  [93 %-96 %] 93 %  Body mass index is 35 97 kg/m²  Input and Output Summary (last 24 hours): Intake/Output Summary (Last 24 hours) at 2022 1203  Last data filed at 2022 0015  Gross per 24 hour   Intake 258 ml   Output --   Net 258 ml       Physical Exam:   Physical Exam  Vitals and nursing note reviewed  Constitutional:       General: He is not in acute distress  Appearance: Normal appearance  He is ill-appearing  HENT:      Head: Normocephalic and atraumatic  Right Ear: External ear normal       Left Ear: External ear normal       Nose: Nose normal       Mouth/Throat:      Mouth: Mucous membranes are moist       Pharynx: Oropharynx is clear  Eyes:      General:         Right eye: No discharge  Left eye: No discharge  Extraocular Movements: Extraocular movements intact  Pupils: Pupils are equal, round, and reactive to light  Cardiovascular:      Rate and Rhythm: Normal rate and regular rhythm  Pulses: Normal pulses  Heart sounds: Normal heart sounds  No murmur heard  Pulmonary:      Effort: No respiratory distress  Breath sounds: Rhonchi present  No wheezing or rales  Comments: Increased work of breathing    Chest:      Chest wall: Tenderness (left sided ) present  Abdominal:      General: Bowel sounds are normal  There is no distension  Palpations: Abdomen is soft  There is no mass  Tenderness: There is no abdominal tenderness  Musculoskeletal:         General: No swelling, tenderness or deformity  Normal range of motion        Cervical back: Normal range of motion and neck supple  No rigidity  Skin:     General: Skin is warm and dry  Capillary Refill: Capillary refill takes less than 2 seconds  Coloration: Skin is pale  Findings: No erythema  Neurological:      General: No focal deficit present  Mental Status: He is alert and oriented to person, place, and time  Mental status is at baseline  Psychiatric:         Mood and Affect: Mood normal          Behavior: Behavior normal          Thought Content: Thought content normal          Judgment: Judgment normal          Additional Data:     Labs:    Results from last 7 days   Lab Units 06/19/22  0419 06/17/22  0509 06/16/22  1043   WBC Thousand/uL 23 82*   < > 12 40*   HEMOGLOBIN g/dL 10 1*   < > 10 6*   HEMATOCRIT % 31 5*   < > 32 6*   PLATELETS Thousands/uL 311   < > 293   NEUTROS PCT %  --   --  88*   LYMPHS PCT %  --   --  4*   MONOS PCT %  --   --  7   EOS PCT %  --   --  0    < > = values in this interval not displayed  Results from last 7 days   Lab Units 06/19/22  0455 06/18/22  0619 06/17/22  0509   SODIUM mmol/L 128*   < > 129*   POTASSIUM mmol/L 4 3   < > 4 9   CHLORIDE mmol/L 93*   < > 91*   CO2 mmol/L 24   < > 25   BUN mg/dL 42*   < > 86*   CREATININE mg/dL 4 93*   < > 8 29*   CALCIUM mg/dL 8 3*   < > 7 7*   ALK PHOS U/L  --   --  41   ALT U/L  --   --  16   AST U/L  --   --  21    < > = values in this interval not displayed  Results from last 7 days   Lab Units 06/19/22  0419   INR  1 07               * I Have Reviewed All Lab Data Listed Above  * Additional Pertinent Lab Tests Reviewed: [facility]  Reviewed    Imaging:  Imaging Reports Reviewed Today Include: CTA chest     Recent Cultures (last 7 days):     Results from last 7 days   Lab Units 06/14/22  1426 06/14/22  1425   BLOOD CULTURE  No Growth After 4 Days  No Growth After 4 Days         Last 24 Hours Medication List:   Current Facility-Administered Medications   Medication Dose Route Frequency Provider Last Rate    acetaminophen  650 mg Oral Q4H PRN Pako Jennings, DO      albuterol  2 5 mg Nebulization Q4H PRN Dennise Christa, CRNP      allopurinol  200 mg Oral Daily [facility], DO      amLODIPine  5 mg Oral Daily Helena Tyson MD      ascorbic acid  500 mg Oral BID Pako Jennings, DO      benzonatate  200 mg Oral TID Dennise Christa, CRNP      calcium acetate  1,334 mg Oral TID With Meals [facility], DO      cefepime  1,000 mg Intravenous Q24H Dennise Christa, CRNP 1,000 mg (06/19/22 0814)    cholecalciferol  5,000 Units Oral Daily REMY Dunlap      co-enzyme Q-10  200 mg Oral Daily [facility], DO      dexamethasone  6 mg Intravenous Q24H [facility], DO      ferrous sulfate  325 mg Oral Daily With Breakfast [facility], DO      folic acid  1 mg Oral Daily [facility], DO      furosemide  100 mg Intravenous Once Dennise Christa, CRNP      guaiFENesin  600 mg Oral Q12H [address] Dennise Christa, CRNP      hydrALAZINE  25 mg Oral [facility] Helena Tyson MD      HYDROcodone-homatropine  5 mL Oral Q4H PRN Chance Webber PA-C      ipratropium  0 5 mg Nebulization TID Dennise Christa, CRNP      labetalol  10 mg Intravenous Q4H PRN Pako Michaela, DO      levalbuterol  1 25 mg Nebulization TID Dennise Christa, CRNP      lidocaine   Topical Daily PRN REMY Dunlap      losartan  50 mg Oral BID REMY Dunlap      magnesium oxide  400 mg Oral Daily Pako Jennings, DO      metoclopramide  5 mg Intravenous Q6H PRN Chance Webber PA-C      metoprolol succinate  50 mg Oral BID Pako Jennings, DO      ondansetron  4 mg Oral Q6H PRN Pako Jennings, DO      sevelamer  1,600 mg Oral TID With Meals [facility], DO      sodium chloride  4 mL Nebulization TID Dennise Christa, CRNP      vancomycin  20 mg/kg (Adjusted) Intravenous Once Dennise Christa, CRNP 1,750 mg (06/19/22 1022)    [START ON 6/21/2022] vancomycin  10 mg/kg (Adjusted) Intravenous Once per day on Tue Thu Sat REMY Duff      zinc sulfate  220 mg Oral Daily With REMY Bowens          Today, Patient Was Seen By: REMY Duff    ** Please Note: Dictation voice to text software may have been used in the creation of this document   **

## 2022-06-19 NOTE — PLAN OF CARE
Problem: Prexisting or High Potential for Compromised Skin Integrity  Goal: Skin integrity is maintained or improved  Description: INTERVENTIONS:  - Identify patients at risk for skin breakdown  - Assess and monitor skin integrity  - Assess and monitor nutrition and hydration status  - Monitor labs   - Assess for incontinence   - Turn and reposition patient  - Assist with mobility/ambulation  - Relieve pressure over bony prominences  - Avoid friction and shearing  - Provide appropriate hygiene as needed including keeping skin clean and dry  - Evaluate need for skin moisturizer/barrier cream  - Collaborate with interdisciplinary team   - Patient/family teaching  - Consider wound care consult   Outcome: Progressing     Problem: Potential for Falls  Goal: Patient will remain free of falls  Description: INTERVENTIONS:  - Educate patient/family on patient safety including physical limitations  - Instruct patient to call for assistance with activity   - Consult OT/PT to assist with strengthening/mobility   - Keep Call bell within reach  - Keep bed low and locked with side rails adjusted as appropriate  - Keep care items and personal belongings within reach  - Initiate and maintain comfort rounds  - Make Fall Risk Sign visible to staff  - Offer Toileting in advance of need  - Initiate/Maintain bed alarm  - Obtain necessary fall risk management equipment:   - Apply yellow socks and bracelet for high fall risk patients  - Consider moving patient to room near nurses station  Outcome: Progressing     Problem: PAIN - ADULT  Goal: Verbalizes/displays adequate comfort level or baseline comfort level  Description: Interventions:  - Encourage patient to monitor pain and request assistance  - Assess pain using appropriate pain scale  - Administer analgesics based on type and severity of pain and evaluate response  - Implement non-pharmacological measures as appropriate and evaluate response  - Consider cultural and social influences on pain and pain management  - Notify physician/advanced practitioner if interventions unsuccessful or patient reports new pain  Outcome: Progressing     Problem: INFECTION - ADULT  Goal: Absence or prevention of progression during hospitalization  Description: INTERVENTIONS:  - Assess and monitor for signs and symptoms of infection  - Monitor lab/diagnostic results  - Monitor all insertion sites, i e  indwelling lines, tubes, and drains  - Monitor endotracheal if appropriate and nasal secretions for changes in amount and color  - [place] appropriate cooling/warming therapies per order  - Administer medications as ordered  - Instruct and encourage patient and family to use good hand hygiene technique  - Identify and instruct in appropriate isolation precautions for identified infection/condition  Outcome: Progressing  Goal: Absence of fever/infection during neutropenic period  Description: INTERVENTIONS:  - Monitor WBC    Outcome: Progressing     Problem: SAFETY ADULT  Goal: Maintain or return to baseline ADL function  Description: INTERVENTIONS:  -  Assess patient's ability to carry out ADLs; assess patient's baseline for ADL function and identify physical deficits which impact ability to perform ADLs (bathing, care of mouth/teeth, toileting, grooming, dressing, etc )  - Assess/evaluate cause of self-care deficits   - Assess range of motion  - Assess patient's mobility; develop plan if impaired  - Assess patient's need for assistive devices and provide as appropriate  - Encourage maximum independence but intervene and supervise when necessary  - Involve family in performance of ADLs  - Assess for home care needs following discharge   - Consider OT consult to assist with ADL evaluation and planning for discharge  - Provide patient education as appropriate  Outcome: Progressing  Goal: Maintains/Returns to pre admission functional level  Description: INTERVENTIONS:  - Perform BMAT or MOVE assessment daily    - Set and communicate daily mobility goal to care team and patient/family/caregiver  - Collaborate with rehabilitation services on mobility goals if consulted  - Record patient progress and toleration of activity level   Outcome: Progressing     Problem: DISCHARGE PLANNING  Goal: Discharge to home or other facility with appropriate resources  Description: INTERVENTIONS:  - Identify barriers to discharge w/patient and caregiver  - Arrange for needed discharge resources and transportation as appropriate  - Identify discharge learning needs (meds, wound care, etc )  - Refer to Case Management Department for coordinating discharge planning if the patient needs post-hospital services based on physician/advanced practitioner order or complex needs related to functional status, cognitive ability, or social support system  Outcome: Progressing     Problem: Knowledge Deficit  Goal: Patient/family/caregiver demonstrates understanding of disease process, treatment plan, medications, and discharge instructions  Description: Complete learning assessment and assess knowledge base    Interventions:  - Provide teaching at level of understanding  - Provide teaching via preferred learning methods  Outcome: Progressing     Problem: MOBILITY - ADULT  Goal: Maintain or return to baseline ADL function  Description: INTERVENTIONS:  -  Assess patient's ability to carry out ADLs; assess patient's baseline for ADL function and identify physical deficits which impact ability to perform ADLs (bathing, care of mouth/teeth, toileting, grooming, dressing, etc )  - Assess/evaluate cause of self-care deficits   - Assess range of motion  - Assess patient's mobility; develop plan if impaired  - Assess patient's need for assistive devices and provide as appropriate  - Encourage maximum independence but intervene and supervise when necessary  - Involve family in performance of ADLs  - Assess for home care needs following discharge   - Consider OT consult to assist with ADL evaluation and planning for discharge  - Provide patient education as appropriate  Outcome: Progressing  Goal: Maintains/Returns to pre admission functional level  Description: INTERVENTIONS:  - Perform BMAT or MOVE assessment daily    - Set and communicate daily mobility goal to care team and patient/family/caregiver  - Collaborate with rehabilitation services on mobility goals if consulted  - Record patient progress and toleration of activity level   Outcome: Progressing     Problem: METABOLIC, FLUID AND ELECTROLYTES - ADULT  Goal: Electrolytes maintained within normal limits  Description: INTERVENTIONS:  - Monitor labs and assess patient for signs and symptoms of electrolyte imbalances  - Administer electrolyte replacement as ordered  - Monitor response to electrolyte replacements, including repeat lab results as appropriate  - Instruct patient on fluid and nutrition as appropriate  Outcome: Progressing  Goal: Fluid balance maintained  Description: INTERVENTIONS:  - Monitor labs   - Monitor I/O and WT  - Instruct patient on fluid and nutrition as appropriate  - Assess for signs & symptoms of volume excess or deficit  Outcome: Progressing     Problem: Nutrition/Hydration-ADULT  Goal: Nutrient/Hydration intake appropriate for improving, restoring or maintaining nutritional needs  Description: Monitor and assess patient's nutrition/hydration status for malnutrition  Collaborate with interdisciplinary team and initiate plan and interventions as ordered  Monitor patient's weight and dietary intake as ordered or per policy  Utilize nutrition screening tool and intervene as necessary  Determine patient's food preferences and provide high-protein, high-caloric foods as appropriate       INTERVENTIONS:  - Monitor oral intake, urinary output, labs, and treatment plans  - Assess nutrition and hydration status and recommend course of action  - Evaluate amount of meals eaten  - Assist patient with eating if necessary   - Allow adequate time for meals  - Recommend/ encourage appropriate diets, oral nutritional supplements, and vitamin/mineral supplements  - Order, calculate, and assess calorie counts as needed  - Recommend, monitor, and adjust tube feedings and TPN/PPN based on assessed needs  - Assess need for intravenous fluids  - Provide specific nutrition/hydration education as appropriate  - Include patient/family/caregiver in decisions related to nutrition  Outcome: Progressing

## 2022-06-19 NOTE — NURSING NOTE
Pt coughing up bright red blood w/clots  Heparin drip stopped  Hospitalist notified  New orders noted  Respiratory evaluated pt at bedside  Labs collected and sent  Bed elevated for comfort  Pt at rest with call bell and belongings at bedside

## 2022-06-19 NOTE — PLAN OF CARE
Problem: Prexisting or High Potential for Compromised Skin Integrity  Goal: Skin integrity is maintained or improved  Description: INTERVENTIONS:  - Identify patients at risk for skin breakdown  - Assess and monitor skin integrity  - Assess and monitor nutrition and hydration status  - Monitor labs   - Assess for incontinence   - Turn and reposition patient  - Assist with mobility/ambulation  - Relieve pressure over bony prominences  - Avoid friction and shearing  - Provide appropriate hygiene as needed including keeping skin clean and dry  - Evaluate need for skin moisturizer/barrier cream  - Collaborate with interdisciplinary team   - Patient/family teaching  - Consider wound care consult   Outcome: Progressing     Problem: Potential for Falls  Goal: Patient will remain free of falls  Description: INTERVENTIONS:  - Educate patient/family on patient safety including physical limitations  - Instruct patient to call for assistance with activity   - Consult OT/PT to assist with strengthening/mobility   - Keep Call bell within reach  - Keep bed low and locked with side rails adjusted as appropriate  - Keep care items and personal belongings within reach  - Initiate and maintain comfort rounds  - Make Fall Risk Sign visible to staff  - Offer Toileting in advance of need  - Initiate/Maintain bed alarm  - Obtain necessary fall risk management equipment:   - Apply yellow socks and bracelet for high fall risk patients  - Consider moving patient to room near nurses station  Outcome: Progressing     Problem: PAIN - ADULT  Goal: Verbalizes/displays adequate comfort level or baseline comfort level  Description: Interventions:  - Encourage patient to monitor pain and request assistance  - Assess pain using appropriate pain scale  - Administer analgesics based on type and severity of pain and evaluate response  - Implement non-pharmacological measures as appropriate and evaluate response  - Consider cultural and social influences on pain and pain management  - Notify physician/advanced practitioner if interventions unsuccessful or patient reports new pain  Outcome: Progressing     Problem: INFECTION - ADULT  Goal: Absence or prevention of progression during hospitalization  Description: INTERVENTIONS:  - Assess and monitor for signs and symptoms of infection  - Monitor lab/diagnostic results  - Monitor all insertion sites, i e  indwelling lines, tubes, and drains  - Monitor endotracheal if appropriate and nasal secretions for changes in amount and color  - [place] appropriate cooling/warming therapies per order  - Administer medications as ordered  - Instruct and encourage patient and family to use good hand hygiene technique  - Identify and instruct in appropriate isolation precautions for identified infection/condition  Outcome: Progressing  Goal: Absence of fever/infection during neutropenic period  Description: INTERVENTIONS:  - Monitor WBC    Outcome: Progressing     Problem: SAFETY ADULT  Goal: Maintain or return to baseline ADL function  Description: INTERVENTIONS:  -  Assess patient's ability to carry out ADLs; assess patient's baseline for ADL function and identify physical deficits which impact ability to perform ADLs (bathing, care of mouth/teeth, toileting, grooming, dressing, etc )  - Assess/evaluate cause of self-care deficits   - Assess range of motion  - Assess patient's mobility; develop plan if impaired  - Assess patient's need for assistive devices and provide as appropriate  - Encourage maximum independence but intervene and supervise when necessary  - Involve family in performance of ADLs  - Assess for home care needs following discharge   - Consider OT consult to assist with ADL evaluation and planning for discharge  - Provide patient education as appropriate  Outcome: Progressing  Goal: Maintains/Returns to pre admission functional level  Description: INTERVENTIONS:  - Perform BMAT or MOVE assessment daily     - Set and communicate daily mobility goal to care team and patient/family/caregiver  - Collaborate with rehabilitation services on mobility goals if consulted  - Record patient progress and toleration of activity level   Outcome: Progressing     Problem: DISCHARGE PLANNING  Goal: Discharge to home or other facility with appropriate resources  Description: INTERVENTIONS:  - Identify barriers to discharge w/patient and caregiver  - Arrange for needed discharge resources and transportation as appropriate  - Identify discharge learning needs (meds, wound care, etc )  - Refer to Case Management Department for coordinating discharge planning if the patient needs post-hospital services based on physician/advanced practitioner order or complex needs related to functional status, cognitive ability, or social support system  Outcome: Progressing     Problem: Knowledge Deficit  Goal: Patient/family/caregiver demonstrates understanding of disease process, treatment plan, medications, and discharge instructions  Description: Complete learning assessment and assess knowledge base    Interventions:  - Provide teaching at level of understanding  - Provide teaching via preferred learning methods  Outcome: Progressing     Problem: MOBILITY - ADULT  Goal: Maintain or return to baseline ADL function  Description: INTERVENTIONS:  -  Assess patient's ability to carry out ADLs; assess patient's baseline for ADL function and identify physical deficits which impact ability to perform ADLs (bathing, care of mouth/teeth, toileting, grooming, dressing, etc )  - Assess/evaluate cause of self-care deficits   - Assess range of motion  - Assess patient's mobility; develop plan if impaired  - Assess patient's need for assistive devices and provide as appropriate  - Encourage maximum independence but intervene and supervise when necessary  - Involve family in performance of ADLs  - Assess for home care needs following discharge   - Consider OT consult to assist with ADL evaluation and planning for discharge  - Provide patient education as appropriate  Outcome: Progressing  Goal: Maintains/Returns to pre admission functional level  Description: INTERVENTIONS:  - Perform BMAT or MOVE assessment daily    - Set and communicate daily mobility goal to care team and patient/family/caregiver  - Collaborate with rehabilitation services on mobility goals if consulted  - Record patient progress and toleration of activity level   Outcome: Progressing     Problem: METABOLIC, FLUID AND ELECTROLYTES - ADULT  Goal: Electrolytes maintained within normal limits  Description: INTERVENTIONS:  - Monitor labs and assess patient for signs and symptoms of electrolyte imbalances  - Administer electrolyte replacement as ordered  - Monitor response to electrolyte replacements, including repeat lab results as appropriate  - Instruct patient on fluid and nutrition as appropriate  Outcome: Progressing  Goal: Fluid balance maintained  Description: INTERVENTIONS:  - Monitor labs   - Monitor I/O and WT  - Instruct patient on fluid and nutrition as appropriate  - Assess for signs & symptoms of volume excess or deficit  Outcome: Progressing     Problem: Nutrition/Hydration-ADULT  Goal: Nutrient/Hydration intake appropriate for improving, restoring or maintaining nutritional needs  Description: Monitor and assess patient's nutrition/hydration status for malnutrition  Collaborate with interdisciplinary team and initiate plan and interventions as ordered  Monitor patient's weight and dietary intake as ordered or per policy  Utilize nutrition screening tool and intervene as necessary  Determine patient's food preferences and provide high-protein, high-caloric foods as appropriate       INTERVENTIONS:  - Monitor oral intake, urinary output, labs, and treatment plans  - Assess nutrition and hydration status and recommend course of action  - Evaluate amount of meals eaten  - Assist patient with eating if necessary   - Allow adequate time for meals  - Recommend/ encourage appropriate diets, oral nutritional supplements, and vitamin/mineral supplements  - Order, calculate, and assess calorie counts as needed  - Recommend, monitor, and adjust tube feedings and TPN/PPN based on assessed needs  - Assess need for intravenous fluids  - Provide specific nutrition/hydration education as appropriate  - Include patient/family/caregiver in decisions related to nutrition  Outcome: Progressing

## 2022-06-19 NOTE — ASSESSMENT & PLAN NOTE
· Present on admission secondary to COVID -19 pneumonia   · Requiring 3 L of nasal cannula supplemental O2 increased to 4 L but now back down to 3 L   · Does not use oxygen in the outpatient setting   · 6/19 patient developed bright blood clot mixed in with sputum x 2 combined with left sided producible chest pain  CTA chest obtain which shows multifocal pneumonia  With increased work of breathing without increasing requirement of O2  Discussed case with Critical Care  Recommend to continue with current treatment  Patient does not want intubation  · Started on Xopenex Atrovent t i d   With hypertonic saline nebs   · Wean O2 as tolerated  · Goal SpO2 > 90%   · Respiratory protocol

## 2022-06-19 NOTE — NURSING NOTE
Pt resting in bed after short period in recliner for breakfast  Lethargic, fatigued  C/o pain to L lower rib cage with respirations, analgesia administered as ordered, new analgesia education given to pt, effective, pt sleeping easy to arouse  Continues on 3L O2 NC, pt laying on stomach intermittently  Rhonci auscultated throughout lung fields  C/o loss of appetite, ate less than 10% all meals this shift  Anuric  Aware of urine sample pending  Pt made comfortable, denies needs  Personal needs and call bell within reach

## 2022-06-19 NOTE — NURSING NOTE
Pt assisted to edge of bed to eat breakfast, poor appetite, ate protein packet  C/O sob at rest, tachypnea, 100 4 temp, x2 episodes of hemoptysis since 0400, hospitalist made aware, no new orders  Heparin gtt remains stopped  3L O2 NC continues, rhonci auscultated throughout bilat lung fields, moist non productive cough on assessment  Pt assisted into recliner a x 1 RW  Pt made comfortable, pt aware of urine sample need  Personal needs and call bell within reach

## 2022-06-19 NOTE — PROGRESS NOTES
Progress Note - Nephrology   Jesús Redman 76 y o  male MRN: [id number]  Unit/Bed#: -01 Encounter: [id number]    A/P:  1  ESRD hemodialysis dependent TTS   - due to volume overload will dialyze tomorrow morning   - orders written for a 3 hour treatment   -  Check labs in the morning    2  Anemia of CKD on chronic dialysis   - hemoglobin is adequate   - no erythropoietic agent will be given   - he receives Gayatri Lupillo in the outpatient setting    3  Secondary thyroparathyroidectomy of renal origin   - albumin is 3 1 due to acute illness   - check a phosphorus level    4  Covid 19 pneumonia with acute respiratory failure with hypoxia   - case discussed with primary team and Critical care   - his WBC geno likely due to Decadron but may have underlying bacterial PNA  - he is quite symptomatic with inc RR and pul treatments ordered   - had hemoptysis ad heparin will be held   - cefepime and vancomycin ordered    5  Hypertension associated with ESRD   - blood pressure is controlled at 138 73    6   Class 2 severe obesity due to excess calories with serious comorbidity and BMI 39=39/9   - will need dietary intervention after discharge          Follow up reason for today's visit: ESRD/Covid 19    Pneumonia due to COVID-19 virus    Patient Active Problem List   Diagnosis    Essential hypertension    Hemodialysis-associated hypotension    AAA (abdominal aortic aneurysm) (HCC)    Chronic renal failure    Primary osteoarthritis of both knees    Hyperlipidemia    Class 2 severe obesity due to excess calories with serious comorbidity and body mass index (BMI) of 39 0 to 39 9 in adult [facility])    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 ([facility] [address] [facility]    Secondary hyperparathyroidism of renal origin ([facility] [address] )    Abnormal nuclear stress test    Anemia in chronic kidney disease, on chronic dialysis ([facility] [address] )    ASCVD (arteriosclerotic cardiovascular disease)    Benign prostatic hyperplasia without lower urinary tract symptoms    Carotid stenosis, right    Chronic gout, unspecified, with tophus (tophi)    Chronic systolic congestive heart failure (HCC)    Disorder of phosphorus metabolism, unspecified    Gastroesophageal reflux disease    Iron deficiency anemia    Kidney stone    Magnesium deficiency    Osteoarthritis of both knees    Other disorders of electrolyte and fluid balance, not elsewhere classified    Other fluid overload    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, initial encounter ([facility] [address] )    [facility]lobular emphysema ([facility] )    Personal history of nicotine dependence    Proteinuria, unspecified    Skin lesion of face    Unspecified protein-calorie malnutrition (HCC)    Vitamin D deficiency    Elevated d-dimer    Pancreatic mass    Pneumonia due to COVID-19 virus    Acute respiratory failure with hypoxia ([facility])         Subjective:   Complains of chest discomfort on breathing and severe shortness of breath with cough  No abdominal pain nausea vomiting  Has very little appetite  Coughed up blood  Voiding little  Moving his bowels without difficulty  Feels worse today    Objective:     Vitals: Blood pressure 138/73, pulse 76, temperature 100 4 °F (38 °C), resp  rate (!) 23, height 5' 8" (1 727 m), weight 107 kg (236 lb 8 9 oz), SpO2 93 %  ,Body mass index is 35 97 kg/m²  Weight (last 2 days)     Date/Time Weight    06/19/22 0600 107 (236 55)     Weight: patient unable to stand at this time at 06/19/22 0600    06/18/22 0600 110 (241 84)    06/17/22 0549 112 (247 8)            Intake/Output Summary (Last 24 hours) at 6/19/2022 1217  Last data filed at 6/19/2022 0015  Gross per 24 hour   Intake 258 ml   Output --   Net 258 ml     I/O last 3 completed shifts: In: 758 [P O :240;  I V :518]  Out: 1501 [Other:1501]         Physical Exam: /73   Pulse 76   Temp 100 4 °F (38 °C)   Resp (!) 23   Ht 5' 8" (1 727 m)   Wt 107 kg (236 lb 8 9 oz) Comment: patient unable to stand at this time  SpO2 93%   BMI 35 97 kg/m²     General Appearance:    Alert, obese cooperative,in distress, appears stated age   Head:    Normocephalic, without obvious abnormality, atraumatic   Eyes:    Conjunctiva/corneas clear   Ears:    Normal external ears   Nose:   Nares normal, septum midline, mucosa normal, no drainage    or sinus tenderness   Throat:   Lips, mucosa, and tongue normal; teeth and gums normal   Neck:   Supple, symmetrical, trachea midline, no adenopathy;        thyroid:  No enlargement/tenderness/nodules; no carotid    bruit or JVD   Back:     Symmetric, no curvature, ROM normal, no CVA tenderness   Lungs:     Exp wheezing, rhonchi and crackles to auscultation bilaterally, respirations unlabored   Chest wall:    No tenderness or deformity   Heart:    Regular rate and rhythm, S1 and S2 normal, no murmur, rub   or gallop   Abdomen:     Soft, non-tender, bowel sounds active obese   Extremities:   Extremities normal, atraumatic, no cyanosis or edema   Skin:   Skin color, texture, turgor normal, no rashes or lesions   Lymph nodes:   Cervical normal   Neurologic:   CNII-XII conversant and dyspneic            Lab, Imaging and other studies: I have personally reviewed pertinent labs  CBC:   Lab Results   Component Value Date    WBC 23 82 (H) 06/19/2022    HGB 10 1 (L) 06/19/2022    HCT 31 5 (L) 06/19/2022     (H) 06/19/2022     06/19/2022    MCH 32 7 06/19/2022    MCHC 32 1 06/19/2022    RDW 14 1 06/19/2022    MPV 9 9 06/19/2022     CMP:   Lab Results   Component Value Date    K 4 3 06/19/2022    CL 93 (L) 06/19/2022    CO2 24 06/19/2022    BUN 42 (H) 06/19/2022    CREATININE 4 93 (H) 06/19/2022    CALCIUM 8 3 (L) 06/19/2022    EGFR 10 06/19/2022           Results from last 7 days   Lab Units 06/19/22  0455 06/18/22  0619 06/17/22  0509 06/16/22  1043 06/15/22  0408 06/14/22  1425   POTASSIUM mmol/L 4 3 4 5 4 9 4 6   < > 3 9   CHLORIDE mmol/L 93* 94* 91* 88*   < > 91*   CO2 mmol/L 24 26 25 28   < > 30   BUN mg/dL 42* 54* 86* 73*   < > 24   CREATININE mg/dL 4 93* 6 34* 8 29* 7 16*   < > 3 87*   CALCIUM mg/dL 8 3* 7 8* 7 7* 8 5   < > 8 4   ALK PHOS U/L  --   --  41 51  --  51   ALT U/L  --   --  16 21  --  17   AST U/L  --   --  21 32  --  31    < > = values in this interval not displayed  Phosphorus: No results found for: PHOS  Magnesium: No results found for: MG  Urinalysis: No results found for: Bryan Sommer, SPECGRAV, PHUR, LEUKOCYTESUR, NITRITE, PROTEINUA, GLUCOSEU, KETONESU, BILIRUBINUR, BLOODU  Ionized Calcium: No results found for: CAION  Coagulation:   Lab Results   Component Value Date    INR 1 07 06/19/2022     Troponin: No results found for: TROPONINI  ABG: No results found for: PHART, [id number], PO2ART, DQJ9CIZ, J4OYMSQK, BEART, SOURCE  Radiology review:     IMAGING  Procedure: CTA chest pe study    Result Date: 6/19/2022  Narrative: CTA - CHEST WITH IV CONTRAST - PULMONARY ANGIOGRAM INDICATION:   COVID with hemomptysis r/o PE  Patient has confirmed COVID-19  COMPARISON: CT chest, abdomen, and pelvis dated 1/3/2022 TECHNIQUE: CTA examination of the chest was performed using angiographic technique according to a protocol specifically tailored to evaluate for pulmonary embolism  Axial, sagittal, and coronal 2D reformatted images were created from the source data and  submitted for interpretation  In addition, coronal 3D MIP postprocessing was performed on the acquisition scanner  Radiation dose length product (DLP) for this visit:  907 39 mGy-cm   This examination, like all CT scans performed in the [facility], was performed utilizing techniques to minimize radiation dose exposure, including the use of iterative  reconstruction and automated exposure control   IV Contrast:  70 mL of iohexol (OMNIPAQUE)  FINDINGS: PULMONARY ARTERIAL TREE:  Suboptimal contrast bolus and respiratory motion decreases sensitivity for evaluation of peripheral pulmonary emboli, subject to this, no pulmonary embolism is seen  LUNGS/PLEURA:  Superimposed on mild scattered pulmonary emphysematous changes, there are multiple focal alveolar opacities scattered in the left lung, most prominent in the left lower lobe  There are multiple large consolidations in the right upper and lower lobes with associated air bronchograms additional focal alveolar opacities are seen in the right middle lobe and there are scattered groundglass opacities seen throughout the right lung most prominently in the inferior aspect of the right lower lobe and in the right lower lobe  Findings taken together are highly suspicious for multifocal infection  Small dependent pleural effusions  HEART/GREAT VESSELS:  The heart appears enlarged  Moderate coronary atherosclerosis  Mild to moderate aortic atherosclerosis, tortuous aorta  No thoracic aortic aneurysm  MEDIASTINUM AND ROBINSON:  Shotty mediastinal and hilar lymph nodes, most prominent in the right hilum, possibly reactive  Attenuation of multiple right lower lobe airways, probably related to inspissated secretions, inflammation/infection, and mucous plugging  CHEST WALL AND LOWER NECK:   Body wall edema  VISUALIZED STRUCTURES IN THE UPPER ABDOMEN:  1 8 cm focal cystic lesion in the pancreatic head is redemonstrated, similar to the prior  Renal atrophy and multiple renal cysts, largest in the right kidney measures approximately 4 9 cm in size  OSSEOUS STRUCTURES: Generalized osteopenia  Degenerative changes of the bilateral shoulders with bilateral shoulder joint effusions  No acute fracture or destructive osseous lesion  Impression: Suboptimal contrast bolus and respiratory motion decreases sensitivity for evaluation of peripheral pulmonary emboli, subject to this, no pulmonary embolism is seen  Multiple focal alveolar opacities scattered in the left lung, most prominent in the left lower lobe    Multiple large consolidations in the right upper and lower lobes with associated air bronchograms  Additional focal alveolar opacities in the right middle lobe with scattered groundglass opacities throughout the right lung  Findings taken together are highly suspicious for multifocal infection  Attenuation of multiple right lower lobe airways, probably related to inspissated secretions, inflammation/infection, and mucous plugging  Small dependent pleural effusions, shotty mediastinal and hilar lymph nodes, probably reactive  1 8 cm focal cystic lesion in the pancreatic head is redemonstrated, similar to the prior  Differential considerations include cyst, cystic neoplasm, or IPMN  For simple cyst(s) between 1 5 to 2 0 cm, recommend followup every 6 months for 4 times, then every 1 year for 2 times, then every 2 years for 3 times  If stable after 10 years, then no more followups  (If patient reaches age [de-identified], then can switch to age [de-identified] algorithm ) Recommend next followup in 6 months  Preferred imaging modality: abdomen MRI and MRCP with and without IV contrast, or triple phase abdomen CT with IV contrast, or abdomen MRI and MRCP without IV contrast  Cardiomegaly, coronary atherosclerosis, renal atrophy, renal cysts, and other findings as above   Workstation performed: [id number]       Current Facility-Administered Medications   Medication Dose Route Frequency    acetaminophen (TYLENOL) tablet 650 mg  650 mg Oral Q4H PRN    albuterol inhalation solution 2 5 mg  2 5 mg Nebulization Q4H PRN    allopurinol (ZYLOPRIM) tablet 200 mg  200 mg Oral Daily    amLODIPine (NORVASC) tablet 5 mg  5 mg Oral Daily    ascorbic acid (VITAMIN C) tablet 500 mg  500 mg Oral BID    benzonatate (TESSALON PERLES) capsule 200 mg  200 mg Oral TID    calcium acetate (PHOSLO) capsule 1,334 mg  1,334 mg Oral TID With Meals    cefepime (MAXIPIME) IVPB (premix in dextrose) 1,000 mg 50 mL  1,000 mg Intravenous Q24H    cholecalciferol (VITAMIN D3) tablet 5,000 Units  5,000 Units Oral Daily    co-enzyme Q-10 capsule 200 mg  200 mg Oral Daily    dexamethasone (DECADRON) injection 6 mg  6 mg Intravenous Q24H    ferrous sulfate tablet 325 mg  325 mg Oral Daily With Breakfast    folic acid (FOLVITE) tablet 1 mg  1 mg Oral Daily    furosemide (LASIX) 100 mg in dextrose 5 % 50 mL IVPB  100 mg Intravenous Once    guaiFENesin (MUCINEX) 12 hr tablet 600 mg  600 mg Oral Q12H KAROLINE    hydrALAZINE (APRESOLINE) tablet 25 mg  25 mg Oral Q8H [address]    HYDROcodone-homatropine (HYCODAN) oral syrup 5 mL  5 mL Oral Q4H PRN    ipratropium (ATROVENT) 0 02 % inhalation solution 0 5 mg  0 5 mg Nebulization TID    labetalol (NORMODYNE) injection 10 mg  10 mg Intravenous Q4H PRN    levalbuterol (XOPENEX) inhalation solution 1 25 mg  1 25 mg Nebulization TID    lidocaine (LMX) 4 % cream   Topical Daily PRN    losartan (COZAAR) tablet 50 mg  50 mg Oral BID    magnesium oxide (MAG-OX) tablet 400 mg  400 mg Oral Daily    metoclopramide (REGLAN) injection 5 mg  5 mg Intravenous Q6H PRN    metoprolol succinate (TOPROL-XL) 24 hr tablet 50 mg  50 mg Oral BID    ondansetron (ZOFRAN-ODT) dispersible tablet 4 mg  4 mg Oral Q6H PRN    sevelamer (RENAGEL) tablet 1,600 mg  1,600 mg Oral TID With Meals    sodium chloride 3 % inhalation solution 4 mL  4 mL Nebulization TID    vancomycin (VANCOCIN) 1,750 mg in sodium chloride 0 9 % 500 mL IVPB  20 mg/kg (Adjusted) Intravenous Once    [START ON 6/21/2022] vancomycin 750 mg in sodium chloride 0 9% 250 mL  10 mg/kg (Adjusted) Intravenous Once per day on Tue Thu Sat    zinc sulfate (ZINCATE) capsule 220 mg  220 mg Oral Daily With Lunch     Medications Discontinued During This Encounter   Medication Reason    ondansetron (ZOFRAN) injection 4 mg     heparin (ACS LOW)     losartan (COZAAR) tablet 50 mg     hydrALAZINE (APRESOLINE) tablet 100 mg     lidocaine (LMX) 4 % cream     benzonatate (TESSALON PERLES) capsule 100 mg     metoclopramide (REGLAN) injection 5 mg     hydrALAZINE (APRESOLINE) tablet 100 mg     benzonatate (TESSALON PERLES) capsule 100 mg     cefepime (MAXIPIME) injection 1,000 mg     heparin (porcine) 25,000 units in 0 45% NaCl 250 mL infusion (premix)     levalbuterol (XOPENEX) inhalation solution 1 25 mg     ipratropium (ATROVENT) 0 02 % inhalation solution 0 5 mg     sodium chloride 0 9 % inhalation solution 3 mL     albuterol (PROVENTIL HFA,VENTOLIN HFA) inhaler 2 puff     umeclidinium bromide (INCRUSE ELLIPTA) 62 5 mcg/inh inhaler AEPB 1 puff     fluticasone-vilanterol (BREO ELLIPTA) 200-25 MCG/INH inhaler 1 puff     albuterol (PROVENTIL HFA,VENTOLIN HFA) inhaler 2 puff     sodium chloride 0 9 % inhalation solution 3 mL     levalbuterol (XOPENEX) inhalation solution 1 25 mg     sodium chloride 3 % inhalation solution 4 mL        Leigh Mckeon MD      This progress note was produced in part using a dictation device which may document imprecise wording from author's original intent

## 2022-06-20 NOTE — PROGRESS NOTES
Vancomycin IV Pharmacy-to-Dose Consultation    Sallye Heimlich is a 76 y o  male who is currently receiving Vancomycin IV with management by the Pharmacy Consult service  Assessment/Plan:  The patient was reviewed  Renal function is stable and no signs or symptoms of nephrotoxicity and/or infusion reactions were documented in the chart  Based on todays assessment, continue current vancomycin (day # 2) dosing of 750 mg IV post-HD, with a plan for trough to be drawn at 0700 on 6/23/22  We will continue to follow the patients culture results and clinical progress daily      Darryl Teresa, Pharmacist

## 2022-06-20 NOTE — ASSESSMENT & PLAN NOTE
· Secondary to COVID infection  · Patient developed hemoptysis, heparin drip discontinued and hemoptysis appears to be improved  · Pulmonology following  · Will continue oxygen and titrate as tolerated

## 2022-06-20 NOTE — ASSESSMENT & PLAN NOTE
· COVID-19 positive on 06/13/2022  Vaccinated x2 against COVID-19  · Patient completed 5 days of remdesivir on 06/18/2022  · Decadron day 7/10  · Currently on 5 L nasal cannula  · CT done on 06/19/2022 concerning for superimposed bacterial pneumonia  · Cefepime/vancomycin started on 06/19/2022  · Patient was initially on heparin drip however discontinued due to hemoptysis    SubQ heparin initiated today  · Cultures negative to date  · Monitor inflammatory markers  · Supportive care

## 2022-06-20 NOTE — PLAN OF CARE
Pre-tx:  UF 2-2 5 L as tolerated  No difficulties during cannulation, 2K bath per serum K of 5 today  Will cont to monitor  Post-Dialysis RN Treatment Note    Blood Pressure:  Pre 142/90 mm/Hg  Post 119/65 mmHg   EDW  110 kg    Weight:  Pre 106 kg   Post 104 kg   Mode of weight measurement: Bed Scale   Volume Removed  2000 ml    Treatment duration 120 minutes    NS given  No    Treatment shortened?  No   Medications given during Rx Not Applicable   Estimated Kt/V  Not Applicable   Access type: AV fistula   Access Issues: No    Report called to primary nurse   Yes

## 2022-06-20 NOTE — PLAN OF CARE
Problem: Prexisting or High Potential for Compromised Skin Integrity  Goal: Skin integrity is maintained or improved  Description: INTERVENTIONS:  - Identify patients at risk for skin breakdown  - Assess and monitor skin integrity  - Assess and monitor nutrition and hydration status  - Monitor labs   - Assess for incontinence   - Turn and reposition patient  - Assist with mobility/ambulation  - Relieve pressure over bony prominences  - Avoid friction and shearing  - Provide appropriate hygiene as needed including keeping skin clean and dry  - Evaluate need for skin moisturizer/barrier cream  - Collaborate with interdisciplinary team   - Patient/family teaching  - Consider wound care consult   Outcome: Progressing     Problem: Potential for Falls  Goal: Patient will remain free of falls  Description: INTERVENTIONS:  - Educate patient/family on patient safety including physical limitations  - Instruct patient to call for assistance with activity   - Consult OT/PT to assist with strengthening/mobility   - Keep Call bell within reach  - Keep bed low and locked with side rails adjusted as appropriate  - Keep care items and personal belongings within reach  - Initiate and maintain comfort rounds  - Make Fall Risk Sign visible to staff  - Offer Toileting in advance of need  - Initiate/Maintain bed alarm  - Obtain necessary fall risk management equipment:   - Apply yellow socks and bracelet for high fall risk patients  - Consider moving patient to room near nurses station  Outcome: Progressing     Problem: PAIN - ADULT  Goal: Verbalizes/displays adequate comfort level or baseline comfort level  Description: Interventions:  - Encourage patient to monitor pain and request assistance  - Assess pain using appropriate pain scale  - Administer analgesics based on type and severity of pain and evaluate response  - Implement non-pharmacological measures as appropriate and evaluate response  - Consider cultural and social influences on pain and pain management  - Notify physician/advanced practitioner if interventions unsuccessful or patient reports new pain  Outcome: Progressing     Problem: INFECTION - ADULT  Goal: Absence or prevention of progression during hospitalization  Description: INTERVENTIONS:  - Assess and monitor for signs and symptoms of infection  - Monitor lab/diagnostic results  - Monitor all insertion sites, i e  indwelling lines, tubes, and drains  - Monitor endotracheal if appropriate and nasal secretions for changes in amount and color  - [place] appropriate cooling/warming therapies per order  - Administer medications as ordered  - Instruct and encourage patient and family to use good hand hygiene technique  - Identify and instruct in appropriate isolation precautions for identified infection/condition  Outcome: Progressing  Goal: Absence of fever/infection during neutropenic period  Description: INTERVENTIONS:  - Monitor WBC    Outcome: Progressing     Problem: SAFETY ADULT  Goal: Maintain or return to baseline ADL function  Description: INTERVENTIONS:  -  Assess patient's ability to carry out ADLs; assess patient's baseline for ADL function and identify physical deficits which impact ability to perform ADLs (bathing, care of mouth/teeth, toileting, grooming, dressing, etc )  - Assess/evaluate cause of self-care deficits   - Assess range of motion  - Assess patient's mobility; develop plan if impaired  - Assess patient's need for assistive devices and provide as appropriate  - Encourage maximum independence but intervene and supervise when necessary  - Involve family in performance of ADLs  - Assess for home care needs following discharge   - Consider OT consult to assist with ADL evaluation and planning for discharge  - Provide patient education as appropriate  Outcome: Progressing  Goal: Maintains/Returns to pre admission functional level  Description: INTERVENTIONS:  - Perform BMAT or MOVE assessment daily    - Set and communicate daily mobility goal to care team and patient/family/caregiver  - Collaborate with rehabilitation services on mobility goals if consulted  - Record patient progress and toleration of activity level   Outcome: Progressing     Problem: DISCHARGE PLANNING  Goal: Discharge to home or other facility with appropriate resources  Description: INTERVENTIONS:  - Identify barriers to discharge w/patient and caregiver  - Arrange for needed discharge resources and transportation as appropriate  - Identify discharge learning needs (meds, wound care, etc )  - Refer to Case Management Department for coordinating discharge planning if the patient needs post-hospital services based on physician/advanced practitioner order or complex needs related to functional status, cognitive ability, or social support system  Outcome: Progressing     Problem: Knowledge Deficit  Goal: Patient/family/caregiver demonstrates understanding of disease process, treatment plan, medications, and discharge instructions  Description: Complete learning assessment and assess knowledge base    Interventions:  - Provide teaching at level of understanding  - Provide teaching via preferred learning methods  Outcome: Progressing     Problem: MOBILITY - ADULT  Goal: Maintain or return to baseline ADL function  Description: INTERVENTIONS:  -  Assess patient's ability to carry out ADLs; assess patient's baseline for ADL function and identify physical deficits which impact ability to perform ADLs (bathing, care of mouth/teeth, toileting, grooming, dressing, etc )  - Assess/evaluate cause of self-care deficits   - Assess range of motion  - Assess patient's mobility; develop plan if impaired  - Assess patient's need for assistive devices and provide as appropriate  - Encourage maximum independence but intervene and supervise when necessary  - Involve family in performance of ADLs  - Assess for home care needs following discharge   - Consider OT consult to assist with ADL evaluation and planning for discharge  - Provide patient education as appropriate  Outcome: Progressing  Goal: Maintains/Returns to pre admission functional level  Description: INTERVENTIONS:  - Perform BMAT or MOVE assessment daily    - Set and communicate daily mobility goal to care team and patient/family/caregiver  - Collaborate with rehabilitation services on mobility goals if consulted  - Record patient progress and toleration of activity level   Outcome: Progressing     Problem: METABOLIC, FLUID AND ELECTROLYTES - ADULT  Goal: Electrolytes maintained within normal limits  Description: INTERVENTIONS:  - Monitor labs and assess patient for signs and symptoms of electrolyte imbalances  - Administer electrolyte replacement as ordered  - Monitor response to electrolyte replacements, including repeat lab results as appropriate  - Instruct patient on fluid and nutrition as appropriate  Outcome: Progressing  Goal: Fluid balance maintained  Description: INTERVENTIONS:  - Monitor labs   - Monitor I/O and WT  - Instruct patient on fluid and nutrition as appropriate  - Assess for signs & symptoms of volume excess or deficit  Outcome: Progressing     Problem: Nutrition/Hydration-ADULT  Goal: Nutrient/Hydration intake appropriate for improving, restoring or maintaining nutritional needs  Description: Monitor and assess patient's nutrition/hydration status for malnutrition  Collaborate with interdisciplinary team and initiate plan and interventions as ordered  Monitor patient's weight and dietary intake as ordered or per policy  Utilize nutrition screening tool and intervene as necessary  Determine patient's food preferences and provide high-protein, high-caloric foods as appropriate       INTERVENTIONS:  - Monitor oral intake, urinary output, labs, and treatment plans  - Assess nutrition and hydration status and recommend course of action  - Evaluate amount of meals eaten  - Assist patient with eating if necessary   - Allow adequate time for meals  - Recommend/ encourage appropriate diets, oral nutritional supplements, and vitamin/mineral supplements  - Order, calculate, and assess calorie counts as needed  - Recommend, monitor, and adjust tube feedings and TPN/PPN based on assessed needs  - Assess need for intravenous fluids  - Provide specific nutrition/hydration education as appropriate  - Include patient/family/caregiver in decisions related to nutrition  Outcome: Progressing

## 2022-06-20 NOTE — CASE MANAGEMENT
Case Management Discharge Planning Note    Patient name Luanne Gaucher  Location /-70 MRN [id number]  : 1945 Date 2022       Current Admission Date: 2022  Current Admission Diagnosis:Pneumonia due to COVID-19 virus   Patient Active Problem List    Diagnosis Date Noted    Pneumonia due to COVID-19 virus 2022    Acute respiratory failure with hypoxia ([facility] ) 2022    Elevated d-dimer 2022    Pancreatic mass 2022    Primary osteoarthritis of both knees 2021    Hyperlipidemia 2021    Class 2 severe obesity due to excess calories with serious comorbidity and body mass index (BMI) of 39 0 to 39 9 in adult [facility]) 2021    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 ([facility] ) 2021    Secondary hyperparathyroidism of renal origin ([place] [address] ) 2021    Chronic renal failure 2021    Other fluid overload 2020    Abnormal nuclear stress test 2020    [facility]lobular emphysema [facility] ) 2020    Disorder of phosphorus metabolism, unspecified 2019    Essential hypertension 2019    Hemodialysis-associated hypotension 2019    AAA (abdominal aortic aneurysm) ([facility] ) 2019    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, initial encounter ([place] [address] ) 2019    Anemia in chronic kidney disease, on chronic dialysis ([facility] ) 2018    Benign prostatic hyperplasia without lower urinary tract symptoms 2018    Chronic gout, unspecified, with tophus (tophi) 2018    Iron deficiency anemia 2018    Kidney stone 2018    Magnesium deficiency 2018    Other disorders of electrolyte and fluid balance, not elsewhere classified 2018    Personal history of nicotine dependence 2018    Proteinuria, unspecified 2018    Unspecified protein-calorie malnutrition ([facility] ) 2018    Skin lesion of face 2018    Chronic systolic congestive heart failure [facility] ) 06/01/2018    ASCVD (arteriosclerotic cardiovascular disease) 12/11/2017    Carotid stenosis, right 12/11/2017    Gastroesophageal reflux disease 12/11/2017    Vitamin D deficiency 12/11/2017    Osteoarthritis of both knees 09/02/2015      LOS (days): 6  Geometric Mean LOS (GMLOS) (days): 5 40  Days to GMLOS:-0 7     OBJECTIVE:  Risk of Unplanned Readmission Score: 31 78         Current admission status: Inpatient   Preferred Pharmacy:   [facility]/pharmacy #[id number]- [address] - [address]  [address]  Phone: [phone] Fax: [phone] [facility], [facility] [address]  [address] [place] [address]  Phone: [phone] Fax: [phone] [facility] [address], [address] [facility] [address] [address]  Phone: [phone] Fax: [phone]    Primary Care Provider: Sugar Verde DO    Primary Insurance: [facility] REP  Secondary Insurance:     DISCHARGE DETAILS:    Discharge planning discussed with<IPADDMARY> David bird at 17:54 pm  Freedom of Choice: Yes  Comments - Freedom of Choice: recommendation  is for rehab   permission was to send to [facility] they have a dialysis center at the facility  CM contacted family/caregiver?: Yes             Contacts  Patient Contacts: Braeden Hawk  Relationship to Patient[de-identified] Family (daughter)  Contact Method: Phone  Phone Number: [phone]  Reason/Outcome: Discharge Planning              Other Referral/Resources/Interventions Provided:  Interventions: Dialysis, Short Term Rehab  Referral Comments: referral sent to [facility],  pt needs a snf that accepts his insurance, pt may need to have his dialyis chair moved if [facility] is not able to accept   family is hoping he does not need rehab, pt is not yet stable for rehab         Treatment Team Recommendation: Short Term Rehab (snf with dialysis [place] is needed- bls)  Discharge Destination Plan[de-identified]  (snf with dialysis &auth is needed- bls)

## 2022-06-20 NOTE — PHYSICAL THERAPY NOTE
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 ([place] [address] ) [N18 6, Z99 2]  COVID-19 [U07 1]  Problem List:   Patient Active Problem List   Diagnosis    Essential hypertension    Hemodialysis-associated hypotension    AAA (abdominal aortic aneurysm) (HCC)    Chronic renal failure    Primary osteoarthritis of both knees    Hyperlipidemia    Class 2 severe obesity due to excess calories with serious comorbidity and body mass index (BMI) of 39 0 to 39 9 in adult [facility])    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 ([place] [address] )    Secondary hyperparathyroidism of renal origin ([place] [address] )    Abnormal nuclear stress test    Anemia in chronic kidney disease, on chronic dialysis ([place] [address] )    ASCVD (arteriosclerotic cardiovascular disease)    Benign prostatic hyperplasia without lower urinary tract symptoms    Carotid stenosis, right    Chronic gout, unspecified, with tophus (tophi)    Chronic systolic congestive heart failure (HCC)    Disorder of phosphorus metabolism, unspecified    Gastroesophageal reflux disease    Iron deficiency anemia    Kidney stone    Magnesium deficiency    Osteoarthritis of both knees    Other disorders of electrolyte and fluid balance, not elsewhere classified    Other fluid overload    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, initial encounter ([place] [address] )    Panlobular emphysema ([place] [address] )    Personal history of nicotine dependence    Proteinuria, unspecified    Skin lesion of face    Unspecified protein-calorie malnutrition (HCC)    Vitamin D deficiency    Elevated d-dimer    Pancreatic mass    Pneumonia due to COVID-19 virus    Acute respiratory failure with hypoxia [facility])       Past Medical History  Past Medical History:   Diagnosis Date    Abdominal aortic aneurysm (AAA) ([facility])     s/p repair    Anemia     due to kidney disease    Arthritis     CHF (congestive heart failure) ([facility])     stable at present    Chronic kidney disease     on hemodialysis Tu Th Sat    Chronic pain disorder     knees    Edema     GERD (gastroesophageal reflux disease)     no meds     Gout     History of echocardiogram 03/02/2018    EF 55%, mild LVH, technically difficult study   Hyperlipidemia     borderline no meds    Hyperparathyroidism ([address] )     Hypertension     Kidney stone     Seasonal allergies     Shortness of breath     mild exertional    Skin cancer     Vitamin D deficiency        Past Surgical History  Past Surgical History:   Procedure Laterality Date    ABDOMINAL AORTIC ANEURYSM REPAIR  2004    CARDIAC CATHETERIZATION  05/01/2020    no blockages    CARPAL TUNNEL RELEASE Right     COLONOSCOPY      DIALYSIS FISTULA CREATION Right 2017    GLAUCOMA SURGERY      LEG SURGERY      removal splinter    PARATHYROIDECTOMY      SPLIT THICKNESS SKIN GRAFT N/A 12/4/2019    Procedure: THIGH STSG;  Surgeon: Jorden Dyson MD;  Location: [address] [place] [address];  Service: Plastics    SQUAMOUS CELL CARCINOMA EXCISION Right 12/4/2019    Procedure: REMOVE SKIN CA FROM EAR & CHEEK;  Surgeon: Jorden Dyson MD;  Location: [address] [place] [address];  Service: Plastics       Length Of Stay: 6  Performed at least 2 patient identifiers during session: Name and Birthday       06/20/22 0824   PT Last Visit   PT Visit Date 06/20/22   Note Type   Note Type Treatment  (HERBERT Salazar confirmed pt appropriate for PT session)   Pain Assessment   Pain Assessment Tool 0-10   Pain Score 6   Pain Location/Orientation Orientation: Right;Location: Rib Cage   Pain Onset/Description Onset: Ongoing  ("since last tuesday")   Hospital Pain Intervention(s) Emotional support; Ambulation/increased activity   Multiple Pain Sites No   Restrictions/Precautions   Weight Bearing Precautions Per Order No   Other Precautions Fall Risk;O2;Pain;Limb alert  (limb alert RUE  5 Lo2)   General   Chart Reviewed Yes   Response to Previous Treatment Patient with no complaints from previous session  Family/Caregiver Present No   Cognition   Overall Cognitive Status WFL   Arousal/Participation Lethargic   Attention Within functional limits   Orientation Level Oriented X4   Following Commands Follows all commands and directions without difficulty   Comments pt agreed to PT session   Subjective   Subjective "I'll do what I can"   Bed Mobility   Supine to Sit 4  Minimal assistance   Additional items HOB elevated; Bedrails;Verbal cues; Increased time required   Sit to Supine 4  Minimal assistance   Additional items Assist x 1;Bedrails; Increased time required;Verbal cues;LE management   Additional Comments BP: 149/70, HR: 74, SPo2: 91% on 5 Lo2 via nc - vitals at start of session   Transfers   Sit to Stand 4  Minimal assistance   Additional items Assist x 1; Increased time required;Verbal cues   Stand to Sit 4  Minimal assistance   Additional items Assist x 1; Increased time required;Verbal cues   Stand pivot Unable to assess  (PT declined transfer to chair due to continued dizziness sitting EOB and fatigue requesting to return to bed)   Additional Comments Vitals once sitting EOB after 1 stand 107/67mmHg, HR: 81bpm, SPo2: 90% on 5 LO2   Ambulation/Elevation   Gait pattern Improper Weight shift;Narrow MIKKI; Decreased foot clearance;Shuffling   Gait Assistance 3  Moderate assist   Additional items Assist x 1;Verbal cues; Tactile cues   Assistive Device Rolling walker   Distance 3'  (side steps left to [facility])   Balance   Static Sitting Fair   Dynamic Sitting Fair   Static Standing Fair -   Dynamic Standing Fair -   Ambulatory Fair -   Endurance Deficit   Endurance Deficit Yes   Endurance Deficit Description limited by dizziness, fatigue, SOB   Activity Tolerance   Activity Tolerance Patient limited by fatigue   Medical Staff Made Aware CM made aware of updated reccomendation   Nurse Made Aware HERBERT Arrieta made aware of session findings   Exercises   Knee AROM Long Arc Quad 20 reps; Sitting;AROM; Bilateral   Ankle Pumps 20 reps; Sitting;AROM; Bilateral   Assessment   Prognosis Good   Problem List Decreased strength;Decreased range of motion;Decreased endurance; Impaired balance;Decreased mobility; Decreased safety awareness;Pain   Assessment Pt seen for PT treatment session this date, consisting of ther act focused on transfer training  and therapeutic exercise focusing on range of motion  Since previous session, pt has made minimal progress in terms of activity tolerance and endurance and unable to progress to stand pivot transfer this date  Patient had chest CTA 6/19 and per EMR review, negative for PE  This date, patient greeted supine in bed and agreed to PT session  Pt required Juliano for sup <> sit with HOB elevated and use of bed rails  Patient sitting EOB with mild dizziness reported and SPo2 initially dropped to 88% on 5 Lo2 returning to 92% within 30 seconds  Patient performed STS Juliano x2 trials to rolling walker and tolerated standing 20 seconds at most and refused transfer to bedside chair requesting to return to bed due to fatigue and stating "I don't think I can stay up " Patient tolerated seated TE EOB fair with CGA, slow speed, and rest breaks required  Pt did complete 3 small side steps R with rolling walker and modA from PT for improved positioning in bed  Pertinent barriers during this session include SOB and fatigue, dizziness  Current goals and POC remain appropriate, pt continues to have rehab potential and is making progress towards STGs  Pt prognosis for achieving goals is good, pending pt progress with hospitalization/medical status improvements, and indicated by orientation, ability to follow directions, previous response to intervention and responsive to cues/strategies  Pt limited d/t fear of falling, medical instability and avoidance behaviors   PT recommends post acute rehabilitation services upon discharge  Pt continues to be functioning below baseline level, and remains limited 2* factors listed above  PT will continue to see pt during current hospitalization in order to address the deficits listed above and provide interventions consistent w/ POC in effort to achieve STGs  Barriers to Discharge Inaccessible home environment;Decreased caregiver support   Goals   Patient Goals to get back to doing what I did before   Short Term Goal #1 patients goals remain appropriate  PT Treatment Day 1   Plan   Treatment/Interventions Functional transfer training;LE strengthening/ROM; Elevations; Therapeutic exercise; Endurance training;Patient/family training;Bed mobility;Gait training;Spoke to nursing   Progress Progressing toward goals   Recommendation   PT Discharge Recommendation Post acute rehabilitation services  (reccomendation changed to PAR - CM made aware)   Equipment Recommended Walker   AM-PAC Basic Mobility Inpatient   Turning in Bed Without Bedrails 3   Lying on Back to Sitting on Edge of Flat Bed 3   Moving Bed to Chair 1   Standing Up From Chair 3   Walk in Room 2   Climb 3-5 Stairs 1   Basic Mobility Inpatient Raw Score 13   Basic Mobility Standardized Score 33 99   Highest Level Of Mobility   JH-HLM Goal 4: Move to chair/commode   JH-HLM Achieved 3: Sit at edge of bed   End of Consult   Patient Position at End of Consult Supine; All needs within reach;Bed/Chair alarm activated       Time In: 0824  Time Out: 0904  Total Treatment Minutes: [id number] [place], [place]

## 2022-06-20 NOTE — PLAN OF CARE
Problem: Prexisting or High Potential for Compromised Skin Integrity  Goal: Skin integrity is maintained or improved  Description: INTERVENTIONS:  - Identify patients at risk for skin breakdown  - Assess and monitor skin integrity  - Assess and monitor nutrition and hydration status  - Monitor labs   - Assess for incontinence   - Turn and reposition patient  - Assist with mobility/ambulation  - Relieve pressure over bony prominences  - Avoid friction and shearing  - Provide appropriate hygiene as needed including keeping skin clean and dry  - Evaluate need for skin moisturizer/barrier cream  - Collaborate with interdisciplinary team   - Patient/family teaching  - Consider wound care consult   Outcome: Progressing     Problem: Potential for Falls  Goal: Patient will remain free of falls  Description: INTERVENTIONS:  - Educate patient/family on patient safety including physical limitations  - Instruct patient to call for assistance with activity   - Consult OT/PT to assist with strengthening/mobility   - Keep Call bell within reach  - Keep bed low and locked with side rails adjusted as appropriate  - Keep care items and personal belongings within reach  - Initiate and maintain comfort rounds  - Make Fall Risk Sign visible to staff  - Offer Toileting in advance of need  - Initiate/Maintain bed alarm  - Obtain necessary fall risk management equipment:   - Apply yellow socks and bracelet for high fall risk patients  - Consider moving patient to room near nurses station  Outcome: Progressing     Problem: PAIN - ADULT  Goal: Verbalizes/displays adequate comfort level or baseline comfort level  Description: Interventions:  - Encourage patient to monitor pain and request assistance  - Assess pain using appropriate pain scale  - Administer analgesics based on type and severity of pain and evaluate response  - Implement non-pharmacological measures as appropriate and evaluate response  - Consider cultural and social influences on pain and pain management  - Notify physician/advanced practitioner if interventions unsuccessful or patient reports new pain  Outcome: Progressing     Problem: INFECTION - ADULT  Goal: Absence or prevention of progression during hospitalization  Description: INTERVENTIONS:  - Assess and monitor for signs and symptoms of infection  - Monitor lab/diagnostic results  - Monitor all insertion sites, i e  indwelling lines, tubes, and drains  - Monitor endotracheal if appropriate and nasal secretions for changes in amount and color  - [place] appropriate cooling/warming therapies per order  - Administer medications as ordered  - Instruct and encourage patient and family to use good hand hygiene technique  - Identify and instruct in appropriate isolation precautions for identified infection/condition  Outcome: Progressing     Problem: METABOLIC, FLUID AND ELECTROLYTES - ADULT  Goal: Electrolytes maintained within normal limits  Description: INTERVENTIONS:  - Monitor labs and assess patient for signs and symptoms of electrolyte imbalances  - Administer electrolyte replacement as ordered  - Monitor response to electrolyte replacements, including repeat lab results as appropriate  - Instruct patient on fluid and nutrition as appropriate  Outcome: Progressing  Goal: Fluid balance maintained  Description: INTERVENTIONS:  - Monitor labs   - Monitor I/O and WT  - Instruct patient on fluid and nutrition as appropriate  - Assess for signs & symptoms of volume excess or deficit  Outcome: Progressing

## 2022-06-20 NOTE — PLAN OF CARE
Problem: PHYSICAL THERAPY ADULT  Goal: Performs mobility at highest level of function for planned discharge setting  See evaluation for individualized goals  Description: Treatment/Interventions: Functional transfer training, LE strengthening/ROM, Elevations, Therapeutic exercise, Endurance training, Patient/family training, Equipment eval/education, Bed mobility, Gait training, Compensatory technique education, Spoke to nursing, Spoke to case management  Equipment Recommended: Sonido Ybarar (has own)       See flowsheet documentation for full assessment, interventions and recommendations  Outcome: Progressing  Note: Prognosis: Good  Problem List: Decreased strength, Decreased range of motion, Decreased endurance, Impaired balance, Decreased mobility, Decreased safety awareness, Pain  Assessment: Pt seen for PT treatment session this date, consisting of ther act focused on transfer training  and therapeutic exercise focusing on range of motion  Since previous session, pt has made minimal progress in terms of activity tolerance and endurance and unable to progress to stand pivot transfer this date  Patient had chest CTA 6/19 and per EMR review, negative for PE  This date, patient greeted supine in bed and agreed to PT session  Pt required Juliano for sup <> sit with HOB elevated and use of bed rails  Patient sitting EOB with mild dizziness reported and SPo2 initially dropped to 88% on 5 Lo2 returning to 92% within 30 seconds  Patient performed STS Juliano x2 trials to rolling walker and tolerated standing 20 seconds at most and refused transfer to bedside chair requesting to return to bed due to fatigue and stating "I don't think I can stay up " Patient tolerated seated TE EOB fair with CGA, slow speed, and rest breaks required  Pt did complete 3 small side steps R with rolling walker and modA from PT for improved positioning in bed  Pertinent barriers during this session include SOB and fatigue, dizziness   Current goals and POC remain appropriate, pt continues to have rehab potential and is making progress towards STGs  Pt prognosis for achieving goals is good, pending pt progress with hospitalization/medical status improvements, and indicated by orientation, ability to follow directions, previous response to intervention and responsive to cues/strategies  Pt limited d/t fear of falling, medical instability and avoidance behaviors  PT recommends post acute rehabilitation services upon discharge  Pt continues to be functioning below baseline level, and remains limited 2* factors listed above  PT will continue to see pt during current hospitalization in order to address the deficits listed above and provide interventions consistent w/ POC in effort to achieve STGs  Barriers to Discharge: Inaccessible home environment, Decreased caregiver support        PT Discharge Recommendation: Post acute rehabilitation services (reccomendation changed to PAR - CM made aware)          See flowsheet documentation for full assessment        Clark Amor PT

## 2022-06-20 NOTE — ASSESSMENT & PLAN NOTE
· Blood pressure improving  · Continue losartan and metoprolol  · Hydralazine dose increased to 100 mg t i d

## 2022-06-20 NOTE — CONSULTS
PULMONOLOGY CONSULT NOTE     Name: Viviane Navarro   Age & Sex: 76 y o  male   MRN: [id number]  Unit/Bed#: -01   Encounter: [id number]        Reason for consultation: Hemoptysis    Requesting physician: [facility]    Assessment:  Acute hypoxemic respiratory failure  Multifocal pneumonia- developed between 6/14 and 6/19- suspect due to aspiration given choking episodes and difficulty swallowing  2 episodes of limited hemoptysis 6/19 in setting of multifocal pneumonia  COVID19 infection  ESRD on HD  Hyponatremia  Anemia of chronic disease  Hypertension  Pancreatic head lesion    Recommendations  Patient developed multifocal pneumonia  He had 2 episodes of dime-sized hemoptysis 6/19 which has not recurred  His heparin infusion given for COVID protocol with elevated D-dimer has been stopped  He had a negative PE study 6/19 (although limited by motion)  Monitor for further hemoptysis but no specific action needed at this time- defer on bronchoscopy  Recommend- continue cefepime and vancomycin- remove vanco if MRSA swab negative  Check sputum culture, legionella/Strep pneumo antigens  Wean O2 for SPO2 >89%- currently on 5 lpm  Can continue Xopenex/Atrovent TID  Add hypertonic saline TID  OOB as able  Incentive spirometry and flutter valve  Recommend Speech/Swallow evaluation given choking on food/difficulty swallowing  Although I agree with the cessation of the heparin gtt for the COVID protocol I would recommend reinitiating DVT Px  Would recommend chest imaging 6-8 weeks after discharge  Needs outpatient follow-up of his pancreas lesion    Discussed with Dr Rani Fall with [facility]        History of Present Illness   HPI:  Viviane Navarro is a 76 y o  male with PMHx including but not limited to ESRD on HD T/R/S, secondary hyperparathyroidism, HFpEF, gout, hypertension, class 2 obesity BMI 37, who presented 6/14 after testing positive for COVID 6/13  Previously vaccinated x 2    He was found to have acute hypoxemia at his HD session on the day of admission  He required 3 lpm of O2 in the ED  He was admitted to [facility] and started on remdesivir and decadron and heparin infusion  He had improvements in his dyspnea and hypoxemia with treatment and dialysis sessions  Overnight the morning of 6/19 he had an episode of hemoptysis and heparin infusion was held  Was started on vancomycin and cefepime out of concern for sepsis given leukocytosis (previously attributed to decadron), and tachypnea  Received diuresis  The hemoptysis was bright red blood clots mixed with sputum x 2 episodes  On 6/19 he was briefly on 4 lpm then back to 3 lpm     Patient started on Xopenex/atrovent TID nebs with hypertonic saline 6/19  He is scheduled to receive an extra session of HD today Monday 6/20/22    When I see him today he reports he feels a little better than yesterday, but overnight Saturday and Sunday morning he was worse- had two dime-sized blood clots mixed in brown sputum  This has not recurred  He has never had hemoptysis in the past    He currently is on 5 lpm SPO2 91%  He is coughing but having a hard time expectorating  He had a CT angio chest yesterday with no PE noted but has multifocal pneumonia which was not present on his chest x-ray on admission  He reports trouble swallowing which is new and that he is choking on his food and has poor PO intake  He does not have a history of asthma or COPD  He does not use inhalers  He is a former smoker quit 19 years ago  He denies chest pain  Review of systems:  12 point review of systems was completed and was otherwise negative except as listed in HPI        Historical Information   Past Medical History:   Diagnosis Date    Abdominal aortic aneurysm (AAA) ([facility] [address] )     s/p repair    Anemia     due to kidney disease    Arthritis     CHF (congestive heart failure) (HCC)     stable at present    Chronic kidney disease     on hemodialysis Tu Th Sat    Chronic pain disorder     knees    Edema     GERD (gastroesophageal reflux disease)     no meds     Gout     History of echocardiogram 2018    EF 55%, mild LVH, technically difficult study      Hyperlipidemia     borderline no meds    Hyperparathyroidism ([address] )     Hypertension     Kidney stone     Seasonal allergies     Shortness of breath     mild exertional    Skin cancer     Vitamin D deficiency      Past Surgical History:   Procedure Laterality Date    ABDOMINAL AORTIC ANEURYSM REPAIR  2004    CARDIAC CATHETERIZATION  2020    no blockages    CARPAL TUNNEL RELEASE Right     COLONOSCOPY      DIALYSIS FISTULA CREATION Right 2017    GLAUCOMA SURGERY      LEG SURGERY      removal splinter    PARATHYROIDECTOMY      SPLIT THICKNESS SKIN GRAFT N/A 2019    Procedure: THIGH STSG;  Surgeon: Radha Hoang MD;  Location: [address] [place] [address];  Service: Plastics    SQUAMOUS CELL CARCINOMA EXCISION Right 2019    Procedure: REMOVE SKIN CA FROM EAR & CHEEK;  Surgeon: Rahda Hoang MD;  Location: [address] [place] [address];  Service: Plastics     Family History   Problem Relation Age of Onset    Kidney disease Mother     Leukemia Father        Social History:   Social History     Tobacco Use   Smoking Status Former Smoker    Types: Cigarettes    Quit date: 2004    Years since quittin 0   Smokeless Tobacco Never Used         Meds/Allergies   Current Facility-Administered Medications   Medication Dose Route Frequency    acetaminophen (TYLENOL) tablet 650 mg  650 mg Oral Q4H PRN    albuterol inhalation solution 2 5 mg  2 5 mg Nebulization Q4H PRN    allopurinol (ZYLOPRIM) tablet 200 mg  200 mg Oral Daily    amLODIPine (NORVASC) tablet 5 mg  5 mg Oral Daily    ascorbic acid (VITAMIN C) tablet 500 mg  500 mg Oral BID    benzonatate (TESSALON PERLES) capsule 200 mg  200 mg Oral TID    calcium acetate (PHOSLO) capsule 1,334 mg  1,334 mg Oral TID With Meals    cefepime (MAXIPIME) IVPB (premix in dextrose) 1,000 mg 50 mL  1,000 mg Intravenous Q24H    cholecalciferol (VITAMIN D3) tablet 5,000 Units  5,000 Units Oral Daily    co-enzyme Q-10 capsule 200 mg  200 mg Oral Daily    dexamethasone (DECADRON) injection 6 mg  6 mg Intravenous Q24H    ferrous sulfate tablet 325 mg  325 mg Oral Daily With Breakfast    folic acid (FOLVITE) tablet 1 mg  1 mg Oral Daily    guaiFENesin (MUCINEX) 12 hr tablet 600 mg  600 mg Oral Q12H [address]    hydrALAZINE (APRESOLINE) tablet 25 mg  25 mg Oral Q8H [address]    HYDROcodone-homatropine (HYCODAN) oral syrup 5 mL  5 mL Oral Q4H PRN    HYDROmorphone (DILAUDID) injection 0 5 mg  0 5 mg Intravenous Q4H PRN    ipratropium (ATROVENT) 0 02 % inhalation solution 0 5 mg  0 5 mg Nebulization TID    labetalol (NORMODYNE) injection 10 mg  10 mg Intravenous Q4H PRN    levalbuterol (XOPENEX) inhalation solution 1 25 mg  1 25 mg Nebulization TID    lidocaine (LMX) 4 % cream   Topical Daily PRN    losartan (COZAAR) tablet 50 mg  50 mg Oral BID    magnesium oxide (MAG-OX) tablet 400 mg  400 mg Oral Daily    metoclopramide (REGLAN) injection 5 mg  5 mg Intravenous Q6H PRN    metoprolol succinate (TOPROL-XL) 24 hr tablet 50 mg  50 mg Oral BID    ondansetron (ZOFRAN-ODT) dispersible tablet 4 mg  4 mg Oral Q6H PRN    oxyCODONE (ROXICODONE) IR tablet 5 mg  5 mg Oral Q8H PRN    sevelamer (RENAGEL) tablet 1,600 mg  1,600 mg Oral TID With Meals    sodium chloride 3 % inhalation solution 4 mL  4 mL Nebulization TID    traMADol (ULTRAM) tablet 50 mg  50 mg Oral Q8H PRN    [START ON 6/21/2022] vancomycin 750 mg in sodium chloride 0 9% 250 mL  10 mg/kg (Adjusted) Intravenous Once per day on Tue Thu Sat    zinc sulfate (ZINCATE) capsule 220 mg  220 mg Oral Daily With Lunch     Medications Prior to Admission   Medication    Acetaminophen 325 MG CAPS    ascorbic acid (VITAMIN C) 500 MG tablet    calcium acetate (PHOSLO) capsule    candesartan (ATACAND) 8 MG tablet    Coenzyme Q-10 200 MG CAPS    ferrous sulfate 324 (65 Fe) mg    folic acid (FOLVITE) 1 mg tablet    hydrALAZINE (APRESOLINE) 100 MG tablet    lidocaine-prilocaine (EMLA) cream    magnesium oxide (MAG-OX) 400 mg tablet    Methoxy PEG-Epoetin Beta (MIRCERA IJ)    metoprolol succinate (TOPROL-XL) 50 mg 24 hr tablet    ondansetron (ZOFRAN) 4 mg tablet    Red Yeast Rice Extract (RED YEAST RICE PO)    sevelamer carbonate (RENVELA) 800 mg tablet    ULORIC 80 MG TABS     Allergies   Allergen Reactions    Iodine - Food Allergy      Other reaction(s): Respiratory Distress  "swelling of throat"    Levofloxacin      Other reaction(s): Respiratory Distress  "swelling of throat"    Lovastatin      Muscle weakness    Medical Tape Other (See Comments)     Skin breaks down    Shellfish-Derived Products - Food Allergy        Vitals: Blood pressure 107/67, pulse 78, temperature (!) 97 2 °F (36 2 °C), resp  rate 20, height 5' 8" (1 727 m), weight 110 kg (243 lb 9 7 oz), SpO2 91 %  , 5 lpm, Body mass index is 37 04 kg/m²  Intake/Output Summary (Last 24 hours) at 6/20/2022 0940  Last data filed at 6/19/2022 2202  Gross per 24 hour   Intake 480 ml   Output --   Net 480 ml       Physical Exam  Vitals and nursing note reviewed  Constitutional:       Appearance: He is well-developed  He is obese  HENT:      Head: Normocephalic and atraumatic  Eyes:      Conjunctiva/sclera: Conjunctivae normal    Cardiovascular:      Rate and Rhythm: Normal rate and regular rhythm  Heart sounds: No murmur heard  No friction rub  No gallop  Pulmonary:      Effort: Pulmonary effort is normal  No respiratory distress  Breath sounds: Rhonchi present  Abdominal:      Palpations: Abdomen is soft  Tenderness: There is no abdominal tenderness  Musculoskeletal:      Cervical back: Neck supple  Right lower leg: No edema  Left lower leg: No edema  Comments: RUE A-V fistula   Skin:     General: Skin is warm and dry  Neurological:      General: No focal deficit present  Mental Status: He is alert  Psychiatric:         Mood and Affect: Mood normal          Behavior: Behavior normal        Labs: I have personally reviewed pertinent lab results    Laboratory and Diagnostics  Results from last 7 days   Lab Units 06/20/22  0519 06/19/22  0419 06/17/22  0509 06/16/22  1043 06/15/22  0408 06/14/22  1425   WBC Thousand/uL 27 29* 23 82* 13 00* 12 40* 9 50 9 74   HEMOGLOBIN g/dL 9 2* 10 1* 9 2* 10 6* 9 3* 10 3*   HEMATOCRIT % 28 4* 31 5* 27 9* 32 6* 29 1* 32 2*   PLATELETS Thousands/uL 282 311 237 293 194 207   NEUTROS PCT %  --   --   --  88* 88*  --    BANDS PCT % 21*  --   --   --   --   --    MONOS PCT %  --   --   --  7 6  --    MONO PCT % 2*  --   --   --   --  0*     Results from last 7 days   Lab Units 06/20/22  0519 06/19/22  0455 06/18/22  0619 06/17/22  0509 06/16/22  1043 06/15/22  0408 06/14/22  1425   SODIUM mmol/L 125* 128* 129* 129* 130* 131* 135   POTASSIUM mmol/L 5 0 4 3 4 5 4 9 4 6 4 6 3 9   CHLORIDE mmol/L 92* 93* 94* 91* 88* 92* 91*   CO2 mmol/L 22 24 26 25 28 29 30   ANION GAP mmol/L 11 11 9 13 14* 10 14*   BUN mg/dL 57* 42* 54* 86* 73* 36* 24   CREATININE mg/dL 6 54* 4 93* 6 34* 8 29* 7 16* 5 13* 3 87*   CALCIUM mg/dL 8 3* 8 3* 7 8* 7 7* 8 5 8 5 8 4   GLUCOSE RANDOM mg/dL 112 113 132 126 138 103 87   ALT U/L 13  --   --  16 21  --  17   AST U/L 16  --   --  21 32  --  31   ALK PHOS U/L 55  --   --  41 51  --  51   ALBUMIN g/dL 2 8*  --   --  3 1*  3 0* 3 6  --  3 9   TOTAL BILIRUBIN mg/dL 0 36  --   --  0 32 0 37  --  0 55     Results from last 7 days   Lab Units 06/14/22  1425   MAGNESIUM mg/dL 2 0      Results from last 7 days   Lab Units 06/19/22  0725 06/19/22  0419 06/18/22  2310 06/18/22  1705 06/18/22  0924 06/18/22  0154 06/17/22  1810 06/14/22  2147 06/14/22  1425   INR   --  1 07  --   --   --   --   --   --  0 98   PTT seconds 29 47* 39* 67* 50* 73* 140*   < >  --     < > = values in this interval not displayed  Results from last 7 days   Lab Units 06/14/22  1425   LACTIC ACID mmol/L 1 0     Results from last 7 days   Lab Units 06/19/22  0455 06/14/22  1425   FERRITIN ng/mL  --  2,154*   CRP mg/L 305 7* 173 9*             Results from last 7 days   Lab Units 06/19/22  0725 06/14/22  1425   D-DIMER QUANTITATIVE ug/ml FEU 2 33* 2 39*     Results from last 7 days   Lab Units 06/20/22  0519 06/19/22  0455 06/16/22  1043 06/15/22  0408 06/14/22  1425   PROCALCITONIN ng/ml 7 86* 3 01* 1 54* 1 28* 0 90*       Microbiology:  Results from last 7 days   Lab Units 06/15/22  1746 06/14/22  1426 06/14/22  1425   BLOOD CULTURE   --  No Growth After 5 Days  No Growth After 5 Days  MRSA CULTURE ONLY  No Methicillin Resistant Staphlyococcus aureus (MRSA) isolated  --   --        ABG: No results found for: PHART, OVV3KCP, PO2ART, BXK9AWF, [id number], BEART, SOURCE      Imaging and other studies: I have personally reviewed pertinent reports  and I have personally reviewed pertinent films in PACS  XR chest 1 view portable    Result Date: 6/14/2022  Impression: No definite acute infiltrate  Workstation performed: [id number]     CTA chest pe study  Result Date: 6/19/2022  Impression: Suboptimal contrast bolus and respiratory motion decreases sensitivity for evaluation of peripheral pulmonary emboli, subject to this, no pulmonary embolism is seen  Multiple focal alveolar opacities scattered in the left lung, most prominent in the left lower lobe  Multiple large consolidations in the right upper and lower lobes with associated air bronchograms  Additional focal alveolar opacities in the right middle lobe with scattered groundglass opacities throughout the right lung  Findings taken together are highly suspicious for multifocal infection  Attenuation of multiple right lower lobe airways, probably related to inspissated secretions, inflammation/infection, and mucous plugging   Small dependent pleural effusions, shotty mediastinal and hilar lymph nodes, probably reactive  1 8 cm focal cystic lesion in the pancreatic head is redemonstrated, similar to the prior  Differential considerations include cyst, cystic neoplasm, or IPMN  For simple cyst(s) between 1 5 to 2 0 cm, recommend followup every 6 months for 4 times, then every 1 year for 2 times, then every 2 years for 3 times  If stable after 10 years, then no more followups  (If patient reaches age [de-identified], then can switch to age [de-identified] algorithm ) Recommend next followup in 6 months  Preferred imaging modality: abdomen MRI and MRCP with and without IV contrast, or triple phase abdomen CT with IV contrast, or abdomen MRI and MRCP without IV contrast  Cardiomegaly, coronary atherosclerosis, renal atrophy, renal cysts, and other findings as above  Workstation performed: [id number]     CT Chest 1/3/22 (along with A/P)  Lungs clear other than atelectasis at bases      Pulmonary function testing: N/A      EKG, Pathology, and Other Studies: I have personally reviewed pertinent reports  TTE 2/27/2019  LEFT VENTRICLE:  Size was at the upper limits of normal   Systolic function was normal  Ejection fraction was estimated in the range of 55 % to 60 %  There were no regional wall motion abnormalities  Wall thickness was increased  There was mild concentric hypertrophy  Features were consistent with a pseudonormal left ventricular filling pattern, with concomitant abnormal relaxation and increased filling pressure (grade 2 diastolic dysfunction)  Doppler parameters were consistent with high ventricular filling pressure      RIGHT VENTRICLE:  The size was at the upper limits of normal   Systolic function was normal      LEFT ATRIUM:  The atrium was mildly dilated      ATRIAL SEPTUM:  There was a probable tiny patent foramen ovale      RIGHT ATRIUM:  The atrium was mildly dilated      MITRAL VALVE:  There was mild annular calcification    There was mild regurgitation      TRICUSPID VALVE:  There was mild regurgitation      PULMONIC VALVE:  There was trace regurgitation      IVC, HEPATIC VEINS:  The inferior vena cava was mildly dilated      PERICARDIUM:  Trivial hemodynamically insignificant pericardial effusion      Code Status: Level 3 - DNAR and DNI        Vicky Arndt MD  Attending Physician  Pulmonary and Critical Care Medicine

## 2022-06-20 NOTE — PROGRESS NOTES
[address]  Progress Note - Luanne Gaucher 1945, 76 y o  male MRN: [id number]  Unit/Bed#: -01 Encounter: [id number]  Primary Care Provider: Lillian Menjivar DO   Date and time admitted to hospital: 6/14/2022  1:53 PM    * Pneumonia due to COVID-19 virus  Assessment & Plan  · COVID-19 positive on 06/13/2022  Vaccinated x2 against COVID-19  · Patient completed 5 days of remdesivir on 06/18/2022  · Decadron day 7/10  · Currently on 5 L nasal cannula  · CT done on 06/19/2022 concerning for superimposed bacterial pneumonia  · Cefepime/vancomycin started on 06/19/2022  · Patient was initially on heparin drip however discontinued due to hemoptysis  SubQ heparin initiated today  · Cultures negative to date  · Monitor inflammatory markers  · Supportive care    Acute respiratory failure with hypoxia (HCC)  Assessment & Plan  · Secondary to COVID infection  · Patient developed hemoptysis, heparin drip discontinued and hemoptysis appears to be improved  · Pulmonology following  · Will continue oxygen and titrate as tolerated    Anemia in chronic kidney disease, on chronic dialysis (HCC)  Assessment & Plan  · Hemoglobin stable and at baseline  · Continue home ferrous sulfate  · Trend CBC         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 [facility])  Assessment & Plan  · Patient being dialyzed today  · Continue management per Nephrology    Class 2 severe obesity due to excess calories with serious comorbidity and body mass index (BMI) of 39 0 to 39 9 in adult [facility])  Assessment & Plan  · Present on admission as evidenced by BMI greater than 35  · Endorse lifestyle modifications and weight loss        Essential hypertension  Assessment & Plan  · Blood pressure improving  · Continue losartan and metoprolol  · Hydralazine dose increased to 100 mg t i d       VTE Prophylaxis:  Heparin    Patient Centered Rounds: I have performed bedside rounds with nursing staff today      Discussions with Specialists or Other Care Team Provider: yes  Education and Discussions with Family / Patient:  Spoke with patient's daughter over the phone regarding plan of care    Current Length of Stay: 6 day(s)    Current Patient Status: Inpatient   Certification Statement: The patient will continue to require additional inpatient hospital stay due to Respiratory failure/COVID pneumonia    Discharge Plan:  Pending hospital course    Code Status: Level 3 - DNAR and DNI    Subjective:   No overnight events noted  Patient started on antibiotics yesterday for suspected bacterial infection  Patient being dialyzed today  Still on 5 L nasal cannula  Difficulty swallowing, swallow evaluation ordered  Diet modified to level 2    Objective:     Vitals:   Temp (24hrs), Av °F (36 7 °C), Min:97 2 °F (36 2 °C), Max:99 3 °F (37 4 °C)    Temp:  [97 2 °F (36 2 °C)-99 3 °F (37 4 °C)] 97 8 °F (36 6 °C)  HR:  [72-85] 75  Resp:  [19-20] 20  BP: (107-160)/(43-70) 140/48  SpO2:  [87 %-95 %] 91 %  Body mass index is 37 04 kg/m²  Input and Output Summary (last 24 hours): Intake/Output Summary (Last 24 hours) at 2022 1528  Last data filed at 2022 2202  Gross per 24 hour   Intake 480 ml   Output --   Net 480 ml       Physical Exam:   Physical Exam  Constitutional:       General: He is not in acute distress  Appearance: He is ill-appearing  HENT:      Head: Normocephalic and atraumatic  Nose: Nose normal       Mouth/Throat:      Mouth: Mucous membranes are moist    Eyes:      Extraocular Movements: Extraocular movements intact  Conjunctiva/sclera: Conjunctivae normal    Cardiovascular:      Rate and Rhythm: Normal rate and regular rhythm  Pulmonary:      Effort: Pulmonary effort is normal  No respiratory distress  Comments: Decreased breath sounds bilaterally  Abdominal:      Palpations: Abdomen is soft  Tenderness: There is no abdominal tenderness     Musculoskeletal:      Cervical back: Normal range of motion and neck supple  Comments: Generalized weakness   Skin:     General: Skin is warm and dry  Neurological:      General: No focal deficit present  Mental Status: He is alert  Mental status is at baseline  Cranial Nerves: No cranial nerve deficit  Psychiatric:         Mood and Affect: Mood is depressed  Behavior: Behavior normal          Additional Data:     Labs:    Results from last 7 days   Lab Units 06/20/22  0519 06/17/22  0509 06/16/22  1043   WBC Thousand/uL 27 29*   < > 12 40*   HEMOGLOBIN g/dL 9 2*   < > 10 6*   HEMATOCRIT % 28 4*   < > 32 6*   PLATELETS Thousands/uL 282   < > 293   NEUTROS PCT %  --   --  88*   LYMPHS PCT %  --   --  4*   LYMPHO PCT % 1*  --   --    MONOS PCT %  --   --  7   MONO PCT % 2*  --   --    EOS PCT % 0  --  0    < > = values in this interval not displayed  Results from last 7 days   Lab Units 06/20/22  0519   SODIUM mmol/L 125*   POTASSIUM mmol/L 5 0   CHLORIDE mmol/L 92*   CO2 mmol/L 22   BUN mg/dL 57*   CREATININE mg/dL 6 54*   CALCIUM mg/dL 8 3*   ALK PHOS U/L 55   ALT U/L 13   AST U/L 16     Results from last 7 days   Lab Units 06/19/22  0419   INR  1 07               * I Have Reviewed All Lab Data Listed Above  * Additional Pertinent Lab Tests Reviewed: [address] Admission  Reviewed    Imaging:  Imaging Reports Reviewed Today Include:  No new imaging    Recent Cultures (last 7 days):     Results from last 7 days   Lab Units 06/14/22  1426 06/14/22  1425   BLOOD CULTURE  No Growth After 5 Days  No Growth After 5 Days         Last 24 Hours Medication List:   Current Facility-Administered Medications   Medication Dose Route Frequency Provider Last Rate    acetaminophen  650 mg Oral Q4H PRN Carlos Macario DO      albuterol  2 5 mg Nebulization Q4H PRN REMY Duff      allopurinol  200 mg Oral HS Olya Gimenez MD      amLODIPine  5 mg Oral Daily Sandra Martinez MD      ascorbic acid 500 mg Oral BID Jennifer Paradise, DO      benzonatate  200 mg Oral TID REMY Shrestha      calcium acetate  1,334 mg Oral TID With Meals Jennifer [place], DO      cefepime  1,000 mg Intravenous Q24H MAYA ShresthaNP 1,000 mg (06/20/22 0808)    cholecalciferol  5,000 Units Oral Daily REMY Singh      co-enzyme Q-10  200 mg Oral HS Nia Rae MD      dexamethasone  6 mg Intravenous Q24H Jennifer Paradise, DO      ferrous sulfate  325 mg Oral Daily With Breakfast Jennifer Paradise, DO      folic acid  1 mg Oral Daily Jennifer Paradise, DO      guaiFENesin  600 mg Oral Q12H [facility] REMY Shrestha      heparin (porcine)  5,000 Units Subcutaneous [facility] Nia Rae MD      hydrALAZINE  25 mg Oral [facility] Crescencio Urbina MD      HYDROcodone-homatropine  5 mL Oral Q4H PRN Rochell Duverney, PA-C      HYDROmorphone  0 5 mg Intravenous Q4H PRN REMY Shrestha      ipratropium  0 5 mg Nebulization TID REMY Shrestha      labetalol  10 mg Intravenous Q4H PRN Jennifer Paradise, DO      levalbuterol  1 25 mg Nebulization TID Jennifer Paradise, DO      lidocaine   Topical Daily PRN REMY Singh      losartan  50 mg Oral BID REMY Singh      magnesium oxide  400 mg Oral Daily Jennifer Paradise, DO      metoclopramide  5 mg Intravenous Q6H PRN Rochell Duverney, PA-C      metoprolol succinate  50 mg Oral BID Jennifer Paradise, DO      ondansetron  4 mg Oral Q6H PRN Jennifer Paradise, DO      oxyCODONE  5 mg Oral Q8H PRN REMY Shrestha      sevelamer  1,600 mg Oral TID With Meals Jennifer Paradise, DO      sodium chloride  4 mL Nebulization TID Jennifer [place], DO      traMADol  50 mg Oral Q8H PRN REMY Shrestha      [START ON 6/21/2022] vancomycin  10 mg/kg (Adjusted) Intravenous Once per day on Tue ThREMY Kaur      zinc sulfate  220 mg Oral Daily With REMY Valdes          Today, Patient Was Seen By: Katherine Chaudhari Shasahnk Nguyen MD    ** Please Note: Dictation voice to text software may have been used in the creation of this document   **

## 2022-06-20 NOTE — PROGRESS NOTES
Progress Note - Nephrology   Viviane Navarro 76 y o  male MRN: [id number]  Unit/Bed#: -01 Encounter: [id number]    Assessment and Plan    1  End-stage renal disease on hemodialysis   Outpatient dialysis schedule:  Tuesday Thursday Saturday   Will add additional 2 hour ultrafiltration treatment today then resume routine dialysis tomorrow, Tuesday, 06/21/2022     2  COVID-19 pneumonia with acute respiratory failure   Fully vaccinated  On 5 L supplemental oxygen  Receiving remdesivir on dexamethasone  3  Anemia of end-stage renal disease   Receives JARETT in outpatient setting  Continue to monitor  Transfuse for hemoglobin less than 7 0 grams/deciliter  4  Hyperparathyroidism, secondary to renal disease   Continue sevelamer with meals  5  Hypertension in patient with end-stage renal disease   Receiving losartan 50 mg daily, amlodipine 5 mg daily, hydralazine 25 mg every 8 hours, Toprol succinate 50 mg twice daily  Tending towards hypotension  Consider reduction of antihypertensive regimen  6  Volume status of dialysis patient   Continue 1 8 L fluid restriction and ultrafiltration on hemodialysis  Will request 2 g sodium restriction  7  Hyponatremia  · Continue 1 8 L fluid restriction  Additional ultrafiltration today  Consider tighten fluid restriction      Follow up reason for today's visit:  End-stage renal disease on hemodialysis    Pneumonia due to COVID-19 virus    Patient Active Problem List   Diagnosis    Essential hypertension    Hemodialysis-associated hypotension    AAA (abdominal aortic aneurysm) (HCC)    Chronic renal failure    Primary osteoarthritis of both knees    Hyperlipidemia    Class 2 severe obesity due to excess calories with serious comorbidity and body mass index (BMI) of 39 0 to 39 9 in adult [facility])    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 [facility] [address] [facility] )    Secondary hyperparathyroidism of renal origin ([facility] [facility] )    Abnormal nuclear stress test    Anemia in chronic kidney disease, on chronic dialysis ([facility] )    ASCVD (arteriosclerotic cardiovascular disease)    Benign prostatic hyperplasia without lower urinary tract symptoms    Carotid stenosis, right    Chronic gout, unspecified, with tophus (tophi)    Chronic systolic congestive heart failure (HCC)    Disorder of phosphorus metabolism, unspecified    Gastroesophageal reflux disease    Iron deficiency anemia    Kidney stone    Magnesium deficiency    Osteoarthritis of both knees    Other disorders of electrolyte and fluid balance, not elsewhere classified    Other fluid overload    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, initial encounter ([place] [address] )    Panlobular emphysema ([place] [address] )    Personal history of nicotine dependence    Proteinuria, unspecified    Skin lesion of face    Unspecified protein-calorie malnutrition (HCC)    Vitamin D deficiency    Elevated d-dimer    Pancreatic mass    Pneumonia due to COVID-19 virus    Acute respiratory failure with hypoxia (HCC)         Subjective:   Denies current physical complaints  A complete 10 point review of systems was performed and is otherwise negative  Objective:     Vitals: Blood pressure 107/67, pulse 78, temperature (!) 97 2 °F (36 2 °C), resp  rate 20, height 5' 8" (1 727 m), weight 110 kg (243 lb 9 7 oz), SpO2 90 %  ,Body mass index is 37 04 kg/m²  Weight (last 2 days)     Date/Time Weight    06/20/22 0540 110 (243 61)    06/19/22 0600 107 (236 55)     Weight: patient unable to stand at this time at 06/19/22 0600    06/18/22 0600 110 (241 84)            Intake/Output Summary (Last 24 hours) at 6/20/2022 1409  Last data filed at 6/19/2022 2202  Gross per 24 hour   Intake 480 ml   Output --   Net 480 ml     I/O last 3 completed shifts:   In: 498 [P O :480; I V :18]  Out: -          Physical Exam: /67   Pulse 78   Temp (!) 97 2 °F (36 2 °C)   Resp 20   Ht 5' 8" (1 727 m)   Wt 110 kg (243 lb 9 7 oz)   SpO2 90%   BMI 37 04 kg/m²     General Appearance:    No acute distress  Cooperative  Appears stated age  Head:    Normocephalic  Atraumatic  Normal jaw occlusion  Eyes:    Lids, conjunctiva normal  No scleral icterus  Ears:    Normal external ears  Nose:   Nares normal  No drainage  Mouth:   Lips, tongue normal  Mucosa normal  Phonation normal    Neck:   Supple  Symmetrical    Back:     Symmetric  No CVA tenderness  Lungs:     Normal respiratory effort  Clear to auscultation bilaterally  Chest wall:    No tenderness or deformity  Heart:    Regular rate and rhythm  Normal S1 and S2  No murmur  No JVD  No edema  Abdomen:     Soft  Non-tender  Bowel sounds active  Genitourinary:   No Diaz catheter present  Extremities:   Extremities normal  Atraumatic  No cyanosis  Skin:   Warm and dry  No pallor, jaundice, rash, ecchymoses  Neurologic:   Alert and oriented to person, place, time  No focal deficit  Lab, Imaging and other studies: I have personally reviewed pertinent labs  CBC:   Lab Results   Component Value Date    WBC 27 29 (H) 06/20/2022    HGB 9 2 (L) 06/20/2022    HCT 28 4 (L) 06/20/2022     (H) 06/20/2022     06/20/2022    MCH 32 6 06/20/2022    MCHC 32 4 06/20/2022    RDW 14 6 06/20/2022    MPV 9 3 06/20/2022     CMP:   Lab Results   Component Value Date    K 5 0 06/20/2022    CL 92 (L) 06/20/2022    CO2 22 06/20/2022    BUN 57 (H) 06/20/2022    CREATININE 6 54 (H) 06/20/2022    CALCIUM 8 3 (L) 06/20/2022    AST 16 06/20/2022    ALT 13 06/20/2022    ALKPHOS 55 06/20/2022    EGFR 7 06/20/2022           Results from last 7 days   Lab Units 06/20/22  0519 06/19/22  0455 06/18/22  0619 06/17/22  0509 06/16/22  1043   POTASSIUM mmol/L 5 0 4 3 4 5 4 9 4 6   CHLORIDE mmol/L 92* 93* 94* 91* 88*   CO2 mmol/L 22 24 26 25 28   BUN mg/dL 57* 42* 54* 86* 73*   CREATININE mg/dL 6 54* 4 93* 6 34* 8 29* 7 16*   CALCIUM mg/dL 8 3* 8 3* 7 8* 7 7* 8 5   ALK PHOS U/L 55  --   --  41 51   ALT U/L 13  --   --  16 21   AST U/L 16  --   --  21 32         Phosphorus: No results found for: PHOS  Magnesium: No results found for: MG  Urinalysis: No results found for: Dianelys Early, SPECGRAV, PHUR, LEUKOCYTESUR, NITRITE, PROTEINUA, GLUCOSEU, KETONESU, BILIRUBINUR, BLOODU  Ionized Calcium: No results found for: CAION  Coagulation: No results found for: PT, INR, APTT  Troponin: No results found for: TROPONINI  ABG: No results found for: PHART, SMY7OYD, PO2ART, ZYC0WDV, X7TDPLBX, BEART, SOURCE  Radiology review:     IMAGING  Procedure: CTA chest pe study    Result Date: 6/19/2022  Narrative: CTA - CHEST WITH IV CONTRAST - PULMONARY ANGIOGRAM INDICATION:   COVID with hemomptysis r/o PE  Patient has confirmed COVID-19  COMPARISON: CT chest, abdomen, and pelvis dated 1/3/2022 TECHNIQUE: CTA examination of the chest was performed using angiographic technique according to a protocol specifically tailored to evaluate for pulmonary embolism  Axial, sagittal, and coronal 2D reformatted images were created from the source data and  submitted for interpretation  In addition, coronal 3D MIP postprocessing was performed on the acquisition scanner  Radiation dose length product (DLP) for this visit:  907 39 mGy-cm   This examination, like all CT scans performed in the [facility], was performed utilizing techniques to minimize radiation dose exposure, including the use of iterative  reconstruction and automated exposure control  IV Contrast:  70 mL of iohexol (OMNIPAQUE)  FINDINGS: PULMONARY ARTERIAL TREE:  Suboptimal contrast bolus and respiratory motion decreases sensitivity for evaluation of peripheral pulmonary emboli, subject to this, no pulmonary embolism is seen  LUNGS/PLEURA:  Superimposed on mild scattered pulmonary emphysematous changes, there are multiple focal alveolar opacities scattered in the left lung, most prominent in the left lower lobe    There are multiple large consolidations in the right upper and lower lobes with associated air bronchograms additional focal alveolar opacities are seen in the right middle lobe and there are scattered groundglass opacities seen throughout the right lung most prominently in the inferior aspect of the right lower lobe and in the right lower lobe  Findings taken together are highly suspicious for multifocal infection  Small dependent pleural effusions  HEART/GREAT VESSELS:  The heart appears enlarged  Moderate coronary atherosclerosis  Mild to moderate aortic atherosclerosis, tortuous aorta  No thoracic aortic aneurysm  MEDIASTINUM AND ROBINSON:  Shotty mediastinal and hilar lymph nodes, most prominent in the right hilum, possibly reactive  Attenuation of multiple right lower lobe airways, probably related to inspissated secretions, inflammation/infection, and mucous plugging  CHEST WALL AND LOWER NECK:   Body wall edema  VISUALIZED STRUCTURES IN THE UPPER ABDOMEN:  1 8 cm focal cystic lesion in the pancreatic head is redemonstrated, similar to the prior  Renal atrophy and multiple renal cysts, largest in the right kidney measures approximately 4 9 cm in size  OSSEOUS STRUCTURES: Generalized osteopenia  Degenerative changes of the bilateral shoulders with bilateral shoulder joint effusions  No acute fracture or destructive osseous lesion  Impression: Suboptimal contrast bolus and respiratory motion decreases sensitivity for evaluation of peripheral pulmonary emboli, subject to this, no pulmonary embolism is seen  Multiple focal alveolar opacities scattered in the left lung, most prominent in the left lower lobe  Multiple large consolidations in the right upper and lower lobes with associated air bronchograms  Additional focal alveolar opacities in the right middle lobe with scattered groundglass opacities throughout the right lung  Findings taken together are highly suspicious for multifocal infection   Attenuation of multiple right lower lobe airways, probably related to inspissated secretions, inflammation/infection, and mucous plugging  Small dependent pleural effusions, shotty mediastinal and hilar lymph nodes, probably reactive  1 8 cm focal cystic lesion in the pancreatic head is redemonstrated, similar to the prior  Differential considerations include cyst, cystic neoplasm, or IPMN  For simple cyst(s) between 1 5 to 2 0 cm, recommend followup every 6 months for 4 times, then every 1 year for 2 times, then every 2 years for 3 times  If stable after 10 years, then no more followups  (If patient reaches age [de-identified], then can switch to age [de-identified] algorithm ) Recommend next followup in 6 months  Preferred imaging modality: abdomen MRI and MRCP with and without IV contrast, or triple phase abdomen CT with IV contrast, or abdomen MRI and MRCP without IV contrast  Cardiomegaly, coronary atherosclerosis, renal atrophy, renal cysts, and other findings as above   Workstation performed: [id number]       Current Facility-Administered Medications   Medication Dose Route Frequency    acetaminophen (TYLENOL) tablet 650 mg  650 mg Oral Q4H PRN    albuterol inhalation solution 2 5 mg  2 5 mg Nebulization Q4H PRN    allopurinol (ZYLOPRIM) tablet 200 mg  200 mg Oral HS    amLODIPine (NORVASC) tablet 5 mg  5 mg Oral Daily    ascorbic acid (VITAMIN C) tablet 500 mg  500 mg Oral BID    benzonatate (TESSALON PERLES) capsule 200 mg  200 mg Oral TID    calcium acetate (PHOSLO) capsule 1,334 mg  1,334 mg Oral TID With Meals    cefepime (MAXIPIME) IVPB (premix in dextrose) 1,000 mg 50 mL  1,000 mg Intravenous Q24H    cholecalciferol (VITAMIN D3) tablet 5,000 Units  5,000 Units Oral Daily    co-enzyme Q-10 capsule 200 mg  200 mg Oral HS    dexamethasone (DECADRON) injection 6 mg  6 mg Intravenous Q24H    ferrous sulfate tablet 325 mg  325 mg Oral Daily With Breakfast    folic acid (FOLVITE) tablet 1 mg  1 mg Oral Daily    guaiFENesin (MUCINEX) 12 hr tablet 600 mg  600 mg Oral Q12H [address]    heparin (porcine) subcutaneous injection 5,000 Units  5,000 Units Subcutaneous Q8H [address]    hydrALAZINE (APRESOLINE) tablet 25 mg  25 mg Oral Q8H [address]    HYDROcodone-homatropine (HYCODAN) oral syrup 5 mL  5 mL Oral Q4H PRN    HYDROmorphone (DILAUDID) injection 0 5 mg  0 5 mg Intravenous Q4H PRN    ipratropium (ATROVENT) 0 02 % inhalation solution 0 5 mg  0 5 mg Nebulization TID    labetalol (NORMODYNE) injection 10 mg  10 mg Intravenous Q4H PRN    levalbuterol (XOPENEX) inhalation solution 1 25 mg  1 25 mg Nebulization TID    lidocaine (LMX) 4 % cream   Topical Daily PRN    losartan (COZAAR) tablet 50 mg  50 mg Oral BID    magnesium oxide (MAG-OX) tablet 400 mg  400 mg Oral Daily    metoclopramide (REGLAN) injection 5 mg  5 mg Intravenous Q6H PRN    metoprolol succinate (TOPROL-XL) 24 hr tablet 50 mg  50 mg Oral BID    ondansetron (ZOFRAN-ODT) dispersible tablet 4 mg  4 mg Oral Q6H PRN    oxyCODONE (ROXICODONE) IR tablet 5 mg  5 mg Oral Q8H PRN    sevelamer (RENAGEL) tablet 1,600 mg  1,600 mg Oral TID With Meals    sodium chloride 3 % inhalation solution 4 mL  4 mL Nebulization TID    traMADol (ULTRAM) tablet 50 mg  50 mg Oral Q8H PRN    [START ON 6/21/2022] vancomycin 750 mg in sodium chloride 0 9% 250 mL  10 mg/kg (Adjusted) Intravenous Once per day on Tue Thu Sat    zinc sulfate (ZINCATE) capsule 220 mg  220 mg Oral Daily With Lunch     Medications Discontinued During This Encounter   Medication Reason    ondansetron (ZOFRAN) injection 4 mg     heparin (ACS LOW)     losartan (COZAAR) tablet 50 mg     hydrALAZINE (APRESOLINE) tablet 100 mg     lidocaine (LMX) 4 % cream     benzonatate (TESSALON PERLES) capsule 100 mg     metoclopramide (REGLAN) injection 5 mg     hydrALAZINE (APRESOLINE) tablet 100 mg     benzonatate (TESSALON PERLES) capsule 100 mg     cefepime (MAXIPIME) injection 1,000 mg     heparin (porcine) 25,000 units in 0 45% NaCl 250 mL infusion (premix)     levalbuterol (XOPENEX) inhalation solution 1 25 mg     ipratropium (ATROVENT) 0 02 % inhalation solution 0 5 mg     sodium chloride 0 9 % inhalation solution 3 mL     albuterol (PROVENTIL HFA,VENTOLIN HFA) inhaler 2 puff     umeclidinium bromide (INCRUSE ELLIPTA) 62 5 mcg/inh inhaler AEPB 1 puff     fluticasone-vilanterol (BREO ELLIPTA) 200-25 MCG/INH inhaler 1 puff     albuterol (PROVENTIL HFA,VENTOLIN HFA) inhaler 2 puff     sodium chloride 0 9 % inhalation solution 3 mL     levalbuterol (XOPENEX) inhalation solution 1 25 mg     sodium chloride 3 % inhalation solution 4 mL     co-enzyme Q-10 capsule 200 mg     allopurinol (ZYLOPRIM) tablet 200 mg        838 Anita Sood PA-C    Portions of the record may have been created with voice recognition software  Occasional wrong word or "sound a like" substitutions may have occurred due to the inherent limitations of voice recognition software  Read the chart carefully and recognize, using context, where substitutions have occurred

## 2022-06-21 NOTE — PROGRESS NOTES
Vancomycin IV Pharmacy-to-Dose Consultation    Amy Allred is a 76 y o  male who is currently receiving Vancomycin IV with management by the Pharmacy Consult service  Assessment/Plan:  The patient was reviewed  Renal function is stable and no signs or symptoms of nephrotoxicity and/or infusion reactions were documented in the chart  Based on todays assessment, continue current vancomycin (day # 3) dosing of 750 mg post HD Tue, Thur, Sat , with a plan for trough to be drawn at 7am  on 6/23  We will continue to follow the patients culture results and clinical progress daily      Shashank Ward, Pharmacist

## 2022-06-21 NOTE — PROGRESS NOTES
Progress Note - Pulmonary   Belkis Villegas 76 y o  male MRN: [id number]  Unit/Bed#: -01 Encounter: [id number]    Assessment:  Acute hypoxemic respiratory failure  Multifocal pneumonia- developed between 6/14 and 6/19- suspect due to aspiration given choking episodes and difficulty swallowing  2 episodes of limited hemoptysis 6/19 in setting of multifocal pneumonia  COVID19 infection  ESRD on HD  Hyponatremia  Anemia of chronic disease  Hypertension  Pancreatic head lesion     Recommendations    Patient developed multifocal pneumonia  He had 2 episodes of dime-sized hemoptysis 6/19 which has not recurred  His heparin infusion given for COVID protocol with elevated D-dimer has been stopped  He had a negative PE study 6/19 (although limited by motion)  Monitor for further hemoptysis but no specific action needed at this time- defer on bronchoscopy  Continue cefepime- Stop vanco as MRSA swab is negative  Follow-up sputum culture  Wean O2 for SPO2 >89%- currently on 5 lpm  Can continue Xopenex/Atrovent TID  Hypertonic saline TID  OOB as able  Incentive spirometry and flutter valve  Recommend Speech/Swallow evaluation given choking on food/difficulty swallowing  DVT Px     Would recommend chest imaging 6-8 weeks after discharge  Needs outpatient follow-up of his pancreas lesion    Discussed with Dr Keeley Tolentino with [facility]    Subjective:   Feeling a little bit better  Shortness of breath is improved  Coughing but not bringing up mucus    Objective:     Vitals: Blood pressure 141/57, pulse 75, temperature (!) 97 1 °F (36 2 °C), temperature source Oral, resp  rate 20, height 5' 8" (1 727 m), weight 105 kg (231 lb 0 7 oz), SpO2 95 %  ,Body mass index is 35 13 kg/m²        Intake/Output Summary (Last 24 hours) at 6/21/2022 1246  Last data filed at 6/21/2022 1240  Gross per 24 hour   Intake 1255 ml   Output 3934 ml   Net -2679 ml       Invasive Devices  Report    Peripheral Intravenous Line  Duration Peripheral IV 06/18/22 Dorsal (posterior); Left Forearm 2 days          Line  Duration           Hemodialysis AV Fistula Upper arm -- days              General:  Patient is awake, alert, non-toxic and in no acute respiratory distress  Eyes: PERRL, no scleral icterus  Neck: No JVD  CV:  Regular, +S1 and S2, No murmurs, gallops or rubs appreciated  Lungs: Scattered rhonchi  Abdomen: Soft, +BS, Non-tender, non-distended, obese  Extremities: No clubbing, cyanosis or edema  RUE Av-fistula  Neuro: No focal motor/sensory deficits  Skin: Warm, No rashes or ulcerations      Labs: I have personally reviewed pertinent lab results  Imaging and other studies: I have personally reviewed pertinent reports     and I have personally reviewed pertinent films in PACS

## 2022-06-21 NOTE — PLAN OF CARE
Chronic hemodialysis treatment planned for 180 minutes using a 2 K+ bath for potassium 4 6 this morning  Fluid goal 1500 ml as tolerated to end at most recent post weight 104 kg            Post-Dialysis RN Treatment Note    Blood Pressure:  Pre 123/58 mm/Hg  Post 118/50 mmHg   EDW  110 kg    Weight:  Pre 105 kg   Post 103 9 kg   Mode of weight measurement:   Bed scale   Volume Removed  1503 ml    Treatment duration 180 minutes    NS given:  none    Treatment shortened:  no   Medications given during Rx:  Vancomycin 750 mg post HD   Estimated Kt/V:  0 79   Access type:   DARA fistula   Access Issues:    Maintains 400 bfr   Report called to primary nurse:  Verbal to Pulse RN            Problem: METABOLIC, FLUID AND ELECTROLYTES - ADULT  Goal: Electrolytes maintained within normal limits  Description: INTERVENTIONS:  - Monitor labs and assess patient for signs and symptoms of electrolyte imbalances  - Administer electrolyte replacement as ordered  - Monitor response to electrolyte replacements, including repeat lab results as appropriate  - Instruct patient on fluid and nutrition as appropriate  Outcome: Progressing  Goal: Fluid balance maintained  Description: INTERVENTIONS:  - Monitor labs   - Monitor I/O and WT  - Instruct patient on fluid and nutrition as appropriate  - Assess for signs & symptoms of volume excess or deficit  Outcome: Progressing

## 2022-06-21 NOTE — PLAN OF CARE
Problem: Prexisting or High Potential for Compromised Skin Integrity  Goal: Skin integrity is maintained or improved  Description: INTERVENTIONS:  - Identify patients at risk for skin breakdown  - Assess and monitor skin integrity  - Assess and monitor nutrition and hydration status  - Monitor labs   - Assess for incontinence   - Turn and reposition patient  - Assist with mobility/ambulation  - Relieve pressure over bony prominences  - Avoid friction and shearing  - Provide appropriate hygiene as needed including keeping skin clean and dry  - Evaluate need for skin moisturizer/barrier cream  - Collaborate with interdisciplinary team   - Patient/family teaching  - Consider wound care consult   Outcome: Progressing     Problem: Potential for Falls  Goal: Patient will remain free of falls  Description: INTERVENTIONS:  - Educate patient/family on patient safety including physical limitations  - Instruct patient to call for assistance with activity   - Consult OT/PT to assist with strengthening/mobility   - Keep Call bell within reach  - Keep bed low and locked with side rails adjusted as appropriate  - Keep care items and personal belongings within reach  - Initiate and maintain comfort rounds  - Make Fall Risk Sign visible to staff  - Offer Toileting in advance of need  - Initiate/Maintain bed alarm  - Obtain necessary fall risk management equipment:   - Apply yellow socks and bracelet for high fall risk patients  - Consider moving patient to room near nurses station  Outcome: Progressing     Problem: PAIN - ADULT  Goal: Verbalizes/displays adequate comfort level or baseline comfort level  Description: Interventions:  - Encourage patient to monitor pain and request assistance  - Assess pain using appropriate pain scale  - Administer analgesics based on type and severity of pain and evaluate response  - Implement non-pharmacological measures as appropriate and evaluate response  - Consider cultural and social influences on pain and pain management  - Notify physician/advanced practitioner if interventions unsuccessful or patient reports new pain  Outcome: Progressing     Problem: INFECTION - ADULT  Goal: Absence or prevention of progression during hospitalization  Description: INTERVENTIONS:  - Assess and monitor for signs and symptoms of infection  - Monitor lab/diagnostic results  - Monitor all insertion sites, i e  indwelling lines, tubes, and drains  - Monitor endotracheal if appropriate and nasal secretions for changes in amount and color  - [place] appropriate cooling/warming therapies per order  - Administer medications as ordered  - Instruct and encourage patient and family to use good hand hygiene technique  - Identify and instruct in appropriate isolation precautions for identified infection/condition  Outcome: Progressing  Goal: Absence of fever/infection during neutropenic period  Description: INTERVENTIONS:  - Monitor WBC    Outcome: Progressing     Problem: SAFETY ADULT  Goal: Maintain or return to baseline ADL function  Description: INTERVENTIONS:  -  Assess patient's ability to carry out ADLs; assess patient's baseline for ADL function and identify physical deficits which impact ability to perform ADLs (bathing, care of mouth/teeth, toileting, grooming, dressing, etc )  - Assess/evaluate cause of self-care deficits   - Assess range of motion  - Assess patient's mobility; develop plan if impaired  - Assess patient's need for assistive devices and provide as appropriate  - Encourage maximum independence but intervene and supervise when necessary  - Involve family in performance of ADLs  - Assess for home care needs following discharge   - Consider OT consult to assist with ADL evaluation and planning for discharge  - Provide patient education as appropriate  Outcome: Progressing  Goal: Maintains/Returns to pre admission functional level  Description: INTERVENTIONS:  - Perform BMAT or MOVE assessment daily    - Set and communicate daily mobility goal to care team and patient/family/caregiver  - Collaborate with rehabilitation services on mobility goals if consulted  - Record patient progress and toleration of activity level   Outcome: Progressing     Problem: DISCHARGE PLANNING  Goal: Discharge to home or other facility with appropriate resources  Description: INTERVENTIONS:  - Identify barriers to discharge w/patient and caregiver  - Arrange for needed discharge resources and transportation as appropriate  - Identify discharge learning needs (meds, wound care, etc )  - Refer to Case Management Department for coordinating discharge planning if the patient needs post-hospital services based on physician/advanced practitioner order or complex needs related to functional status, cognitive ability, or social support system  Outcome: Progressing     Problem: Knowledge Deficit  Goal: Patient/family/caregiver demonstrates understanding of disease process, treatment plan, medications, and discharge instructions  Description: Complete learning assessment and assess knowledge base    Interventions:  - Provide teaching at level of understanding  - Provide teaching via preferred learning methods  Outcome: Progressing     Problem: MOBILITY - ADULT  Goal: Maintain or return to baseline ADL function  Description: INTERVENTIONS:  -  Assess patient's ability to carry out ADLs; assess patient's baseline for ADL function and identify physical deficits which impact ability to perform ADLs (bathing, care of mouth/teeth, toileting, grooming, dressing, etc )  - Assess/evaluate cause of self-care deficits   - Assess range of motion  - Assess patient's mobility; develop plan if impaired  - Assess patient's need for assistive devices and provide as appropriate  - Encourage maximum independence but intervene and supervise when necessary  - Involve family in performance of ADLs  - Assess for home care needs following discharge   - Consider OT consult to assist with ADL evaluation and planning for discharge  - Provide patient education as appropriate  Outcome: Progressing  Goal: Maintains/Returns to pre admission functional level  Description: INTERVENTIONS:  - Perform BMAT or MOVE assessment daily    - Set and communicate daily mobility goal to care team and patient/family/caregiver  - Collaborate with rehabilitation services on mobility goals if consulted  - Record patient progress and toleration of activity level   Outcome: Progressing     Problem: METABOLIC, FLUID AND ELECTROLYTES - ADULT  Goal: Electrolytes maintained within normal limits  Description: INTERVENTIONS:  - Monitor labs and assess patient for signs and symptoms of electrolyte imbalances  - Administer electrolyte replacement as ordered  - Monitor response to electrolyte replacements, including repeat lab results as appropriate  - Instruct patient on fluid and nutrition as appropriate  Outcome: Progressing  Goal: Fluid balance maintained  Description: INTERVENTIONS:  - Monitor labs   - Monitor I/O and WT  - Instruct patient on fluid and nutrition as appropriate  - Assess for signs & symptoms of volume excess or deficit  Outcome: Progressing     Problem: Nutrition/Hydration-ADULT  Goal: Nutrient/Hydration intake appropriate for improving, restoring or maintaining nutritional needs  Description: Monitor and assess patient's nutrition/hydration status for malnutrition  Collaborate with interdisciplinary team and initiate plan and interventions as ordered  Monitor patient's weight and dietary intake as ordered or per policy  Utilize nutrition screening tool and intervene as necessary  Determine patient's food preferences and provide high-protein, high-caloric foods as appropriate       INTERVENTIONS:  - Monitor oral intake, urinary output, labs, and treatment plans  - Assess nutrition and hydration status and recommend course of action  - Evaluate amount of meals eaten  - Assist patient with eating if necessary   - Allow adequate time for meals  - Recommend/ encourage appropriate diets, oral nutritional supplements, and vitamin/mineral supplements  - Order, calculate, and assess calorie counts as needed  - Recommend, monitor, and adjust tube feedings and TPN/PPN based on assessed needs  - Assess need for intravenous fluids  - Provide specific nutrition/hydration education as appropriate  - Include patient/family/caregiver in decisions related to nutrition  Outcome: Progressing

## 2022-06-21 NOTE — PROGRESS NOTES
[facility]  Progress Note - Bennett Barragan 1945, 76 y o  male MRN: [id number]  Unit/Bed#: -01 Encounter: [id number]  Primary Care Provider: Darya Wakefield DO   Date and time admitted to hospital: 6/14/2022  1:53 PM    * Pneumonia due to COVID-19 virus  Assessment & Plan  · COVID-19 positive on 06/13/2022  Vaccinated x2 against COVID-19  · Patient completed 5 days of remdesivir on 06/18/2022  · Decadron day 8/10  · Continues to require 5 L nasal cannula  · CT done on 06/19/2022 concerning for superimposed bacterial pneumonia  · Cefepime/vancomycin started on 06/19/2022  · Patient was initially on heparin drip however discontinued due to hemoptysis    SubQ heparin initiated  · Cultures negative to date  · Monitor inflammatory markers  · Supportive care    Acute respiratory failure with hypoxia (HCC)  Assessment & Plan  · Secondary to COVID infection  · Patient developed hemoptysis, heparin drip discontinued and hemoptysis appears to be improved  · Pulmonology following  · Will continue oxygen and titrate as tolerated    Anemia in chronic kidney disease, on chronic dialysis (HCC)  Assessment & Plan  · Hemoglobin stable and at baseline  · Continue home ferrous sulfate  · Trend CBC         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 [facility])  Assessment & Plan  · Patient back to routine dialysis schedule today  · Continue management per Nephrology    Class 2 severe obesity due to excess calories with serious comorbidity and body mass index (BMI) of 39 0 to 39 9 in adult [facility])  Assessment & Plan  · Present on admission as evidenced by BMI greater than 35  · Endorse lifestyle modifications and weight loss        Essential hypertension  Assessment & Plan  · Blood pressure improving  · Continue losartan and metoprolol  · Hydralazine dose increased to 100 mg t i d       VTE Prophylaxis:  Heparin    Patient Centered Rounds: I have performed bedside rounds with nursing staff today     Discussions with Specialists or Other Care Team Provider: yes  Education and Discussions with Family / Patient: spoke with patients daughter over the phone regarding plan of care    Current Length of Stay: 7 day(s)    Current Patient Status: Inpatient   Certification Statement: The patient will continue to require additional inpatient hospital stay due to covid    Discharge Plan: pending hospital course    Code Status: Level 3 - DNAR and DNI    Subjective:   No over night events noted  HD today  No fever/chills  Decreased appetite     Objective:     Vitals:   Temp (24hrs), Av 9 °F (36 6 °C), Min:97 1 °F (36 2 °C), Max:98 4 °F (36 9 °C)    Temp:  [97 1 °F (36 2 °C)-98 4 °F (36 9 °C)] 98 2 °F (36 8 °C)  HR:  [68-84] 74  Resp:  [18-20] 20  BP: (105-169)/(47-84) 167/74  SpO2:  [92 %-97 %] 92 %  Body mass index is 35 13 kg/m²  Input and Output Summary (last 24 hours): Intake/Output Summary (Last 24 hours) at 2022 1733  Last data filed at 2022 1240  Gross per 24 hour   Intake 1055 ml   Output 1503 ml   Net -448 ml       Physical Exam:   Physical Exam  Constitutional:       General: He is not in acute distress  Appearance: He is ill-appearing  HENT:      Head: Normocephalic and atraumatic  Nose: Nose normal       Mouth/Throat:      Mouth: Mucous membranes are moist    Eyes:      Extraocular Movements: Extraocular movements intact  Conjunctiva/sclera: Conjunctivae normal    Cardiovascular:      Rate and Rhythm: Normal rate and regular rhythm  Pulmonary:      Effort: Pulmonary effort is normal  No respiratory distress  Comments: Decreased breath sounds bilaterally  Abdominal:      Palpations: Abdomen is soft  Tenderness: There is no abdominal tenderness  Musculoskeletal:         General: Normal range of motion  Cervical back: Normal range of motion and neck supple  Comments: Generalized weakness   Skin:     General: Skin is warm and dry  Neurological:      General: No focal deficit present  Mental Status: He is alert  Mental status is at baseline  Cranial Nerves: No cranial nerve deficit  Psychiatric:         Mood and Affect: Mood normal          Behavior: Behavior normal          Additional Data:     Labs:    Results from last 7 days   Lab Units 06/21/22  0457 06/20/22  0519 06/17/22  0509 06/16/22  1043   WBC Thousand/uL 22 70* 27 29*   < > 12 40*   HEMOGLOBIN g/dL 9 3* 9 2*   < > 10 6*   HEMATOCRIT % 28 5* 28 4*   < > 32 6*   PLATELETS Thousands/uL 314 282   < > 293   NEUTROS PCT %  --   --   --  88*   LYMPHS PCT %  --   --   --  4*   LYMPHO PCT %  --  1*  --   --    MONOS PCT %  --   --   --  7   MONO PCT %  --  2*  --   --    EOS PCT %  --  0  --  0    < > = values in this interval not displayed  Results from last 7 days   Lab Units 06/21/22 0457 06/20/22  0519   SODIUM mmol/L 128* 125*   POTASSIUM mmol/L 4 6 5 0   CHLORIDE mmol/L 91* 92*   CO2 mmol/L 26 22   BUN mg/dL 51* 57*   CREATININE mg/dL 5 81* 6 54*   CALCIUM mg/dL 8 7 8 3*   ALK PHOS U/L  --  55   ALT U/L  --  13   AST U/L  --  16     Results from last 7 days   Lab Units 06/19/22  0419   INR  1 07               * I Have Reviewed All Lab Data Listed Above  * Additional Pertinent Lab Tests Reviewed:  [facility]  Reviewed    Imaging:  Imaging Reports Reviewed Today Include: no new imaging    Recent Cultures (last 7 days):           Last 24 Hours Medication List:   Current Facility-Administered Medications   Medication Dose Route Frequency Provider Last Rate    acetaminophen  650 mg Oral Q4H PRN Geneva Mauritian, DO      albuterol  2 5 mg Nebulization Q4H PRN Cici Slates, CRNP      allopurinol  200 mg Oral HS Yifan Warren MD      amLODIPine  5 mg Oral Daily Carmen Valenzuela MD      ascorbic acid  500 mg Oral BID Geneva Mauritian, DO      benzonatate  200 mg Oral TID Cici Slates, CRNP      calcium acetate  1,334 mg Oral TID With Meals Luwanna Neas, DO      cefepime  1,000 mg Intravenous Q24H MAYA RousseauNP 1,000 mg (06/21/22 0836)    cholecalciferol  5,000 Units Oral Daily REMY Amaral      co-enzyme Q-10  200 mg Oral HS Abril Garcia MD      dexamethasone  6 mg Intravenous Q24H Luwanna Neas, DO      ferrous sulfate  325 mg Oral Daily With Breakfast Luwanna Neas, DO      folic acid  1 mg Oral Daily Luwanna Neas, DO      guaiFENesin  600 mg Oral Q12H [facility] REMY Rousseau      heparin (porcine)  5,000 Units Subcutaneous [facility] Abril Garcia MD      hydrALAZINE  25 mg Oral [facility] Santiago Harden MD      HYDROcodone-homatropine  5 mL Oral Q4H PRN Kaylah Morris PA-C      HYDROmorphone  0 5 mg Intravenous Q4H PRN REMY Rousseau      ipratropium  0 5 mg Nebulization TID REMY Rousseau      labetalol  10 mg Intravenous Q4H PRN Luwanna Neas, DO      levalbuterol  1 25 mg Nebulization TID Luwanna Neas, DO      lidocaine   Topical Daily PRN REMY Amaral      losartan  50 mg Oral BID REMY Amaral      magnesium oxide  400 mg Oral Daily Luwanna Neas, DO      metoclopramide  5 mg Intravenous Q6H PRN Kaylah Morris PA-C      metoprolol succinate  50 mg Oral BID Luwanna Neas, DO      ondansetron  4 mg Oral Q6H PRN Luwanna Neas, DO      oxyCODONE  5 mg Oral Q8H PRN Yaima SolroREMY      sevelamer  1,600 mg Oral TID With Meals Luwanna Neas, DO      sodium chloride  4 mL Nebulization TID Luwanna Neas, DO      traMADol  50 mg Oral Q8H PRN REMY Rousseau      vancomycin  10 mg/kg (Adjusted) Intravenous Once per day on Tue Thu Sat REMY Rousseau      zinc sulfate  220 mg Oral Daily With Lunch REMY Amaral          Today, Patient Was Seen By: Abril Garcia MD    ** Please Note: Dictation voice to text software may have been used in the creation of this document   **

## 2022-06-21 NOTE — ASSESSMENT & PLAN NOTE
· COVID-19 positive on 06/13/2022  Vaccinated x2 against COVID-19  · Patient completed 5 days of remdesivir on 06/18/2022  · Decadron day 8/10  · Continues to require 5 L nasal cannula  · CT done on 06/19/2022 concerning for superimposed bacterial pneumonia  · Cefepime/vancomycin started on 06/19/2022  · Patient was initially on heparin drip however discontinued due to hemoptysis    SubQ heparin initiated  · Cultures negative to date  · Monitor inflammatory markers  · Supportive care

## 2022-06-21 NOTE — RESPIRATORY THERAPY NOTE
RT Protocol Note  Renée Silva 76 y o  male MRN: [id number]  Unit/Bed#: -01 Encounter: [id number]    Assessment    Principal Problem:    Pneumonia due to COVID-19 virus  Active Problems:    Essential hypertension    Class 2 severe obesity due to excess calories with serious comorbidity and body mass index (BMI) of 39 0 to 39 9 in adult [facility])    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 ([facility])    Anemia in chronic kidney disease, on chronic dialysis ([place] [address] )    Acute respiratory failure with hypoxia ([facility])      Home Pulmonary Medications:  none       Past Medical History:   Diagnosis Date    Abdominal aortic aneurysm (AAA) ([place] [address] )     s/p repair    Anemia     due to kidney disease    Arthritis     CHF (congestive heart failure) ([facility])     stable at present    Chronic kidney disease     on hemodialysis  Sat    Chronic pain disorder     knees    Edema     GERD (gastroesophageal reflux disease)     no meds     Gout     History of echocardiogram 2018    EF 55%, mild LVH, technically difficult study      Hyperlipidemia     borderline no meds    Hyperparathyroidism ([place] [address] )     Hypertension     Kidney stone     Seasonal allergies     Shortness of breath     mild exertional    Skin cancer     Vitamin D deficiency      Social History     Socioeconomic History    Marital status: /Civil Union     Spouse name: None    Number of children: None    Years of education: None    Highest education level: None   Occupational History    None   Tobacco Use    Smoking status: Former Smoker     Types: Cigarettes     Quit date: 2004     Years since quittin 0    Smokeless tobacco: Never Used   Vaping Use    Vaping Use: Never used   Substance and Sexual Activity    Alcohol use: Not Currently    Drug use: Never    Sexual activity: None   Other Topics Concern    None   Social History Narrative    None     Social Determinants of Health     Financial Resource Strain: Not on file   Food Insecurity: No Food Insecurity    Worried About Running Out of Food in the Last Year: Never true    Ran Out of Food in the Last Year: Never true   Transportation Needs: No Transportation Needs    Lack of Transportation (Medical): No    Lack of Transportation (Non-Medical): No   Physical Activity: Not on file   Stress: Not on file   Social Connections: Not on file   Intimate Partner Violence: Not on file   Housing Stability: Low Risk     Unable to Pay for Housing in the Last Year: No    Number of Places Lived in the Last Year: 1    Unstable Housing in the Last Year: No       Subjective         Objective    Physical Exam:   Assessment Type: During-treatment  General Appearance: Alert, Awake  Respiratory Pattern: Normal  Chest Assessment: Chest expansion symmetrical  Bilateral Breath Sounds: (P) Diminished, Coarse  Cough: Non-productive, Moist  O2 Device: 5 lpm nc  Vitals:  Blood pressure 119/65, pulse 66, temperature 98 °F (36 7 °C), resp  rate 20, height 5' 8" (1 727 m), weight 106 kg (233 lb 11 oz), SpO2 97 %  Imaging and other studies: I have personally reviewed pertinent reports  O2 Device: 5 lpm nc       Plan    Respiratory Plan: Mild Distress pathway  Airway Clearance Plan: Flutter     Resp Comments: Post treatment evaluation

## 2022-06-21 NOTE — PHYSICAL THERAPY NOTE
Physical Therapy Cancellation Note       06/21/22 1230   PT Last Visit   PT Visit Date 06/21/22   Note Type   Note Type Cancelled Session   Cancel Reasons Patient off floor/test  (pt receiving HD, unavailable for PT session)       Chart review performed  At this time, PT treatment session cancelled as patient currently having HD session  PT will follow and provide PT interventions as appropriate      Luciano Hogan PT

## 2022-06-21 NOTE — PLAN OF CARE
Problem: Prexisting or High Potential for Compromised Skin Integrity  Goal: Skin integrity is maintained or improved  Description: INTERVENTIONS:  - Identify patients at risk for skin breakdown  - Assess and monitor skin integrity  - Assess and monitor nutrition and hydration status  - Monitor labs   - Assess for incontinence   - Turn and reposition patient  - Assist with mobility/ambulation  - Relieve pressure over bony prominences  - Avoid friction and shearing  - Provide appropriate hygiene as needed including keeping skin clean and dry  - Evaluate need for skin moisturizer/barrier cream  - Collaborate with interdisciplinary team   - Patient/family teaching  - Consider wound care consult   Outcome: Progressing     Problem: Potential for Falls  Goal: Patient will remain free of falls  Description: INTERVENTIONS:  - Educate patient/family on patient safety including physical limitations  - Instruct patient to call for assistance with activity   - Consult OT/PT to assist with strengthening/mobility   - Keep Call bell within reach  - Keep bed low and locked with side rails adjusted as appropriate  - Keep care items and personal belongings within reach  - Initiate and maintain comfort rounds  - Make Fall Risk Sign visible to staff  - Offer Toileting in advance of need  - Initiate/Maintain bed alarm  - Obtain necessary fall risk management equipment:   - Apply yellow socks and bracelet for high fall risk patients  - Consider moving patient to room near nurses station  Outcome: Progressing     Problem: PAIN - ADULT  Goal: Verbalizes/displays adequate comfort level or baseline comfort level  Description: Interventions:  - Encourage patient to monitor pain and request assistance  - Assess pain using appropriate pain scale  - Administer analgesics based on type and severity of pain and evaluate response  - Implement non-pharmacological measures as appropriate and evaluate response  - Consider cultural and social influences on pain and pain management  - Notify physician/advanced practitioner if interventions unsuccessful or patient reports new pain  Outcome: Progressing     Problem: INFECTION - ADULT  Goal: Absence or prevention of progression during hospitalization  Description: INTERVENTIONS:  - Assess and monitor for signs and symptoms of infection  - Monitor lab/diagnostic results  - Monitor all insertion sites, i e  indwelling lines, tubes, and drains  - Monitor endotracheal if appropriate and nasal secretions for changes in amount and color  - [place] appropriate cooling/warming therapies per order  - Administer medications as ordered  - Instruct and encourage patient and family to use good hand hygiene technique  - Identify and instruct in appropriate isolation precautions for identified infection/condition  Outcome: Progressing     Problem: SAFETY ADULT  Goal: Maintain or return to baseline ADL function  Description: INTERVENTIONS:  -  Assess patient's ability to carry out ADLs; assess patient's baseline for ADL function and identify physical deficits which impact ability to perform ADLs (bathing, care of mouth/teeth, toileting, grooming, dressing, etc )  - Assess/evaluate cause of self-care deficits   - Assess range of motion  - Assess patient's mobility; develop plan if impaired  - Assess patient's need for assistive devices and provide as appropriate  - Encourage maximum independence but intervene and supervise when necessary  - Involve family in performance of ADLs  - Assess for home care needs following discharge   - Consider OT consult to assist with ADL evaluation and planning for discharge  - Provide patient education as appropriate  Outcome: Progressing  Goal: Maintains/Returns to pre admission functional level  Description: INTERVENTIONS:  - Perform BMAT or MOVE assessment daily    - Set and communicate daily mobility goal to care team and patient/family/caregiver     - Collaborate with rehabilitation services on mobility goals if consulted  - Record patient progress and toleration of activity level   Outcome: Progressing     Problem: DISCHARGE PLANNING  Goal: Discharge to home or other facility with appropriate resources  Description: INTERVENTIONS:  - Identify barriers to discharge w/patient and caregiver  - Arrange for needed discharge resources and transportation as appropriate  - Identify discharge learning needs (meds, wound care, etc )  - Refer to Case Management Department for coordinating discharge planning if the patient needs post-hospital services based on physician/advanced practitioner order or complex needs related to functional status, cognitive ability, or social support system  Outcome: Progressing     Problem: Knowledge Deficit  Goal: Patient/family/caregiver demonstrates understanding of disease process, treatment plan, medications, and discharge instructions  Description: Complete learning assessment and assess knowledge base    Interventions:  - Provide teaching at level of understanding  - Provide teaching via preferred learning methods  Outcome: Progressing     Problem: MOBILITY - ADULT  Goal: Maintain or return to baseline ADL function  Description: INTERVENTIONS:  -  Assess patient's ability to carry out ADLs; assess patient's baseline for ADL function and identify physical deficits which impact ability to perform ADLs (bathing, care of mouth/teeth, toileting, grooming, dressing, etc )  - Assess/evaluate cause of self-care deficits   - Assess range of motion  - Assess patient's mobility; develop plan if impaired  - Assess patient's need for assistive devices and provide as appropriate  - Encourage maximum independence but intervene and supervise when necessary  - Involve family in performance of ADLs  - Assess for home care needs following discharge   - Consider OT consult to assist with ADL evaluation and planning for discharge  - Provide patient education as appropriate  Outcome: Progressing  Goal: Maintains/Returns to pre admission functional level  Description: INTERVENTIONS:  - Perform BMAT or MOVE assessment daily    - Set and communicate daily mobility goal to care team and patient/family/caregiver  - Collaborate with rehabilitation services on mobility goals if consulted  - Record patient progress and toleration of activity level   Outcome: Progressing     Problem: METABOLIC, FLUID AND ELECTROLYTES - ADULT  Goal: Electrolytes maintained within normal limits  Description: INTERVENTIONS:  - Monitor labs and assess patient for signs and symptoms of electrolyte imbalances  - Administer electrolyte replacement as ordered  - Monitor response to electrolyte replacements, including repeat lab results as appropriate  - Instruct patient on fluid and nutrition as appropriate  Outcome: Progressing  Goal: Fluid balance maintained  Description: INTERVENTIONS:  - Monitor labs   - Monitor I/O and WT  - Instruct patient on fluid and nutrition as appropriate  - Assess for signs & symptoms of volume excess or deficit  Outcome: Progressing     Problem: Nutrition/Hydration-ADULT  Goal: Nutrient/Hydration intake appropriate for improving, restoring or maintaining nutritional needs  Description: Monitor and assess patient's nutrition/hydration status for malnutrition  Collaborate with interdisciplinary team and initiate plan and interventions as ordered  Monitor patient's weight and dietary intake as ordered or per policy  Utilize nutrition screening tool and intervene as necessary  Determine patient's food preferences and provide high-protein, high-caloric foods as appropriate       INTERVENTIONS:  - Monitor oral intake, urinary output, labs, and treatment plans  - Assess nutrition and hydration status and recommend course of action  - Evaluate amount of meals eaten  - Assist patient with eating if necessary   - Allow adequate time for meals  - Recommend/ encourage appropriate diets, oral nutritional supplements, and vitamin/mineral supplements  - Order, calculate, and assess calorie counts as needed  - Recommend, monitor, and adjust tube feedings and TPN/PPN based on assessed needs  - Assess need for intravenous fluids  - Provide specific nutrition/hydration education as appropriate  - Include patient/family/caregiver in decisions related to nutrition  Outcome: Progressing

## 2022-06-21 NOTE — PROGRESS NOTES
Progress Note - Nephrology   Jesús Redman 76 y o  male MRN: [id number]  Unit/Bed#: -01 Encounter: [id number]    Assessment and Plan    HEMODIALYSIS PROCEDURE NOTE  The patient was seen and examined on hemodialysis  Time: 3 hours  Sodium: 135 Blood flow: 400   Dialyzer: F160 Potassium: 2 Dialysate flow: 600   Access: AVF Bicarbonate: 30 Ultrafiltration goal: 2   Medications on HD:  Vancomycin     1  End-stage renal disease on hemodialysis   Status post 2 hour 2 L ultrafiltration on 06/20/2022 10 plan for routine treatment today  Continue routine hemodialysis Tuesday Thursday Saturday   Recommend giving cefepime after dialysis treatments as this can be dialyzed out  2  Dialysis access   AV fistula access today without difficulty    3  Anemia of end-stage renal disease   Hemoglobin remains stable  Receives JARETT in outpatient setting  Continue monitor at this time  IV iron contraindicated given infection  4  Hyperparathyroidism, secondary to renal disease   Continue sevelamer phosphorus binders with meals  5  Hypertension in patient with end-stage renal disease   Blood pressure is improved  6  Volume overload   Continue ultrafiltration on hemodialysis      Follow up reason for today's visit:     Pneumonia due to COVID-19 virus    Patient Active Problem List   Diagnosis    Essential hypertension    Hemodialysis-associated hypotension    AAA (abdominal aortic aneurysm) (HCC)    Chronic renal failure    Primary osteoarthritis of both knees    Hyperlipidemia    Class 2 severe obesity due to excess calories with serious comorbidity and body mass index (BMI) of 39 0 to 39 9 in adult [facility])    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 ([facility]    Secondary hyperparathyroidism of renal origin ([facility] )    Abnormal nuclear stress test    Anemia in chronic kidney disease, on chronic dialysis ([facility] )    ASCVD (arteriosclerotic cardiovascular disease)    Benign prostatic hyperplasia without lower urinary tract symptoms    Carotid stenosis, right    Chronic gout, unspecified, with tophus (tophi)    Chronic systolic congestive heart failure (HCC)    Disorder of phosphorus metabolism, unspecified    Gastroesophageal reflux disease    Iron deficiency anemia    Kidney stone    Magnesium deficiency    Osteoarthritis of both knees    Other disorders of electrolyte and fluid balance, not elsewhere classified    Other fluid overload    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[facility]    Panlobular emphysema ([facility] )    Personal history of nicotine dependence    Proteinuria, unspecified    Skin lesion of face    Unspecified protein-calorie malnutrition (HCC)    Vitamin D deficiency    Elevated d-dimer    Pancreatic mass    Pneumonia due to COVID-19 virus    Acute respiratory failure with hypoxia (HCC)         Subjective:   Denies current physical complaints  Reports that breathing is unchanged following ultrafiltration  A complete 10 point review of systems was performed and is otherwise negative  Objective:     Vitals: Blood pressure 118/50, pulse 74, temperature (!) 97 1 °F (36 2 °C), temperature source Oral, resp  rate 20, height 5' 8" (1 727 m), weight 105 kg (231 lb 0 7 oz), SpO2 95 %  ,Body mass index is 35 13 kg/m²  Weight (last 2 days)     Date/Time Weight    06/21/22 0537 105 (231 04)    06/20/22 1529 106 (233 69)    06/20/22 0540 110 (243 61)    06/19/22 0600 107 (236 55)     Weight: patient unable to stand at this time at 06/19/22 0600            Intake/Output Summary (Last 24 hours) at 6/21/2022 1336  Last data filed at 6/21/2022 1240  Gross per 24 hour   Intake 1555 ml   Output 3934 ml   Net -2379 ml     I/O last 3 completed shifts: In: 9409 [P O :855;  I V :500]  Out: 2431 [Other:2431]         Physical Exam: /50   Pulse 74   Temp (!) 97 1 °F (36 2 °C) (Oral)   Resp 20   Ht 5' 8" (1 727 m)   Wt 105 kg (231 lb 0 7 oz)   SpO2 95%   BMI 35 13 kg/m²     General Appearance:    No acute distress  Cooperative  Appears stated age  Head:    Normocephalic  Atraumatic  Normal jaw occlusion  Eyes:    Lids, conjunctiva normal  No scleral icterus  Ears:    Normal external ears  Nose:   Nares normal  No drainage  Nasal cannula   Mouth:   Lips, tongue normal  Mucosa normal  Phonation normal    Neck:   Supple  Symmetrical    Back:     Symmetric  No CVA tenderness  Lungs:     Normal respiratory effort  Clear to auscultation bilaterally  Chest wall:    No tenderness or deformity  Heart:    Regular rate and rhythm  Normal S1 and S2  No murmur  No JVD  1+ bilateral lower extremity edema  Abdomen:     Soft  Non-tender  Bowel sounds active  Genitourinary:   No Diaz catheter present  Extremities:   Extremities normal  Atraumatic  No cyanosis  Skin:   Warm and dry  No pallor, jaundice, rash, ecchymoses  Neurologic:   Alert and oriented to person, place, time  No focal deficit  Lab, Imaging and other studies: I have personally reviewed pertinent labs  CBC:   Lab Results   Component Value Date    WBC 22 70 (H) 06/21/2022    HGB 9 3 (L) 06/21/2022    HCT 28 5 (L) 06/21/2022    MCV 99 (H) 06/21/2022     06/21/2022    MCH 32 4 06/21/2022    MCHC 32 6 06/21/2022    RDW 15 2 (H) 06/21/2022    MPV 9 7 06/21/2022     CMP:   Lab Results   Component Value Date    K 4 6 06/21/2022    CL 91 (L) 06/21/2022    CO2 26 06/21/2022    BUN 51 (H) 06/21/2022    CREATININE 5 81 (H) 06/21/2022    CALCIUM 8 7 06/21/2022    EGFR 8 06/21/2022           Results from last 7 days   Lab Units 06/21/22  0457 06/20/22  0519 06/19/22  0455 06/18/22  0619 06/17/22  0509 06/16/22  1043   POTASSIUM mmol/L 4 6 5 0 4 3   < > 4 9 4 6   CHLORIDE mmol/L 91* 92* 93*   < > 91* 88*   CO2 mmol/L 26 22 24   < > 25 28   BUN mg/dL 51* 57* 42*   < > 86* 73*   CREATININE mg/dL 5 81* 6 54* 4 93*   < > 8 29* 7 16*   CALCIUM mg/dL 8 7 8  3* 8 3*   < > 7 7* 8 5   ALK PHOS U/L  --  55  --   --  41 51   ALT U/L  --  13  --   --  16 21   AST U/L  --  16  --   --  21 32    < > = values in this interval not displayed  Phosphorus: No results found for: PHOS  Magnesium: No results found for: MG  Urinalysis: No results found for: Evelina Yash, SPECGRAV, PHUR, LEUKOCYTESUR, NITRITE, PROTEINUA, GLUCOSEU, KETONESU, BILIRUBINUR, BLOODU  Ionized Calcium: No results found for: CAION  Coagulation: No results found for: PT, INR, APTT  Troponin: No results found for: TROPONINI  ABG: No results found for: PHART, EAU5MZY, PO2ART, MWM4MVM, Y9SGEUQS, BEART, SOURCE  Radiology review:     IMAGING  Procedure: CTA chest pe study    Result Date: 6/19/2022  Narrative: CTA - CHEST WITH IV CONTRAST - PULMONARY ANGIOGRAM INDICATION:   COVID with hemomptysis r/o PE  Patient has confirmed COVID-19  COMPARISON: CT chest, abdomen, and pelvis dated 1/3/2022 TECHNIQUE: CTA examination of the chest was performed using angiographic technique according to a protocol specifically tailored to evaluate for pulmonary embolism  Axial, sagittal, and coronal 2D reformatted images were created from the source data and  submitted for interpretation  In addition, coronal 3D MIP postprocessing was performed on the acquisition scanner  Radiation dose length product (DLP) for this visit:  907 39 mGy-cm   This examination, like all CT scans performed in the [facility], was performed utilizing techniques to minimize radiation dose exposure, including the use of iterative  reconstruction and automated exposure control  IV Contrast:  70 mL of iohexol (OMNIPAQUE)  FINDINGS: PULMONARY ARTERIAL TREE:  Suboptimal contrast bolus and respiratory motion decreases sensitivity for evaluation of peripheral pulmonary emboli, subject to this, no pulmonary embolism is seen   LUNGS/PLEURA:  Superimposed on mild scattered pulmonary emphysematous changes, there are multiple focal alveolar opacities scattered in the left lung, most prominent in the left lower lobe  There are multiple large consolidations in the right upper and lower lobes with associated air bronchograms additional focal alveolar opacities are seen in the right middle lobe and there are scattered groundglass opacities seen throughout the right lung most prominently in the inferior aspect of the right lower lobe and in the right lower lobe  Findings taken together are highly suspicious for multifocal infection  Small dependent pleural effusions  HEART/GREAT VESSELS:  The heart appears enlarged  Moderate coronary atherosclerosis  Mild to moderate aortic atherosclerosis, tortuous aorta  No thoracic aortic aneurysm  MEDIASTINUM AND ROBINSON:  Shotty mediastinal and hilar lymph nodes, most prominent in the right hilum, possibly reactive  Attenuation of multiple right lower lobe airways, probably related to inspissated secretions, inflammation/infection, and mucous plugging  CHEST WALL AND LOWER NECK:   Body wall edema  VISUALIZED STRUCTURES IN THE UPPER ABDOMEN:  1 8 cm focal cystic lesion in the pancreatic head is redemonstrated, similar to the prior  Renal atrophy and multiple renal cysts, largest in the right kidney measures approximately 4 9 cm in size  OSSEOUS STRUCTURES: Generalized osteopenia  Degenerative changes of the bilateral shoulders with bilateral shoulder joint effusions  No acute fracture or destructive osseous lesion  Impression: Suboptimal contrast bolus and respiratory motion decreases sensitivity for evaluation of peripheral pulmonary emboli, subject to this, no pulmonary embolism is seen  Multiple focal alveolar opacities scattered in the left lung, most prominent in the left lower lobe  Multiple large consolidations in the right upper and lower lobes with associated air bronchograms    Additional focal alveolar opacities in the right middle lobe with scattered groundglass opacities throughout the right lung  Findings taken together are highly suspicious for multifocal infection  Attenuation of multiple right lower lobe airways, probably related to inspissated secretions, inflammation/infection, and mucous plugging  Small dependent pleural effusions, shotty mediastinal and hilar lymph nodes, probably reactive  1 8 cm focal cystic lesion in the pancreatic head is redemonstrated, similar to the prior  Differential considerations include cyst, cystic neoplasm, or IPMN  For simple cyst(s) between 1 5 to 2 0 cm, recommend followup every 6 months for 4 times, then every 1 year for 2 times, then every 2 years for 3 times  If stable after 10 years, then no more followups  (If patient reaches age [de-identified], then can switch to age [de-identified] algorithm ) Recommend next followup in 6 months  Preferred imaging modality: abdomen MRI and MRCP with and without IV contrast, or triple phase abdomen CT with IV contrast, or abdomen MRI and MRCP without IV contrast  Cardiomegaly, coronary atherosclerosis, renal atrophy, renal cysts, and other findings as above   Workstation performed: [id number]       Current Facility-Administered Medications   Medication Dose Route Frequency    acetaminophen (TYLENOL) tablet 650 mg  650 mg Oral Q4H PRN    albuterol inhalation solution 2 5 mg  2 5 mg Nebulization Q4H PRN    allopurinol (ZYLOPRIM) tablet 200 mg  200 mg Oral HS    amLODIPine (NORVASC) tablet 5 mg  5 mg Oral Daily    ascorbic acid (VITAMIN C) tablet 500 mg  500 mg Oral BID    benzonatate (TESSALON PERLES) capsule 200 mg  200 mg Oral TID    calcium acetate (PHOSLO) capsule 1,334 mg  1,334 mg Oral TID With Meals    cefepime (MAXIPIME) IVPB (premix in dextrose) 1,000 mg 50 mL  1,000 mg Intravenous Q24H    cholecalciferol (VITAMIN D3) tablet 5,000 Units  5,000 Units Oral Daily    co-enzyme Q-10 capsule 200 mg  200 mg Oral HS    dexamethasone (DECADRON) injection 6 mg  6 mg Intravenous Q24H    ferrous sulfate tablet 325 mg  325 mg Oral Daily With Breakfast    folic acid (FOLVITE) tablet 1 mg  1 mg Oral Daily    guaiFENesin (MUCINEX) 12 hr tablet 600 mg  600 mg Oral Q12H [facility]    heparin (porcine) subcutaneous injection 5,000 Units  5,000 Units Subcutaneous Q8H [facility]    hydrALAZINE (APRESOLINE) tablet 25 mg  25 mg Oral Q8H [facility]    HYDROcodone-homatropine (HYCODAN) oral syrup 5 mL  5 mL Oral Q4H PRN    HYDROmorphone (DILAUDID) injection 0 5 mg  0 5 mg Intravenous Q4H PRN    ipratropium (ATROVENT) 0 02 % inhalation solution 0 5 mg  0 5 mg Nebulization TID    labetalol (NORMODYNE) injection 10 mg  10 mg Intravenous Q4H PRN    levalbuterol (XOPENEX) inhalation solution 1 25 mg  1 25 mg Nebulization TID    lidocaine (LMX) 4 % cream   Topical Daily PRN    losartan (COZAAR) tablet 50 mg  50 mg Oral BID    magnesium oxide (MAG-OX) tablet 400 mg  400 mg Oral Daily    metoclopramide (REGLAN) injection 5 mg  5 mg Intravenous Q6H PRN    metoprolol succinate (TOPROL-XL) 24 hr tablet 50 mg  50 mg Oral BID    ondansetron (ZOFRAN-ODT) dispersible tablet 4 mg  4 mg Oral Q6H PRN    oxyCODONE (ROXICODONE) IR tablet 5 mg  5 mg Oral Q8H PRN    sevelamer (RENAGEL) tablet 1,600 mg  1,600 mg Oral TID With Meals    sodium chloride 3 % inhalation solution 4 mL  4 mL Nebulization TID    traMADol (ULTRAM) tablet 50 mg  50 mg Oral Q8H PRN    vancomycin 750 mg in sodium chloride 0 9% 250 mL  10 mg/kg (Adjusted) Intravenous Once per day on Tue Thu Sat    zinc sulfate (ZINCATE) capsule 220 mg  220 mg Oral Daily With Lunch     Medications Discontinued During This Encounter   Medication Reason    ondansetron (ZOFRAN) injection 4 mg     heparin (ACS LOW)     losartan (COZAAR) tablet 50 mg     hydrALAZINE (APRESOLINE) tablet 100 mg     lidocaine (LMX) 4 % cream     benzonatate (TESSALON PERLES) capsule 100 mg     metoclopramide (REGLAN) injection 5 mg     hydrALAZINE (APRESOLINE) tablet 100 mg     benzonatate (TESSALON PERLES) capsule 100 mg     cefepime (MAXIPIME) injection 1,000 mg     heparin (porcine) 25,000 units in 0 45% NaCl 250 mL infusion (premix)     levalbuterol (XOPENEX) inhalation solution 1 25 mg     ipratropium (ATROVENT) 0 02 % inhalation solution 0 5 mg     sodium chloride 0 9 % inhalation solution 3 mL     albuterol (PROVENTIL HFA,VENTOLIN HFA) inhaler 2 puff     umeclidinium bromide (INCRUSE ELLIPTA) 62 5 mcg/inh inhaler AEPB 1 puff     fluticasone-vilanterol (BREO ELLIPTA) 200-25 MCG/INH inhaler 1 puff     albuterol (PROVENTIL HFA,VENTOLIN HFA) inhaler 2 puff     sodium chloride 0 9 % inhalation solution 3 mL     levalbuterol (XOPENEX) inhalation solution 1 25 mg     sodium chloride 3 % inhalation solution 4 mL     co-enzyme Q-10 capsule 200 mg     allopurinol (ZYLOPRIM) tablet 200 mg        Dylan ANGEL Almanzar    Portions of the record may have been created with voice recognition software  Occasional wrong word or "sound a like" substitutions may have occurred due to the inherent limitations of voice recognition software  Read the chart carefully and recognize, using context, where substitutions have occurred

## 2022-06-21 NOTE — CASE MANAGEMENT
Case Management Discharge Planning Note    Patient name Aminata Roles  Location /-59 MRN [id number]  : 1945 Date 2022       Current Admission Date: 2022  Current Admission Diagnosis:Pneumonia due to COVID-19 virus   Patient Active Problem List    Diagnosis Date Noted    Pneumonia due to COVID-19 virus 2022    Acute respiratory failure with hypoxia ([facility] ) 2022    Elevated d-dimer 2022    Pancreatic mass 2022    Primary osteoarthritis of both knees 2021    Hyperlipidemia 2021    Class 2 severe obesity due to excess calories with serious comorbidity and body mass index (BMI) of 39 0 to 39 9 in adult [facility]) 2021    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 ([facility] ) 2021    Secondary hyperparathyroidism of renal origin ([place] [address] ) 2021    Chronic renal failure 2021    Other fluid overload 2020    Abnormal nuclear stress test 2020    Panlobular emphysema ([facility] ) 2020    Disorder of phosphorus metabolism, unspecified 2019    Essential hypertension 2019    Hemodialysis-associated hypotension 2019    AAA (abdominal aortic aneurysm) ([facility] ) 2019    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, initial encounter ([place] [address] ) 2019    Anemia in chronic kidney disease, on chronic dialysis ([place] [address] ) 2018    Benign prostatic hyperplasia without lower urinary tract symptoms 2018    Chronic gout, unspecified, with tophus (tophi) 2018    Iron deficiency anemia 2018    Kidney stone 2018    Magnesium deficiency 2018    Other disorders of electrolyte and fluid balance, not elsewhere classified 2018    Personal history of nicotine dependence 2018    Proteinuria, unspecified 2018    Unspecified protein-calorie malnutrition ([facility] ) 2018    Skin lesion of face 2018    Chronic systolic congestive heart failure ([facility] ) 06/01/2018    ASCVD (arteriosclerotic cardiovascular disease) 12/11/2017    Carotid stenosis, right 12/11/2017    Gastroesophageal reflux disease 12/11/2017    Vitamin D deficiency 12/11/2017    Osteoarthritis of both knees 09/02/2015      LOS (days): 7  Geometric Mean LOS (GMLOS) (days): 5 40  Days to GMLOS:-1 6     OBJECTIVE:  Risk of Unplanned Readmission Score: 28 59         Current admission status: Inpatient   Preferred Pharmacy:   [facility]/pharmacy #[id number]- [address]  [address]   [address], [address], [address] [facility] [address]  Phone: [phone] Fax: [address] [facility], [address] [place] [address]  Phone: [phone] Fax: [phone] [address] [place] [address], [address] [place], [place] [address]  Phone: [phone] Fax: [phone]    Primary Care Provider: Casie Burnette DO    Primary Insurance: [facility]  Secondary Insurance:     DISCHARGE DETAILS:    Discharge planning discussed with[de-identified] cm placed a call to Kobi Muhammad at 15:00 pm  Freedom of Choice: Yes  Comments - Freedom of Choice: rehab recommended   list of facilities reviewed over the phone  permission to send new referrals  CM contacted family/caregiver?: Yes             Contacts  Patient Contacts: Mary Ann Ramos  Relationship to Patient[de-identified] Family (daughter)  Contact Method: Phone  Phone Number: [phone]  Reason/Outcome: Discharge Planning              Other Referral/Resources/Interventions Provided:  Interventions: Short Term Rehab, Dialysis  Referral Comments: [facility] accept,   family gave permission to send to [facility] near his [facility] chair that hew is at now,, pt needs 2 negative covid testes before he can go back to his regular dialysis chair at [facility]    Would you like to participate in our [facility] Pharm[facility] service program?  : No - Declined

## 2022-06-22 NOTE — PROGRESS NOTES
Vancomycin IV Pharmacy-to-Dose Consultation    Viviane Navarro is a 76 y o  male who is currently receiving Vancomycin IV with management by the Pharmacy Consult service  Assessment/Plan:  The patient was reviewed  Renal function is stable and no signs or symptoms of nephrotoxicity and/or infusion reactions were documented in the chart  Based on todays assessment, continue current vancomycin (day # 4) dosing of 750 mg post HD tue, thur, sat, with a plan for trough to be drawn at 7am on 6/23  We will continue to follow the patients culture results and clinical progress daily      Jr Montague, Pharmacist

## 2022-06-22 NOTE — ASSESSMENT & PLAN NOTE
· COVID-19 positive on 06/13/2022  Vaccinated x2 against COVID-19  · Patient completed 5 days of remdesivir on 06/18/2022  · Decadron day 9/10  · Continues to require 5 L nasal cannula  · CT done on 06/19/2022 concerning for superimposed bacterial pneumonia  · Cefepime/vancomycin started on 06/19/2022  · Patient was initially on heparin drip however discontinued due to hemoptysis    SubQ heparin initiated  · Cultures negative to date  · Monitor inflammatory markers  · Supportive care

## 2022-06-22 NOTE — NURSING NOTE
Pt more aware at this time,bp elevated earlier, hospitalist aware and prn given with + results,  presently in bed in no acute distress watching tv, callbell in reach of the pt

## 2022-06-22 NOTE — PROGRESS NOTES
Aline [address]  Progress Note - Bonilla Pereyra 1945, 76 y o  male MRN: [id number]  Unit/Bed#: -01 Encounter: [id number]  Primary Care Provider: Miranda Jean DO   Date and time admitted to hospital: 6/14/2022  1:53 PM    * Pneumonia due to COVID-19 virus  Assessment & Plan  · COVID-19 positive on 06/13/2022  Vaccinated x2 against COVID-19  · Patient completed 5 days of remdesivir on 06/18/2022  · Decadron day 9/10  · Continues to require 5 L nasal cannula  · CT done on 06/19/2022 concerning for superimposed bacterial pneumonia  · Cefepime/vancomycin started on 06/19/2022  · Patient was initially on heparin drip however discontinued due to hemoptysis  SubQ heparin initiated  · Cultures negative to date  · Monitor inflammatory markers  · Supportive care    Acute respiratory failure with hypoxia (HCC)  Assessment & Plan  · Secondary to COVID infection  · Patient developed hemoptysis, heparin drip discontinued and hemoptysis appears to be improved  · Pulmonology following  · Will continue oxygen and titrate as tolerated    Anemia in chronic kidney disease, on chronic dialysis (HCC)  Assessment & Plan  · Hemoglobin stable and at baseline  · Continue home ferrous sulfate  · Trend CBC         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 [facility])  Assessment & Plan  · Patient back to routine dialysis schedule   · Continue management per Nephrology    Class 2 severe obesity due to excess calories with serious comorbidity and body mass index (BMI) of 39 0 to 39 9 in adult [facility])  Assessment & Plan  · Present on admission as evidenced by BMI greater than 35  · Endorse lifestyle modifications and weight loss        Essential hypertension  Assessment & Plan  · Hypertensive urgency noted today  · Continue losartan and metoprolol  · Hydralazine dose increased to 100 mg t i d   · Labetalol 10 mg as needed every 4 hours      VTE Prophylaxis:  Heparin    Patient Centered Rounds:  I have performed bedside rounds with nursing staff today  Discussions with Specialists or Other Care Team Provider: yes  Education and Discussions with Family / Patient:  Spoke with patient's daughter over the phone regarding plan of care    Current Length of Stay: 8 day(s)    Current Patient Status: Inpatient   Certification Statement: The patient will continue to require additional inpatient hospital stay due to COVID infection    Discharge Plan:  Pending hospital course    Code Status: Level 3 - DNAR and DNI    Subjective:   No overnight events noted  Patient continues require 5 L of nasal cannula  Patient significantly depressed  Also with continued weakness/fatigue  Decreased appetite with poor oral intake    Objective:     Vitals:   Temp (24hrs), Av 2 °F (36 8 °C), Min:97 5 °F (36 4 °C), Max:98 9 °F (37 2 °C)    Temp:  [97 5 °F (36 4 °C)-98 9 °F (37 2 °C)] 97 5 °F (36 4 °C)  HR:  [68-85] 68  Resp:  [18-20] 20  BP: (144-203)/(59-86) 203/86  SpO2:  [92 %-96 %] 96 %  Body mass index is 33 99 kg/m²  Input and Output Summary (last 24 hours):     No intake or output data in the 24 hours ending 22 1446    Physical Exam:   Physical Exam  Constitutional:       General: He is not in acute distress  Appearance: He is obese  He is ill-appearing  HENT:      Head: Normocephalic and atraumatic  Nose: Nose normal       Mouth/Throat:      Mouth: Mucous membranes are moist    Eyes:      Extraocular Movements: Extraocular movements intact  Conjunctiva/sclera: Conjunctivae normal    Cardiovascular:      Rate and Rhythm: Normal rate and regular rhythm  Pulmonary:      Effort: Pulmonary effort is normal       Comments: Decreased breath sounds bilaterally  Abdominal:      Palpations: Abdomen is soft  Tenderness: There is no abdominal tenderness  Musculoskeletal:      Cervical back: Normal range of motion and neck supple        Comments: Generalized weakness   Skin:     General: Skin is warm and dry  Neurological:      General: No focal deficit present  Mental Status: He is alert  Mental status is at baseline  Cranial Nerves: No cranial nerve deficit  Psychiatric:         Mood and Affect: Mood is depressed  Behavior: Behavior normal          Additional Data:     Labs:    Results from last 7 days   Lab Units 06/22/22  0440 06/21/22  0457 06/20/22  0519 06/17/22  0509 06/16/22  1043   WBC Thousand/uL 23 80*   < > 27 29*   < > 12 40*   HEMOGLOBIN g/dL 8 4*   < > 9 2*   < > 10 6*   HEMATOCRIT % 25 5*   < > 28 4*   < > 32 6*   PLATELETS Thousands/uL 293   < > 282   < > 293   NEUTROS PCT %  --   --   --   --  88*   LYMPHS PCT %  --   --   --   --  4*   LYMPHO PCT %  --   --  1*  --   --    MONOS PCT %  --   --   --   --  7   MONO PCT %  --   --  2*  --   --    EOS PCT %  --   --  0  --  0    < > = values in this interval not displayed  Results from last 7 days   Lab Units 06/22/22  0440   SODIUM mmol/L 128*   POTASSIUM mmol/L 4 0   CHLORIDE mmol/L 93*   CO2 mmol/L 27   BUN mg/dL 43*   CREATININE mg/dL 4 66*   CALCIUM mg/dL 8 6   ALK PHOS U/L 65   ALT U/L 14   AST U/L 15     Results from last 7 days   Lab Units 06/19/22  0419   INR  1 07               * I Have Reviewed All Lab Data Listed Above  * Additional Pertinent Lab Tests Reviewed: [facility]  Reviewed    Imaging:  Imaging Reports Reviewed Today Include:  No new imaging    Recent Cultures (last 7 days):     Results from last 7 days   Lab Units 06/21/22  2101   SPUTUM CULTURE  Test not performed  Suggest repeat specimen  GRAM STAIN RESULT  >10 squamous epithelial cells/lpf, indicating orophayngeal contamination  *  No polys seen*  3+ Budding yeast*  2+ Gram positive cocci in pairs and chains*  2+ Gram positive rods*  1+ Gram negative rods*       Last 24 Hours Medication List:   Current Facility-Administered Medications   Medication Dose Route Frequency Provider Last Rate    acetaminophen  650 mg Oral Q4H PRN Olga Tampa, DO      albuterol  2 5 mg Nebulization Q4H PRN REMY Judge      allopurinol  200 mg Oral HS Melani Vital MD      amLODIPine  5 mg Oral Daily Cal Wise MD      ascorbic acid  500 mg Oral BID Marianna Tampa, DO      benzonatate  200 mg Oral TID REMY Judge      calcium acetate  1,334 mg Oral TID With Meals Marianna Tampa, DO      cefepime  1,000 mg Intravenous Q24H Micheline Pemberton PA-C 1,000 mg (06/22/22 0806)    co-enzyme Q-10  200 mg Oral HS Sid Ivory MD      dexamethasone  6 mg Intravenous Q24H Olga Tampa, DO      docusate sodium  100 mg Oral BID Melani Vital MD      ferrous sulfate  325 mg Oral Daily With Breakfast Olga Tampa, DO      folic acid  1 mg Oral Daily Olga Tampa, DO      guaiFENesin  600 mg Oral Q12H [facility] REMY Judge      heparin (porcine)  5,000 Units Subcutaneous [facility] Melani Vital MD      hydrALAZINE  25 mg Oral [facility] Cal Wise MD      HYDROcodone-homatropine  5 mL Oral Q4H PRN Michelle Aldana PA-C      HYDROmorphone  0 5 mg Intravenous Q4H PRN REMY Judge      ipratropium  0 5 mg Nebulization TID REMY Judge      labetalol  10 mg Intravenous Q4H PRN Olga Tampa, DO      levalbuterol  1 25 mg Nebulization TID Olga Tampa, DO      lidocaine   Topical Daily PRN Vergil REMY Martin      losartan  50 mg Oral BID Vergil REMY Martin      magnesium oxide  400 mg Oral Daily Marianna Tampa, DO      metoclopramide  5 mg Intravenous Q6H PRN Michelle Aldana PA-C      metoprolol succinate  50 mg Oral BID Olga Tampa, DO      ondansetron  4 mg Oral Q6H PRN Marianna Tampa, DO      oxyCODONE  5 mg Oral Q8H PRN REMY Judge      polyethylene glycol  17 g Oral Daily Melani Vital MD      senna  1 tablet Oral HS Melani Vital MD      sevelamer  1,600 mg Oral TID With Meals Emeka PHILIP Heard,       sodium chloride  4 mL Nebulization TID Belen Crawford DO      traMADol  50 mg Oral Q8H PRN REMY Tiwari      vancomycin  10 mg/kg (Adjusted) Intravenous Once per day on Tue Thu Sat REMY Tiwari          Today, Patient Was Seen By: Iraida Gunn MD    ** Please Note: Dictation voice to text software may have been used in the creation of this document   **

## 2022-06-22 NOTE — PLAN OF CARE
Problem: Prexisting or High Potential for Compromised Skin Integrity  Goal: Skin integrity is maintained or improved  Description: INTERVENTIONS:  - Identify patients at risk for skin breakdown  - Assess and monitor skin integrity  - Assess and monitor nutrition and hydration status  - Monitor labs   - Assess for incontinence   - Turn and reposition patient  - Assist with mobility/ambulation  - Relieve pressure over bony prominences  - Avoid friction and shearing  - Provide appropriate hygiene as needed including keeping skin clean and dry  - Evaluate need for skin moisturizer/barrier cream  - Collaborate with interdisciplinary team   - Patient/family teaching  - Consider wound care consult   Outcome: Progressing     Problem: Potential for Falls  Goal: Patient will remain free of falls  Description: INTERVENTIONS:  - Educate patient/family on patient safety including physical limitations  - Instruct patient to call for assistance with activity   - Consult OT/PT to assist with strengthening/mobility   - Keep Call bell within reach  - Keep bed low and locked with side rails adjusted as appropriate  - Keep care items and personal belongings within reach  - Initiate and maintain comfort rounds  - Make Fall Risk Sign visible to staff  - Offer Toileting in advance of need  - Initiate/Maintain bed alarm  - Obtain necessary fall risk management equipment:   - Apply yellow socks and bracelet for high fall risk patients  - Consider moving patient to room near nurses station  Outcome: Progressing     Problem: PAIN - ADULT  Goal: Verbalizes/displays adequate comfort level or baseline comfort level  Description: Interventions:  - Encourage patient to monitor pain and request assistance  - Assess pain using appropriate pain scale  - Administer analgesics based on type and severity of pain and evaluate response  - Implement non-pharmacological measures as appropriate and evaluate response  - Consider cultural and social influences on pain and pain management  - Notify physician/advanced practitioner if interventions unsuccessful or patient reports new pain  Outcome: Progressing     Problem: INFECTION - ADULT  Goal: Absence or prevention of progression during hospitalization  Description: INTERVENTIONS:  - Assess and monitor for signs and symptoms of infection  - Monitor lab/diagnostic results  - Monitor all insertion sites, i e  indwelling lines, tubes, and drains  - Monitor endotracheal if appropriate and nasal secretions for changes in amount and color  - [place] appropriate cooling/warming therapies per order  - Administer medications as ordered  - Instruct and encourage patient and family to use good hand hygiene technique  - Identify and instruct in appropriate isolation precautions for identified infection/condition  Outcome: Progressing     Problem: SAFETY ADULT  Goal: Maintain or return to baseline ADL function  Description: INTERVENTIONS:  -  Assess patient's ability to carry out ADLs; assess patient's baseline for ADL function and identify physical deficits which impact ability to perform ADLs (bathing, care of mouth/teeth, toileting, grooming, dressing, etc )  - Assess/evaluate cause of self-care deficits   - Assess range of motion  - Assess patient's mobility; develop plan if impaired  - Assess patient's need for assistive devices and provide as appropriate  - Encourage maximum independence but intervene and supervise when necessary  - Involve family in performance of ADLs  - Assess for home care needs following discharge   - Consider OT consult to assist with ADL evaluation and planning for discharge  - Provide patient education as appropriate  Outcome: Progressing  Goal: Maintains/Returns to pre admission functional level  Description: INTERVENTIONS:  - Perform BMAT or MOVE assessment daily    - Set and communicate daily mobility goal to care team and patient/family/caregiver     - Collaborate with rehabilitation services on mobility goals if consulted  - Record patient progress and toleration of activity level   Outcome: Progressing     Problem: DISCHARGE PLANNING  Goal: Discharge to home or other facility with appropriate resources  Description: INTERVENTIONS:  - Identify barriers to discharge w/patient and caregiver  - Arrange for needed discharge resources and transportation as appropriate  - Identify discharge learning needs (meds, wound care, etc )  - Refer to Case Management Department for coordinating discharge planning if the patient needs post-hospital services based on physician/advanced practitioner order or complex needs related to functional status, cognitive ability, or social support system  Outcome: Progressing     Problem: Knowledge Deficit  Goal: Patient/family/caregiver demonstrates understanding of disease process, treatment plan, medications, and discharge instructions  Description: Complete learning assessment and assess knowledge base    Interventions:  - Provide teaching at level of understanding  - Provide teaching via preferred learning methods  Outcome: Progressing     Problem: MOBILITY - ADULT  Goal: Maintain or return to baseline ADL function  Description: INTERVENTIONS:  -  Assess patient's ability to carry out ADLs; assess patient's baseline for ADL function and identify physical deficits which impact ability to perform ADLs (bathing, care of mouth/teeth, toileting, grooming, dressing, etc )  - Assess/evaluate cause of self-care deficits   - Assess range of motion  - Assess patient's mobility; develop plan if impaired  - Assess patient's need for assistive devices and provide as appropriate  - Encourage maximum independence but intervene and supervise when necessary  - Involve family in performance of ADLs  - Assess for home care needs following discharge   - Consider OT consult to assist with ADL evaluation and planning for discharge  - Provide patient education as appropriate  Outcome: Progressing  Goal: Maintains/Returns to pre admission functional level  Description: INTERVENTIONS:  - Perform BMAT or MOVE assessment daily    - Set and communicate daily mobility goal to care team and patient/family/caregiver  - Collaborate with rehabilitation services on mobility goals if consulted  - Record patient progress and toleration of activity level   Outcome: Progressing     Problem: METABOLIC, FLUID AND ELECTROLYTES - ADULT  Goal: Electrolytes maintained within normal limits  Description: INTERVENTIONS:  - Monitor labs and assess patient for signs and symptoms of electrolyte imbalances  - Administer electrolyte replacement as ordered  - Monitor response to electrolyte replacements, including repeat lab results as appropriate  - Instruct patient on fluid and nutrition as appropriate  Outcome: Progressing  Goal: Fluid balance maintained  Description: INTERVENTIONS:  - Monitor labs   - Monitor I/O and WT  - Instruct patient on fluid and nutrition as appropriate  - Assess for signs & symptoms of volume excess or deficit  Outcome: Progressing     Problem: Nutrition/Hydration-ADULT  Goal: Nutrient/Hydration intake appropriate for improving, restoring or maintaining nutritional needs  Description: Monitor and assess patient's nutrition/hydration status for malnutrition  Collaborate with interdisciplinary team and initiate plan and interventions as ordered  Monitor patient's weight and dietary intake as ordered or per policy  Utilize nutrition screening tool and intervene as necessary  Determine patient's food preferences and provide high-protein, high-caloric foods as appropriate       INTERVENTIONS:  - Monitor oral intake, urinary output, labs, and treatment plans  - Assess nutrition and hydration status and recommend course of action  - Evaluate amount of meals eaten  - Assist patient with eating if necessary   - Allow adequate time for meals  - Recommend/ encourage appropriate diets, oral nutritional supplements, and vitamin/mineral supplements  - Order, calculate, and assess calorie counts as needed  - Recommend, monitor, and adjust tube feedings and TPN/PPN based on assessed needs  - Assess need for intravenous fluids  - Provide specific nutrition/hydration education as appropriate  - Include patient/family/caregiver in decisions related to nutrition  Outcome: Progressing

## 2022-06-22 NOTE — PLAN OF CARE
Problem: Prexisting or High Potential for Compromised Skin Integrity  Goal: Skin integrity is maintained or improved  Description: INTERVENTIONS:  - Identify patients at risk for skin breakdown  - Assess and monitor skin integrity  - Assess and monitor nutrition and hydration status  - Monitor labs   - Assess for incontinence   - Turn and reposition patient  - Assist with mobility/ambulation  - Relieve pressure over bony prominences  - Avoid friction and shearing  - Provide appropriate hygiene as needed including keeping skin clean and dry  - Evaluate need for skin moisturizer/barrier cream  - Collaborate with interdisciplinary team   - Patient/family teaching  - Consider wound care consult   Outcome: Progressing     Problem: Potential for Falls  Goal: Patient will remain free of falls  Description: INTERVENTIONS:  - Educate patient/family on patient safety including physical limitations  - Instruct patient to call for assistance with activity   - Consult OT/PT to assist with strengthening/mobility   - Keep Call bell within reach  - Keep bed low and locked with side rails adjusted as appropriate  - Keep care items and personal belongings within reach  - Initiate and maintain comfort rounds  - Make Fall Risk Sign visible to staff  - Offer Toileting in advance of need  - Initiate/Maintain bed alarm  - Obtain necessary fall risk management equipment:   - Apply yellow socks and bracelet for high fall risk patients  - Consider moving patient to room near nurses station  Outcome: Progressing     Problem: PAIN - ADULT  Goal: Verbalizes/displays adequate comfort level or baseline comfort level  Description: Interventions:  - Encourage patient to monitor pain and request assistance  - Assess pain using appropriate pain scale  - Administer analgesics based on type and severity of pain and evaluate response  - Implement non-pharmacological measures as appropriate and evaluate response  - Consider cultural and social influences on pain and pain management  - Notify physician/advanced practitioner if interventions unsuccessful or patient reports new pain  Outcome: Progressing     Problem: INFECTION - ADULT  Goal: Absence or prevention of progression during hospitalization  Description: INTERVENTIONS:  - Assess and monitor for signs and symptoms of infection  - Monitor lab/diagnostic results  - Monitor all insertion sites, i e  indwelling lines, tubes, and drains  - Monitor endotracheal if appropriate and nasal secretions for changes in amount and color  - [place] appropriate cooling/warming therapies per order  - Administer medications as ordered  - Instruct and encourage patient and family to use good hand hygiene technique  - Identify and instruct in appropriate isolation precautions for identified infection/condition  Outcome: Progressing     Problem: SAFETY ADULT  Goal: Maintain or return to baseline ADL function  Description: INTERVENTIONS:  -  Assess patient's ability to carry out ADLs; assess patient's baseline for ADL function and identify physical deficits which impact ability to perform ADLs (bathing, care of mouth/teeth, toileting, grooming, dressing, etc )  - Assess/evaluate cause of self-care deficits   - Assess range of motion  - Assess patient's mobility; develop plan if impaired  - Assess patient's need for assistive devices and provide as appropriate  - Encourage maximum independence but intervene and supervise when necessary  - Involve family in performance of ADLs  - Assess for home care needs following discharge   - Consider OT consult to assist with ADL evaluation and planning for discharge  - Provide patient education as appropriate  Outcome: Progressing  Goal: Maintains/Returns to pre admission functional level  Description: INTERVENTIONS:  - Perform BMAT or MOVE assessment daily    - Set and communicate daily mobility goal to care team and patient/family/caregiver     - Collaborate with rehabilitation services on mobility goals if consulted  - Record patient progress and toleration of activity level   Outcome: Progressing     Problem: DISCHARGE PLANNING  Goal: Discharge to home or other facility with appropriate resources  Description: INTERVENTIONS:  - Identify barriers to discharge w/patient and caregiver  - Arrange for needed discharge resources and transportation as appropriate  - Identify discharge learning needs (meds, wound care, etc )  - Refer to Case Management Department for coordinating discharge planning if the patient needs post-hospital services based on physician/advanced practitioner order or complex needs related to functional status, cognitive ability, or social support system  Outcome: Progressing     Problem: Knowledge Deficit  Goal: Patient/family/caregiver demonstrates understanding of disease process, treatment plan, medications, and discharge instructions  Description: Complete learning assessment and assess knowledge base    Interventions:  - Provide teaching at level of understanding  - Provide teaching via preferred learning methods  Outcome: Progressing     Problem: MOBILITY - ADULT  Goal: Maintain or return to baseline ADL function  Description: INTERVENTIONS:  -  Assess patient's ability to carry out ADLs; assess patient's baseline for ADL function and identify physical deficits which impact ability to perform ADLs (bathing, care of mouth/teeth, toileting, grooming, dressing, etc )  - Assess/evaluate cause of self-care deficits   - Assess range of motion  - Assess patient's mobility; develop plan if impaired  - Assess patient's need for assistive devices and provide as appropriate  - Encourage maximum independence but intervene and supervise when necessary  - Involve family in performance of ADLs  - Assess for home care needs following discharge   - Consider OT consult to assist with ADL evaluation and planning for discharge  - Provide patient education as appropriate  Outcome: Progressing  Goal: Maintains/Returns to pre admission functional level  Description: INTERVENTIONS:  - Perform BMAT or MOVE assessment daily    - Set and communicate daily mobility goal to care team and patient/family/caregiver  - Collaborate with rehabilitation services on mobility goals if consulted  - Record patient progress and toleration of activity level   Outcome: Progressing     Problem: METABOLIC, FLUID AND ELECTROLYTES - ADULT  Goal: Electrolytes maintained within normal limits  Description: INTERVENTIONS:  - Monitor labs and assess patient for signs and symptoms of electrolyte imbalances  - Administer electrolyte replacement as ordered  - Monitor response to electrolyte replacements, including repeat lab results as appropriate  - Instruct patient on fluid and nutrition as appropriate  Outcome: Progressing  Goal: Fluid balance maintained  Description: INTERVENTIONS:  - Monitor labs   - Monitor I/O and WT  - Instruct patient on fluid and nutrition as appropriate  - Assess for signs & symptoms of volume excess or deficit  Outcome: Progressing     Problem: Nutrition/Hydration-ADULT  Goal: Nutrient/Hydration intake appropriate for improving, restoring or maintaining nutritional needs  Description: Monitor and assess patient's nutrition/hydration status for malnutrition  Collaborate with interdisciplinary team and initiate plan and interventions as ordered  Monitor patient's weight and dietary intake as ordered or per policy  Utilize nutrition screening tool and intervene as necessary  Determine patient's food preferences and provide high-protein, high-caloric foods as appropriate       INTERVENTIONS:  - Monitor oral intake, urinary output, labs, and treatment plans  - Assess nutrition and hydration status and recommend course of action  - Evaluate amount of meals eaten  - Assist patient with eating if necessary   - Allow adequate time for meals  - Recommend/ encourage appropriate diets, oral nutritional supplements, and vitamin/mineral supplements  - Order, calculate, and assess calorie counts as needed  - Recommend, monitor, and adjust tube feedings and TPN/PPN based on assessed needs  - Assess need for intravenous fluids  - Provide specific nutrition/hydration education as appropriate  - Include patient/family/caregiver in decisions related to nutrition  Outcome: Progressing

## 2022-06-22 NOTE — PROGRESS NOTES
Progress Note - Nephrology   Skip Wilber 76 y o  male MRN: [id number]  Unit/Bed#: -01 Encounter: [id number]    Assessment and Plan    1  End-stage renal disease on hemodialysis   Last dialyzed yesterday, Tuesday, 06/21/2022 for 180 minutes with 1 5 L ultrafiltration achieved  Plan to continue routine hemodialysis Tuesday Thursday Saturday with next treatment tomorrow, Thursday, 06/23/2022     2  Dialysis access   Right upper arm arteriovenous fistula  3  Anemia of end-stage renal disease   Receives long-acting JARETT in outpatient setting  Continue to monitor and transfuse for hemoglobin less than 7 0 grams/deciliter  IV iron contraindicated given infection  4  Hyperparathyroidism, secondary to renal disease   Will update phosphorus as patient has not been eating well  Consider discontinuation of phosphorus binders  5  Hypertension in patient with end-stage renal disease   Blood pressure elevated today  Consider increase of losartan if persistent    6  Volume status of dialysis patient   Continue ultrafiltration on hemodialysis      Follow up reason for today's visit:  End-stage renal disease on hemodialysis    Pneumonia due to COVID-19 virus    Patient Active Problem List   Diagnosis    Essential hypertension    Hemodialysis-associated hypotension    AAA (abdominal aortic aneurysm) (HCC)    Chronic renal failure    Primary osteoarthritis of both knees    Hyperlipidemia    Class 2 severe obesity due to excess calories with serious comorbidity and body mass index (BMI) of 39 0 to 39 9 in adult [facility])    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 ([facility] )    Secondary hyperparathyroidism of renal origin ([facility] )    Abnormal nuclear stress test    Anemia in chronic kidney disease, on chronic dialysis ([facility] )    ASCVD (arteriosclerotic cardiovascular disease)    Benign prostatic hyperplasia without lower urinary tract symptoms    Carotid stenosis, right    Chronic gout, unspecified, with tophus (tophi)    Chronic systolic congestive heart failure (HCC)    Disorder of phosphorus metabolism, unspecified    Gastroesophageal reflux disease    Iron deficiency anemia    Kidney stone    Magnesium deficiency    Osteoarthritis of both knees    Other disorders of electrolyte and fluid balance, not elsewhere classified    Other fluid overload    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, initial encounter ([facility] )    [facility]    Personal history of nicotine dependence    Proteinuria, unspecified    Skin lesion of face    Unspecified protein-calorie malnutrition (HCC)    Vitamin D deficiency    Elevated d-dimer    Pancreatic mass    Pneumonia due to COVID-19 virus    Acute respiratory failure with hypoxia (HCC)         Subjective:   Loss of appetite, poor energy  A complete 10 point review of systems was performed and is otherwise negative  Objective:     Vitals: Blood pressure (!) 174/73, pulse 82, temperature 97 5 °F (36 4 °C), resp  rate 18, height 5' 8" (1 727 m), weight 101 kg (223 lb 8 7 oz), SpO2 96 %  ,Body mass index is 33 99 kg/m²  Weight (last 2 days)     Date/Time Weight    06/22/22 0600 101 (223 55)    06/21/22 0537 105 (231 04)    06/20/22 1529 106 (233 69)    06/20/22 0540 110 (243 61)          No intake or output data in the 24 hours ending 06/22/22 1358  I/O last 3 completed shifts: In: 680 [P O :180; I V :500]  Out: 1503 [Other:1503]         Physical Exam: BP (!) 174/73   Pulse 82   Temp 97 5 °F (36 4 °C)   Resp 18   Ht 5' 8" (1 727 m)   Wt 101 kg (223 lb 8 7 oz)   SpO2 96%   BMI 33 99 kg/m²     General Appearance:    No acute distress  Cooperative  Appears stated age  Ill appearing  Head:    Normocephalic  Atraumatic  Normal jaw occlusion  Eyes:    Lids, conjunctiva normal  No scleral icterus  Ears:    Normal external ears  Nose:   Nares normal  No drainage  NC     Mouth:   Lips, tongue normal  Mucosa normal  Phonation normal    Neck:   Supple  Symmetrical    Back:     Symmetric  No CVA tenderness  Lungs:     Normal respiratory effort  Clear to auscultation bilaterally  Chest wall:    No tenderness or deformity  Heart:    Regular rate and rhythm  Normal S1 and S2  No murmur  No JVD  No edema  Abdomen:     Soft  Non-tender  Bowel sounds active  Genitourinary:   No Diaz catheter present  Extremities:   Extremities normal  Atraumatic  No cyanosis  Skin:   Warm and dry  No pallor, jaundice, rash, ecchymoses  Neurologic:   Alert and oriented to person, place, time  No focal deficit  Lab, Imaging and other studies: I have personally reviewed pertinent labs  CBC:   Lab Results   Component Value Date    WBC 23 80 (H) 06/22/2022    HGB 8 4 (L) 06/22/2022    HCT 25 5 (L) 06/22/2022     (H) 06/22/2022     06/22/2022    MCH 33 2 06/22/2022    MCHC 32 9 06/22/2022    RDW 15 6 (H) 06/22/2022    MPV 9 5 06/22/2022     CMP:   Lab Results   Component Value Date    K 4 0 06/22/2022    CL 93 (L) 06/22/2022    CO2 27 06/22/2022    BUN 43 (H) 06/22/2022    CREATININE 4 66 (H) 06/22/2022    CALCIUM 8 6 06/22/2022    AST 15 06/22/2022    ALT 14 06/22/2022    ALKPHOS 65 06/22/2022    EGFR 11 06/22/2022         Results from last 7 days   Lab Units 06/22/22  0440 06/21/22  0457 06/20/22  0519 06/18/22  0619 06/17/22  0509   POTASSIUM mmol/L 4 0 4 6 5 0   < > 4 9   CHLORIDE mmol/L 93* 91* 92*   < > 91*   CO2 mmol/L 27 26 22   < > 25   BUN mg/dL 43* 51* 57*   < > 86*   CREATININE mg/dL 4 66* 5 81* 6 54*   < > 8 29*   CALCIUM mg/dL 8 6 8 7 8 3*   < > 7 7*   ALK PHOS U/L 65  --  55  --  41   ALT U/L 14  --  13  --  16   AST U/L 15  --  16  --  21    < > = values in this interval not displayed           Phosphorus: No results found for: PHOS  Magnesium: No results found for: MG  Urinalysis: No results found for: Cathren Ashley, SPECGRAV, PHUR, LEUKOCYTESUR, NITRITE, PROTEINUA, Caryl Ee, BILIRUBINUR, BLOODU  Ionized Calcium: No results found for: CAION  Coagulation: No results found for: PT, INR, APTT  Troponin: No results found for: TROPONINI  ABG: No results found for: PHART, MLV8BHO, PO2ART, AOJ8SDT, B9NHZNTV, BEART, SOURCE  Radiology review:     IMAGING  No results found      Current Facility-Administered Medications   Medication Dose Route Frequency    acetaminophen (TYLENOL) tablet 650 mg  650 mg Oral Q4H PRN    albuterol inhalation solution 2 5 mg  2 5 mg Nebulization Q4H PRN    allopurinol (ZYLOPRIM) tablet 200 mg  200 mg Oral HS    amLODIPine (NORVASC) tablet 5 mg  5 mg Oral Daily    ascorbic acid (VITAMIN C) tablet 500 mg  500 mg Oral BID    benzonatate (TESSALON PERLES) capsule 200 mg  200 mg Oral TID    calcium acetate (PHOSLO) capsule 1,334 mg  1,334 mg Oral TID With Meals    cefepime (MAXIPIME) IVPB (premix in dextrose) 1,000 mg 50 mL  1,000 mg Intravenous Q24H    co-enzyme Q-10 capsule 200 mg  200 mg Oral HS    dexamethasone (DECADRON) injection 6 mg  6 mg Intravenous Q24H    docusate sodium (COLACE) capsule 100 mg  100 mg Oral BID    ferrous sulfate tablet 325 mg  325 mg Oral Daily With Breakfast    folic acid (FOLVITE) tablet 1 mg  1 mg Oral Daily    guaiFENesin (MUCINEX) 12 hr tablet 600 mg  600 mg Oral Q12H KAROLINE    heparin (porcine) subcutaneous injection 5,000 Units  5,000 Units Subcutaneous Q8H [facility]    hydrALAZINE (APRESOLINE) tablet 25 mg  25 mg Oral Q8H [facility]    HYDROcodone-homatropine (HYCODAN) oral syrup 5 mL  5 mL Oral Q4H PRN    HYDROmorphone (DILAUDID) injection 0 5 mg  0 5 mg Intravenous Q4H PRN    ipratropium (ATROVENT) 0 02 % inhalation solution 0 5 mg  0 5 mg Nebulization TID    labetalol (NORMODYNE) injection 10 mg  10 mg Intravenous Q4H PRN    levalbuterol (XOPENEX) inhalation solution 1 25 mg  1 25 mg Nebulization TID    lidocaine (LMX) 4 % cream   Topical Daily PRN    losartan (COZAAR) tablet 50 mg  50 mg Oral BID    magnesium oxide (MAG-OX) tablet 400 mg  400 mg Oral Daily    metoclopramide (REGLAN) injection 5 mg  5 mg Intravenous Q6H PRN    metoprolol succinate (TOPROL-XL) 24 hr tablet 50 mg  50 mg Oral BID    ondansetron (ZOFRAN-ODT) dispersible tablet 4 mg  4 mg Oral Q6H PRN    oxyCODONE (ROXICODONE) IR tablet 5 mg  5 mg Oral Q8H PRN    polyethylene glycol (MIRALAX) packet 17 g  17 g Oral Daily    senna (SENOKOT) tablet 8 6 mg  1 tablet Oral HS    sevelamer (RENAGEL) tablet 1,600 mg  1,600 mg Oral TID With Meals    sodium chloride 3 % inhalation solution 4 mL  4 mL Nebulization TID    traMADol (ULTRAM) tablet 50 mg  50 mg Oral Q8H PRN    vancomycin 750 mg in sodium chloride 0 9% 250 mL  10 mg/kg (Adjusted) Intravenous Once per day on Tue Thu Sat     Medications Discontinued During This Encounter   Medication Reason    ondansetron (ZOFRAN) injection 4 mg     heparin (ACS LOW)     losartan (COZAAR) tablet 50 mg     hydrALAZINE (APRESOLINE) tablet 100 mg     lidocaine (LMX) 4 % cream     benzonatate (TESSALON PERLES) capsule 100 mg     metoclopramide (REGLAN) injection 5 mg     hydrALAZINE (APRESOLINE) tablet 100 mg     benzonatate (TESSALON PERLES) capsule 100 mg     cefepime (MAXIPIME) injection 1,000 mg     heparin (porcine) 25,000 units in 0 45% NaCl 250 mL infusion (premix)     levalbuterol (XOPENEX) inhalation solution 1 25 mg     ipratropium (ATROVENT) 0 02 % inhalation solution 0 5 mg     sodium chloride 0 9 % inhalation solution 3 mL     albuterol (PROVENTIL HFA,VENTOLIN HFA) inhaler 2 puff     umeclidinium bromide (INCRUSE ELLIPTA) 62 5 mcg/inh inhaler AEPB 1 puff     fluticasone-vilanterol (BREO ELLIPTA) 200-25 MCG/INH inhaler 1 puff     albuterol (PROVENTIL HFA,VENTOLIN HFA) inhaler 2 puff     sodium chloride 0 9 % inhalation solution 3 mL     levalbuterol (XOPENEX) inhalation solution 1 25 mg     sodium chloride 3 % inhalation solution 4 mL     co-enzyme Q-10 capsule 200 mg     allopurinol (ZYLOPRIM) tablet 200 mg        Cedric Diehl PA-C    Portions of the record may have been created with voice recognition software  Occasional wrong word or "sound a like" substitutions may have occurred due to the inherent limitations of voice recognition software  Read the chart carefully and recognize, using context, where substitutions have occurred

## 2022-06-22 NOTE — CASE MANAGEMENT
Case Management Discharge Planning Note    Patient name Miranda Guevara  Location /-30 MRN [id number]  : 1945 Date 2022       Current Admission Date: 2022  Current Admission Diagnosis:Pneumonia due to COVID-19 virus   Patient Active Problem List    Diagnosis Date Noted    Pneumonia due to COVID-19 virus 2022    Acute respiratory failure with hypoxia ([facility] ) 2022    Elevated d-dimer 2022    Pancreatic mass 2022    Primary osteoarthritis of both knees 2021    Hyperlipidemia 2021    Class 2 severe obesity due to excess calories with serious comorbidity and body mass index (BMI) of 39 0 to 39 9 in adult [facility]) 2021    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 2021    Secondary hyperparathyroidism of renal origin ([facility] ) 2021    Chronic renal failure 2021    Other fluid overload 2020    Abnormal nuclear stress test 2020    Panlobular emphysema ([facility] ) 2020    Disorder of phosphorus metabolism, unspecified 2019    Essential hypertension 2019    Hemodialysis-associated hypotension 2019    AAA (abdominal aortic aneurysm) ([facility] ) 2019    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, initial encounter ([facility] ) 2019    Anemia in chronic kidney disease, on chronic dialysis ([facility] ) 2018    Benign prostatic hyperplasia without lower urinary tract symptoms 2018    Chronic gout, unspecified, with tophus (tophi) 2018    Iron deficiency anemia 2018    Kidney stone 2018    Magnesium deficiency 2018    Other disorders of electrolyte and fluid balance, not elsewhere classified 2018    Personal history of nicotine dependence 2018    Proteinuria, unspecified 2018    Unspecified protein-calorie malnutrition ([facility] ) 2018    Skin lesion of face 2018    Chronic systolic congestive heart failure ([facility] ) 06/01/2018    ASCVD (arteriosclerotic cardiovascular disease) 12/11/2017    Carotid stenosis, right 12/11/2017    Gastroesophageal reflux disease 12/11/2017    Vitamin D deficiency 12/11/2017    Osteoarthritis of both knees 09/02/2015      LOS (days): 8  Geometric Mean LOS (GMLOS) (days): 5 40  Days to GMLOS:-2 6     OBJECTIVE:  Risk of Unplanned Readmission Score: 29 27         Current admission status: Inpatient   Preferred Pharmacy:   [facility]/pharmacy #[id number]- [address]  [address]   [address], [address], [address] [facility] [address]  Phone: [phone] Fax: [phone] [facility], [address] [place] [address] [address] [place] [address]  Phone: [phone] Fax: [phone] [address] [place] [address], [address] [place] [address]  Phone: [phone] Fax: [phone]    Primary Care Provider: Lillian Menjivar DO    Primary Insurance: [facility] REP  Secondary Insurance:     DISCHARGE DETAILS:    Discharge planning discussed with[de-identified] cm placed a call to Meliton Montano at 16:01 pm  Freedom of Choice: Yes  Comments - Freedom of Choice: rehab recommended   cm reivewed with  Meliton Montano the placed that have not been able to offer a bed and what places we are waiting on an answer   she did give addtiional chocies from the list  CM contacted family/caregiver?: Yes             Contacts  Patient Contacts: Addis Balderas  Relationship to Patient[de-identified] Family (daughter)  Contact Method: Phone  Phone Number: [phone]  Reason/Outcome: Discharge Planning              Other Referral/Resources/Interventions Provided:  Interventions: Short Term Rehab  Referral Comments: complete care , [facility], are not abler, no response yet from the [facility] cannot accept until 10 days recovered, additional referrals have been set wiht permission,  authorization is needed    Would you like to participate in our [address] service program?  : No - Declined

## 2022-06-22 NOTE — ASSESSMENT & PLAN NOTE
· Hypertensive urgency noted today  · Continue losartan and metoprolol  · Hydralazine dose increased to 100 mg t i d   · Labetalol 10 mg as needed every 4 hours

## 2022-06-22 NOTE — UTILIZATION REVIEW
Continued Stay Review    Date: 6/22                          Current Patient Class: INpatient  Current Level of Care: Med/surg    HPI:77 y o  male initially admitted on 6/14     Assessment/Plan: Continue with Decadron  Has completed remdesivir  Was initially on heparin drip but discontinued due to hemoptysis  Continues to require 5 LNC  Significantly depressed with weakness, fatigue  Decreased breath sounds  Continue with IV abx  Monitor inflammatory markers  Hgb stable  Continue ferrous sulfate  Nephrology consult: Plan to continue routine HD  Vital Signs:   Date/Time Temp Pulse Resp BP MAP (mmHg) SpO2 Calculated FIO2 (%) - Nasal Cannula O2 Flow Rate (L/min) Nasal Cannula O2 Flow Rate (L/min) O2 Device Patient Position - Orthostatic VS   06/22/22 14:36:33 -- 68 20 203/86 Abnormal  125 96 % -- -- -- -- --   06/22/22 1436 -- -- -- 188/76 Abnormal  -- -- -- -- -- -- --   06/22/22 1304 -- -- -- -- -- 96 % 40 -- 5 L/min   Nasal cannula --   Nasal Cannula O2 Flow Rate (L/min): Decreased to 4 liter    HERBERT Ambrose aware at 06/22/22 1304   06/22/22 08:02:17 97 5 °F (36 4 °C) 82 -- 174/73 Abnormal  107 96 % 40 -- 5 L/min -- --   06/22/22 08:01:29 -- 82 18 174/73 Abnormal  107 96 % -- -- -- -- --   06/22/22 0715 -- -- -- -- -- 94 % 40 -- 5 L/min Nasal cannula --   06/22/22 0533 -- -- -- 179/76 Abnormal  -- -- -- -- -- --      Pertinent Labs/Diagnostic Results:       Results from last 7 days   Lab Units 06/22/22  0440 06/21/22  0457 06/20/22  0519 06/19/22  0419 06/17/22  0509 06/16/22  1043   WBC Thousand/uL 23 80* 22 70* 27 29* 23 82* 13 00* 12 40*   HEMOGLOBIN g/dL 8 4* 9 3* 9 2* 10 1* 9 2* 10 6*   HEMATOCRIT % 25 5* 28 5* 28 4* 31 5* 27 9* 32 6*   PLATELETS Thousands/uL 293 314 282 311 237 293   NEUTROS ABS Thousands/µL  --   --   --   --   --  10 87*   BANDS PCT %  --   --  21*  --   --   --          Results from last 7 days   Lab Units 06/22/22  0440 06/21/22  0457 06/20/22  0519 06/19/22  0455 06/18/22  4454 SODIUM mmol/L 128* 128* 125* 128* 129*   POTASSIUM mmol/L 4 0 4 6 5 0 4 3 4 5   CHLORIDE mmol/L 93* 91* 92* 93* 94*   CO2 mmol/L 27 26 22 24 26   ANION GAP mmol/L 8 11 11 11 9   BUN mg/dL 43* 51* 57* 42* 54*   CREATININE mg/dL 4 66* 5 81* 6 54* 4 93* 6 34*   EGFR ml/min/1 73sq m 11 8 7 10 7   CALCIUM mg/dL 8 6 8 7 8 3* 8 3* 7 8*     Results from last 7 days   Lab Units 06/22/22  0440 06/20/22  0519 06/17/22  0509 06/16/22  1043   AST U/L 15 16 21 32   ALT U/L 14 13 16 21   ALK PHOS U/L 65 55 41 51   TOTAL PROTEIN g/dL 5 0* 5 2* 5 4* 6 4   ALBUMIN g/dL 2 6* 2 8* 3 1*  3 0* 3 6   TOTAL BILIRUBIN mg/dL 0 35 0 36 0 32 0 37         Results from last 7 days   Lab Units 06/22/22  0440 06/21/22  0457 06/20/22  0519 06/19/22  0455 06/18/22  0619 06/17/22  0509 06/16/22  1043   GLUCOSE RANDOM mg/dL 133 136 112 113 132 126 138           Results from last 7 days   Lab Units 06/19/22  0725   D-DIMER QUANTITATIVE ug/ml FEU 2 33*     Results from last 7 days   Lab Units 06/19/22  0725 06/19/22  0419 06/18/22  2310   PROTIME seconds  --  13 8  --    INR   --  1 07  --    PTT seconds 29 47* 39*         Results from last 7 days   Lab Units 06/22/22  0440 06/20/22  0519 06/19/22  0455 06/16/22  1043   PROCALCITONIN ng/ml 6 42* 7 86* 3 01* 1 54*         Results from last 7 days   Lab Units 06/22/22  0440 06/19/22  0455   CRP mg/L 152 7* 305 7*         Results from last 7 days   Lab Units 06/21/22  2101   SPUTUM CULTURE  Test not performed  Suggest repeat specimen  GRAM STAIN RESULT  >10 squamous epithelial cells/lpf, indicating orophayngeal contamination  *  No polys seen*  3+ Budding yeast*  2+ Gram positive cocci in pairs and chains*  2+ Gram positive rods*  1+ Gram negative rods*         Medications:   Scheduled Medications:  allopurinol, 200 mg, Oral, HS  amLODIPine, 5 mg, Oral, Daily  ascorbic acid, 500 mg, Oral, BID  benzonatate, 200 mg, Oral, TID  calcium acetate, 1,334 mg, Oral, TID With Meals  cefepime, 1,000 mg, Intravenous, Q24H  co-enzyme Q-10, 200 mg, Oral, HS  dexamethasone, 6 mg, Intravenous, Q24H  docusate sodium, 100 mg, Oral, BID  ferrous sulfate, 325 mg, Oral, Daily With Breakfast  folic acid, 1 mg, Oral, Daily  guaiFENesin, 600 mg, Oral, Q12H KAROLINE  heparin (porcine), 5,000 Units, Subcutaneous, Q8H KAROLINE  hydrALAZINE, 25 mg, Oral, Q8H KAROLINE  ipratropium, 0 5 mg, Nebulization, TID  levalbuterol, 1 25 mg, Nebulization, TID  losartan, 50 mg, Oral, BID  magnesium oxide, 400 mg, Oral, Daily  metoprolol succinate, 50 mg, Oral, BID  polyethylene glycol, 17 g, Oral, Daily  senna, 1 tablet, Oral, HS  sevelamer, 1,600 mg, Oral, TID With Meals  sodium chloride, 4 mL, Nebulization, TID  vancomycin, 10 mg/kg (Adjusted), Intravenous, Once per day on Tue Thu Sat      Continuous IV Infusions:     PRN Meds:  acetaminophen, 650 mg, Oral, Q4H PRN  albuterol, 2 5 mg, Nebulization, Q4H PRN  HYDROcodone-homatropine, 5 mL, Oral, Q4H PRN  HYDROmorphone, 0 5 mg, Intravenous, Q4H PRN  labetalol, 10 mg, Intravenous, Q4H PRN  lidocaine, , Topical, Daily PRN  metoclopramide, 5 mg, Intravenous, Q6H PRN  ondansetron, 4 mg, Oral, Q6H PRN  oxyCODONE, 5 mg, Oral, Q8H PRN  traMADol, 50 mg, Oral, Q8H PRN        Discharge Plan: [facility]    Network Utilization Review Department  ATTENTION: Please call with any questions or concerns to [phone] and carefully listen to the prompts so that you are directed to the right person  All voicemails are confidential   Payam Recinos all requests for admission clinical reviews, approved or denied determinations and any other requests to dedicated fax number below belonging to the campus where the patient is receiving treatment   List of dedicated fax numbers for the Facilities:  [address] DENIALS (Administrative/Medical Necessity) [phone]   [address] (Maternity/NICU/Pediatrics) [phone]   [address] [place] [address] [place] [address] [address] [place] [address] [address] [place] [address] [phone]

## 2022-06-22 NOTE — RESPIRATORY THERAPY NOTE
RT Protocol Note  Elisa Everett 76 y o  male MRN: [id number]  Unit/Bed#: -01 Encounter: [id number]    Assessment    Principal Problem:    Pneumonia due to COVID-19 virus  Active Problems:    Essential hypertension    Class 2 severe obesity due to excess calories with serious comorbidity and body mass index (BMI) of 39 0 to 39 9 in adult 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 ([facility])    Anemia in chronic kidney disease, on chronic dialysis ([place] [address] )    Acute respiratory failure with hypoxia ([facility])      Home Pulmonary Medications:       Past Medical History:   Diagnosis Date    Abdominal aortic aneurysm (AAA) ([place] [address] )     s/p repair    Anemia     due to kidney disease    Arthritis     CHF (congestive heart failure) ([facility])     stable at present    Chronic kidney disease     on hemodialysis  Sat    Chronic pain disorder     knees    Edema     GERD (gastroesophageal reflux disease)     no meds     Gout     History of echocardiogram 2018    EF 55%, mild LVH, technically difficult study      Hyperlipidemia     borderline no meds    Hyperparathyroidism ([place] [address] )     Hypertension     Kidney stone     Seasonal allergies     Shortness of breath     mild exertional    Skin cancer     Vitamin D deficiency      Social History     Socioeconomic History    Marital status: /Civil Union     Spouse name: None    Number of children: None    Years of education: None    Highest education level: None   Occupational History    None   Tobacco Use    Smoking status: Former Smoker     Types: Cigarettes     Quit date: 2004     Years since quittin 0    Smokeless tobacco: Never Used   Vaping Use    Vaping Use: Never used   Substance and Sexual Activity    Alcohol use: Not Currently    Drug use: Never    Sexual activity: None   Other Topics Concern    None   Social History Narrative    None     Social Determinants of Health     Financial Resource Strain: Not on file   Food Insecurity: No Food Insecurity    Worried About Running Out of Food in the Last Year: Never true    [address] in the Last Year: Never true   Transportation Needs: No Transportation Needs    Lack of Transportation (Medical): No    Lack of Transportation (Non-Medical): No   Physical Activity: Not on file   Stress: Not on file   Social Connections: Not on file   Intimate Partner Violence: Not on file   Housing Stability: Low Risk     Unable to Pay for Housing in the Last Year: No    Number of Places Lived in the Last Year: 1    Unstable Housing in the Last Year: No       Subjective         Objective    Physical Exam:   Assessment Type: Pre-treatment  General Appearance: Alert, Awake  Respiratory Pattern: Dyspnea at rest  Chest Assessment: Chest expansion symmetrical  Bilateral Breath Sounds: Diminished  Cough: Congested, Non-productive  O2 Device: 5 lpm nc  Vitals:  Blood pressure 167/74, pulse 74, temperature 98 2 °F (36 8 °C), temperature source Oral, resp  rate 20, height 5' 8" (1 727 m), weight 105 kg (231 lb 0 7 oz), SpO2 94 %  Imaging and other studies: I have personally reviewed pertinent reports  O2 Device: 5 lpm nc       Plan    Respiratory Plan: Mild Distress pathway  Airway Clearance Plan: Flutter     Resp Comments: Post treatment evaluation

## 2022-06-23 PROBLEM — B37.0 THRUSH: Status: ACTIVE | Noted: 2022-01-01

## 2022-06-23 NOTE — SPEECH THERAPY NOTE
Speech Language/Pathology  Speech/Language Pathology  Assessment    Patient Name: Di Laurent  FPQDY'[id number] Date: 6/23/2022     Problem List  Principal Problem:    Pneumonia due to COVID-19 virus  Active Problems:    Essential hypertension    Class 2 severe obesity due to excess calories with serious comorbidity and body mass index (BMI) of 39 0 to 39 9 in adult [facility])    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 ([facility])    Anemia in chronic kidney disease, on chronic dialysis ([facility] )    Acute respiratory failure with hypoxia ([facility] )    Maile Knows    Past Medical History  Past Medical History:   Diagnosis Date    Abdominal aortic aneurysm (AAA) ([facility] )     s/p repair    Anemia     due to kidney disease    Arthritis     CHF (congestive heart failure) ([facility])     stable at present    Chronic kidney disease     on hemodialysis Tu Th Sat    Chronic pain disorder     knees    Edema     GERD (gastroesophageal reflux disease)     no meds     Gout     History of echocardiogram 03/02/2018    EF 55%, mild LVH, technically difficult study      Hyperlipidemia     borderline no meds    Hyperparathyroidism ([facility] )     Hypertension     Kidney stone     Seasonal allergies     Shortness of breath     mild exertional    Skin cancer     Vitamin D deficiency      Past Surgical History  Past Surgical History:   Procedure Laterality Date    ABDOMINAL AORTIC ANEURYSM REPAIR  2004    CARDIAC CATHETERIZATION  05/01/2020    no blockages    CARPAL TUNNEL RELEASE Right     COLONOSCOPY      DIALYSIS FISTULA CREATION Right 2017    GLAUCOMA SURGERY      LEG SURGERY      removal splinter    PARATHYROIDECTOMY      SPLIT THICKNESS SKIN GRAFT N/A 12/4/2019    Procedure: THIGH STSG;  Surgeon: Yolande Collet, MD;  Location: [address] [place] [address];  Service: Plastics    SQUAMOUS CELL CARCINOMA EXCISION Right 12/4/2019    Procedure: REMOVE SKIN CA FROM EAR & CHEEK;  Surgeon: Yolande Collet, MD;  Location: [address] [place] [address];  Service: Plastics        06/23/22 1600 Patient Information   Current Medical Covid+   Swallow Information   Current Risks for Dysphagia & Aspiration Respiratory compromise;Weak cough   Current Symptoms/Concerns Cough;Clear throat; Wet voice; Decreased oral intake;Pain with swallowing   Current Diet Dysphagia mechanical soft; Thin liquid   Baseline Diet Regular; Thin liquids   Baseline Assessment   Behavior/Cognition Alert; Cooperative; Interactive   Speech/Language Status WFL   Patient Positioning Partially reclined   Consistencies Assessed and Performance   Materials Admnistered Thin liquid   Oral Stage WFL   Phargngeal Stage Mild impaired   Swallow Mechanics Mild delayed;Swallow initation;Good Larygneal rise   Summary   Swallow Summary Patient received slightly reclined in bed coughing repeatedly  Patient with productive, thick, yellow/white sputum into tissue  Patient provided emesis bag for future sputum collection  Patient reports "now they tell me I got thrush "  Tongue with white residue coating, pt able to rub some away with tissue  Patient continues to report a painful swallow and decreased appetite, at this time only tolerating very little of puree/soft and thin items due to pain  Education provided to patient on the overall strength, coordination and function of his swallow  At this time suspect that residual s/s of Covid in conjunction with thrush are contributing to overall s/s as they began acutely with diagnosis ~8-10 days ago at symptom onset  ST to cont follow up as pt improves and is able to tolerate larger bolus without pain/discomfort  Recommendations   Risk for Aspiration Mild   Recommendations Consider oral diet   Diet Solid Recommendation Level 2 Dysphagia/ mechanical soft/altered   Diet Liquid Recommendation Thin liquid   Recommended Form of Meds As desired   General Precautions Aspiration precautions; Feed only when alert;Minimize distractions;Upright as possible for all oral intake;Remain upright for 45 mins after meals Compensatory Swallowing Strategies Alternate solids and liquids   Results Reviewed with PT/Family/Caregiver   Speech Therapy Prognosis   Prognosis Good

## 2022-06-23 NOTE — PHYSICAL THERAPY NOTE
PHYSICAL THERAPY TREATMENT NOTE  NAME:  Tona Flannery  DATE: 06/23/22    Length Of Stay: 9  Performed at least 2 patient identifiers during session: Name and ID bracelet    TREATMENT:      06/23/22 0859   PT Last Visit   PT Visit Date 06/23/22   Note Type   Note Type Treatment   Pain Assessment   Pain Assessment Tool 0-10   Pain Score No Pain   Restrictions/Precautions   Weight Bearing Precautions Per Order No   Other Precautions Multiple lines;Telemetry;O2;Fall Risk;Pain   General   Chart Reviewed Yes   Response to Previous Treatment Patient with no complaints from previous session  Family/Caregiver Present No   Cognition   Overall Cognitive Status WFL   Arousal/Participation Lethargic   Attention Attends with cues to redirect   Orientation Level Oriented X4   Memory Within functional limits   Following Commands Follows one step commands without difficulty   Comments pt agreeable to PT treatment   Bed Mobility   Rolling R 4  Minimal assistance   Additional items Assist x 1;Bedrails; Increased time required; Impulsive;LE management   Rolling L 4  Minimal assistance   Additional items Assist x 1;Bedrails; Increased time required;Verbal cues;LE management   Additional Comments pt performed scooting up to [facility] with minAx2   Transfers   Sit to Stand   (pt declined)   Endurance Deficit   Endurance Deficit Yes   Endurance Deficit Description pt very lethargic, declined functional mobility   Activity Tolerance   Activity Tolerance Patient limited by fatigue;Treatment limited secondary to medical complications (Comment)  (MARTIN)   Nurse Made Aware yes   Exercises   Quad Sets Supine;20 reps;AROM; Bilateral   Heelslides Supine;20 reps;AROM; Bilateral   Glute Sets Supine;20 reps;AROM; Bilateral   Hip Flexion Supine;20 reps;AROM; Bilateral   Hip Abduction Supine;20 reps;AROM; Bilateral   Hip Adduction Supine;20 reps;AROM; Bilateral   Knee AROM Short Arc Quad Supine;20 reps;AROM; Bilateral   Ankle Pumps Supine;20 reps;AROM; Bilateral Assessment   Prognosis Good   Problem List Decreased strength;Decreased endurance; Impaired balance;Decreased mobility; Decreased coordination; Impaired hearing;Obesity   Assessment Pt seen for PT treatment session this date with interventions consisting of Therapeutic exercise consisting of: AROM 20 reps B LE in supine position and therapeutic activity consisting of training: bed mobility and performed rolling and scooting in bed  Pt agreeable to PT treatment session upon arrival, pt found supine in bed w/ HOB elevated, in no apparent distress and lethargic  In comparison to previous session, pt with no improvements as evidenced by pt limited by fatigue  Post session: pt returned BTB, bed alarm engaged, all needs in reach and RN notified of session findings/recommendations  Continue to recommend home with home health rehabilitation at time of d/c in order to maximize pt's functional independence and safety w/ mobility  Pt continues to be functioning below baseline level  PT will continue to see pt during current hospitalization in order to address the deficits listed above and provide interventions consistent w/ POC in effort to achieve STGs  Barriers to Discharge Inaccessible home environment;Decreased caregiver support   Plan   Treatment/Interventions Functional transfer training;LE strengthening/ROM; Therapeutic exercise; Endurance training;Bed mobility;Gait training; Compensatory technique education;Spoke to nursing   Progress Slow progress, decreased activity tolerance   PT Frequency 3-5x/wk   Recommendation   PT Discharge Recommendation Home with home health rehabilitation   [address]   Turning in Bed Without Bedrails 3   Lying on Back to Sitting on Edge of Flat Bed 3   Moving Bed to Chair 3   Standing Up From Chair 3   Walk in Room 3   Climb 3-5 Stairs 1   Basic Mobility Inpatient Raw Score 16   Basic Mobility Standardized Score 38 32   Highest Level Of Mobility   -[facility] Goal 5: Stand one or more mins   JH-HLM Achieved 2: Bed activities/Dependent transfer   Education   Education Provided Home exercise program;Mobility training   Patient Demonstrates verbal understanding   End of Consult   Patient Position at End of Consult Supine;Bed/Chair alarm activated; All needs within reach       The patient's AM-PAC Basic Mobility Inpatient Short Form Raw Score is 16  A Raw score of less than or equal to 16 suggests the patient may benefit from discharge to post-acute rehabilitation services  Please also refer to the recommendation of the Physical Therapist for safe discharge planning        Keira Haas PTA,PTA

## 2022-06-23 NOTE — PROGRESS NOTES
Vancomycin Assessment    Vadim Love is a 76 y o  male who is currently receiving vancomycin 750 mg after dialysis tue, thur,sat for Pneumonia     Relevant clinical data and objective history reviewed:  Creatinine   Date Value Ref Range Status   06/23/2022 6 26 (H) 0 60 - 1 30 mg/dL Final     Comment:     Standardized to IDMS reference method   06/22/2022 4 66 (H) 0 60 - 1 30 mg/dL Final     Comment:     Standardized to IDMS reference method   06/21/2022 5 81 (H) 0 60 - 1 30 mg/dL Final     Comment:     Standardized to IDMS reference method     Vancomycin Rm   Date Value Ref Range Status   06/23/2022 12 2 10 0 - 20 0 ug/mL Final     /68 (BP Location: Left arm)   Pulse 67   Temp 98 1 °F (36 7 °C) (Temporal)   Resp 17   Ht 5' 8" (1 727 m)   Wt 104 kg (228 lb 13 4 oz)   SpO2 94%   BMI 34 79 kg/m²   I/O last 3 completed shifts: In: 400 [P O :400]  Out: -   Lab Results   Component Value Date/Time    BUN 69 (H) 06/23/2022 06:27 AM    WBC 19 65 (H) 06/23/2022 06:27 AM    HGB 8 7 (L) 06/23/2022 06:27 AM    HCT 27 3 (L) 06/23/2022 06:27 AM     (H) 06/23/2022 06:27 AM     06/23/2022 06:27 AM     Temp Readings from Last 3 Encounters:   06/23/22 98 1 °F (36 7 °C) (Temporal)   06/13/22 97 5 °F (36 4 °C)   04/13/22 (!) 96 8 °F (36 °C) (Tympanic)     Vancomycin Days of Therapy: 5    Assessment/Plan  The patient is currently on vancomycin utilizing scheduled dosing  Baseline risks associated with therapy include: pre-existing renal impairment and advanced age  The patient is receiving 750 mg after dialysis tue, thur,sat with the most recent vancomycin level coming back low at 12 2  Based on a goal of 15-20 (appropriate for most indications) after clinical evaluation dose will be changed to 1250 mg after dialysis tue, thur ,sat   Pharmacy will continue to follow closely for s/sx of nephrotoxicity, infusion reactions, and appropriateness of therapy  BMP and CBC will be ordered per protocol  Plan for a pre-HD random prior to the 3rd dose at 7am 6/28   Pharmacy will continue to follow the patients culture results and clinical progress daily      Belkys Rizvi, Pharmacist

## 2022-06-23 NOTE — PLAN OF CARE
Problem: Prexisting or High Potential for Compromised Skin Integrity  Goal: Skin integrity is maintained or improved  Description: INTERVENTIONS:  - Identify patients at risk for skin breakdown  - Assess and monitor skin integrity  - Assess and monitor nutrition and hydration status  - Monitor labs   - Assess for incontinence   - Turn and reposition patient  - Assist with mobility/ambulation  - Relieve pressure over bony prominences  - Avoid friction and shearing  - Provide appropriate hygiene as needed including keeping skin clean and dry  - Evaluate need for skin moisturizer/barrier cream  - Collaborate with interdisciplinary team   - Patient/family teaching  - Consider wound care consult   Outcome: Progressing     Problem: Potential for Falls  Goal: Patient will remain free of falls  Description: INTERVENTIONS:  - Educate patient/family on patient safety including physical limitations  - Instruct patient to call for assistance with activity   - Consult OT/PT to assist with strengthening/mobility   - Keep Call bell within reach  - Keep bed low and locked with side rails adjusted as appropriate  - Keep care items and personal belongings within reach  - Initiate and maintain comfort rounds  - Make Fall Risk Sign visible to staff  - Offer Toileting in advance of need  - Initiate/Maintain bed alarm  - Obtain necessary fall risk management equipment:   - Apply yellow socks and bracelet for high fall risk patients  - Consider moving patient to room near nurses station  Outcome: Progressing     Problem: PAIN - ADULT  Goal: Verbalizes/displays adequate comfort level or baseline comfort level  Description: Interventions:  - Encourage patient to monitor pain and request assistance  - Assess pain using appropriate pain scale  - Administer analgesics based on type and severity of pain and evaluate response  - Implement non-pharmacological measures as appropriate and evaluate response  - Consider cultural and social influences on pain and pain management  - Notify physician/advanced practitioner if interventions unsuccessful or patient reports new pain  Outcome: Progressing     Problem: INFECTION - ADULT  Goal: Absence or prevention of progression during hospitalization  Description: INTERVENTIONS:  - Assess and monitor for signs and symptoms of infection  - Monitor lab/diagnostic results  - Monitor all insertion sites, i e  indwelling lines, tubes, and drains  - Monitor endotracheal if appropriate and nasal secretions for changes in amount and color  - [place] appropriate cooling/warming therapies per order  - Administer medications as ordered  - Instruct and encourage patient and family to use good hand hygiene technique  - Identify and instruct in appropriate isolation precautions for identified infection/condition  Outcome: Progressing     Problem: SAFETY ADULT  Goal: Maintain or return to baseline ADL function  Description: INTERVENTIONS:  -  Assess patient's ability to carry out ADLs; assess patient's baseline for ADL function and identify physical deficits which impact ability to perform ADLs (bathing, care of mouth/teeth, toileting, grooming, dressing, etc )  - Assess/evaluate cause of self-care deficits   - Assess range of motion  - Assess patient's mobility; develop plan if impaired  - Assess patient's need for assistive devices and provide as appropriate  - Encourage maximum independence but intervene and supervise when necessary  - Involve family in performance of ADLs  - Assess for home care needs following discharge   - Consider OT consult to assist with ADL evaluation and planning for discharge  - Provide patient education as appropriate  Outcome: Progressing  Goal: Maintains/Returns to pre admission functional level  Description: INTERVENTIONS:  - Perform BMAT or MOVE assessment daily    - Set and communicate daily mobility goal to care team and patient/family/caregiver     - Collaborate with rehabilitation services on mobility goals if consulted  - Record patient progress and toleration of activity level   Outcome: Progressing     Problem: DISCHARGE PLANNING  Goal: Discharge to home or other facility with appropriate resources  Description: INTERVENTIONS:  - Identify barriers to discharge w/patient and caregiver  - Arrange for needed discharge resources and transportation as appropriate  - Identify discharge learning needs (meds, wound care, etc )  - Refer to Case Management Department for coordinating discharge planning if the patient needs post-hospital services based on physician/advanced practitioner order or complex needs related to functional status, cognitive ability, or social support system  Outcome: Progressing     Problem: Knowledge Deficit  Goal: Patient/family/caregiver demonstrates understanding of disease process, treatment plan, medications, and discharge instructions  Description: Complete learning assessment and assess knowledge base    Interventions:  - Provide teaching at level of understanding  - Provide teaching via preferred learning methods  Outcome: Progressing     Problem: MOBILITY - ADULT  Goal: Maintain or return to baseline ADL function  Description: INTERVENTIONS:  -  Assess patient's ability to carry out ADLs; assess patient's baseline for ADL function and identify physical deficits which impact ability to perform ADLs (bathing, care of mouth/teeth, toileting, grooming, dressing, etc )  - Assess/evaluate cause of self-care deficits   - Assess range of motion  - Assess patient's mobility; develop plan if impaired  - Assess patient's need for assistive devices and provide as appropriate  - Encourage maximum independence but intervene and supervise when necessary  - Involve family in performance of ADLs  - Assess for home care needs following discharge   - Consider OT consult to assist with ADL evaluation and planning for discharge  - Provide patient education as appropriate  Outcome: Progressing  Goal: Maintains/Returns to pre admission functional level  Description: INTERVENTIONS:  - Perform BMAT or MOVE assessment daily    - Set and communicate daily mobility goal to care team and patient/family/caregiver  - Collaborate with rehabilitation services on mobility goals if consulted  - Record patient progress and toleration of activity level   Outcome: Progressing     Problem: METABOLIC, FLUID AND ELECTROLYTES - ADULT  Goal: Electrolytes maintained within normal limits  Description: INTERVENTIONS:  - Monitor labs and assess patient for signs and symptoms of electrolyte imbalances  - Administer electrolyte replacement as ordered  - Monitor response to electrolyte replacements, including repeat lab results as appropriate  - Instruct patient on fluid and nutrition as appropriate  Outcome: Progressing  Goal: Fluid balance maintained  Description: INTERVENTIONS:  - Monitor labs   - Monitor I/O and WT  - Instruct patient on fluid and nutrition as appropriate  - Assess for signs & symptoms of volume excess or deficit  Outcome: Progressing     Problem: Nutrition/Hydration-ADULT  Goal: Nutrient/Hydration intake appropriate for improving, restoring or maintaining nutritional needs  Description: Monitor and assess patient's nutrition/hydration status for malnutrition  Collaborate with interdisciplinary team and initiate plan and interventions as ordered  Monitor patient's weight and dietary intake as ordered or per policy  Utilize nutrition screening tool and intervene as necessary  Determine patient's food preferences and provide high-protein, high-caloric foods as appropriate       INTERVENTIONS:  - Monitor oral intake, urinary output, labs, and treatment plans  - Assess nutrition and hydration status and recommend course of action  - Evaluate amount of meals eaten  - Assist patient with eating if necessary   - Allow adequate time for meals  - Recommend/ encourage appropriate diets, oral nutritional supplements, and vitamin/mineral supplements  - Order, calculate, and assess calorie counts as needed  - Recommend, monitor, and adjust tube feedings and TPN/PPN based on assessed needs  - Assess need for intravenous fluids  - Provide specific nutrition/hydration education as appropriate  - Include patient/family/caregiver in decisions related to nutrition  Outcome: Progressing

## 2022-06-23 NOTE — CASE MANAGEMENT
Case Management Progress Note    Patient name Cleo Shafer  Location /-43 MRN [id number]  : 1945 Date 2022       LOS (days): 9  Geometric Mean LOS (GMLOS) (days): 5 40  Days to GMLOS:-3 6        OBJECTIVE:        Current admission status: Inpatient  Preferred Pharmacy:   [facility]/pharmacy #[id number] [place] [address]  [address]  [address] [address], [address] [facility]  Phone: [phone] Fax: [address] [facility], [facility] 60  [address] [place] [address]  Phone: [phone] Fax: [phone] [facility] [address], [address] [facility] [address] [address]  Phone: [phone] Fax: [phone]    Primary Care Provider: Manuela Cervantes DO    Primary Insurance: Sohail Sheikh [facility] REP  Secondary Insurance:     PROGRESS NOTE: spoke with daughter joe regarding placement for STR  Reviewed list with her  Her preference is down in [place] area because pt has to go to dialysis in [place] until pt has negative covid swabs  Messages sent to facilities day that we have reached day 10 awaiting return message via [employer]

## 2022-06-23 NOTE — CONSULTS
The patients Vancomycin therapy has been completed / discontinued  Thank you for this consult; Pharmacy will sign-off now      Elizabet Hernandez PharmD

## 2022-06-23 NOTE — PLAN OF CARE
Problem: PHYSICAL THERAPY ADULT  Goal: Performs mobility at highest level of function for planned discharge setting  See evaluation for individualized goals  Description: Treatment/Interventions: Functional transfer training, LE strengthening/ROM, Elevations, Therapeutic exercise, Endurance training, Patient/family training, Equipment eval/education, Bed mobility, Gait training, Compensatory technique education, Spoke to nursing, Spoke to case management  Equipment Recommended: Brendan Fang (has own)       See flowsheet documentation for full assessment, interventions and recommendations  Outcome: Not Progressing  Note: Prognosis: Good  Problem List: Decreased strength, Decreased endurance, Impaired balance, Decreased mobility, Decreased coordination, Impaired hearing, Obesity  Assessment: Pt seen for PT treatment session this date with interventions consisting of Therapeutic exercise consisting of: AROM 20 reps B LE in supine position and therapeutic activity consisting of training: bed mobility and performed rolling and scooting in bed  Pt agreeable to PT treatment session upon arrival, pt found supine in bed w/ HOB elevated, in no apparent distress and lethargic  In comparison to previous session, pt with no improvements as evidenced by pt limited by fatigue  Post session: pt returned BTB, bed alarm engaged, all needs in reach and RN notified of session findings/recommendations  Continue to recommend home with home health rehabilitation at time of d/c in order to maximize pt's functional independence and safety w/ mobility  Pt continues to be functioning below baseline level  PT will continue to see pt during current hospitalization in order to address the deficits listed above and provide interventions consistent w/ POC in effort to achieve STGs    Barriers to Discharge: Inaccessible home environment, Decreased caregiver support        PT Discharge Recommendation: Home with home health rehabilitation          See flowsheet documentation for full assessment

## 2022-06-23 NOTE — PLAN OF CARE
Chronic hemodialysis treatment planned for 210 minutes using a 2 K+ bath for potassium 4 7 this morning  Fluid goal 1500 ml as discussed with Toya Dong PA-C            Post-Dialysis RN Treatment Note    Blood Pressure:  Pre 183/94 mm/Hg  Post 178/96 mmHg   EDW  110 kg    Weight:  Pre 105 2 kg   Post 103 8 kg   Mode of weight measurement:    Bed scale   Volume Removed:   1503 ml    Treatment duration:   210 minutes    NS given:   none   Treatment shortened:   no   Medications given during Rx:  Cefepime 1000 mg   Estimated Kt/V:   0 99   Access type:    DARA fistula   Access Issues:    Maintains 350-400 bfr   Report called to primary nurse:   Verbal to Grayson Light RN at bedside          Problem: METABOLIC, FLUID AND ELECTROLYTES - ADULT  Goal: Electrolytes maintained within normal limits  Description: INTERVENTIONS:  - Monitor labs and assess patient for signs and symptoms of electrolyte imbalances  - Administer electrolyte replacement as ordered  - Monitor response to electrolyte replacements, including repeat lab results as appropriate  - Instruct patient on fluid and nutrition as appropriate  Outcome: Progressing  Goal: Fluid balance maintained  Description: INTERVENTIONS:  - Monitor labs   - Monitor I/O and WT  - Instruct patient on fluid and nutrition as appropriate  - Assess for signs & symptoms of volume excess or deficit  Outcome: Progressing

## 2022-06-23 NOTE — NURSING NOTE
Pt back from dialysis and tolerated well per the rn, noted with thrush on his tongue and no bm as of yet, dr Hicks Arts aware and was in to see and eval the pt, orders noted and meds given

## 2022-06-23 NOTE — PROGRESS NOTES
[address]  Progress Note - Belkis Villegas 1945, 76 y o  male MRN: [id number]  Unit/Bed#: -01 Encounter: [id number]  Primary Care Provider: Bob Vergara DO   Date and time admitted to hospital: 6/14/2022  1:53 PM    * Pneumonia due to COVID-19 virus  Assessment & Plan  · COVID-19 positive on 06/13/2022  Vaccinated x2 against COVID-19  · Patient completed 5 days of remdesivir on 06/18/2022  · Decadron day 10/10  · Patient titrated down to 3 L of nasal cannula today  · CT done on 06/19/2022 concerning for superimposed bacterial pneumonia  · Cefepime/vancomycin started on 06/19/2022  · Patient was initially on heparin drip however discontinued due to hemoptysis    SubQ heparin initiated  · Cultures negative to date  · Monitor inflammatory markers  · Supportive care    Thrush  Assessment & Plan  · Oral thrush noted  · Will give Diflucan as well as nystatin swish and swallow for 5 days of treatment    Acute respiratory failure with hypoxia ([address] )  Assessment & Plan  · Secondary to COVID infection  · Patient developed hemoptysis, heparin drip discontinued and hemoptysis appears to be improved  · Pulmonology following  · Will continue oxygen and titrate as tolerated    Anemia in chronic kidney disease, on chronic dialysis (HCC)  Assessment & Plan  · Hemoglobin stable and at baseline  · Continue home ferrous sulfate  · Trend CBC         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 [facility])  Assessment & Plan  · Patient back to routine dialysis schedule   · Continue management per Nephrology    Class 2 severe obesity due to excess calories with serious comorbidity and body mass index (BMI) of 39 0 to 39 9 in adult [facility])  Assessment & Plan  · Present on admission as evidenced by BMI greater than 35  · Endorse lifestyle modifications and weight loss        Essential hypertension  Assessment & Plan  · Intermittent accelerated hypertension noted  · Continue losartan and metoprolol  · Hydralazine dose increased to 100 mg t i d   · Labetalol 10 mg as needed every 4 hours      VTE Prophylaxis:  Heparin    Patient Centered Rounds: I have performed bedside rounds with nursing staff today  Discussions with Specialists or Other Care Team Provider: yes  Education and Discussions with Family / Patient:  Spoke with patient's daughter over the phone regarding plan of care    Current Length of Stay: 9 day(s)    Current Patient Status: Inpatient   Certification Statement: The patient will continue to require additional inpatient hospital stay due to COVID infection    Discharge Plan:  Pending hospital course    Code Status: Level 3 - DNAR and DNI    Subjective:   Patient dialyzed today  Patient still with difficulty swallowing, noted to have oral thrush  No fever or chills  Titrated to 3 L nasal cannula today  Patient still feels fatigue/weak with decreased appetite    Objective:     Vitals:   Temp (24hrs), Av °F (36 7 °C), Min:97 7 °F (36 5 °C), Max:98 2 °F (36 8 °C)    Temp:  [97 7 °F (36 5 °C)-98 2 °F (36 8 °C)] 97 7 °F (36 5 °C)  HR:  [55-82] 82  Resp:  [17-20] 18  BP: (119-203)/(58-97) 119/58  SpO2:  [94 %-96 %] 95 %  Body mass index is 34 79 kg/m²  Input and Output Summary (last 24 hours): Intake/Output Summary (Last 24 hours) at 2022 1427  Last data filed at 2022 1300  Gross per 24 hour   Intake 900 ml   Output 1503 ml   Net -603 ml       Physical Exam:   Physical Exam  Constitutional:       General: He is not in acute distress  Appearance: He is ill-appearing  HENT:      Head: Normocephalic and atraumatic  Nose: Nose normal       Mouth/Throat:      Mouth: Mucous membranes are dry  Comments: Oral thrush noted  Eyes:      Extraocular Movements: Extraocular movements intact  Conjunctiva/sclera: Conjunctivae normal    Cardiovascular:      Rate and Rhythm: Normal rate and regular rhythm     Pulmonary:      Effort: Pulmonary effort is normal  No respiratory distress  Abdominal:      Palpations: Abdomen is soft  Tenderness: There is no abdominal tenderness  Musculoskeletal:         General: Normal range of motion  Cervical back: Normal range of motion and neck supple  Comments: Generalized weakness   Skin:     General: Skin is warm and dry  Neurological:      General: No focal deficit present  Mental Status: He is alert  Mental status is at baseline  Cranial Nerves: No cranial nerve deficit  Psychiatric:         Mood and Affect: Mood is depressed  Behavior: Behavior normal          Additional Data:     Labs:    Results from last 7 days   Lab Units 06/23/22  0627   WBC Thousand/uL 19 65*   HEMOGLOBIN g/dL 8 7*   HEMATOCRIT % 27 3*   PLATELETS Thousands/uL 267   LYMPHO PCT % 5*   MONO PCT % 4   EOS PCT % 0     Results from last 7 days   Lab Units 06/23/22  0627 06/22/22  0440   SODIUM mmol/L 126* 128*   POTASSIUM mmol/L 4 7 4 0   CHLORIDE mmol/L 92* 93*   CO2 mmol/L 24 27   BUN mg/dL 69* 43*   CREATININE mg/dL 6 26* 4 66*   CALCIUM mg/dL 8 5 8 6   ALK PHOS U/L  --  65   ALT U/L  --  14   AST U/L  --  15     Results from last 7 days   Lab Units 06/19/22  0419   INR  1 07               * I Have Reviewed All Lab Data Listed Above  * Additional Pertinent Lab Tests Reviewed: [address] Admission  Reviewed    Imaging:  Imaging Reports Reviewed Today Include:  No new imaging    Recent Cultures (last 7 days):     Results from last 7 days   Lab Units 06/22/22  1831 06/21/22  2101   SPUTUM CULTURE  Test not performed  Suggest repeat specimen  Test not performed  Suggest repeat specimen  GRAM STAIN RESULT  >10 squamous epithelial cells/lpf, indicating orophayngeal contamination  *  Rare Polys*  3+ Budding yeast*  1+ Gram positive rods*  Rare Gram positive cocci in pairs* >10 squamous epithelial cells/lpf, indicating orophayngeal contamination  *  No polys seen*  3+ Budding yeast*  2+ Gram positive cocci in pairs and chains*  2+ Gram positive rods*  1+ Gram negative rods*       Last 24 Hours Medication List:   Current Facility-Administered Medications   Medication Dose Route Frequency Provider Last Rate    acetaminophen  650 mg Oral Q4H PRN Olga Callahan, DO      albuterol  2 5 mg Nebulization Q4H PRN REMY Judge      allopurinol  200 mg Oral HS Melani Vital MD      amLODIPine  5 mg Oral Daily Cal Wise MD      ascorbic acid  500 mg Oral BID Olga Callahan, DO      benzonatate  200 mg Oral TID REMY Judge      calcium acetate  1,334 mg Oral TID With Meals Cooks Callahan, DO      cefepime  1,000 mg Intravenous Q24H Micheline Pemberton PA-C 1,000 mg (06/23/22 1244)    co-enzyme Q-10  200 mg Oral HS Melani Vital MD      dexamethasone  6 mg Intravenous Q24H Olga Callahan, DO      docusate sodium  100 mg Oral BID Melani Vital MD      ferrous sulfate  325 mg Oral Daily With Breakfast Cooks Callahan, DO      [START ON 6/24/2022] fluconazole  100 mg Oral Daily Melani Vital MD      fluconazole  200 mg Oral Daily Melani Vital MD      folic acid  1 mg Oral Daily Olga Callahan, DO      guaiFENesin  600 mg Oral Q12H [address] REMY Judge      heparin (porcine)  5,000 Units Subcutaneous [facility] Melani Vital MD      hydrALAZINE  25 mg Oral [facility] Cal Wise MD      HYDROcodone-homatropine  5 mL Oral Q4H PRN Michelle Aldana PA-C      HYDROmorphone  0 5 mg Intravenous Q4H PRN REMY Judge      ipratropium  0 5 mg Nebulization TID REMY Judge      labetalol  10 mg Intravenous Q4H PRN Olga Callahan, DO      levalbuterol  1 25 mg Nebulization TID Olga Callahan, DO      lidocaine   Topical Daily PRN Vergil REMY Martin      losartan  50 mg Oral BID Vergil REMY Martin      magnesium citrate  296 mL Oral Once Melani Vital MD      magnesium oxide  400 mg Oral Daily Hettie Saltness, DO      metoclopramide  5 mg Intravenous Q6H PRN Poli Lopez PA-C      metoprolol succinate  50 mg Oral BID Hettie Saltness, DO      nystatin  500,000 Units Swish & Swallow 4x Daily Manual Eisenmenger, MD      ondansetron  4 mg Oral Q6H PRN Hettie Saltness, DO      oxyCODONE  5 mg Oral Q8H PRN REMY Hart      polyethylene glycol  17 g Oral Daily Manual Eisenmenger, MD      senna  1 tablet Oral HS Manual Eisenmenger, MD      sevelamer  1,600 mg Oral TID With Meals Hettie Saltness, DO      traMADol  50 mg Oral Q8H PRN REMY Hart      vancomycin  1,250 mg Intravenous Once per day on Tue Thu Sat REMY Hart          Today, Patient Was Seen By: Manual Eisenmenger, MD    ** Please Note: Dictation voice to text software may have been used in the creation of this document   **

## 2022-06-23 NOTE — PROGRESS NOTES
Progress Note - Nephrology   Gisell Stroud 76 y o  male MRN: [id number]  Unit/Bed#: -01 Encounter: [id number]    Assessment and Plan    HEMODIALYSIS PROCEDURE NOTE  The patient was seen and examined on hemodialysis  Time: 3 5 hours  Sodium: 138 Blood flow: 350   Dialyzer: F160 Potassium:  Dialysate flow: 525   Access: RUE AV fistula Bicarbonate: 30 Ultrafiltration goal: 1L   Medications on HD: cefepime after      1  End-stage renal disease on hemodialysis   Outpatient dialysis schedule: Continue hemodialysis Tuesday Thursday Saturday with next treatment Saturday, 06/25/2022  Patient was noted to be only 1 3 kg from last treatment and sodium 126 mmol/L was noted  Orders adjusted to increase sodium dialysate bath 138 millimole per L and ultrafiltration was reduced from 3 L down to 1 L today  2  Dialysis access   Right upper extremity AV fistula access today without difficulty  3  Anemia of end-stage renal disease   Hemoglobin 8 7 grams/deciliter on 06/23/2022, stable  Continue to monitor  Patient receives long-acting erythropoietin stimulating agent in the outpatient setting, will defer short-acting JARETT use while inpatient  IV iron contraindicated given antibiotic use  Continue to monitor and transfuse for hemoglobin 7 0 grams/deciliter, as indicated  If patient requires a transfusion, please give with dialysis to prevent volume overload  4  Hyperparathyroidism, secondary to renal disease   Phosphorous 4 2 mg/dL, may continue binder at this time  Periodically monitor and discontinue binders if indicated as patient has not been eating well  5  Hypertension in patient with end-stage renal disease   Hypotension is resolved  6  Volume status of dialysis patient   Continue ultrafiltration on hemodialysis      Follow up reason for today's visit:     Pneumonia due to COVID-19 virus    Patient Active Problem List   Diagnosis    Essential hypertension    Hemodialysis-associated hypotension  AAA (abdominal aortic aneurysm) (HCC)    Chronic renal failure    Primary osteoarthritis of both knees    Hyperlipidemia    Class 2 severe obesity due to excess calories with serious comorbidity and body mass index (BMI) of 39 0 to 39 9 in adult [facility])    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 [facility])    Secondary hyperparathyroidism of renal origin ([place] [address] )    Abnormal nuclear stress test    Anemia in chronic kidney disease, on chronic dialysis ([place] [address] )    ASCVD (arteriosclerotic cardiovascular disease)    Benign prostatic hyperplasia without lower urinary tract symptoms    Carotid stenosis, right    Chronic gout, unspecified, with tophus (tophi)    Chronic systolic congestive heart failure (HCC)    Disorder of phosphorus metabolism, unspecified    Gastroesophageal reflux disease    Iron deficiency anemia    Kidney stone    Magnesium deficiency    Osteoarthritis of both knees    Other disorders of electrolyte and fluid balance, not elsewhere classified    Other fluid overload    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, initial encounter ([place] [address] )    Panlobular emphysema ([place] [address] )    Personal history of nicotine dependence    Proteinuria, unspecified    Skin lesion of face    Unspecified protein-calorie malnutrition (HCC)    Vitamin D deficiency    Elevated d-dimer    Pancreatic mass    Pneumonia due to COVID-19 virus    Acute respiratory failure with hypoxia ([facility])         Subjective:   Feeling weak  A complete 10 point review of systems was performed and is otherwise negative  Objective:     Vitals: Blood pressure 166/79, pulse 55, temperature 98 1 °F (36 7 °C), temperature source Temporal, resp  rate 18, height 5' 8" (1 727 m), weight 104 kg (228 lb 13 4 oz), SpO2 94 %  ,Body mass index is 34 79 kg/m²      Weight (last 2 days)     Date/Time Weight    06/23/22 0600 104 (228 84)    06/22/22 0600 101 (223 55)    06/21/22 0537 105 (231 04) Intake/Output Summary (Last 24 hours) at 6/23/2022 1251  Last data filed at 6/23/2022 0930  Gross per 24 hour   Intake 600 ml   Output --   Net 600 ml     I/O last 3 completed shifts: In: 400 [P O :400]  Out: -          Physical Exam: /79   Pulse 55   Temp 98 1 °F (36 7 °C) (Temporal)   Resp 18   Ht 5' 8" (1 727 m)   Wt 104 kg (228 lb 13 4 oz)   SpO2 94%   BMI 34 79 kg/m²     General Appearance:    No acute distress  Cooperative  Appears stated age  Head:    Normocephalic  Atraumatic  Normal jaw occlusion  Eyes:    Lids, conjunctiva normal  No scleral icterus  Ears:    Normal external ears  Nose:   Nares normal  No drainage  Mouth:   Lips, tongue normal  Mucosa normal  Phonation normal    Neck:   Supple  Symmetrical    Back:     Symmetric  No CVA tenderness  Lungs:     Normal respiratory effort  Clear to auscultation bilaterally  Chest wall:    No tenderness or deformity  Heart:    Regular rate and rhythm  Normal S1 and S2  No murmur  No JVD  No edema  Abdomen:     Soft  Non-tender  Bowel sounds active  Genitourinary:   No Diaz catheter present  Extremities:   Extremities normal  Atraumatic  No cyanosis  RUE AVF  Skin:   Warm and dry  No pallor, jaundice, rash, ecchymoses  Neurologic:   Alert and oriented to person, place, time  No focal deficit  Lab, Imaging and other studies: I have personally reviewed pertinent labs  CBC:   Lab Results   Component Value Date    WBC 19 65 (H) 06/23/2022    HGB 8 7 (L) 06/23/2022    HCT 27 3 (L) 06/23/2022     (H) 06/23/2022     06/23/2022    MCH 32 5 06/23/2022    MCHC 31 9 06/23/2022    RDW 15 8 (H) 06/23/2022    MPV 9 2 06/23/2022     CMP:   Lab Results   Component Value Date    K 4 7 06/23/2022    CL 92 (L) 06/23/2022    CO2 24 06/23/2022    BUN 69 (H) 06/23/2022    CREATININE 6 26 (H) 06/23/2022    CALCIUM 8 5 06/23/2022    EGFR 7 06/23/2022           Results from last 7 days   Lab Units 06/23/22  1372 06/22/22  0440 06/21/22  0457 06/20/22  0519 06/18/22  0619 06/17/22  0509   POTASSIUM mmol/L 4 7 4 0 4 6 5 0   < > 4 9   CHLORIDE mmol/L 92* 93* 91* 92*   < > 91*   CO2 mmol/L 24 27 26 22   < > 25   BUN mg/dL 69* 43* 51* 57*   < > 86*   CREATININE mg/dL 6 26* 4 66* 5 81* 6 54*   < > 8 29*   CALCIUM mg/dL 8 5 8 6 8 7 8 3*   < > 7 7*   ALK PHOS U/L  --  65  --  55  --  41   ALT U/L  --  14  --  13  --  16   AST U/L  --  15  --  16  --  21    < > = values in this interval not displayed  Phosphorus:   Lab Results   Component Value Date    PHOS 4 2 (H) 06/23/2022     Magnesium: No results found for: MG  Urinalysis: No results found for: [place], SPECGRAV, PHUR, LEUKOCYTESUR, NITRITE, PROTEINUA, GLUCOSEU, KETONESU, BILIRUBINUR, BLOODU  Ionized Calcium: No results found for: CAION  Coagulation: No results found for: PT, INR, APTT  Troponin: No results found for: TROPONINI  ABG: No results found for: PHART, TNG5YRZ, PO2ART, OFA1XXM, H5NLUZYJ, BEART, SOURCE  Radiology review:     IMAGING  No results found      Current Facility-Administered Medications   Medication Dose Route Frequency    acetaminophen (TYLENOL) tablet 650 mg  650 mg Oral Q4H PRN    albuterol inhalation solution 2 5 mg  2 5 mg Nebulization Q4H PRN    allopurinol (ZYLOPRIM) tablet 200 mg  200 mg Oral HS    amLODIPine (NORVASC) tablet 5 mg  5 mg Oral Daily    ascorbic acid (VITAMIN C) tablet 500 mg  500 mg Oral BID    benzonatate (TESSALON PERLES) capsule 200 mg  200 mg Oral TID    calcium acetate (PHOSLO) capsule 1,334 mg  1,334 mg Oral TID With Meals    cefepime (MAXIPIME) IVPB (premix in dextrose) 1,000 mg 50 mL  1,000 mg Intravenous Q24H    co-enzyme Q-10 capsule 200 mg  200 mg Oral HS    dexamethasone (DECADRON) injection 6 mg  6 mg Intravenous Q24H    docusate sodium (COLACE) capsule 100 mg  100 mg Oral BID    ferrous sulfate tablet 325 mg  325 mg Oral Daily With Breakfast    folic acid (FOLVITE) tablet 1 mg  1 mg Oral Daily    guaiFENesin (MUCINEX) 12 hr tablet 600 mg  600 mg Oral Q12H [address]    heparin (porcine) subcutaneous injection 5,000 Units  5,000 Units Subcutaneous Q8H [address]    hydrALAZINE (APRESOLINE) tablet 25 mg  25 mg Oral Q8H [address]    HYDROcodone-homatropine (HYCODAN) oral syrup 5 mL  5 mL Oral Q4H PRN    HYDROmorphone (DILAUDID) injection 0 5 mg  0 5 mg Intravenous Q4H PRN    ipratropium (ATROVENT) 0 02 % inhalation solution 0 5 mg  0 5 mg Nebulization TID    labetalol (NORMODYNE) injection 10 mg  10 mg Intravenous Q4H PRN    levalbuterol (XOPENEX) inhalation solution 1 25 mg  1 25 mg Nebulization TID    lidocaine (LMX) 4 % cream   Topical Daily PRN    losartan (COZAAR) tablet 50 mg  50 mg Oral BID    magnesium oxide (MAG-OX) tablet 400 mg  400 mg Oral Daily    metoclopramide (REGLAN) injection 5 mg  5 mg Intravenous Q6H PRN    metoprolol succinate (TOPROL-XL) 24 hr tablet 50 mg  50 mg Oral BID    ondansetron (ZOFRAN-ODT) dispersible tablet 4 mg  4 mg Oral Q6H PRN    oxyCODONE (ROXICODONE) IR tablet 5 mg  5 mg Oral Q8H PRN    polyethylene glycol (MIRALAX) packet 17 g  17 g Oral Daily    senna (SENOKOT) tablet 8 6 mg  1 tablet Oral HS    sevelamer (RENAGEL) tablet 1,600 mg  1,600 mg Oral TID With Meals    traMADol (ULTRAM) tablet 50 mg  50 mg Oral Q8H PRN    vancomycin (VANCOCIN) 1,250 mg in sodium chloride 0 9 % 250 mL IVPB  1,250 mg Intravenous Once per day on Tue Thu Sat     Medications Discontinued During This Encounter   Medication Reason    ondansetron (ZOFRAN) injection 4 mg     heparin (ACS LOW)     losartan (COZAAR) tablet 50 mg     hydrALAZINE (APRESOLINE) tablet 100 mg     lidocaine (LMX) 4 % cream     benzonatate (TESSALON PERLES) capsule 100 mg     metoclopramide (REGLAN) injection 5 mg     hydrALAZINE (APRESOLINE) tablet 100 mg     benzonatate (TESSALON PERLES) capsule 100 mg     cefepime (MAXIPIME) injection 1,000 mg     heparin (porcine) 25,000 units in 0 45% NaCl 250 mL infusion (premix)     levalbuterol (XOPENEX) inhalation solution 1 25 mg     ipratropium (ATROVENT) 0 02 % inhalation solution 0 5 mg     sodium chloride 0 9 % inhalation solution 3 mL     albuterol (PROVENTIL HFA,VENTOLIN HFA) inhaler 2 puff     umeclidinium bromide (INCRUSE ELLIPTA) 62 5 mcg/inh inhaler AEPB 1 puff     fluticasone-vilanterol (BREO ELLIPTA) 200-25 MCG/INH inhaler 1 puff     albuterol (PROVENTIL HFA,VENTOLIN HFA) inhaler 2 puff     sodium chloride 0 9 % inhalation solution 3 mL     levalbuterol (XOPENEX) inhalation solution 1 25 mg     sodium chloride 3 % inhalation solution 4 mL     co-enzyme Q-10 capsule 200 mg     allopurinol (ZYLOPRIM) tablet 200 mg     vancomycin 750 mg in sodium chloride 0 9% 250 mL     sodium chloride 3 % inhalation solution 4 mL        Gemma Sepulveda PA-C    Portions of the record may have been created with voice recognition software  Occasional wrong word or "sound a like" substitutions may have occurred due to the inherent limitations of voice recognition software  Read the chart carefully and recognize, using context, where substitutions have occurred

## 2022-06-23 NOTE — PLAN OF CARE
Problem: Prexisting or High Potential for Compromised Skin Integrity  Goal: Skin integrity is maintained or improved  Description: INTERVENTIONS:  - Identify patients at risk for skin breakdown  - Assess and monitor skin integrity  - Assess and monitor nutrition and hydration status  - Monitor labs   - Assess for incontinence   - Turn and reposition patient  - Assist with mobility/ambulation  - Relieve pressure over bony prominences  - Avoid friction and shearing  - Provide appropriate hygiene as needed including keeping skin clean and dry  - Evaluate need for skin moisturizer/barrier cream  - Collaborate with interdisciplinary team   - Patient/family teaching  - Consider wound care consult   Outcome: Progressing     Problem: Potential for Falls  Goal: Patient will remain free of falls  Description: INTERVENTIONS:  - Educate patient/family on patient safety including physical limitations  - Instruct patient to call for assistance with activity   - Consult OT/PT to assist with strengthening/mobility   - Keep Call bell within reach  - Keep bed low and locked with side rails adjusted as appropriate  - Keep care items and personal belongings within reach  - Initiate and maintain comfort rounds  - Make Fall Risk Sign visible to staff  - Offer Toileting in advance of need  - Initiate/Maintain bed alarm  - Obtain necessary fall risk management equipment:   - Apply yellow socks and bracelet for high fall risk patients  - Consider moving patient to room near nurses station  Outcome: Progressing     Problem: PAIN - ADULT  Goal: Verbalizes/displays adequate comfort level or baseline comfort level  Description: Interventions:  - Encourage patient to monitor pain and request assistance  - Assess pain using appropriate pain scale  - Administer analgesics based on type and severity of pain and evaluate response  - Implement non-pharmacological measures as appropriate and evaluate response  - Consider cultural and social influences on pain and pain management  - Notify physician/advanced practitioner if interventions unsuccessful or patient reports new pain  Outcome: Progressing     Problem: INFECTION - ADULT  Goal: Absence or prevention of progression during hospitalization  Description: INTERVENTIONS:  - Assess and monitor for signs and symptoms of infection  - Monitor lab/diagnostic results  - Monitor all insertion sites, i e  indwelling lines, tubes, and drains  - Monitor endotracheal if appropriate and nasal secretions for changes in amount and color  - Carpenter appropriate cooling/warming therapies per order  - Administer medications as ordered  - Instruct and encourage patient and family to use good hand hygiene technique  - Identify and instruct in appropriate isolation precautions for identified infection/condition  Outcome: Progressing     Problem: SAFETY ADULT  Goal: Maintain or return to baseline ADL function  Description: INTERVENTIONS:  -  Assess patient's ability to carry out ADLs; assess patient's baseline for ADL function and identify physical deficits which impact ability to perform ADLs (bathing, care of mouth/teeth, toileting, grooming, dressing, etc )  - Assess/evaluate cause of self-care deficits   - Assess range of motion  - Assess patient's mobility; develop plan if impaired  - Assess patient's need for assistive devices and provide as appropriate  - Encourage maximum independence but intervene and supervise when necessary  - Involve family in performance of ADLs  - Assess for home care needs following discharge   - Consider OT consult to assist with ADL evaluation and planning for discharge  - Provide patient education as appropriate  Outcome: Progressing  Goal: Maintains/Returns to pre admission functional level  Description: INTERVENTIONS:  - Perform BMAT or MOVE assessment daily    - Set and communicate daily mobility goal to care team and patient/family/caregiver     - Collaborate with rehabilitation services on mobility goals if consulted  - Record patient progress and toleration of activity level   Outcome: Progressing     Problem: DISCHARGE PLANNING  Goal: Discharge to home or other facility with appropriate resources  Description: INTERVENTIONS:  - Identify barriers to discharge w/patient and caregiver  - Arrange for needed discharge resources and transportation as appropriate  - Identify discharge learning needs (meds, wound care, etc )  - Refer to Case Management Department for coordinating discharge planning if the patient needs post-hospital services based on physician/advanced practitioner order or complex needs related to functional status, cognitive ability, or social support system  Outcome: Progressing     Problem: Knowledge Deficit  Goal: Patient/family/caregiver demonstrates understanding of disease process, treatment plan, medications, and discharge instructions  Description: Complete learning assessment and assess knowledge base    Interventions:  - Provide teaching at level of understanding  - Provide teaching via preferred learning methods  Outcome: Progressing     Problem: MOBILITY - ADULT  Goal: Maintain or return to baseline ADL function  Description: INTERVENTIONS:  -  Assess patient's ability to carry out ADLs; assess patient's baseline for ADL function and identify physical deficits which impact ability to perform ADLs (bathing, care of mouth/teeth, toileting, grooming, dressing, etc )  - Assess/evaluate cause of self-care deficits   - Assess range of motion  - Assess patient's mobility; develop plan if impaired  - Assess patient's need for assistive devices and provide as appropriate  - Encourage maximum independence but intervene and supervise when necessary  - Involve family in performance of ADLs  - Assess for home care needs following discharge   - Consider OT consult to assist with ADL evaluation and planning for discharge  - Provide patient education as appropriate  Outcome: Progressing  Goal: Maintains/Returns to pre admission functional level  Description: INTERVENTIONS:  - Perform BMAT or MOVE assessment daily    - Set and communicate daily mobility goal to care team and patient/family/caregiver  - Collaborate with rehabilitation services on mobility goals if consulted  - Record patient progress and toleration of activity level   Outcome: Progressing     Problem: METABOLIC, FLUID AND ELECTROLYTES - ADULT  Goal: Electrolytes maintained within normal limits  Description: INTERVENTIONS:  - Monitor labs and assess patient for signs and symptoms of electrolyte imbalances  - Administer electrolyte replacement as ordered  - Monitor response to electrolyte replacements, including repeat lab results as appropriate  - Instruct patient on fluid and nutrition as appropriate  Outcome: Progressing  Goal: Fluid balance maintained  Description: INTERVENTIONS:  - Monitor labs   - Monitor I/O and WT  - Instruct patient on fluid and nutrition as appropriate  - Assess for signs & symptoms of volume excess or deficit  Outcome: Progressing     Problem: Nutrition/Hydration-ADULT  Goal: Nutrient/Hydration intake appropriate for improving, restoring or maintaining nutritional needs  Description: Monitor and assess patient's nutrition/hydration status for malnutrition  Collaborate with interdisciplinary team and initiate plan and interventions as ordered  Monitor patient's weight and dietary intake as ordered or per policy  Utilize nutrition screening tool and intervene as necessary  Determine patient's food preferences and provide high-protein, high-caloric foods as appropriate       INTERVENTIONS:  - Monitor oral intake, urinary output, labs, and treatment plans  - Assess nutrition and hydration status and recommend course of action  - Evaluate amount of meals eaten  - Assist patient with eating if necessary   - Allow adequate time for meals  - Recommend/ encourage appropriate diets, oral nutritional supplements, and vitamin/mineral supplements  - Order, calculate, and assess calorie counts as needed  - Recommend, monitor, and adjust tube feedings and TPN/PPN based on assessed needs  - Assess need for intravenous fluids  - Provide specific nutrition/hydration education as appropriate  - Include patient/family/caregiver in decisions related to nutrition  Outcome: Progressing

## 2022-06-23 NOTE — ASSESSMENT & PLAN NOTE
· COVID-19 positive on 06/13/2022  Vaccinated x2 against COVID-19  · Patient completed 5 days of remdesivir on 06/18/2022  · Decadron day 10/10  · Patient titrated down to 3 L of nasal cannula today  · CT done on 06/19/2022 concerning for superimposed bacterial pneumonia  · Cefepime/vancomycin started on 06/19/2022  · Patient was initially on heparin drip however discontinued due to hemoptysis    SubQ heparin initiated  · Cultures negative to date  · Monitor inflammatory markers  · Supportive care

## 2022-06-23 NOTE — ASSESSMENT & PLAN NOTE
· Intermittent accelerated hypertension noted  · Continue losartan and metoprolol  · Hydralazine dose increased to 100 mg t i d   · Labetalol 10 mg as needed every 4 hours

## 2022-06-23 NOTE — NURSING NOTE
Pt tolerating mag citrate poorly, c/o nausea and heartburn, zofran given and dr Homero Tejada made aware, order received and pt given maalox, pt denies any abd pain, abdomen soft with + bs in all 4 quads, refused meds at this time, pt also refusing to get oob and does minimal activity while in bed, hospitalist aware

## 2022-06-23 NOTE — ASSESSMENT & PLAN NOTE
· Oral thrush noted  · Will give Diflucan as well as nystatin swish and swallow for 5 days of treatment

## 2022-06-24 NOTE — OCCUPATIONAL THERAPY NOTE
Occupational Therapy Treatment Note     Patient Name: Vera Darby  [id number] Date: 6/24/2022  Problem List  Principal Problem:    Pneumonia due to COVID-19 virus  Active Problems:    Essential hypertension    Class 2 severe obesity due to excess calories with serious comorbidity and body mass index (BMI) of 39 0 to 39 9 in adult [facility])    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 (HCC)    Anemia in chronic kidney disease, on chronic dialysis ([facility] [address] )    Acute respiratory failure with hypoxia ([facility] [address] )    Thrush        06/24/22 1320   OT Last Visit   OT Visit Date 06/24/22   Note Type   Note Type Treatment   Restrictions/Precautions   Weight Bearing Precautions Per Order No   Other Precautions Multiple lines;Telemetry;O2;Fall Risk   Lifestyle   Autonomy Patient reporting being independent with ADLs, ambulatory with rollator and lives with family in a two story house with 1st floor set-up   Reciprocal Relationships Daughters and wife   Service to Others Retired   [address] Enjoys reading   Pain Assessment   Pain Assessment Tool 0-10   Pain Score No Pain   ADL   UB Bathing Assistance 4  Minimal Assistance   UB Bathing Deficit Setup; Left arm;Right arm; Increased time to complete   UB Bathing Comments Performed seated EOB   LB Bathing Assistance 2  Maximal Assistance   LB Bathing Deficit Setup; Increased time to complete;Supervision/safety; Left lower leg including foot;Right lower leg including foot   LB Bathing Comments Performed seated EOB + reports "I can't bend that far "   UB Dressing Assistance 4  Minimal Assistance   UB Dressing Deficit Setup; Increased time to complete   UB Dressing Comments Dons hospital gown requiring A d/t fatigue   LB Dressing Assistance 1  Total Assistance   LB Dressing Deficit Setup; Increased time to complete;Supervision/safety; Don/doff L sock; Don/doff R sock   LB Dressing Comments Donned while seated EOB  Limitations d/t fatigue     Functional Standing Tolerance   Time 15-20s x 2 attempts   Activity STS engagement for fxl purpose of preparation- SPT; requires mod A x 2 STS + sustains c use of BUE onto RW but unsteady  Bed Mobility   Supine to Sit 3  Moderate assistance   Additional items Assist x 2; Increased time required;LE management   Additional Comments DNT sit-sup as pt transitioned from bed-recliner   Transfers   Sit to Stand 3  Moderate assistance   Additional items Assist x 2; Increased time required;Verbal cues   Stand to Sit 4  Minimal assistance   Additional items Assist x 1; Armrests; Increased time required;Verbal cues   Stand pivot 4  Minimal assistance   Additional items Assist x 1; Increased time required  (RW use for stability)   Functional Mobility   Additional Comments NT as pt demonstrates + reports significant weakness/lethargy c poor engagement/initation of tasks  Agreeable but requires max cueing for encouragement  Cognition   Overall Cognitive Status WFL   Arousal/Participation Lethargic   Attention Attends with cues to redirect   Orientation Level Oriented X4   Memory Within functional limits   Following Commands Follows one step commands without difficulty   Assessment   Assessment Pt received supine in bed on this date reporting no pain/significant fatigue + agreeable to Skilled OT treatment session @ this time  Pt participated in Skilled OT session with interventions consisting of ADL retraining, bed mobility, functional transfer training, endurance training, patient/family training, compensatory technique education, energy conservation and activity engagement in effort to:  increase BUE strength, fxl activity tolerance, static/dynamic sit/stand balance/tolerance for improved independence c ADLs/IADLs/fxl transfers/ADL related fxl mobility  Patient completed bed mobility with mod A x 2, functional transfers with mod A x 2, ADL-related fxl mobility with NT (SPT min A x 1)    ADLs performed while seated EOB with min A UB, max A-total A LB + s/u for grooming  Since eval/previous treatment session, pt improved in areas including sit tolerance ~15 min yet deficits remain @ sit/standing balance/tolerance, fxl activity tolerance, BUE strength, core strength, level of alertness, severe fatigue limiting higher level of I/S c ADLs  Occupational performance remains limited secondary to factors listed above and increased risk for falls and injury  Pt continues to function below baseline + is therefore, unsafe to transition safely home @ this time without skilled occupational therapy services/comprehensive intervention  D/t aforementioned factors, continuation of skilled occupational therapy services are warranted in efforts to prepare patient for safe d/c to PAR c LTG of transitioning home @ PLOF  Goals continued as stated in initial evaluation + therapy will continue to collaborate c pt regarding improvement in all deficit areas  including but not limited to: maximize I c ADLs/IADLs, safe fxl transfer training, energy conservation education, coordination of care, family education, equipment management (DME/AD) + d/c planning  Plan   Treatment Interventions ADL retraining;Functional transfer training;UE strengthening/ROM; Endurance training;Patient/family training; Compensatory technique education; Energy conservation; Activityengagement;Cardiac education   Goal Expiration Date 06/26/22   OT Treatment Day 1   OT Frequency 3-5x/wk   Recommendation   OT Discharge Recommendation Post acute rehabilitation services   OT - OK to Discharge Yes   Additional Comments  Once medically cleared + c safe/coordinated d/c plan     AM-PAC Daily Activity Inpatient   Lower Body Dressing 2   Bathing 2   Toileting 2   Upper Body Dressing 3   Grooming 4   Eating 4   Daily Activity Raw Score 17   Daily Activity Standardized Score (Calc for Raw Score >=11) 37 26   AM-PAC Applied Cognition Inpatient   Following a Speech/Presentation 4   Understanding Ordinary Conversation 4   Taking Medications 4   Remembering Where Things Are Placed or Put Away 4   Remembering List of 4-5 Errands 4   Taking Care of Complicated Tasks 4   Applied Cognition Raw Score 24   Applied Cognition Standardized Score 62 21     The patient's raw score on the AM-PAC Daily Activity inpatient short form is 17, standardized score is 37 26, less than 39 4  Patients at this level are likely to benefit from DC to post-acute rehabilitation services  Please refer to the recommendation of the Occupational Therapist for safe DC planning      Geetha Littlejohn OTR/LAKEISHA

## 2022-06-24 NOTE — PHYSICAL THERAPY NOTE
PHYSICAL THERAPY TREATMENT NOTE  NAME:  Violetta Preciado  DATE: 06/24/22    Length Of Stay: 10  Performed at least 2 patient identifiers during session: Name and ID bracelet    TREATMENT:      06/24/22 1345   PT Last Visit   PT Visit Date 06/24/22   Note Type   Note Type Treatment   Pain Assessment   Pain Assessment Tool 0-10   Pain Score No Pain   Restrictions/Precautions   Weight Bearing Precautions Per Order No   Other Precautions Multiple lines;O2;Telemetry; Fall Risk   General   Chart Reviewed Yes   Response to Previous Treatment Patient with no complaints from previous session  Family/Caregiver Present No   Cognition   Overall Cognitive Status WFL   Arousal/Participation Lethargic   Attention Attends with cues to redirect   Orientation Level Oriented X4   Memory Within functional limits   Following Commands Follows one step commands without difficulty   Comments pt agreeable to PT session   Bed Mobility   Supine to Sit 3  Moderate assistance   Additional items Assist x 2; Increased time required; Bedrails;HOB elevated;Verbal cues;LE management   Additional Comments pt OOB in chair to end session  Pt sat at EOBx15 minutes with mod UE support   Transfers   Sit to Stand 3  Moderate assistance   Additional items Assist x 2;Bedrails; Increased time required;Verbal cues   Stand to Sit 3  Moderate assistance   Additional items Assist x 2; Increased time required;Verbal cues;Armrests   Stand pivot 4  Minimal assistance   Additional items Assist x 1; Increased time required;Verbal cues   Additional Comments use of RW   Ambulation/Elevation   Gait pattern Not appropriate  (B knee buckling during pivot)   Balance   Static Sitting Fair   Dynamic Sitting Fair -   Static Standing Fair -   Dynamic Standing Poor +   Endurance Deficit   Endurance Deficit Yes   Activity Tolerance   Activity Tolerance Patient limited by fatigue;Treatment limited secondary to medical complications (Comment)  (MARTIN)   Nurse Made Aware yes Assessment   Prognosis Good   Problem List Decreased strength;Decreased endurance; Impaired balance;Decreased mobility;Obesity; Impaired hearing;Decreased coordination   Assessment Pt seen for PT treatment session this date with interventions consisting of therapeutic activity consisting of training: bed mobility, supine<>sit transfers, sit<>stand transfers, static sitting tolerance at EOB for 15 minutes w/ mod UE support, vc and tactile cues for static sitting posture faciliation and stand pivot transfers towards left direction  Pt agreeable to PT treatment session upon arrival, pt found supine in bed w/ HOB elevated, responsive and lethargic  In comparison to previous session, pt with no improvements as evidenced by pt requiring increased assist for mobility  Post session: pt returned back to recliner, chair alarm engaged, all needs in reach and RN notified of session findings/recommendations  Continue to recommend post acute rehabilitation services at time of d/c in order to maximize pt's functional independence and safety w/ mobility  Pt continues to be functioning below baseline level  PT will continue to see pt during current hospitalization in order to address the deficits listed above and provide interventions consistent w/ POC in effort to achieve STGs  Barriers to Discharge Inaccessible home environment;Decreased caregiver support   Plan   Treatment/Interventions Functional transfer training;LE strengthening/ROM; Therapeutic exercise; Endurance training;Bed mobility;Gait training; Compensatory technique education   Progress No functional improvements   PT Frequency 3-5x/wk   Recommendation   PT Discharge Recommendation Home with home health rehabilitation   [address]   Turning in Bed Without Bedrails 2   Lying on Back to Sitting on Edge of Flat Bed 2   Moving Bed to Chair 3   Standing Up From Chair 2   Walk in Room 2   Climb 3-5 Stairs 1   Basic Mobility Inpatient Raw Score 12   Basic Mobility Standardized Score 32 23   Highest Level Of Mobility   -HLM Goal 4: Move to chair/commode   -HLM Achieved 4: Move to chair/commode   Education   Education Provided Home exercise program;Mobility training   Patient Demonstrates verbal understanding;Reinforcement needed   End of Consult   Patient Position at End of Consult Bedside chair;Bed/Chair alarm activated; All needs within reach       The patient's AM-PAC Basic Mobility Inpatient Short Form Raw Score is 12  A Raw score of less than or equal to 16 suggests the patient may benefit from discharge to post-acute rehabilitation services  Please also refer to the recommendation of the Physical Therapist for safe discharge planning        Nieves Nolasco, PTA,PTA

## 2022-06-24 NOTE — PLAN OF CARE
Problem: PHYSICAL THERAPY ADULT  Goal: Performs mobility at highest level of function for planned discharge setting  See evaluation for individualized goals  Description: Treatment/Interventions: Functional transfer training, LE strengthening/ROM, Elevations, Therapeutic exercise, Endurance training, Patient/family training, Equipment eval/education, Bed mobility, Gait training, Compensatory technique education, Spoke to nursing, Spoke to case management  Equipment Recommended: Wolf Hernandez (has own)       See flowsheet documentation for full assessment, interventions and recommendations  Outcome: Not Progressing  Note: Prognosis: Good  Problem List: Decreased strength, Decreased endurance, Impaired balance, Decreased mobility, Obesity, Impaired hearing, Decreased coordination  Assessment: Pt seen for PT treatment session this date with interventions consisting of therapeutic activity consisting of training: bed mobility, supine<>sit transfers, sit<>stand transfers, static sitting tolerance at EOB for 15 minutes w/ mod UE support, vc and tactile cues for static sitting posture faciliation and stand pivot transfers towards left direction  Pt agreeable to PT treatment session upon arrival, pt found supine in bed w/ HOB elevated, responsive and lethargic  In comparison to previous session, pt with no improvements as evidenced by pt requiring increased assist for mobility  Post session: pt returned back to recliner, chair alarm engaged, all needs in reach and RN notified of session findings/recommendations  Continue to recommend post acute rehabilitation services at time of d/c in order to maximize pt's functional independence and safety w/ mobility  Pt continues to be functioning below baseline level  PT will continue to see pt during current hospitalization in order to address the deficits listed above and provide interventions consistent w/ POC in effort to achieve STGs    Barriers to Discharge: Inaccessible home environment, Decreased caregiver support        PT Discharge Recommendation: Home with home health rehabilitation          See flowsheet documentation for full assessment

## 2022-06-24 NOTE — ASSESSMENT & PLAN NOTE
· COVID-19 positive on 06/13/2022  Vaccinated x2 against COVID-19  · Patient completed 5 days of remdesivir on 06/18/2022  · Patient completed 10 days of Decadron on 06/23/2022  · Patient titrated down to 3 L of nasal cannula  · CT done on 06/19/2022 concerning for superimposed bacterial pneumonia  · Cefepime/vancomycin started on 06/19/2022  · Vancomycin discontinued on 06/23/2022  · Patient was initially on heparin drip however discontinued due to hemoptysis    SubQ heparin initiated  · Cultures negative to date  · Monitor inflammatory markers  · Supportive care

## 2022-06-24 NOTE — PROGRESS NOTES
NEPHROLOGY PROGRESS NOTE   Immanuel Lowry 76 y o  male MRN: [id number]  Unit/Bed#: -01 Encounter: [id number]    Assessment/Plan:    Immanuel Lowry is a 76 y o  male with ESRD on hemodialysis admitted 06/14 initially treated for acute hypoxic respiratory failure secondary to COVID-19 pneumonia however developed multifocal pneumonia suspected due to aspiration from choking/dysphagia, thrush, hemoptysis in the setting of heparin infusion  Nephrology following along for management of dialysis-plan outlined below  1  ESRD on maintenance hemodialysis Tuesday, Thursday, Saturday [facility]  · Previous target weight was 110 kg and current weight is 103 kg  He has poor oral intake in the setting of multifocal pneumonia and thrush  1 5 L ultra filtered yesterday  Dizzy today  Will give 12 5 g 5% albumin now  · Next hemodialysis session tomorrow, may need to be very gentle with ultrafiltration  · Access:  Right AV fistula  2  Secondary hyperparathyroidism  · No indication for activated vitamin-D agent  Continue binders and periodically monitor phosphorus level  May need deescalation as poor oral intake  3  Anemia of ESRD  · Hemoglobin stable  Defer short-acting agent as receives long-acting as outpatient and also with recent COVID pneumonia  Transfuse for hemoglobin less than 7 0 grams/deciliter  4  Hypertension in the setting of ESRD  · Will not adjust antihypertensives  Very dizzy  Giving small boluses above  5  Suspected volume depletion  · 12 5 g 5% albumin now  6  At risk for malnutrition  · Adjusted supplementation  Discussed with registered nutritionist   May need to consider other form of nutrition if not able to appropriately take p o   7  Acute hypoxic respiratory failure, Multifocal pneumonia  · Management per primary team and pulmonology  8  Constipation  · Avoid fleets enemas and caution with magnesium citrate    ROS  Seen and examined lying in bed    States he had a bowel movement this morning  Complains of dizziness, fatigue after ambulation  A complete 10 point review of systems have been performed and are otherwise negative  Historical Information   Past Medical History:   Diagnosis Date    Abdominal aortic aneurysm (AAA) ([facility] [address] )     s/p repair    Anemia     due to kidney disease    Arthritis     CHF (congestive heart failure) (HCC)     stable at present    Chronic kidney disease     on hemodialysis  Sat    Chronic pain disorder     knees    Edema     GERD (gastroesophageal reflux disease)     no meds     Gout     History of echocardiogram 2018    EF 55%, mild LVH, technically difficult study      Hyperlipidemia     borderline no meds    Hyperparathyroidism ([facility] [address] )     Hypertension     Kidney stone     Seasonal allergies     Shortness of breath     mild exertional    Skin cancer     Vitamin D deficiency      Past Surgical History:   Procedure Laterality Date    ABDOMINAL AORTIC ANEURYSM REPAIR      CARDIAC CATHETERIZATION  2020    no blockages    CARPAL TUNNEL RELEASE Right     COLONOSCOPY      DIALYSIS FISTULA CREATION Right 2017    GLAUCOMA SURGERY      LEG SURGERY      removal splinter    PARATHYROIDECTOMY      SPLIT THICKNESS SKIN GRAFT N/A 2019    Procedure: THIGH STSG;  Surgeon: Mic Childress MD;  Location: [address] [place] [address];  Service: Plastics    SQUAMOUS CELL CARCINOMA EXCISION Right 2019    Procedure: REMOVE SKIN CA FROM EAR & CHEEK;  Surgeon: Mic Childress MD;  Location: [address] [place] [address];  Service: Plastics     Social History   Social History     Substance and Sexual Activity   Alcohol Use Not Currently     Social History     Substance and Sexual Activity   Drug Use Never     Social History     Tobacco Use   Smoking Status Former Smoker    Types: Cigarettes    Quit date: 2004    Years since quittin 0   Smokeless Tobacco Never Used       Family History:   Family History   Problem Relation Age of Onset    Kidney disease Mother     Leukemia Father        Medications:  Pertinent medications were reviewed  Current Facility-Administered Medications   Medication Dose Route Frequency Provider Last Rate    acetaminophen  650 mg Oral Q4H PRN Lesly Ramp, DO      albumin human  12 5 g Intravenous Once Priscella REMY [place]      albuterol  2 5 mg Nebulization Q4H PRN MAYA TobiasNP      allopurinol  200 mg Oral HS Lord Ky MD      aluminum-magnesium hydroxide-simethicone  30 mL Oral Q4H PRN Lord Ky MD      amLODIPine  5 mg Oral Daily Tom Harrington MD      ascorbic acid  500 mg Oral BID Lesly Ramp, DO      benzonatate  200 mg Oral TID REMY Tobias      calcium acetate  1,334 mg Oral TID With Meals Lesly Ramp, DO      cefepime  1,000 mg Intravenous Q24H Vernon Pemberton PA-C 1,000 mg (06/24/22 0907)    co-enzyme Q-10  200 mg Oral HS Lord Ky MD      docusate sodium  100 mg Oral BID Lord Ky MD      ferrous sulfate  325 mg Oral Daily With Breakfast Lesly Ramp, DO      folic acid  1 mg Oral Daily Lesly Ramp, DO      guaiFENesin  600 mg Oral Q12H [address] REMY Tobias      heparin (porcine)  5,000 Units Subcutaneous [facility] Lord Ky MD      hydrALAZINE  25 mg Oral [facility] Tom Harrington MD      HYDROcodone-homatropine  5 mL Oral Q4H PRN Rich Gardner PA-C      HYDROmorphone  0 5 mg Intravenous Q4H PRN REMY Tobias      ipratropium  0 5 mg Nebulization TID REMY Tobias      labetalol  10 mg Intravenous Q4H PRN Lesly Ramp, DO      levalbuterol  1 25 mg Nebulization TID Lesly Ramp, DO      lidocaine   Topical Daily PRN Priscella Ethel, REMY      losartan  50 mg Oral BID Priscella [place], CRALANA      magnesium oxide  400 mg Oral Daily Lesly Ramp, DO      metoclopramide  5 mg Intravenous Q6H PRN Rich Gardner PA-C      metoprolol succinate  50 mg Oral BID Courtney Maze Jammie Mcknight,       nystatin  500,000 Units Swish & Swallow 4x Daily Manual Eisenmenger, MD      ondansetron  4 mg Oral Q6H PRN Hettie Saltness, DO      oxyCODONE  5 mg Oral Q8H PRN Josef Eliecer, CRNP      senna  1 tablet Oral HS Manual Eisenmenger, MD      sevelamer  1,600 mg Oral TID With Meals Hettie Saltness, DO      traMADol  50 mg Oral Q8H PRN Josef Eliecer, CRNP           Allergies   Allergen Reactions    Iodine - Food Allergy      Other reaction(s): Respiratory Distress  "swelling of throat"    Levofloxacin      Other reaction(s): Respiratory Distress  "swelling of throat"    Lovastatin      Muscle weakness    Medical Tape Other (See Comments)     Skin breaks down    Shellfish-Derived Products - Food Allergy          Vitals:   /81   Pulse 89   Temp 98 3 °F (36 8 °C)   Resp 18   Ht 5' 8" (1 727 m)   Wt 103 kg (227 lb 1 2 oz)   SpO2 94%   BMI 34 53 kg/m²   Body mass index is 34 53 kg/m²    SpO2: 94 %,   SpO2 Activity: At Rest,   O2 Device: Nasal cannula      Intake/Output Summary (Last 24 hours) at 6/24/2022 0933  Last data filed at 6/23/2022 1749  Gross per 24 hour   Intake 410 ml   Output 1503 ml   Net -1093 ml     Invasive Devices  Report    Peripheral Intravenous Line  Duration           Peripheral IV 06/22/22 Left;Ventral (anterior) Forearm 1 day          Line  Duration           Hemodialysis AV Fistula Upper arm -- days                Physical Exam  General: conscious, cooperative, in no acute distress  Eyes: conjunctivae pink, anicteric sclerae  ENT: lips and mucous membranes dry, white coating on tongue  Neck: supple, no JVD, no masses  Chest:  Very diminished breath sounds bilaterally, scattered rhonchi, nasal cannula  CVS: S1 & S2, normal rate, regular rhythm  Abdomen: soft, non-tender, non-distended, normoactive bowel sounds  Extremities: no edema of both legs  Skin: no rash  Neuro: awake, alert, oriented      Diagnostic Data:  Lab: I have personally reviewed pertinent lab results  ,   CBC:  Results from last 7 days   Lab Units 06/24/22  0503   WBC Thousand/uL 21 87*   HEMOGLOBIN g/dL 9 3*   HEMATOCRIT % 29 4*   PLATELETS Thousands/uL 256      CMP:   Lab Results   Component Value Date    SODIUM 130 (L) 06/24/2022    K 4 0 06/24/2022    CL 96 06/24/2022    CO2 24 06/24/2022    BUN 46 (H) 06/24/2022    CREATININE 4 36 (H) 06/24/2022    CALCIUM 8 7 06/24/2022    EGFR 12 06/24/2022   ,   PT/INR: No results found for: PT, INR,   Magnesium: No components found for: MAG,  Phosphorous: No results found for: PHOS    Microbiology:  @LABNT,(urinecx:7)@        REMY Gillespie    Portions of the record may have been created with voice recognition software  Occasional wrong word or "sound a like" substitutions may have occurred due to the inherent limitations of voice recognition software  Read the chart carefully and recognize, using context, where substitutions have occurred

## 2022-06-24 NOTE — PROGRESS NOTES
Progress Note - Pulmonary   Jesús Redman 76 y o  male MRN: [id number]  Unit/Bed#: -01 Encounter: [id number]    Assessment:  Acute hypoxemic respiratory failure  Multifocal pneumonia  2 episodes of limited hemoptysis 6/19 in setting of multifocal pneumonia  COVID19 infection  ESRD on HD  Hyponatremia  Anemia of chronic disease  Hypertension  Pancreatic head lesion     Recommendations     Patient developed multifocal pneumonia  Pastor Siu had 2 episodes of dime-sized hemoptysis 6/19 which has not recurred   His heparin infusion given for COVID protocol with elevated D-dimer has been stopped  Pastor Siu had a negative PE study 6/19 (although limited by motion)        Continue cefepime- Recommend 7 days     Wean O2 for SPO2 >89%- currently on 3 lpm  Can continue Xopenex/Atrovent TID  Hypertonic saline TID   OOB as able  Incentive spirometry and flutter valve  DVT Px    As he approaches discharge and his secretion burden decreased his nebulized therapy can be stopped  May need oxygen on discharge      Would recommend chest X-ray 6-8 weeks after discharge  Needs outpatient follow-up of his pancreas lesion    Pulmonary medicine will sign off  Call back with questions  Subjective:   Feels poorly today  Fatigued  Coughing yellow-brown sputum  Oxygen has been decreased to 3 lpm   SPO2 95%  Objective:     Vitals: Blood pressure 139/82, pulse 89, temperature 97 6 °F (36 4 °C), resp  rate 18, height 5' 8" (1 727 m), weight 103 kg (227 lb 1 2 oz), SpO2 94 %  ,Body mass index is 34 53 kg/m²        Intake/Output Summary (Last 24 hours) at 6/24/2022 1254  Last data filed at 6/23/2022 1749  Gross per 24 hour   Intake 410 ml   Output 1503 ml   Net -1093 ml       Invasive Devices  Report    Peripheral Intravenous Line  Duration           Peripheral IV 06/22/22 Left;Ventral (anterior) Forearm 1 day          Line  Duration           Hemodialysis AV Fistula Upper arm -- days                Vitals:    06/24/22 0724   BP: 139/82 Pulse: 89   Resp: 18   Temp: 97 6 °F (36 4 °C)   SpO2: 94%     General:  Patient is awake, alert, non-toxic and in no acute respiratory distress  Eyes: PERRL, no scleral icterus  Neck: No JVD  CV:  Regular, +S1 and S2, No murmurs, gallops or rubs appreciated  Lungs: Scattered rhonchi  Abdomen: Soft, +BS, Non-tender, non-distended, obese  Extremities: No clubbing, cyanosis or edema, RUE Av-fistula  Neuro: No focal motor/sensory deficits  Skin: Warm, dry      Labs: I have personally reviewed pertinent lab results  Imaging and other studies: I have personally reviewed pertinent reports     and I have personally reviewed pertinent films in PACS

## 2022-06-24 NOTE — PROGRESS NOTES
[facility]  Progress Note - Elsie Torres 1945, 76 y o  male MRN: [id number]  Unit/Bed#: -01 Encounter: [id number]  Primary Care Provider: Morena Laughlin DO   Date and time admitted to hospital: 6/14/2022  1:53 PM    * Pneumonia due to COVID-19 virus  Assessment & Plan  · COVID-19 positive on 06/13/2022  Vaccinated x2 against COVID-19  · Patient completed 5 days of remdesivir on 06/18/2022  · Patient completed 10 days of Decadron on 06/23/2022  · Patient titrated down to 3 L of nasal cannula  · CT done on 06/19/2022 concerning for superimposed bacterial pneumonia  · Cefepime/vancomycin started on 06/19/2022  · Vancomycin discontinued on 06/23/2022  · Patient was initially on heparin drip however discontinued due to hemoptysis    SubQ heparin initiated  · Cultures negative to date  · Monitor inflammatory markers  · Supportive care    Thrush  Assessment & Plan  · Nystatin swish and swallow    Acute respiratory failure with hypoxia (HCC)  Assessment & Plan  · Secondary to COVID infection  · Patient developed hemoptysis, heparin drip discontinued and hemoptysis appears to be improved  · Pulmonology following  · Will continue oxygen and titrate as tolerated    Anemia in chronic kidney disease, on chronic dialysis (HCC)  Assessment & Plan  · Hemoglobin stable and at baseline  · Continue home ferrous sulfate  · Trend CBC         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 [facility])  Assessment & Plan  · Patient back to routine dialysis schedule   · Continue management per Nephrology    Class 2 severe obesity due to excess calories with serious comorbidity and body mass index (BMI) of 39 0 to 39 9 in adult [facility])  Assessment & Plan  · Present on admission as evidenced by BMI greater than 35  · Endorse lifestyle modifications and weight loss        Essential hypertension  Assessment & Plan  · Intermittent accelerated hypertension noted  · Continue losartan and metoprolol  · Hydralazine dose increased to 100 mg t i d   · Labetalol 10 mg as needed every 4 hours      VTE Prophylaxis:  Heparin    Patient Centered Rounds: I have performed bedside rounds with nursing staff today  Discussions with Specialists or Other Care Team Provider: yes  Education and Discussions with Family / Patient:  Spoke with patient's daughter over the phone regarding plan of care    Current Length of Stay: 10 day(s)    Current Patient Status: Inpatient   Certification Statement: The patient will continue to require additional inpatient hospital stay due to COVID infection    Discharge Plan:  Pending hospital course    Code Status: Level 3 - DNAR and DNI    Subjective:   Patient continues with weakness and fatigue  Also with depressed mood  Poor appetite  No pain complaints    Objective:     Vitals:   Temp (24hrs), Av 9 °F (36 6 °C), Min:97 6 °F (36 4 °C), Max:98 3 °F (36 8 °C)    Temp:  [97 6 °F (36 4 °C)-98 3 °F (36 8 °C)] 97 6 °F (36 4 °C)  HR:  [62-90] 89  Resp:  [18-20] 18  BP: (119-178)/(58-96) 139/82  SpO2:  [93 %-95 %] 94 %  Body mass index is 34 53 kg/m²  Input and Output Summary (last 24 hours): Intake/Output Summary (Last 24 hours) at 2022 1210  Last data filed at 2022 1749  Gross per 24 hour   Intake 410 ml   Output 1503 ml   Net -1093 ml       Physical Exam:   Physical Exam  Constitutional:       General: He is not in acute distress  Appearance: He is obese  He is ill-appearing  HENT:      Head: Normocephalic and atraumatic  Nose: Nose normal       Mouth/Throat:      Comments: Tongue with white discoloration  Eyes:      Extraocular Movements: Extraocular movements intact  Conjunctiva/sclera: Conjunctivae normal    Cardiovascular:      Rate and Rhythm: Normal rate and regular rhythm  Pulmonary:      Effort: Pulmonary effort is normal  No respiratory distress  Abdominal:      Palpations: Abdomen is soft  Tenderness:  There is no abdominal tenderness  Musculoskeletal:         General: Normal range of motion  Cervical back: Normal range of motion and neck supple  Comments: Generalized weakness   Skin:     General: Skin is warm and dry  Neurological:      General: No focal deficit present  Mental Status: He is alert  Mental status is at baseline  Cranial Nerves: No cranial nerve deficit  Psychiatric:         Mood and Affect: Mood is depressed  Behavior: Behavior normal          Additional Data:     Labs:    Results from last 7 days   Lab Units 06/24/22  0503 06/23/22  0627   WBC Thousand/uL 21 87* 19 65*   HEMOGLOBIN g/dL 9 3* 8 7*   HEMATOCRIT % 29 4* 27 3*   PLATELETS Thousands/uL 256 267   LYMPHO PCT %  --  5*   MONO PCT %  --  4   EOS PCT %  --  0     Results from last 7 days   Lab Units 06/24/22  0503 06/23/22  0627 06/22/22  0440   SODIUM mmol/L 130*   < > 128*   POTASSIUM mmol/L 4 0   < > 4 0   CHLORIDE mmol/L 96   < > 93*   CO2 mmol/L 24   < > 27   BUN mg/dL 46*   < > 43*   CREATININE mg/dL 4 36*   < > 4 66*   CALCIUM mg/dL 8 7   < > 8 6   ALK PHOS U/L  --   --  65   ALT U/L  --   --  14   AST U/L  --   --  15    < > = values in this interval not displayed  Results from last 7 days   Lab Units 06/19/22  0419   INR  1 07               * I Have Reviewed All Lab Data Listed Above  * Additional Pertinent Lab Tests Reviewed: [facility]  Reviewed    Imaging:  Imaging Reports Reviewed Today Include:  No new imaging    Recent Cultures (last 7 days):     Results from last 7 days   Lab Units 06/22/22  1831 06/21/22  2101   SPUTUM CULTURE  Test not performed  Suggest repeat specimen  Test not performed  Suggest repeat specimen  GRAM STAIN RESULT  >10 squamous epithelial cells/lpf, indicating orophayngeal contamination  *  Rare Polys*  3+ Budding yeast*  1+ Gram positive rods*  Rare Gram positive cocci in pairs* >10 squamous epithelial cells/lpf, indicating orophayngeal contamination  *  No polys seen*  3+ Budding yeast*  2+ Gram positive cocci in pairs and chains*  2+ Gram positive rods*  1+ Gram negative rods*       Last 24 Hours Medication List:   Current Facility-Administered Medications   Medication Dose Route Frequency Provider Last Rate    acetaminophen  650 mg Oral Q4H PRN [place], DO      albumin human  12 5 g Intravenous Once REMY Stephens      albuterol  2 5 mg Nebulization Q4H PRN REMY Griffith      allopurinol  200 mg Oral HS Yifan Warren MD      aluminum-magnesium hydroxide-simethicone  30 mL Oral Q4H PRN Yifan Warren MD      amLODIPine  5 mg Oral Daily Carmen Valenzuela MD      ascorbic acid  500 mg Oral BID [facility], DO      benzonatate  200 mg Oral TID REMY Griffith      calcium acetate  1,334 mg Oral TID With Meals [facility], DO      cefepime  1,000 mg Intravenous Q24H Kalina Pemberton PA-C 1,000 mg (06/24/22 0907)    co-enzyme Q-10  200 mg Oral HS Yifan Warren MD      docusate sodium  100 mg Oral BID Yifan Warren MD      ferrous sulfate  325 mg Oral Daily With Breakfast [facility], DO      folic acid  1 mg Oral Daily [facility], DO      guaiFENesin  600 mg Oral Q12H [facility] REMY Griffith      heparin (porcine)  5,000 Units Subcutaneous [facility] Yifan Warren MD      hydrALAZINE  25 mg Oral [facility] Carmen Valenzuela MD      HYDROcodone-homatropine  5 mL Oral Q4H PRN Hiral Rubi PA-C      HYDROmorphone  0 5 mg Intravenous Q4H PRN REMY Griffith      ipratropium  0 5 mg Nebulization TID REMY Griffith      labetalol  10 mg Intravenous Q4H PRN [facility], DO      levalbuterol  1 25 mg Nebulization TID [facility], DO      lidocaine   Topical Daily PRN REMY Stephens      losartan  50 mg Oral BID REMY Stephens      magnesium oxide  400 mg Oral Daily [facility], DO      metoclopramide  5 mg Intravenous Q6H PRN Tejal Andujar PA-C      metoprolol succinate  50 mg Oral BID Damon Rang, DO      nystatin  500,000 Units Swish & Swallow 4x Daily Luis Ramos MD      ondansetron  4 mg Oral Q6H PRN Damon Rang, DO      oxyCODONE  5 mg Oral Q8H PRN REMY Tello      senna  1 tablet Oral HS Luis Ramos MD      sevelamer  1,600 mg Oral TID With Meals Damon Rang, DO      traMADol  50 mg Oral Q8H PRN REMY Tello          Today, Patient Was Seen By: Luis Ramos MD    ** Please Note: Dictation voice to text software may have been used in the creation of this document   **

## 2022-06-24 NOTE — PLAN OF CARE
Problem: OCCUPATIONAL THERAPY ADULT  Goal: Performs self-care activities at highest level of function for planned discharge setting  See evaluation for individualized goals  Description: Treatment Interventions: ADL retraining, Functional transfer training, Endurance training, UE strengthening/ROM, Patient/family training, Equipment evaluation/education, Compensatory technique education, Activityengagement, Energy conservation          See flowsheet documentation for full assessment, interventions and recommendations  Outcome: Not Progressing  Note: Limitation: Decreased ADL status, Decreased UE strength, Decreased endurance, Decreased high-level ADLs, Decreased self-care trans  Prognosis: Good  Assessment: Pt received supine in bed on this date reporting no pain/significant fatigue + agreeable to Skilled OT treatment session @ this time  Pt participated in Skilled OT session with interventions consisting of ADL retraining, bed mobility, functional transfer training, endurance training, patient/family training, compensatory technique education, energy conservation and activity engagement in effort to:  increase BUE strength, fxl activity tolerance, static/dynamic sit/stand balance/tolerance for improved independence c ADLs/IADLs/fxl transfers/ADL related fxl mobility  Patient completed bed mobility with mod A x 2, functional transfers with mod A x 2, ADL-related fxl mobility with NT (SPT min A x 1)  ADLs performed while seated EOB with min A UB, max A-total A LB + s/u for grooming  Since eval/previous treatment session, pt improved in areas including sit tolerance ~15 min yet deficits remain @ sit/standing balance/tolerance, fxl activity tolerance, BUE strength, core strength, level of alertness, severe fatigue limiting higher level of I/S c ADLs  Occupational performance remains limited secondary to factors listed above and increased risk for falls and injury   Pt continues to function below baseline + is therefore, unsafe to transition safely home @ this time without skilled occupational therapy services/comprehensive intervention  D/t aforementioned factors, continuation of skilled occupational therapy services are warranted in efforts to prepare patient for safe d/c to PAR c LTG of transitioning home @ PLOF  Goals continued as stated in initial evaluation + therapy will continue to collaborate c pt regarding improvement in all deficit areas  including but not limited to: maximize I c ADLs/IADLs, safe fxl transfer training, energy conservation education, coordination of care, family education, equipment management (DME/AD) + d/c planning  OT Discharge Recommendation: Post acute rehabilitation services  OT - OK to Discharge:  Yes

## 2022-06-24 NOTE — PLAN OF CARE
Problem: Prexisting or High Potential for Compromised Skin Integrity  Goal: Skin integrity is maintained or improved  Description: INTERVENTIONS:  - Identify patients at risk for skin breakdown  - Assess and monitor skin integrity  - Assess and monitor nutrition and hydration status  - Monitor labs   - Assess for incontinence   - Turn and reposition patient  - Assist with mobility/ambulation  - Relieve pressure over bony prominences  - Avoid friction and shearing  - Provide appropriate hygiene as needed including keeping skin clean and dry  - Evaluate need for skin moisturizer/barrier cream  - Collaborate with interdisciplinary team   - Patient/family teaching  - Consider wound care consult   Outcome: Progressing     Problem: Potential for Falls  Goal: Patient will remain free of falls  Description: INTERVENTIONS:  - Educate patient/family on patient safety including physical limitations  - Instruct patient to call for assistance with activity   - Consult OT/PT to assist with strengthening/mobility   - Keep Call bell within reach  - Keep bed low and locked with side rails adjusted as appropriate  - Keep care items and personal belongings within reach  - Initiate and maintain comfort rounds  - Make Fall Risk Sign visible to staff  - Offer Toileting in advance of need  - Initiate/Maintain bed alarm  - Obtain necessary fall risk management equipment:   - Apply yellow socks and bracelet for high fall risk patients  - Consider moving patient to room near nurses station  Outcome: Progressing

## 2022-06-24 NOTE — PLAN OF CARE
Problem: Prexisting or High Potential for Compromised Skin Integrity  Goal: Skin integrity is maintained or improved  Description: INTERVENTIONS:  - Identify patients at risk for skin breakdown  - Assess and monitor skin integrity  - Assess and monitor nutrition and hydration status  - Monitor labs   - Assess for incontinence   - Turn and reposition patient  - Assist with mobility/ambulation  - Relieve pressure over bony prominences  - Avoid friction and shearing  - Provide appropriate hygiene as needed including keeping skin clean and dry  - Evaluate need for skin moisturizer/barrier cream  - Collaborate with interdisciplinary team   - Patient/family teaching  - Consider wound care consult   Outcome: Progressing     Problem: Potential for Falls  Goal: Patient will remain free of falls  Description: INTERVENTIONS:  - Educate patient/family on patient safety including physical limitations  - Instruct patient to call for assistance with activity   - Consult OT/PT to assist with strengthening/mobility   - Keep Call bell within reach  - Keep bed low and locked with side rails adjusted as appropriate  - Keep care items and personal belongings within reach  - Initiate and maintain comfort rounds  - Make Fall Risk Sign visible to staff  - Offer Toileting in advance of need  - Initiate/Maintain bed alarm  - Obtain necessary fall risk management equipment:   - Apply yellow socks and bracelet for high fall risk patients  - Consider moving patient to room near nurses station  Outcome: Progressing     Problem: PAIN - ADULT  Goal: Verbalizes/displays adequate comfort level or baseline comfort level  Description: Interventions:  - Encourage patient to monitor pain and request assistance  - Assess pain using appropriate pain scale  - Administer analgesics based on type and severity of pain and evaluate response  - Implement non-pharmacological measures as appropriate and evaluate response  - Consider cultural and social influences on pain and pain management  - Notify physician/advanced practitioner if interventions unsuccessful or patient reports new pain  Outcome: Progressing     Problem: INFECTION - ADULT  Goal: Absence or prevention of progression during hospitalization  Description: INTERVENTIONS:  - Assess and monitor for signs and symptoms of infection  - Monitor lab/diagnostic results  - Monitor all insertion sites, i e  indwelling lines, tubes, and drains  - Monitor endotracheal if appropriate and nasal secretions for changes in amount and color  - [place] appropriate cooling/warming therapies per order  - Administer medications as ordered  - Instruct and encourage patient and family to use good hand hygiene technique  - Identify and instruct in appropriate isolation precautions for identified infection/condition  Outcome: Progressing     Problem: SAFETY ADULT  Goal: Maintain or return to baseline ADL function  Description: INTERVENTIONS:  -  Assess patient's ability to carry out ADLs; assess patient's baseline for ADL function and identify physical deficits which impact ability to perform ADLs (bathing, care of mouth/teeth, toileting, grooming, dressing, etc )  - Assess/evaluate cause of self-care deficits   - Assess range of motion  - Assess patient's mobility; develop plan if impaired  - Assess patient's need for assistive devices and provide as appropriate  - Encourage maximum independence but intervene and supervise when necessary  - Involve family in performance of ADLs  - Assess for home care needs following discharge   - Consider OT consult to assist with ADL evaluation and planning for discharge  - Provide patient education as appropriate  Outcome: Progressing  Goal: Maintains/Returns to pre admission functional level  Description: INTERVENTIONS:  - Perform BMAT or MOVE assessment daily    - Set and communicate daily mobility goal to care team and patient/family/caregiver     - Collaborate with rehabilitation services on mobility goals if consulted  - Record patient progress and toleration of activity level   Outcome: Progressing     Problem: DISCHARGE PLANNING  Goal: Discharge to home or other facility with appropriate resources  Description: INTERVENTIONS:  - Identify barriers to discharge w/patient and caregiver  - Arrange for needed discharge resources and transportation as appropriate  - Identify discharge learning needs (meds, wound care, etc )  - Refer to Case Management Department for coordinating discharge planning if the patient needs post-hospital services based on physician/advanced practitioner order or complex needs related to functional status, cognitive ability, or social support system  Outcome: Progressing     Problem: Knowledge Deficit  Goal: Patient/family/caregiver demonstrates understanding of disease process, treatment plan, medications, and discharge instructions  Description: Complete learning assessment and assess knowledge base    Interventions:  - Provide teaching at level of understanding  - Provide teaching via preferred learning methods  Outcome: Progressing     Problem: MOBILITY - ADULT  Goal: Maintain or return to baseline ADL function  Description: INTERVENTIONS:  -  Assess patient's ability to carry out ADLs; assess patient's baseline for ADL function and identify physical deficits which impact ability to perform ADLs (bathing, care of mouth/teeth, toileting, grooming, dressing, etc )  - Assess/evaluate cause of self-care deficits   - Assess range of motion  - Assess patient's mobility; develop plan if impaired  - Assess patient's need for assistive devices and provide as appropriate  - Encourage maximum independence but intervene and supervise when necessary  - Involve family in performance of ADLs  - Assess for home care needs following discharge   - Consider OT consult to assist with ADL evaluation and planning for discharge  - Provide patient education as appropriate  Outcome: Progressing  Goal: Maintains/Returns to pre admission functional level  Description: INTERVENTIONS:  - Perform BMAT or MOVE assessment daily    - Set and communicate daily mobility goal to care team and patient/family/caregiver  - Collaborate with rehabilitation services on mobility goals if consulted  - Record patient progress and toleration of activity level   Outcome: Progressing     Problem: METABOLIC, FLUID AND ELECTROLYTES - ADULT  Goal: Electrolytes maintained within normal limits  Description: INTERVENTIONS:  - Monitor labs and assess patient for signs and symptoms of electrolyte imbalances  - Administer electrolyte replacement as ordered  - Monitor response to electrolyte replacements, including repeat lab results as appropriate  - Instruct patient on fluid and nutrition as appropriate  Outcome: Progressing  Goal: Fluid balance maintained  Description: INTERVENTIONS:  - Monitor labs   - Monitor I/O and WT  - Instruct patient on fluid and nutrition as appropriate  - Assess for signs & symptoms of volume excess or deficit  Outcome: Progressing     Problem: Nutrition/Hydration-ADULT  Goal: Nutrient/Hydration intake appropriate for improving, restoring or maintaining nutritional needs  Description: Monitor and assess patient's nutrition/hydration status for malnutrition  Collaborate with interdisciplinary team and initiate plan and interventions as ordered  Monitor patient's weight and dietary intake as ordered or per policy  Utilize nutrition screening tool and intervene as necessary  Determine patient's food preferences and provide high-protein, high-caloric foods as appropriate       INTERVENTIONS:  - Monitor oral intake, urinary output, labs, and treatment plans  - Assess nutrition and hydration status and recommend course of action  - Evaluate amount of meals eaten  - Assist patient with eating if necessary   - Allow adequate time for meals  - Recommend/ encourage appropriate diets, oral nutritional supplements, and vitamin/mineral supplements  - Order, calculate, and assess calorie counts as needed  - Recommend, monitor, and adjust tube feedings and TPN/PPN based on assessed needs  - Assess need for intravenous fluids  - Provide specific nutrition/hydration education as appropriate  - Include patient/family/caregiver in decisions related to nutrition  Outcome: Progressing

## 2022-06-25 PROBLEM — Z71.89 GOALS OF CARE, COUNSELING/DISCUSSION: Status: ACTIVE | Noted: 2022-01-01

## 2022-06-25 NOTE — Clinical Note
Hospice referral for Davidson Jones 75 y/o  1945 pt is currently in slc due to pna secondary to Covid 19 (positive ) pt also is esrd and was getting hd 3xs a week  Chest X-ray today revealed collapsed r lung  Pt and family have agreed to no further aggressive tx including chest tube or hd  Pt spouse is also on our hospice  Pt is o2 dependent on 3l Family would like to bring home on hospice rloc ?

## 2022-06-25 NOTE — PLAN OF CARE
Post-Dialysis RN Treatment Note    Blood Pressure:  Pre 122/79 mm/Hg  Post 106/71 mmHg   EDW  110 kg    Weight:  Pre 103 9 kg   Post 102 4 kg   Mode of weight measurement: Bed Scale   Volume Removed  1300 ml    Treatment duration 180 minutes    NS given  Yes, hypotensive during hd, uf goal lowered and nss given    Treatment shortened? Yes, describe: late start   Medications given during Rx None Reported   Estimated Kt/V  1 0   Access type: AV fistula   Access Issues: No    Report called to primary nurse   Yes Lui Cristobal RN    Saline bolus given and uf goal lowered due to hypotension during tx, able to remove 1 3L net, bp wnl post reinfusion  HD tx plan: 3 hrs removing 2-2 5L as yin  3K bath for K 4 0 6/24  Using DARA av fistula for dialysis      Problem: METABOLIC, FLUID AND ELECTROLYTES - ADULT  Goal: Electrolytes maintained within normal limits  Description: INTERVENTIONS:  - Monitor labs and assess patient for signs and symptoms of electrolyte imbalances  - Administer electrolyte replacement as ordered  - Monitor response to electrolyte replacements, including repeat lab results as appropriate  - Instruct patient on fluid and nutrition as appropriate  Outcome: Progressing     Problem: METABOLIC, FLUID AND ELECTROLYTES - ADULT  Goal: Fluid balance maintained  Description: INTERVENTIONS:  - Monitor labs   - Monitor I/O and WT  - Instruct patient on fluid and nutrition as appropriate  - Assess for signs & symptoms of volume excess or deficit  Outcome: Progressing

## 2022-06-25 NOTE — ASSESSMENT & PLAN NOTE
· Blood pressure overall improved  · Continue losartan and metoprolol  · Hydralazine dose increased to 100 mg t i d   · Labetalol 10 mg as needed every 4 hours

## 2022-06-25 NOTE — ASSESSMENT & PLAN NOTE
· Spoke with patient regarding goals of care  Patient states that he is tired and would like a hospice evaluation  · Also spoke with patient's daughters over the phone regarding goals of care  Per daughter's patient is a DNR/DNI and never wanted to be on dialysis however given a chance    Patient has been increasingly weak and fatigued over the last few months specifically after he receives dialysis  · Family would like to speak to patient regarding possible hospice evaluation  · Will follow up with family  · Of note patient's wife is currently on hospice at home

## 2022-06-25 NOTE — NURSING NOTE
Patient continues with SOB at rest, expiratory wheezing persists on auscultation  Patient responds to touch and name, taking ice chips  Family at bedside

## 2022-06-25 NOTE — RESPIRATORY THERAPY NOTE
RT Protocol Note  Bhavana Cash 76 y o  male MRN: [id number]  Unit/Bed#: -01 Encounter: [id number]    Assessment    Principal Problem:    Pneumonia due to COVID-19 virus  Active Problems:    Essential hypertension    Class 2 severe obesity due to excess calories with serious comorbidity and body mass index (BMI) of 39 0 to 39 9 in adult [facility])    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 ([facility])    Anemia in chronic kidney disease, on chronic dialysis ([facility])    Acute respiratory failure with hypoxia ([facility] )    Thrush      Home Pulmonary Medications:       Past Medical History:   Diagnosis Date    Abdominal aortic aneurysm (AAA) ([facility] )     s/p repair    Anemia     due to kidney disease    Arthritis     CHF (congestive heart failure) ([facility])     stable at present    Chronic kidney disease     on hemodialysis  Sat    Chronic pain disorder     knees    Edema     GERD (gastroesophageal reflux disease)     no meds     Gout     History of echocardiogram 2018    EF 55%, mild LVH, technically difficult study      Hyperlipidemia     borderline no meds    Hyperparathyroidism ([facility] )     Hypertension     Kidney stone     Seasonal allergies     Shortness of breath     mild exertional    Skin cancer     Vitamin D deficiency      Social History     Socioeconomic History    Marital status: /Civil Union     Spouse name: None    Number of children: None    Years of education: None    Highest education level: None   Occupational History    None   Tobacco Use    Smoking status: Former Smoker     Types: Cigarettes     Quit date: 2004     Years since quittin 0    Smokeless tobacco: Never Used   Vaping Use    Vaping Use: Never used   Substance and Sexual Activity    Alcohol use: Not Currently    Drug use: Never    Sexual activity: None   Other Topics Concern    None   Social History Narrative    None     Social Determinants of Health     Financial Resource Strain: Not on file   Food Insecurity: No Food Insecurity    Worried About Running Out of Food in the Last Year: Never true    Ran Out of Food in the Last Year: Never true   Transportation Needs: No Transportation Needs    Lack of Transportation (Medical): No    Lack of Transportation (Non-Medical): No   Physical Activity: Not on file   Stress: Not on file   Social Connections: Not on file   Intimate Partner Violence: Not on file   Housing Stability: Low Risk     Unable to Pay for Housing in the Last Year: No    Number of Places Lived in the Last Year: 1    Unstable Housing in the Last Year: No       Subjective         Objective    Physical Exam:   Assessment Type: During-treatment  General Appearance: Alert, Awake  Respiratory Pattern: Dyspnea at rest  Chest Assessment: Chest expansion symmetrical  Bilateral Breath Sounds: Diminished, Crackles  Cough: Non-productive  O2 Device: 3 lpm nc  Vitals:  Blood pressure 138/70, pulse 90, temperature 97 9 °F (36 6 °C), temperature source Temporal, resp  rate (!) 26, height 5' 8" (1 727 m), weight 103 kg (227 lb 1 2 oz), SpO2 93 %  Imaging and other studies: I have personally reviewed pertinent reports  O2 Device: 3 lpm nc       Plan    Respiratory Plan: Mild Distress pathway  Airway Clearance Plan: Flutter     Resp Comments: no distress tx given

## 2022-06-25 NOTE — PROGRESS NOTES
[address] [address]  Progress Note - Arthuro Rily 1945, 76 y o  male MRN: [id number]  Unit/Bed#: -01 Encounter: [id number]  Primary Care Provider: Beti Osborn DO   Date and time admitted to hospital: 6/14/2022  1:53 PM    * Pneumonia due to COVID-19 virus  Assessment & Plan  · COVID-19 positive on 06/13/2022  Vaccinated x2 against COVID-19  · Patient completed 5 days of remdesivir on 06/18/2022  · Patient completed 10 days of Decadron on 06/23/2022  · Patient titrated down to 3 L of nasal cannula  · CT done on 06/19/2022 concerning for superimposed bacterial pneumonia  · Patient completed 5 days of vancomycin, MRSA screen negative  Also completed 7 days of cefepime on 06/25/2022  · Vancomycin discontinued on 06/23/2022  · Patient was initially on heparin drip however discontinued due to hemoptysis    SubQ heparin initiated  · Cultures negative to date  · Monitor inflammatory markers  · Supportive care    Thrush  Assessment & Plan  · Nystatin swish and swallow    Acute respiratory failure with hypoxia (HCC)  Assessment & Plan  · Secondary to COVID infection  · Patient developed hemoptysis, heparin drip discontinued and hemoptysis appears to be improved  · Pulmonology following  · Will continue oxygen and titrate as tolerated    Anemia in chronic kidney disease, on chronic dialysis (HCC)  Assessment & Plan  · Hemoglobin stable and at baseline  · Continue home ferrous sulfate  · Trend CBC         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 [facility])  Assessment & Plan  · Patient back to routine dialysis schedule   · Continue management per Nephrology  · Next dialysis session is today    Class 2 severe obesity due to excess calories with serious comorbidity and body mass index (BMI) of 39 0 to 39 9 in adult [facility])  Assessment & Plan  · Present on admission as evidenced by BMI greater than 35  · Endorse lifestyle modifications and weight loss        Essential hypertension  Assessment & Plan  · Blood pressure overall improved  · Continue losartan and metoprolol  · Hydralazine dose increased to 100 mg t i d   · Labetalol 10 mg as needed every 4 hours      VTE Prophylaxis:  Heparin    Patient Centered Rounds: I have performed bedside rounds with nursing staff today  Discussions with Specialists or Other Care Team Provider: yes  Education and Discussions with Family / Patient:  Spoke with patient's family over the phone    Current Length of Stay: 11 day(s)    Current Patient Status: Inpatient   Certification Statement: The patient will continue to require additional inpatient hospital stay due to Respiratory failure    Discharge Plan:  Pending hospital course    Code Status: Level 3 - DNAR and DNI    Subjective:   No overnight events noted  Patient continues with weakness and fatigue  Decreased appetite  Objective:     Vitals:   Temp (24hrs), Av °F (36 7 °C), Min:97 8 °F (36 6 °C), Max:98 2 °F (36 8 °C)    Temp:  [97 8 °F (36 6 °C)-98 2 °F (36 8 °C)] 98 2 °F (36 8 °C)  HR:  [81-99] 91  Resp:  [18-26] 18  BP: (107-144)/(57-73) 131/57  SpO2:  [90 %-97 %] 97 %  Body mass index is 34 73 kg/m²  Input and Output Summary (last 24 hours): Intake/Output Summary (Last 24 hours) at 2022 1151  Last data filed at 2022 2144  Gross per 24 hour   Intake 270 ml   Output --   Net 270 ml       Physical Exam:   Physical Exam  Constitutional:       General: He is not in acute distress  Appearance: He is obese  He is ill-appearing  HENT:      Head: Normocephalic and atraumatic  Nose: Nose normal       Mouth/Throat:      Comments: Thrush improving  Eyes:      Extraocular Movements: Extraocular movements intact  Conjunctiva/sclera: Conjunctivae normal    Cardiovascular:      Rate and Rhythm: Normal rate and regular rhythm  Pulmonary:      Effort: Pulmonary effort is normal  No respiratory distress  Abdominal:      Palpations: Abdomen is soft  Tenderness: There is no abdominal tenderness  Musculoskeletal:      Cervical back: Normal range of motion  Comments: Generalized weakness   Skin:     General: Skin is warm and dry  Neurological:      General: No focal deficit present  Mental Status: He is alert  Mental status is at baseline  Cranial Nerves: No cranial nerve deficit  Comments: Drowsy but arousable   Psychiatric:         Mood and Affect: Mood is depressed  Behavior: Behavior normal          Additional Data:     Labs:    Results from last 7 days   Lab Units 06/24/22  0503 06/23/22  0627   WBC Thousand/uL 21 87* 19 65*   HEMOGLOBIN g/dL 9 3* 8 7*   HEMATOCRIT % 29 4* 27 3*   PLATELETS Thousands/uL 256 267   LYMPHO PCT %  --  5*   MONO PCT %  --  4   EOS PCT %  --  0     Results from last 7 days   Lab Units 06/24/22  0503 06/23/22  0627 06/22/22  0440   SODIUM mmol/L 130*   < > 128*   POTASSIUM mmol/L 4 0   < > 4 0   CHLORIDE mmol/L 96   < > 93*   CO2 mmol/L 24   < > 27   BUN mg/dL 46*   < > 43*   CREATININE mg/dL 4 36*   < > 4 66*   CALCIUM mg/dL 8 7   < > 8 6   ALK PHOS U/L  --   --  65   ALT U/L  --   --  14   AST U/L  --   --  15    < > = values in this interval not displayed  Results from last 7 days   Lab Units 06/19/22  0419   INR  1 07               * I Have Reviewed All Lab Data Listed Above  * Additional Pertinent Lab Tests [facility]ed: [facility]  Reviewed    Imaging:  Imaging Reports Reviewed Today Include:  No new imaging    Recent Cultures (last 7 days):     Results from last 7 days   Lab Units 06/22/22  1831 06/21/22  2101   SPUTUM CULTURE  Test not performed  Suggest repeat specimen  Test not performed  Suggest repeat specimen  GRAM STAIN RESULT  >10 squamous epithelial cells/lpf, indicating orophayngeal contamination  *  Rare Polys*  3+ Budding yeast*  1+ Gram positive rods*  Rare Gram positive cocci in pairs* >10 squamous epithelial cells/lpf, indicating orophayngeal contamination  *  No polys seen*  3+ Budding yeast*  2+ Gram positive cocci in pairs and chains*  2+ Gram positive rods*  1+ Gram negative rods*       Last 24 Hours Medication List:   Current Facility-Administered Medications   Medication Dose Route Frequency Provider Last Rate    acetaminophen  650 mg Oral Q4H PRN Creed Elms, DO      albumin human  12 5 g Intravenous Once Cross Rigo, REMY      albuterol  2 5 mg Nebulization Q4H PRN REMY Rich      allopurinol  200 mg Oral HS Taye Mann MD      aluminum-magnesium hydroxide-simethicone  30 mL Oral Q4H PRN Taye Mann MD      amLODIPine  5 mg Oral Daily Elle Brandon MD      ascorbic acid  500 mg Oral BID Creed Jennifer, DO      benzonatate  200 mg Oral TID REMY Rich      calcium acetate  1,334 mg Oral TID With Meals Creed Jennifer, DO      co-enzyme Q-10  200 mg Oral HS Sid Sher MD      docusate sodium  100 mg Oral BID Taye Mann MD      ferrous sulfate  325 mg Oral Daily With Breakfast Creed Jennifer, DO      folic acid  1 mg Oral Daily Creed Jennifer, DO      guaiFENesin  600 mg Oral Q12H [address] REMY Rich      heparin (porcine)  5,000 Units Subcutaneous [facility] Taye Mann MD      hydrALAZINE  25 mg Oral [facility] Elle Brandon MD      HYDROcodone-homatropine  5 mL Oral Q4H PRN Alba Casper PA-C      HYDROmorphone  0 5 mg Intravenous Q4H PRN REMY Rich      ipratropium  0 5 mg Nebulization TID REMY Rich      labetalol  10 mg Intravenous Q4H PRN Creed Jennifer, DO      levalbuterol  1 25 mg Nebulization TID Creed Jennifer, DO      lidocaine   Topical Daily PRN Cross Rigo, REMY      losartan  50 mg Oral BID Cross Rigo, REMY      magnesium oxide  400 mg Oral Daily Creed Elms, DO      metoclopramide  5 mg Intravenous Q6H PRN Alba Casper PA-C      metoprolol succinate  50 mg Oral BID Meena Carlos Félix, DO      nystatin  500,000 Units Swish & Swallow 4x Daily De Carlisle MD      ondansetron  4 mg Oral Q6H PRN Sejal Ivanoff, DO      oxyCODONE  5 mg Oral Q8H PRN REMY Morrison      senna  1 tablet Oral HS De Carlisle MD      sevelamer  1,600 mg Oral TID With Meals Sejal Ivanoff, DO      traMADol  50 mg Oral Q8H PRN REMY Morrison          Today, Patient Was Seen By: De Carlisle MD    ** Please Note: Dictation voice to text software may have been used in the creation of this document   **

## 2022-06-25 NOTE — ASSESSMENT & PLAN NOTE
· Patient back to routine dialysis schedule   · Continue management per Nephrology  · Next dialysis session is today

## 2022-06-25 NOTE — QUICK NOTE
Called by radiology regarding chest x-ray with finding of large pneumothorax causing collapse and consolidation of right lung  Discussed with pulmonology who state that patient will likely need a chest tube  Called family and spoke with patient's daughter Gurjit Luque and David Walden over the phone  Explained to them finding of chest x-ray and need for chest tube  At this time they both agree that they do not want any further invasive treatment  They do not want to chest tube at this time  They are going to speak to their mother about transitioning patient to hospice  Explained to them that patient's condition could worsen which they understand  Patient is a DNR/DNI  No further escalation in care    If patient does clinically decline will reach back out to family regarding transitioning to comfort care
Nursing contacted me that patient was having hemoptysis  Heparin drip was held and STAT PTT and CBC were ordered and respiratory to evaluate patient 
Spoke with patient's daughters at bedside as well as grandson  Discussion was based on patient's clinical decline  Spoke with family stating that patient has indicated that he is interested in hospice  Family also confirms that he was asking them about hospice when they arrived today  Patient has completed therapy with antibiotics as well as COVID treatment  Despite improvement of labs and oxygen requirements patient continues to be weak, debilitated, and depressed  Family agrees with obtaining hospice evaluation  Will try dialysis today to see if patient is able to tolerate  Notified case management regarding proceeding with hospice    Patient currently DNR/DNI
nasal congestion

## 2022-06-25 NOTE — CASE MANAGEMENT
Case Management Discharge Planning Note    Patient name Immanuel Lowry  Location /-68 MRN [id number]  : 1945 Date 2022       Current Admission Date: 2022  Current Admission Diagnosis:Pneumonia due to COVID-19 virus   Patient Active Problem List    Diagnosis Date Noted    Goals of care, counseling/discussion 2022    Thrush 2022    Pneumonia due to COVID-19 virus 2022    [facility] failure with [facility] ) 2022    Elevated d-dimer 2022    Pancreatic mass 2022    Primary osteoarthritis of both knees 2021    Hyperlipidemia 2021    Class 2 severe obesity due to excess calories with serious comorbidity and body mass index (BMI) of 39 0 to 39 9 in [facility]) 2021    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 ([facility] ) 2021    Secondary hyperparathyroidism of renal origin ([facility] ) 2021    Chronic renal failure 2021    Other fluid overload 2020    Abnormal nuclear stress test 2020    Panlobular emphysema ([facility] ) 2020    Disorder of phosphorus metabolism, unspecified 2019    Essential hypertension 2019    Hemodialysis-associated hypotension 2019    AAA (abdominal aortic aneurysm) ([facility] ) 2019    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, initial encounter ([facility] ) 2019    Anemia in chronic kidney disease, on chronic dialysis ([facility] ) 2018    Benign prostatic hyperplasia without lower urinary tract symptoms 2018    Chronic gout, unspecified, with tophus (tophi) 2018    Iron deficiency anemia 2018    Kidney stone 2018    Magnesium deficiency 2018    Other disorders of electrolyte and fluid balance, not elsewhere classified 2018    Personal history of nicotine dependence 2018    Proteinuria, unspecified 2018    Unspecified protein-calorie malnutrition ([facility] ) 08/29/2018    Skin lesion of face 08/23/2018    Chronic systolic congestive heart failure ([facility] ) 06/01/2018    ASCVD (arteriosclerotic cardiovascular disease) 12/11/2017    Carotid stenosis, right 12/11/2017    Gastroesophageal reflux disease 12/11/2017    Vitamin D deficiency 12/11/2017    Osteoarthritis of both knees 09/02/2015      LOS (days): 11  Geometric Mean LOS (GMLOS) (days): 5 40  Days to GMLOS:-5 6     OBJECTIVE:  Risk of Unplanned Readmission Score: 30 39         Current admission status: Inpatient   Preferred Pharmacy:   [facility]/pharmacy #[id number]- [place] [address] [place]  [address]  [place]  [address] [address], [address] [facility]  Phone: [phone] Fax: [phone] [facility]  [address] [place] [address]  Phone: [phone] Fax: [phone] [facility] [address] [place] [address] [address] [place] [address]  Phone: [phone] Fax: [phone]    Primary Care Provider: Bob Vergara DO    Primary Insurance: [facility]  Secondary Insurance:     DISCHARGE DETAILS:    Discharge planning discussed with[de-identified] patient and daughters  at [facility] bedside  Freedom of Choice: Yes  Comments - [place] of Choice: hospice consult received  CM contacted family/caregiver?: Yes             Contacts  Patient Contacts: daughters  Relationship to Patient[de-identified] Family  Contact Method:  In Person              Other Referral/Resources/Interventions Provided:  Interventions: Hospice  Referral Comments: referral sent to [address] as requested, pt's wife is on hopsice care with [facility]    Would you like to participate in our [address] service program?  : No - Declined

## 2022-06-25 NOTE — PLAN OF CARE
Problem: Prexisting or High Potential for Compromised Skin Integrity  Goal: Skin integrity is maintained or improved  Description: INTERVENTIONS:  - Identify patients at risk for skin breakdown  - Assess and monitor skin integrity  - Assess and monitor nutrition and hydration status  - Monitor labs   - Assess for incontinence   - Turn and reposition patient  - Assist with mobility/ambulation  - Relieve pressure over bony prominences  - Avoid friction and shearing  - Provide appropriate hygiene as needed including keeping skin clean and dry  - Evaluate need for skin moisturizer/barrier cream  - Collaborate with interdisciplinary team   - Patient/family teaching  - Consider wound care consult   Outcome: Progressing     Problem: Potential for Falls  Goal: Patient will remain free of falls  Description: INTERVENTIONS:  - Educate patient/family on patient safety including physical limitations  - Instruct patient to call for assistance with activity   - Consult OT/PT to assist with strengthening/mobility   - Keep Call bell within reach  - Keep bed low and locked with side rails adjusted as appropriate  - Keep care items and personal belongings within reach  - Initiate and maintain comfort rounds  - Make Fall Risk Sign visible to staff  - Offer Toileting in advance of need  - Initiate/Maintain bed alarm  - Obtain necessary fall risk management equipment:   - Apply yellow socks and bracelet for high fall risk patients  - Consider moving patient to room near nurses station  Outcome: Progressing     Problem: PAIN - ADULT  Goal: Verbalizes/displays adequate comfort level or baseline comfort level  Description: Interventions:  - Encourage patient to monitor pain and request assistance  - Assess pain using appropriate pain scale  - Administer analgesics based on type and severity of pain and evaluate response  - Implement non-pharmacological measures as appropriate and evaluate response  - Consider cultural and social influences on pain and pain management  - Notify physician/advanced practitioner if interventions unsuccessful or patient reports new pain  Outcome: Progressing     Problem: INFECTION - ADULT  Goal: Absence or prevention of progression during hospitalization  Description: INTERVENTIONS:  - Assess and monitor for signs and symptoms of infection  - Monitor lab/diagnostic results  - Monitor all insertion sites, i e  indwelling lines, tubes, and drains  - Monitor endotracheal if appropriate and nasal secretions for changes in amount and color  - [place] appropriate cooling/warming therapies per order  - Administer medications as ordered  - Instruct and encourage patient and family to use good hand hygiene technique  - Identify and instruct in appropriate isolation precautions for identified infection/condition  Outcome: Progressing     Problem: SAFETY ADULT  Goal: Maintain or return to baseline ADL function  Description: INTERVENTIONS:  -  Assess patient's ability to carry out ADLs; assess patient's baseline for ADL function and identify physical deficits which impact ability to perform ADLs (bathing, care of mouth/teeth, toileting, grooming, dressing, etc )  - Assess/evaluate cause of self-care deficits   - Assess range of motion  - Assess patient's mobility; develop plan if impaired  - Assess patient's need for assistive devices and provide as appropriate  - Encourage maximum independence but intervene and supervise when necessary  - Involve family in performance of ADLs  - Assess for home care needs following discharge   - Consider OT consult to assist with ADL evaluation and planning for discharge  - Provide patient education as appropriate  Outcome: Progressing  Goal: Maintains/Returns to pre admission functional level  Description: INTERVENTIONS:  - Perform BMAT or MOVE assessment daily    - Set and communicate daily mobility goal to care team and patient/family/caregiver     - Collaborate with rehabilitation services on mobility goals if consulted  - Record patient progress and toleration of activity level   Outcome: Progressing     Problem: DISCHARGE PLANNING  Goal: Discharge to home or other facility with appropriate resources  Description: INTERVENTIONS:  - Identify barriers to discharge w/patient and caregiver  - Arrange for needed discharge resources and transportation as appropriate  - Identify discharge learning needs (meds, wound care, etc )  - Refer to Case Management Department for coordinating discharge planning if the patient needs post-hospital services based on physician/advanced practitioner order or complex needs related to functional status, cognitive ability, or social support system  Outcome: Progressing     Problem: Knowledge Deficit  Goal: Patient/family/caregiver demonstrates understanding of disease process, treatment plan, medications, and discharge instructions  Description: Complete learning assessment and assess knowledge base    Interventions:  - Provide teaching at level of understanding  - Provide teaching via preferred learning methods  Outcome: Progressing     Problem: MOBILITY - ADULT  Goal: Maintain or return to baseline ADL function  Description: INTERVENTIONS:  -  Assess patient's ability to carry out ADLs; assess patient's baseline for ADL function and identify physical deficits which impact ability to perform ADLs (bathing, care of mouth/teeth, toileting, grooming, dressing, etc )  - Assess/evaluate cause of self-care deficits   - Assess range of motion  - Assess patient's mobility; develop plan if impaired  - Assess patient's need for assistive devices and provide as appropriate  - Encourage maximum independence but intervene and supervise when necessary  - Involve family in performance of ADLs  - Assess for home care needs following discharge   - Consider OT consult to assist with ADL evaluation and planning for discharge  - Provide patient education as appropriate  Outcome: Progressing  Goal: Maintains/Returns to pre admission functional level  Description: INTERVENTIONS:  - Perform BMAT or MOVE assessment daily    - Set and communicate daily mobility goal to care team and patient/family/caregiver  - Collaborate with rehabilitation services on mobility goals if consulted  - Record patient progress and toleration of activity level   Outcome: Progressing     Problem: METABOLIC, FLUID AND ELECTROLYTES - ADULT  Goal: Electrolytes maintained within normal limits  Description: INTERVENTIONS:  - Monitor labs and assess patient for signs and symptoms of electrolyte imbalances  - Administer electrolyte replacement as ordered  - Monitor response to electrolyte replacements, including repeat lab results as appropriate  - Instruct patient on fluid and nutrition as appropriate  Outcome: Progressing  Goal: Fluid balance maintained  Description: INTERVENTIONS:  - Monitor labs   - Monitor I/O and WT  - Instruct patient on fluid and nutrition as appropriate  - Assess for signs & symptoms of volume excess or deficit  Outcome: Progressing     Problem: Nutrition/Hydration-ADULT  Goal: Nutrient/Hydration intake appropriate for improving, restoring or maintaining nutritional needs  Description: Monitor and assess patient's nutrition/hydration status for malnutrition  Collaborate with interdisciplinary team and initiate plan and interventions as ordered  Monitor patient's weight and dietary intake as ordered or per policy  Utilize nutrition screening tool and intervene as necessary  Determine patient's food preferences and provide high-protein, high-caloric foods as appropriate       INTERVENTIONS:  - Monitor oral intake, urinary output, labs, and treatment plans  - Assess nutrition and hydration status and recommend course of action  - Evaluate amount of meals eaten  - Assist patient with eating if necessary   - Allow adequate time for meals  - Recommend/ encourage appropriate diets, oral nutritional supplements, and vitamin/mineral supplements  - Order, calculate, and assess calorie counts as needed  - Recommend, monitor, and adjust tube feedings and TPN/PPN based on assessed needs  - Assess need for intravenous fluids  - Provide specific nutrition/hydration education as appropriate  - Include patient/family/caregiver in decisions related to nutrition  Outcome: Progressing

## 2022-06-25 NOTE — PROGRESS NOTES
NEPHROLOGY PROGRESS NOTE   Viviane Navarro 76 y o  male MRN: [id number]  Unit/Bed#: -01 Encounter: [id number]    Assessment/Plan:    Viviane Navarro is a 76 y o  male with ESRD on hemodialysis admitted 06/14 initially treated for acute hypoxic respiratory failure secondary to COVID-19 pneumonia however developed multifocal pneumonia suspected due to aspiration from choking/dysphagia, thrush, hemoptysis in the setting of heparin infusion  Nephrology following along for management of dialysis-plan outlined below      1  ESRD on maintenance hemodialysis Tuesday, Thursday, Saturday [facility] [address]  ? Previous target weight 110 kg now significantly below this likely due to actual weight loss as he has poor oral intake  ? Tachypneic on exam and generally feeling dyspneic  Stat chest x-ray pending official read  Will attempt to ultrafiltrate aggressively with hemodialysis and monitor for hypotension  2  Secondary hyperparathyroidism  ? Check phosphorus level today  Likely will need to deescalate or discontinue phosphorus binders due to poor oral intake  3  Anemia of ESRD  ? Hemoglobin stable, defer JARETT  4  Hypertension in the setting of ESRD  ? Hold antihypertensives prior to dialysis to prevent intra dialytic hypotension   5  Acute hypoxic respiratory failure, Multifocal pneumonia  ? Stat chest x-ray pending official read  Due for dialysis with ultrafiltration  6  Constipation  ? Avoid fleets enemas and caution with magnesium citrate in patients with end-stage renal disease        ROS  Seen and examined lying in bed  Patient states he feels very unwell  Cant elucidate any further  A complete 10 point review of systems have been performed and are otherwise negative         Historical Information   Past Medical History:   Diagnosis Date    Abdominal aortic aneurysm (AAA) ([facility] [address] )     s/p repair    Anemia     due to kidney disease    Arthritis     CHF [facility]congestive heart failure) [facility])     stable at present   Yue Dee Chronic kidney disease     on hemodialysis  Sat    Chronic pain disorder     knees    Edema     GERD (gastroesophageal reflux disease)     no meds     Gout     History of echocardiogram 2018    EF 55%, mild LVH, technically difficult study      Hyperlipidemia     borderline no meds    Hyperparathyroidism ([address] )     Hypertension     Kidney stone     Seasonal allergies     Shortness of breath     mild exertional    Skin cancer     Vitamin D deficiency      Past Surgical History:   Procedure Laterality Date    ABDOMINAL AORTIC ANEURYSM REPAIR  2004    CARDIAC CATHETERIZATION  2020    no blockages    CARPAL TUNNEL RELEASE Right     COLONOSCOPY      DIALYSIS FISTULA CREATION Right 2017    GLAUCOMA SURGERY      LEG SURGERY      removal splinter    PARATHYROIDECTOMY      SPLIT THICKNESS SKIN GRAFT N/A 2019    Procedure: THIGH STSG;  Surgeon: Shannan Goel MD;  Location: [address] [place] [address];  Service: Plastics    SQUAMOUS CELL CARCINOMA EXCISION Right 2019    Procedure: REMOVE SKIN CA FROM EAR & CHEEK;  Surgeon: Shannan Goel MD;  Location: [address] [place] [address];  Service: Plastics     Social History   Social History     Substance and Sexual Activity   Alcohol Use Not Currently     Social History     Substance and Sexual Activity   Drug Use Never     Social History     Tobacco Use   Smoking Status Former Smoker    Types: Cigarettes    Quit date: 2004    Years since quittin 0   Smokeless Tobacco Never Used       Family History:   Family History   Problem Relation Age of Onset    Kidney disease Mother     Leukemia Father        Medications:  Pertinent medications were reviewed  Current Facility-Administered Medications   Medication Dose Route Frequency Provider Last Rate    acetaminophen  650 mg Oral Q4H PRN Jarrett Lucia DO      albumin human  12 5 g Intravenous Once REMY Armendariz      albuterol  2 5 mg Nebulization Q4H PRN REMY Choudhary      allopurinol 200 mg Oral HS Lord Ky MD      aluminum-magnesium hydroxide-simethicone  30 mL Oral Q4H PRN Lord Ky MD      amLODIPine  5 mg Oral Daily Tom Harrington MD      ascorbic acid  500 mg Oral BID Lesly Ramp, DO      benzonatate  200 mg Oral TID REMY Tobias      calcium acetate  1,334 mg Oral TID With Meals Lesly Ramp, DO      co-enzyme Q-10  200 mg Oral HS Lord Ky MD      docusate sodium  100 mg Oral BID Lord Ky MD      ferrous sulfate  325 mg Oral Daily With Breakfast Lesly Ramp, DO      folic acid  1 mg Oral Daily Lesly Ramp, DO      guaiFENesin  600 mg Oral Q12H [address] REMY Tobias      heparin (porcine)  5,000 Units Subcutaneous [facility] Lord Ky MD      hydrALAZINE  25 mg Oral [facility] Tom Harrington MD      HYDROcodone-homatropine  5 mL Oral Q4H PRN Rich Gardner PA-C      HYDROmorphone  0 5 mg Intravenous Q4H PRN REMY Tobias      ipratropium  0 5 mg Nebulization TID REMY Tobias      labetalol  10 mg Intravenous Q4H PRN Lesly Ramp, DO      levalbuterol  1 25 mg Nebulization TID Lesly Ramp, DO      lidocaine   Topical Daily PRN Priscella [place] brady      losartan  50 mg Oral BID Priscella Ethel, REMY      magnesium oxide  400 mg Oral Daily Lesly Ramp, DO      metoclopramide  5 mg Intravenous Q6H PRN Rich Gardner PA-C      metoprolol succinate  50 mg Oral BID Lesly Ramp, DO      nystatin  500,000 Units Swish & Swallow 4x Daily Lord Ky MD      ondansetron  4 mg Oral Q6H PRN Lesly Ramp, DO      oxyCODONE  5 mg Oral Q8H PRN REMY Tobias      senna  1 tablet Oral HS Lord Ky MD      sevelamer  1,600 mg Oral TID With Meals Lesyl Ramp, DO      traMADol  50 mg Oral Q8H PRN REMY Tobias           Allergies   Allergen Reactions    Iodine - Food Allergy      Other reaction(s): Respiratory Distress  "swelling of throat"    Levofloxacin      Other reaction(s): Respiratory Distress  "swelling of throat"    Lovastatin      Muscle weakness    Medical Tape Other (See Comments)     Skin breaks down    Shellfish-Derived Products - Food Allergy          Vitals:   /68   Pulse 89   Temp 98 2 °F (36 8 °C) (Temporal)   Resp (!) 24   Ht 5' 8" (1 727 m)   Wt 104 kg (228 lb 6 3 oz)   SpO2 92%   BMI 34 73 kg/m²   Body mass index is 34 73 kg/m²  SpO2: 92 %,   SpO2 Activity: At Rest,   O2 Device: Nasal cannula      Intake/Output Summary (Last 24 hours) at 6/25/2022 1353  Last data filed at 6/24/2022 2144  Gross per 24 hour   Intake 270 ml   Output --   Net 270 ml     Invasive Devices  Report    Peripheral Intravenous Line  Duration           Peripheral IV 06/22/22 Left;Ventral (anterior) Forearm 2 days          Line  Duration           Hemodialysis AV Fistula Upper arm -- days                Physical Exam  General: conscious, cooperative, in some respiratory distress  Eyes: conjunctivae pink, anicteric sclerae  ENT: lips and mucous membranes dry, white coating on tongue  Neck: supple, no JVD, no masses  Chest:  Very diminished breath sounds bilaterally, tachypneic  CVS: S1 & S2, normal rate, regular rhythm  Abdomen: soft, non-tender, non-distended, normoactive bowel sounds  Extremities:  Trace edema of both legs  Skin: no rash  Neuro: awake, alert, oriented lethargic    Diagnostic Data:  Lab: I have personally reviewed pertinent lab results  ,   CBC:  Results from last 7 days   Lab Units 06/24/22  0503   WBC Thousand/uL 21 87*   HEMOGLOBIN g/dL 9 3*   HEMATOCRIT % 29 4*   PLATELETS Thousands/uL 256      CMP: No results found for: SODIUM, K, CL, CO2, ANIONGAP, BUN, CREATININE, GLUCOSE, CALCIUM, AST, ALT, ALKPHOS, PROT, BILITOT, EGFR,   PT/INR: No results found for: PT, INR,   Magnesium: No components found for: MAG,  Phosphorous: No results found for: PHOS    Microbiology:  @LAB[facility],(urinecx:7)@        REMY Sepulveda    Portions of the record may have been created with voice recognition software  Occasional wrong word or "sound a like" substitutions may have occurred due to the inherent limitations of voice recognition software  Read the chart carefully and recognize, using context, where substitutions have occurred

## 2022-06-25 NOTE — NURSING NOTE
Patient drowsy, awakens and opens eyes to name  Refusing to eat or take medications, scheduled for dialysis this afternoon, asked for ice chips, tolerated well  Complained of discomfort R rib area  Repositioned to comfort  Call bell in reach

## 2022-06-25 NOTE — PLAN OF CARE
Problem: Prexisting or High Potential for Compromised Skin Integrity  Goal: Skin integrity is maintained or improved  Description: INTERVENTIONS:  - Identify patients at risk for skin breakdown  - Assess and monitor skin integrity  - Assess and monitor nutrition and hydration status  - Monitor labs   - Assess for incontinence   - Turn and reposition patient  - Assist with mobility/ambulation  - Relieve pressure over bony prominences  - Avoid friction and shearing  - Provide appropriate hygiene as needed including keeping skin clean and dry  - Evaluate need for skin moisturizer/barrier cream  - Collaborate with interdisciplinary team   - Patient/family teaching  - Consider wound care consult   Outcome: Progressing     Problem: PAIN - ADULT  Goal: Verbalizes/displays adequate comfort level or baseline comfort level  Description: Interventions:  - Encourage patient to monitor pain and request assistance  - Assess pain using appropriate pain scale  - Administer analgesics based on type and severity of pain and evaluate response  - Implement non-pharmacological measures as appropriate and evaluate response  - Consider cultural and social influences on pain and pain management  - Notify physician/advanced practitioner if interventions unsuccessful or patient reports new pain  Outcome: Progressing     Problem: INFECTION - ADULT  Goal: Absence or prevention of progression during hospitalization  Description: INTERVENTIONS:  - Assess and monitor for signs and symptoms of infection  - Monitor lab/diagnostic results  - Monitor all insertion sites, i e  indwelling lines, tubes, and drains  - Monitor endotracheal if appropriate and nasal secretions for changes in amount and color  - [place] appropriate cooling/warming therapies per order  - Administer medications as ordered  - Instruct and encourage patient and family to use good hand hygiene technique  - Identify and instruct in appropriate isolation precautions for identified infection/condition  Outcome: Progressing     Problem: DISCHARGE PLANNING  Goal: Discharge to home or other facility with appropriate resources  Description: INTERVENTIONS:  - Identify barriers to discharge w/patient and caregiver  - Arrange for needed discharge resources and transportation as appropriate  - Identify discharge learning needs (meds, wound care, etc )  - Refer to Case Management Department for coordinating discharge planning if the patient needs post-hospital services based on physician/advanced practitioner order or complex needs related to functional status, cognitive ability, or social support system  Outcome: Progressing

## 2022-06-25 NOTE — ASSESSMENT & PLAN NOTE
· COVID-19 positive on 06/13/2022  Vaccinated x2 against COVID-19  · Patient completed 5 days of remdesivir on 06/18/2022  · Patient completed 10 days of Decadron on 06/23/2022  · Patient titrated down to 3 L of nasal cannula  · CT done on 06/19/2022 concerning for superimposed bacterial pneumonia  · Patient completed 5 days of vancomycin, MRSA screen negative  Also completed 7 days of cefepime on 06/25/2022  · Vancomycin discontinued on 06/23/2022  · Patient was initially on heparin drip however discontinued due to hemoptysis    SubQ heparin initiated  · Cultures negative to date  · Monitor inflammatory markers  · Supportive care

## 2022-06-26 PROBLEM — J93.9 PNEUMOTHORAX: Status: ACTIVE | Noted: 2022-01-01

## 2022-06-26 NOTE — ASSESSMENT & PLAN NOTE
· COVID-19 positive on 06/13/2022  Vaccinated x2 against COVID-19  · Patient completed 5 days of remdesivir on 06/18/2022  · Patient completed 10 days of Decadron on 06/23/2022  · Patient titrated down to 3 L of nasal cannula  · CT done on 06/19/2022 concerning for superimposed bacterial pneumonia  · Chest x-ray on 06/25/2022 with large right pneumothorax  · Patient completed 5 days of vancomycin, MRSA screen negative  Also completed 7 days of cefepime on 06/25/2022  · Vancomycin discontinued on 06/23/2022  · Patient was initially on heparin drip however discontinued due to hemoptysis    SubQ heparin initiated  · Cultures negative to date  · Monitor inflammatory markers  · Supportive care

## 2022-06-26 NOTE — ACP (ADVANCE CARE PLANNING)
Asked to see patient regarding new R pneumothorax  Patient lethargic, but appears to be resting comfortably without respiratory distress  Call placed to patients daughter Veronica Carmichael to discuss chest tube for pneumothorax  Veronica Carmichael states that patient would not want any further invasive procedures, and has asked to be placed on hospice care  She and family have discussed the situation and they do not wish to pursue chest tube  At this point they want to continue current medical management without escalation of care  They wish to speak with hospice liaison in the [place] understands that her father is at high risk for decompensation and death  IF he were to decompensate in any way, she understands that comfort care would be most appropriate at that time  Discussed with SCOTT and yaneth CC attending Dr Dave Charles

## 2022-06-26 NOTE — NURSING NOTE
Pt expressing 10/10 pain in chest area  Pt tachypneic RR 24-26  Hospitalist made aware  Orders for IV pain medication

## 2022-06-26 NOTE — ASSESSMENT & PLAN NOTE
· Patient back to routine dialysis schedule   · Continue management per Nephrology  · Last dialysis session was yesterday

## 2022-06-26 NOTE — PLAN OF CARE
Problem: Prexisting or High Potential for Compromised Skin Integrity  Goal: Skin integrity is maintained or improved  Description: INTERVENTIONS:  - Identify patients at risk for skin breakdown  - Assess and monitor skin integrity  - Assess and monitor nutrition and hydration status  - Monitor labs   - Assess for incontinence   - Turn and reposition patient  - Assist with mobility/ambulation  - Relieve pressure over bony prominences  - Avoid friction and shearing  - Provide appropriate hygiene as needed including keeping skin clean and dry  - Evaluate need for skin moisturizer/barrier cream  - Collaborate with interdisciplinary team   - Patient/family teaching  - Consider wound care consult   Outcome: Not Progressing     Problem: Potential for Falls  Goal: Patient will remain free of falls  Description: INTERVENTIONS:  - Educate patient/family on patient safety including physical limitations  - Instruct patient to call for assistance with activity   - Consult OT/PT to assist with strengthening/mobility   - Keep Call bell within reach  - Keep bed low and locked with side rails adjusted as appropriate  - Keep care items and personal belongings within reach  - Initiate and maintain comfort rounds  - Make Fall Risk Sign visible to staff  - Offer Toileting in advance of need  - Initiate/Maintain bed alarm  - Obtain necessary fall risk management equipment:   - Apply yellow socks and bracelet for high fall risk patients  - Consider moving patient to room near nurses station  Outcome: Not Progressing     Problem: PAIN - ADULT  Goal: Verbalizes/displays adequate comfort level or baseline comfort level  Description: Interventions:  - Encourage patient to monitor pain and request assistance  - Assess pain using appropriate pain scale  - Administer analgesics based on type and severity of pain and evaluate response  - Implement non-pharmacological measures as appropriate and evaluate response  - Consider cultural and social influences on pain and pain management  - Notify physician/advanced practitioner if interventions unsuccessful or patient reports new pain  Outcome: Not Progressing     Problem: INFECTION - ADULT  Goal: Absence or prevention of progression during hospitalization  Description: INTERVENTIONS:  - Assess and monitor for signs and symptoms of infection  - Monitor lab/diagnostic results  - Monitor all insertion sites, i e  indwelling lines, tubes, and drains  - Monitor endotracheal if appropriate and nasal secretions for changes in amount and color  - [place] appropriate cooling/warming therapies per order  - Administer medications as ordered  - Instruct and encourage patient and family to use good hand hygiene technique  - Identify and instruct in appropriate isolation precautions for identified infection/condition  Outcome: Not Progressing     Problem: SAFETY ADULT  Goal: Maintain or return to baseline ADL function  Description: INTERVENTIONS:  -  Assess patient's ability to carry out ADLs; assess patient's baseline for ADL function and identify physical deficits which impact ability to perform ADLs (bathing, care of mouth/teeth, toileting, grooming, dressing, etc )  - Assess/evaluate cause of self-care deficits   - Assess range of motion  - Assess patient's mobility; develop plan if impaired  - Assess patient's need for assistive devices and provide as appropriate  - Encourage maximum independence but intervene and supervise when necessary  - Involve family in performance of ADLs  - Assess for home care needs following discharge   - Consider OT consult to assist with ADL evaluation and planning for discharge  - Provide patient education as appropriate  Outcome: Not Progressing  Goal: Maintains/Returns to pre admission functional level  Description: INTERVENTIONS:  - Perform BMAT or MOVE assessment daily    - Set and communicate daily mobility goal to care team and patient/family/caregiver     - Collaborate with rehabilitation services on mobility goals if consulted  - Record patient progress and toleration of activity level   Outcome: Not Progressing     Problem: Knowledge Deficit  Goal: Patient/family/caregiver demonstrates understanding of disease process, treatment plan, medications, and discharge instructions  Description: Complete learning assessment and assess knowledge base  Interventions:  - Provide teaching at level of understanding  - Provide teaching via preferred learning methods  Outcome: Not Progressing     Problem: MOBILITY - ADULT  Goal: Maintain or return to baseline ADL function  Description: INTERVENTIONS:  -  Assess patient's ability to carry out ADLs; assess patient's baseline for ADL function and identify physical deficits which impact ability to perform ADLs (bathing, care of mouth/teeth, toileting, grooming, dressing, etc )  - Assess/evaluate cause of self-care deficits   - Assess range of motion  - Assess patient's mobility; develop plan if impaired  - Assess patient's need for assistive devices and provide as appropriate  - Encourage maximum independence but intervene and supervise when necessary  - Involve family in performance of ADLs  - Assess for home care needs following discharge   - Consider OT consult to assist with ADL evaluation and planning for discharge  - Provide patient education as appropriate  Outcome: Not Progressing  Goal: Maintains/Returns to pre admission functional level  Description: INTERVENTIONS:  - Perform BMAT or MOVE assessment daily    - Set and communicate daily mobility goal to care team and patient/family/caregiver     - Collaborate with rehabilitation services on mobility goals if consulted  - Record patient progress and toleration of activity level   Outcome: Not Progressing     Problem: METABOLIC, FLUID AND ELECTROLYTES - ADULT  Goal: Electrolytes maintained within normal limits  Description: INTERVENTIONS:  - Monitor labs and assess patient for signs and symptoms of electrolyte imbalances  - Administer electrolyte replacement as ordered  - Monitor response to electrolyte replacements, including repeat lab results as appropriate  - Instruct patient on fluid and nutrition as appropriate  Outcome: Not Progressing  Goal: Fluid balance maintained  Description: INTERVENTIONS:  - Monitor labs   - Monitor I/O and WT  - Instruct patient on fluid and nutrition as appropriate  - Assess for signs & symptoms of volume excess or deficit  Outcome: Not Progressing

## 2022-06-26 NOTE — ASSESSMENT & PLAN NOTE
· Patient noted to have pneumothorax on chest x-ray yesterday    Family does not want any invasive workup/testing  · Family understands the patient could further decline  · Hospice has been consulted  · Family's goal is to transition patient to home hospice  · Of note patient's wife is currently on home hospice

## 2022-06-26 NOTE — PROGRESS NOTES
[address]  Progress Note - Robbin Life 1945, 76 y o  male MRN: [id number]  Unit/Bed#: -01 Encounter: [id number]  Primary Care Provider: Elidia Dawkins DO   Date and time admitted to hospital: 6/14/2022  1:53 PM    * Pneumonia due to COVID-19 virus  Assessment & Plan  · COVID-19 positive on 06/13/2022  Vaccinated x2 against COVID-19  · Patient completed 5 days of remdesivir on 06/18/2022  · Patient completed 10 days of Decadron on 06/23/2022  · Patient titrated down to 3 L of nasal cannula  · CT done on 06/19/2022 concerning for superimposed bacterial pneumonia  · Chest x-ray on 06/25/2022 with large right pneumothorax  · Patient completed 5 days of vancomycin, MRSA screen negative  Also completed 7 days of cefepime on 06/25/2022  · Vancomycin discontinued on 06/23/2022  · Patient was initially on heparin drip however discontinued due to hemoptysis  SubQ heparin initiated  · Cultures negative to date  · Monitor inflammatory markers  · Supportive care    Pneumothorax  Assessment & Plan  · Noted on chest x-ray from 06/25/2022  · At this time family does not want any invasive procedures  · Patient is in the process of being transitioned to hospice care, family working with case management/hospice to arrange for home hospice possibly tomorrow    Goals of care, counseling/discussion  Assessment & Plan  · Patient noted to have pneumothorax on chest x-ray yesterday    Family does not want any invasive workup/testing  · Family understands the patient could further decline  · Hospice has been consulted  · Family's goal is to transition patient to home hospice  · Of note patient's wife is currently on home hospice    [place]  Assessment & Plan  · Nystatin swish and swallow as tolerated    Acute respiratory failure with hypoxia ([address] )  Assessment & Plan  · Secondary to COVID infection as well as pneumothorax on cxr from 6/25/22  · Patient developed hemoptysis, heparin drip discontinued and hemoptysis appears to be improved  · Pulmonology following  · Will continue oxygen and titrate as tolerated    Anemia in chronic kidney disease, on chronic dialysis (HCC)  Assessment & Plan  · Hemoglobin stable and at baseline  · Continue home ferrous sulfate  · Trend CBC         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 [facility])  Assessment & Plan  · Patient back to routine dialysis schedule   · Continue management per Nephrology  · Last dialysis session was yesterday    Class 2 severe obesity due to excess calories with serious comorbidity and body mass index (BMI) of 39 0 to 39 9 in adult [facility])  Assessment & Plan  · Present on admission as evidenced by BMI greater than 35  · Endorse lifestyle modifications and weight loss          VTE Prophylaxis:  Heparin    Patient Centered Rounds: I have performed bedside rounds with nursing staff today  Discussions with Specialists or Other Care Team Provider: yes  Education and Discussions with Family / Patient:  Spoke with patient's daughter Petr Conrad over the phone regarding plan of care  Also spoke with her regarding patient's poor clinical condition  She states that she is working on getting set up with home hospice    Current Length of Stay: 12 day(s)    Current Patient Status: Inpatient   Certification Statement: The patient will continue to require additional inpatient hospital stay due to Respiratory failure    Discharge Plan:  Case management/hospice team working on transitioning to home hospice    Code Status: Level 3 - DNAR and DNI    Subjective:   Patient noted to have pneumothorax on x-ray yesterday  Family does not want any aggressive measures taken    Working on getting home hospice in place    Objective:     Vitals:   Temp (24hrs), Av 8 °F (36 6 °C), Min:97 5 °F (36 4 °C), Max:98 °F (36 7 °C)    Temp:  [97 5 °F (36 4 °C)-98 °F (36 7 °C)] 97 5 °F (36 4 °C)  HR:  [] 98  Resp:  [20-26] 20  BP: ()/(43-90) 100/43  SpO2:  [90 %-94 %] 94 %  Body mass index is 34 93 kg/m²  Input and Output Summary (last 24 hours): Intake/Output Summary (Last 24 hours) at 6/26/2022 1109  Last data filed at 6/25/2022 1830  Gross per 24 hour   Intake 750 ml   Output 1800 ml   Net -1050 ml       Physical Exam:   Physical Exam  Constitutional:       General: He is not in acute distress  Appearance: He is ill-appearing  Comments: Drowsy/lethargic   HENT:      Head: Normocephalic and atraumatic  Nose: Nose normal       Mouth/Throat:      Mouth: Mucous membranes are dry  Eyes:      Conjunctiva/sclera: Conjunctivae normal    Cardiovascular:      Rate and Rhythm: Normal rate and regular rhythm  Pulmonary:      Effort: No respiratory distress  Comments: Poor inspiratory effort  Abdominal:      Palpations: Abdomen is soft  Tenderness: There is no abdominal tenderness  Musculoskeletal:      Cervical back: Neck supple  Comments: Generalized weakness   Skin:     General: Skin is warm and dry  Neurological:      Comments: Drowsy/lethargic  Patient opens eyes to verbal stimuli  Difficulty with following simple commands   Psychiatric:         Mood and Affect: Mood is depressed  Behavior: Behavior normal          Additional Data:     Labs:    Results from last 7 days   Lab Units 06/24/22  0503 06/23/22  0627   WBC Thousand/uL 21 87* 19 65*   HEMOGLOBIN g/dL 9 3* 8 7*   HEMATOCRIT % 29 4* 27 3*   PLATELETS Thousands/uL 256 267   LYMPHO PCT %  --  5*   MONO PCT %  --  4   EOS PCT %  --  0     Results from last 7 days   Lab Units 06/24/22  0503 06/23/22  0627 06/22/22  0440   SODIUM mmol/L 130*   < > 128*   POTASSIUM mmol/L 4 0   < > 4 0   CHLORIDE mmol/L 96   < > 93*   CO2 mmol/L 24   < > 27   BUN mg/dL 46*   < > 43*   CREATININE mg/dL 4 36*   < > 4 66*   CALCIUM mg/dL 8 7   < > 8 6   ALK PHOS U/L  --   --  65   ALT U/L  --   --  14   AST U/L  --   --  15    < > = values in this interval not displayed  * I Have Reviewed All Lab Data Listed Above  * Additional Pertinent Lab Tests Reviewed: [address] Admission  Reviewed    Imaging:  Imaging Reports Reviewed Today Include:  No new imaging    Recent Cultures (last 7 days):     Results from last 7 days   Lab Units 06/22/22  1831 06/21/22  2101   SPUTUM CULTURE  Test not performed  Suggest repeat specimen  Test not performed  Suggest repeat specimen  GRAM STAIN RESULT  >10 squamous epithelial cells/lpf, indicating orophayngeal contamination  *  Rare Polys*  3+ Budding yeast*  1+ Gram positive rods*  Rare Gram positive cocci in pairs* >10 squamous epithelial cells/lpf, indicating orophayngeal contamination  *  No polys seen*  3+ Budding yeast*  2+ Gram positive cocci in pairs and chains*  2+ Gram positive rods*  1+ Gram negative rods*       Last 24 Hours Medication List:   Current Facility-Administered Medications   Medication Dose Route Frequency Provider Last Rate    acetaminophen  650 mg Oral Q4H PRN Richard Wahl,       albuterol  2 5 mg Nebulization Q4H PRN REMY Michael      allopurinol  200 mg Oral HS Moy Shea MD      aluminum-magnesium hydroxide-simethicone  30 mL Oral Q4H PRN Moy Shea MD      amLODIPine  5 mg Oral Daily Nancy Andrea MD      ascorbic acid  500 mg Oral BID Richard Wahl,       benzonatate  200 mg Oral TID REMY Michael      calcium acetate  1,334 mg Oral TID With Meals Richard Wahl DO      co-enzyme Q-10  200 mg Oral HS Moy Shea MD      docusate sodium  100 mg Oral BID Moy Shea MD      ferrous sulfate  325 mg Oral Daily With Breakfast Richard Wahl,       folic acid  1 mg Oral Daily Richard Wahl,       guaiFENesin  600 mg Oral Q12H [facility] REMY Michael      heparin (porcine)  5,000 Units Subcutaneous [facility] Moy Shea MD      hydrALAZINE  25 mg Oral [facility] Nancy Andrea MD      HYDROcodone-homatropine  5 mL Oral Q4H PRN Dalia Mcburney, PA-C      HYDROmorphone  0 5 mg Intravenous Q3H PRN Sandra Prudent, CRNP      ipratropium  0 5 mg Nebulization TID Sandra Prudent, CRNP      labetalol  10 mg Intravenous Q4H PRN Belen Valentinee, DO      levalbuterol  1 25 mg Nebulization TID Belen Crawford, DO      lidocaine   Topical Daily PRN Ferteena Roberto, CRNP      losartan  50 mg Oral BID Ferdie Horn, CRNP      magnesium oxide  400 mg Oral Daily Dejonhenia Muckle, DO      metoclopramide  5 mg Intravenous Q6H PRN Dalia Mcburney, PA-C      metoprolol succinate  50 mg Oral BID Belen Crawford, DO      nystatin  500,000 Units Swish & Swallow 4x Daily Iraida Gunn MD      ondansetron  4 mg Oral Q6H PRN Marthaia Muckle, DO      oxyCODONE  5 mg Oral Q8H PRN Sandra Prudent, CRNP      senna  1 tablet Oral HS Iraida Gunn MD      sevelamer  1,600 mg Oral TID With Meals Belen Crawford, DO      traMADol  50 mg Oral Q8H PRN Sandra Prudent, CRNP          Today, Patient Was Seen By: Iraida Gunn MD    ** Please Note: Dictation voice to text software may have been used in the creation of this document   **

## 2022-06-26 NOTE — ASSESSMENT & PLAN NOTE
· Noted on chest x-ray from 06/25/2022  · At this time family does not want any invasive procedures  · Patient is in the process of being transitioned to hospice care, family working with case management/hospice to arrange for home hospice possibly tomorrow

## 2022-06-26 NOTE — ASSESSMENT & PLAN NOTE
· Secondary to COVID infection as well as pneumothorax on cxr from 6/25/22  · Patient developed hemoptysis, heparin drip discontinued and hemoptysis appears to be improved  · Pulmonology following  · Will continue oxygen and titrate as tolerated

## 2022-06-26 NOTE — NURSING NOTE
Patient opens eyes to name, oriented to person and place  Denies any pain at this time, refusing medications and food  Squeezed hand when asked  Lungs diminished, resp 24, labored, Sp02 93% on 4L  Positioned to comfort, call bell in reach

## 2022-06-26 NOTE — PLAN OF CARE
Problem: Prexisting or High Potential for Compromised Skin Integrity  Goal: Skin integrity is maintained or improved  Description: INTERVENTIONS:  - Identify patients at risk for skin breakdown  - Assess and monitor skin integrity  - Assess and monitor nutrition and hydration status  - Monitor labs   - Assess for incontinence   - Turn and reposition patient  - Assist with mobility/ambulation  - Relieve pressure over bony prominences  - Avoid friction and shearing  - Provide appropriate hygiene as needed including keeping skin clean and dry  - Evaluate need for skin moisturizer/barrier cream  - Collaborate with interdisciplinary team   - Patient/family teaching  - Consider wound care consult   Outcome: Progressing     Problem: INFECTION - ADULT  Goal: Absence or prevention of progression during hospitalization  Description: INTERVENTIONS:  - Assess and monitor for signs and symptoms of infection  - Monitor lab/diagnostic results  - Monitor all insertion sites, i e  indwelling lines, tubes, and drains  - Monitor endotracheal if appropriate and nasal secretions for changes in amount and color  - [place] appropriate cooling/warming therapies per order  - Administer medications as ordered  - Instruct and encourage patient and family to use good hand hygiene technique  - Identify and instruct in appropriate isolation precautions for identified infection/condition  Outcome: Progressing     Problem: Knowledge Deficit  Goal: Patient/family/caregiver demonstrates understanding of disease process, treatment plan, medications, and discharge instructions  Description: Complete learning assessment and assess knowledge base    Interventions:  - Provide teaching at level of understanding  - Provide teaching via preferred learning methods  Outcome: Progressing

## 2022-06-26 NOTE — PROGRESS NOTES
NEPHROLOGY PROGRESS NOTE   Miranda Guevara 76 y o  male MRN: [id number]  Unit/Bed#: -01 Encounter: [id number]    Assessment/Plan:     Ivonne Jerry is a 76 y  o  male with ESRD on hemodialysis admitted 06/14 initially treated for acute hypoxic respiratory failure secondary to COVID-19 pneumonia however developed multifocal pneumonia suspected due to aspiration from choking/dysphagia, thrush, hemoptysis in the setting of heparin infusion   Newly identified large right pneumothorax noted on imaging yesterday  Family decided to transition to comfort measures      1  ESRD on maintenance hemodialysis Tuesday, Thursday, Saturday [address] [place] [address] Drive  ? Status post hemodialysis yesterday, 6/25 with no acute issue  Unfortunately patient was found to have large right-sided pneumothorax  Family/patient declining any further aggressive intervention  Patient be evaluated by hospice  If that is the case Nephrology will sign off  2  Acute hypoxic respiratory failure, multifocal pneumonia, right-sided pneumothorax  · As above      ROS  Unable to obtain due to encephalopathy, lethargy  Historical Information   Past Medical History:   Diagnosis Date    Abdominal aortic aneurysm (AAA) ([facility] [address] )     s/p repair    Anemia     due to kidney disease    Arthritis     CHF (congestive heart failure) (HCC)     stable at present    Chronic kidney disease     on hemodialysis Tu Th Sat    Chronic pain disorder     knees    Edema     GERD (gastroesophageal reflux disease)     no meds     Gout     History of echocardiogram 03/02/2018    EF 55%, mild LVH, technically difficult study      Hyperlipidemia     borderline no meds    Hyperparathyroidism ([address] )     Hypertension     Kidney stone     Seasonal allergies     Shortness of breath     mild exertional    Skin cancer     Vitamin D deficiency      Past Surgical History:   Procedure Laterality Date    ABDOMINAL AORTIC ANEURYSM REPAIR  2004    CARDIAC CATHETERIZATION 2020    no blockages    CARPAL TUNNEL RELEASE Right     COLONOSCOPY      DIALYSIS FISTULA CREATION Right 2017    GLAUCOMA SURGERY      LEG SURGERY      removal splinter    PARATHYROIDECTOMY      SPLIT THICKNESS SKIN GRAFT N/A 2019    Procedure: THIGH STSG;  Surgeon: Clifton Burrell MD;  Location: [address] [place] [address];  Service: Plastics    SQUAMOUS CELL CARCINOMA EXCISION Right 2019    Procedure: REMOVE SKIN CA FROM EAR & CHEEK;  Surgeon: Clifton Burrell MD;  Location: [address] [place] [address];  Service: Plastics     Social History   Social History     Substance and Sexual Activity   Alcohol Use Not Currently     Social History     Substance and Sexual Activity   Drug Use Never     Social History     Tobacco Use   Smoking Status Former Smoker    Types: Cigarettes    Quit date: 2004    Years since quittin 0   Smokeless Tobacco Never Used       Family History:   Family History   Problem Relation Age of Onset    Kidney disease Mother     Leukemia Father        Medications:  Pertinent medications were reviewed  Current Facility-Administered Medications   Medication Dose Route Frequency Provider Last Rate    acetaminophen  650 mg Oral Q4H PRN Isabell Webb DO      albuterol  2 5 mg Nebulization Q4H PRN REMY Jackson      allopurinol  200 mg Oral HS Ryann Giles MD      aluminum-magnesium hydroxide-simethicone  30 mL Oral Q4H PRN Ryann Giles MD      amLODIPine  5 mg Oral Daily Jose G Frances MD      ascorbic acid  500 mg Oral BID Isabell Webb DO      benzonatate  200 mg Oral TID REMY Jackson      calcium acetate  1,334 mg Oral TID With Meals Isabell Webb DO      co-enzyme Q-10  200 mg Oral HS Ryann Giles MD      docusate sodium  100 mg Oral BID Ryann Giles MD      ferrous sulfate  325 mg Oral Daily With Breakfast Isabell Webb DO      folic acid  1 mg Oral Daily Isabell Webb DO      guaiFENesin  600 mg Oral Q12H [address] Dennise Christa, CRNP      heparin (porcine)  5,000 Units Subcutaneous [facility] Bakari Hagen MD      hydrALAZINE  25 mg Oral [facility] Helena Tyson MD      HYDROcodone-homatropine  5 mL Oral Q4H PRN Michaelina Inch, PA-C      HYDROmorphone  0 5 mg Intravenous Q3H PRN Dennise Christa, CRNP      ipratropium  0 5 mg Nebulization TID Dennise Christa, CRNP      labetalol  10 mg Intravenous Q4H PRN Pakoprashant Jennings, DO      levalbuterol  1 25 mg Nebulization TID Pako Jennings, DO      lidocaine   Topical Daily PRN Steve Keith, MAYANP      losartan  50 mg Oral BID Steve Keith, CRNP      magnesium oxide  400 mg Oral Daily Pako Jennings, DO      metoclopramide  5 mg Intravenous Q6H PRN Michaelina Inch PA-C      metoprolol succinate  50 mg Oral BID Pako Jennings, DO      nystatin  500,000 Units Swish & Swallow 4x Daily Bakari Hagen MD      ondansetron  4 mg Oral Q6H PRN Pako Jennings, DO      oxyCODONE  5 mg Oral Q8H PRN Dennise Tejedaa, MAYANP      senna  1 tablet Oral HS Bakari Hagen MD      sevelamer  1,600 mg Oral TID With Meals Pako Jennings, DO      traMADol  50 mg Oral Q8H PRN Dennise Christa, CRNP           Allergies   Allergen Reactions    Iodine - Food Allergy      Other reaction(s): Respiratory Distress  "swelling of throat"    Levofloxacin      Other reaction(s): Respiratory Distress  "swelling of throat"    Lovastatin      Muscle weakness    Medical Tape Other (See Comments)     Skin breaks down    Shellfish-Derived Products - Food Allergy          Vitals:   BP (!) 100/43   Pulse 98   Temp 97 5 °F (36 4 °C)   Resp 20   Ht 5' 8" (1 727 m)   Wt 104 kg (229 lb 11 5 oz)   SpO2 94%   BMI 34 93 kg/m²   Body mass index is 34 93 kg/m²    SpO2: 94 %,   SpO2 Activity: At Rest,   O2 Device: Nasal cannula      Intake/Output Summary (Last 24 hours) at 6/26/2022 1234  Last data filed at 6/25/2022 1830  Gross per 24 hour   Intake 750 ml   Output 1800 ml Net -1050 ml     Invasive Devices  Report    Peripheral Intravenous Line  Duration           Peripheral IV 06/26/22 Distal;Left;Upper;Ventral (anterior) Arm <1 day          Line  Duration           Hemodialysis AV Fistula Right Upper arm -- days                Physical Exam  General: conscious, cooperative, in no acute distress  Eyes: conjunctivae pink, anicteric sclerae  ENT: lips and mucous membranes moist  Neck: supple, no JVD, no masses  Chest:  Very diminished right greater than left breath sounds bilaterally, no crackles, ronchus or wheezings  CVS: S1 & S2, normal rate, regular rhythm  Abdomen: soft, non-tender, non-distended, normoactive bowel sounds  Extremities:  Trace edema of both legs  Skin: no rash  Neuro:  Lethargic, minimally responsive      Diagnostic Data:  Lab: I have personally reviewed pertinent lab results  ,   CBC:  Results from last 7 days   Lab Units 06/24/22  0503   WBC Thousand/uL 21 87*   HEMOGLOBIN g/dL 9 3*   HEMATOCRIT % 29 4*   PLATELETS Thousands/uL 256      CMP: No results found for: SODIUM, K, CL, CO2, ANIONGAP, BUN, CREATININE, GLUCOSE, CALCIUM, AST, ALT, ALKPHOS, PROT, BILITOT, EGFR,   PT/INR: No results found for: PT, INR,   Magnesium: No components found for: MAG,  Phosphorous: No results found for: PHOS    Microbiology:  @[facility],(urinecx:7)@        REMY Betancourt    Portions of the record may have been created with voice recognition software  Occasional wrong word or "sound a like" substitutions may have occurred due to the inherent limitations of voice recognition software  Read the chart carefully and recognize, using context, where substitutions have occurred

## 2022-06-26 NOTE — NURSING NOTE
Pt stating pain medication did help  Pt states comfortable on lt side at this time, does not want to be repositioned

## 2022-06-27 NOTE — RESPIRATORY THERAPY NOTE
RT Protocol Note  Casimiro Hartman 76 y o  male MRN: [id number]  Unit/Bed#: -01 Encounter: [id number]    Assessment    Principal Problem:    Pneumonia due to COVID-19 virus  Active Problems:    Essential hypertension    Class 2 severe obesity due to excess calories with serious comorbidity and body mass index (BMI) of 39 0 to 39 9 in adult [facility])    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 ([facility])    Anemia in chronic kidney disease, on chronic dialysis ([facility])    Acute respiratory failure with hypoxia ([facility])    Thrush    Goals of care, counseling/discussion    Pneumothorax      Home Pulmonary Medications       Past Medical History:   Diagnosis Date    Abdominal aortic aneurysm (AAA) ([facility] [address] )     s/p repair    Anemia     due to kidney disease    Arthritis     CHF (congestive heart failure) ([facility])     stable at present    Chronic kidney disease     on hemodialysis  Sat    Chronic pain disorder     knees    Edema     GERD (gastroesophageal reflux disease)     no meds     Gout     History of echocardiogram 2018    EF 55%, mild LVH, technically difficult study      Hyperlipidemia     borderline no meds    Hyperparathyroidism ([facility] [address] )     Hypertension     Kidney stone     Seasonal allergies     Shortness of breath     mild exertional    Skin cancer     Vitamin D deficiency      Social History     Socioeconomic History    Marital status: /Civil Union     Spouse name: None    Number of children: None    Years of education: None    Highest education level: None   Occupational History    None   Tobacco Use    Smoking status: Former Smoker     Types: Cigarettes     Quit date: 2004     Years since quittin 0    Smokeless tobacco: Never Used   Vaping Use    Vaping Use: Never used   Substance and Sexual Activity    Alcohol use: Not Currently    Drug use: Never    Sexual activity: None   Other Topics Concern    None   Social History Narrative    None     Social Determinants of Health Financial Resource Strain: Not on file   Food Insecurity: No Food Insecurity    Worried About [address] [employer] in the Last Year: Never true    Ran Out of Food in the Last Year: Never true   Transportation Needs: No Transportation Needs    Lack of Transportation (Medical): No    Lack of Transportation (Non-Medical): No   Physical Activity: Not on file   Stress: Not on file   Social Connections: Not on file   Intimate Partner Violence: Not on file   Housing Stability: Low Risk     Unable to Pay for Housing in the Last Year: No    Number of Places Lived in the Last Year: 1    Unstable Housing in the Last Year: No       Subjective         Objective    Physical Exam:   Assessment Type: During-treatment  General Appearance: Drowsy  Respiratory Pattern: Dyspnea at rest  Chest Assessment: Chest expansion symmetrical  Bilateral Breath Sounds: Diminished, Coarse  Cough: None  O2 Device: 4 lpm nc  Vitals:  Blood pressure (!) 83/45, pulse 91, temperature 97 9 °F (36 6 °C), resp  rate (!) 24, height 5' 8" (1 727 m), weight 104 kg (229 lb 11 5 oz), SpO2 91 %  Imaging and other studies: I have personally reviewed pertinent reports  O2 Device: 4 lpm nc  Plan    Respiratory Plan: Mild Distress pathway  Airway Clearance Plan: Discontinue Protocol     Resp Comments: Pt too weak to perform flutter adequatly  Pt for hospice care  Will D/C flutter

## 2022-06-27 NOTE — ASSESSMENT & PLAN NOTE
· Patient noted to have pneumothorax on chest x-ray 6/25  Family does not want any invasive workup/testing  · Family understands the patient could further decline  · Hospice has been consulted   Patient is being discharged home on hospice service   · Of note patient's wife is currently on home hospice

## 2022-06-27 NOTE — NURSING NOTE
Patient discharged to home with [facility]  IV removed with catheter tip intact, DSD and pressure applied  All belongings returned to patient upon discharge  Patient daughter, verbalized understanding of discharge instructions  Patient transported via litter

## 2022-06-27 NOTE — ASSESSMENT & PLAN NOTE
· Secondary to COVID infection as well as pneumothorax on cxr from 6/25/22  · Patient developed hemoptysis, heparin drip discontinued and hemoptysis appears to be improved  · Pulmonology input appreciated  · The patient is being discharged to home with hospice service; continue with oxygen supplement as indicated

## 2022-06-27 NOTE — PLAN OF CARE
Problem: Prexisting or High Potential for Compromised Skin Integrity  Goal: Skin integrity is maintained or improved  Description: INTERVENTIONS:  - Identify patients at risk for skin breakdown  - Assess and monitor skin integrity  - Assess and monitor nutrition and hydration status  - Monitor labs   - Assess for incontinence   - Turn and reposition patient  - Assist with mobility/ambulation  - Relieve pressure over bony prominences  - Avoid friction and shearing  - Provide appropriate hygiene as needed including keeping skin clean and dry  - Evaluate need for skin moisturizer/barrier cream  - Collaborate with interdisciplinary team   - Patient/family teaching  - Consider wound care consult   6/27/2022 1225 by Conor Wang RN  Outcome: Adequate for Discharge  6/27/2022 1105 by Conor Wang RN  Outcome: Progressing     Problem: Potential for Falls  Goal: Patient will remain free of falls  Description: INTERVENTIONS:  - Educate patient/family on patient safety including physical limitations  - Instruct patient to call for assistance with activity   - Consult OT/PT to assist with strengthening/mobility   - Keep Call bell within reach  - Keep bed low and locked with side rails adjusted as appropriate  - Keep care items and personal belongings within reach  - Initiate and maintain comfort rounds  - Make Fall Risk Sign visible to staff  - Offer Toileting in advance of need  - Initiate/Maintain bed alarm  - Obtain necessary fall risk management equipment:   - Apply yellow socks and bracelet for high fall risk patients  - Consider moving patient to room near nurses station  6/27/2022 1225 by Conor Wang RN  Outcome: Adequate for Discharge  6/27/2022 1105 by Conor Wang RN  Outcome: Progressing     Problem: PAIN - ADULT  Goal: Verbalizes/displays adequate comfort level or baseline comfort level  Description: Interventions:  - Encourage patient to monitor pain and request assistance  - Assess pain using appropriate pain scale  - Administer analgesics based on type and severity of pain and evaluate response  - Implement non-pharmacological measures as appropriate and evaluate response  - Consider cultural and social influences on pain and pain management  - Notify physician/advanced practitioner if interventions unsuccessful or patient reports new pain  6/27/2022 1225 by Gabriella Bean RN  Outcome: Adequate for Discharge  6/27/2022 1105 by Gabriella Bean RN  Outcome: Progressing     Problem: INFECTION - ADULT  Goal: Absence or prevention of progression during hospitalization  Description: INTERVENTIONS:  - Assess and monitor for signs and symptoms of infection  - Monitor lab/diagnostic results  - Monitor all insertion sites, i e  indwelling lines, tubes, and drains  - Monitor endotracheal if appropriate and nasal secretions for changes in amount and color  - [place] appropriate cooling/warming therapies per order  - Administer medications as ordered  - Instruct and encourage patient and family to use good hand hygiene technique  - Identify and instruct in appropriate isolation precautions for identified infection/condition  6/27/2022 1225 by Gabriella Bean RN  Outcome: Adequate for Discharge  6/27/2022 1105 by Gabriella Bean RN  Outcome: Progressing     Problem: SAFETY ADULT  Goal: Maintain or return to baseline ADL function  Description: INTERVENTIONS:  -  Assess patient's ability to carry out ADLs; assess patient's baseline for ADL function and identify physical deficits which impact ability to perform ADLs (bathing, care of mouth/teeth, toileting, grooming, dressing, etc )  - Assess/evaluate cause of self-care deficits   - Assess range of motion  - Assess patient's mobility; develop plan if impaired  - Assess patient's need for assistive devices and provide as appropriate  - Encourage maximum independence but intervene and supervise when necessary  - Involve family in performance of ADLs  - Assess for home care needs following discharge   - Consider OT consult to assist with ADL evaluation and planning for discharge  - Provide patient education as appropriate  6/27/2022 1225 by Sav Carroll RN  Outcome: Adequate for Discharge  6/27/2022 1105 by Sav Carroll RN  Outcome: Progressing  Goal: Maintains/Returns to pre admission functional level  Description: INTERVENTIONS:  - Perform BMAT or MOVE assessment daily    - Set and communicate daily mobility goal to care team and patient/family/caregiver  - Collaborate with rehabilitation services on mobility goals if consulted  - Record patient progress and toleration of activity level   6/27/2022 1225 by Sav Carroll RN  Outcome: Adequate for Discharge  6/27/2022 1105 by Sav Carroll RN  Outcome: Progressing     Problem: DISCHARGE PLANNING  Goal: Discharge to home or other facility with appropriate resources  Description: INTERVENTIONS:  - Identify barriers to discharge w/patient and caregiver  - Arrange for needed discharge resources and transportation as appropriate  - Identify discharge learning needs (meds, wound care, etc )  - Refer to Case Management Department for coordinating discharge planning if the patient needs post-hospital services based on physician/advanced practitioner order or complex needs related to functional status, cognitive ability, or social support system  6/27/2022 1225 by Sav Carroll RN  Outcome: Adequate for Discharge  6/27/2022 1105 by Sav Carroll RN  Outcome: Progressing     Problem: Knowledge Deficit  Goal: Patient/family/caregiver demonstrates understanding of disease process, treatment plan, medications, and discharge instructions  Description: Complete learning assessment and assess knowledge base    Interventions:  - Provide teaching at level of understanding  - Provide teaching via preferred learning methods  6/27/2022 1225 by Sav Carroll RN  Outcome: Adequate for Discharge  6/27/2022 1105 by Sav Carroll RN  Outcome: Progressing     Problem: MOBILITY - ADULT  Goal: Maintain or return to baseline ADL function  Description: INTERVENTIONS:  -  Assess patient's ability to carry out ADLs; assess patient's baseline for ADL function and identify physical deficits which impact ability to perform ADLs (bathing, care of mouth/teeth, toileting, grooming, dressing, etc )  - Assess/evaluate cause of self-care deficits   - Assess range of motion  - Assess patient's mobility; develop plan if impaired  - Assess patient's need for assistive devices and provide as appropriate  - Encourage maximum independence but intervene and supervise when necessary  - Involve family in performance of ADLs  - Assess for home care needs following discharge   - Consider OT consult to assist with ADL evaluation and planning for discharge  - Provide patient education as appropriate  6/27/2022 1225 by Sav Carroll RN  Outcome: Adequate for Discharge  6/27/2022 1105 by Sav Carroll RN  Outcome: Progressing  Goal: Maintains/Returns to pre admission functional level  Description: INTERVENTIONS:  - Perform BMAT or MOVE assessment daily    - Set and communicate daily mobility goal to care team and patient/family/caregiver     - Collaborate with rehabilitation services on mobility goals if consulted  - Record patient progress and toleration of activity level   6/27/2022 1225 by Sav Carroll RN  Outcome: Adequate for Discharge  6/27/2022 1105 by Sav Carroll RN  Outcome: Progressing     Problem: METABOLIC, FLUID AND ELECTROLYTES - ADULT  Goal: Electrolytes maintained within normal limits  Description: INTERVENTIONS:  - Monitor labs and assess patient for signs and symptoms of electrolyte imbalances  - Administer electrolyte replacement as ordered  - Monitor response to electrolyte replacements, including repeat lab results as appropriate  - Instruct patient on fluid and nutrition as appropriate  6/27/2022 1225 by Sav Carroll RN  Outcome: Adequate for Discharge  6/27/2022 1105 by Caro Mckinley RN  Outcome: Progressing  Goal: Fluid balance maintained  Description: INTERVENTIONS:  - Monitor labs   - Monitor I/O and WT  - Instruct patient on fluid and nutrition as appropriate  - Assess for signs & symptoms of volume excess or deficit  6/27/2022 1225 by Caro Mckinley RN  Outcome: Adequate for Discharge  6/27/2022 1105 by Caro Mckinley RN  Outcome: Progressing     Problem: Nutrition/Hydration-ADULT  Goal: Nutrient/Hydration intake appropriate for improving, restoring or maintaining nutritional needs  Description: Monitor and assess patient's nutrition/hydration status for malnutrition  Collaborate with interdisciplinary team and initiate plan and interventions as ordered  Monitor patient's weight and dietary intake as ordered or per policy  Utilize nutrition screening tool and intervene as necessary  Determine patient's food preferences and provide high-protein, high-caloric foods as appropriate       INTERVENTIONS:  - Monitor oral intake, urinary output, labs, and treatment plans  - Assess nutrition and hydration status and recommend course of action  - Evaluate amount of meals eaten  - Assist patient with eating if necessary   - Allow adequate time for meals  - Recommend/ encourage appropriate diets, oral nutritional supplements, and vitamin/mineral supplements  - Order, calculate, and assess calorie counts as needed  - Recommend, monitor, and adjust tube feedings and TPN/PPN based on assessed needs  - Assess need for intravenous fluids  - Provide specific nutrition/hydration education as appropriate  - Include patient/family/caregiver in decisions related to nutrition  6/27/2022 1225 by Caro Mckinley RN  Outcome: Adequate for Discharge  6/27/2022 1105 by Caro Mckinley RN  Outcome: Progressing

## 2022-06-27 NOTE — DISCHARGE SUMMARY
[address]  Discharge- Sallye Heimlich 1945, 76 y o  male MRN: [id number]  Unit/Bed#: -Tien Encounter: [id number]  Primary Care Provider: Greg Monterroso DO   Date and time admitted to hospital: 6/14/2022  1:53 PM    * Pneumonia due to COVID-19 virus  Assessment & Plan  · COVID-19 positive on 06/13/2022  Vaccinated x2 against COVID-19  · Patient completed 5 days of remdesivir on 06/18/2022  · Patient completed 10 days of Decadron on 06/23/2022  · Patient titrated down to 3 L of nasal cannula  · CT done on 06/19/2022 concerning for superimposed bacterial pneumonia  · Chest x-ray on 06/25/2022 with large right pneumothorax  · Patient completed 5 days of vancomycin, MRSA screen negative  Also completed 7 days of cefepime on 06/25/2022  · Vancomycin discontinued on 06/23/2022  · Patient was initially on heparin drip however discontinued due to hemoptysis  SubQ heparin initiated  · Cultures negative to date  · Supportive care    Acute respiratory failure with hypoxia (HCC)  Assessment & Plan  · Secondary to COVID infection as well as pneumothorax on cxr from 6/25/22  · Patient developed hemoptysis, heparin drip discontinued and hemoptysis appears to be improved  · Pulmonology input appreciated  · The patient is being discharged to home with hospice service; continue with oxygen supplement as indicated    Pneumothorax  Assessment & Plan  · Noted on chest x-ray from 06/25/2022  · At this time patient and family do not want any invasive procedures  · Patient is being discharged home with hospice care    Goals of care, counseling/discussion  Assessment & Plan  · Patient noted to have pneumothorax on chest x-ray 6/25  Family does not want any invasive workup/testing  · Family understands the patient could further decline  · Hospice has been consulted   Patient is being discharged home on hospice service   · Of note patient's wife is currently on home hospice    Thrush  Assessment & Plan  · Nystatin swish and swallow as tolerated  · Patient is being discharged home with hospice care    Anemia in chronic kidney disease, on chronic dialysis [facility])  Assessment & Plan  · Hemoglobin stable and at baseline  · Patient is be discharged to home with hospice service     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 [facility])  Assessment & Plan  · Previously on dialysis on Tuesday Thursday and Saturday  Last session was on 06/25/2022  Essential hypertension  Assessment & Plan  · The patient is being discharged home on hospice service        Discharging Physician / Practitioner: REMY Black  PCP: Nathalia Orourke DO  Admission Date:   Admission Orders (From admission, onward)     Ordered        06/14/22 1518  Inpatient Admission  Once                      Discharge Date: 06/27/22    Medical Problems             Resolved Problems  Date Reviewed: 6/27/2022   None                 Consultations During Hospital Stay:  · Pulmonology  · Nephrology  · Pharmacy    Procedures Performed:   · None    Significant Findings / Test Results:   · Chest x-ray on 6/25   Large right pneumothorax with collapse and consolidation of the right lung  Moderate left pneumonia  · CTA chest on 06/19 Suboptimal contrast bolus and respiratory motion decreases sensitivity for evaluation of peripheral pulmonary emboli, subject to this, no pulmonary embolism is seen  Multiple focal alveolar opacities scattered in the left lung, most prominent in the left lower lobe   Multiple large consolidations in the right upper and lower lobes with associated air bronchograms   Additional focal alveolar opacities in the right   middle lobe with scattered groundglass opacities throughout the right lung   Findings taken together are highly suspicious for multifocal infection  Attenuation of multiple right lower lobe airways, probably related to inspissated secretions, inflammation/infection, and mucous plugging  Small dependent pleural effusions, shotty mediastinal and hilar lymph nodes, probably reactive  1 8 cm focal cystic lesion in the pancreatic head is redemonstrated, similar to the prior  Differential considerations include cyst, cystic neoplasm, or IPMN  For simple cyst(s) between 1 5 to 2 0 cm, recommend followup every 6 months for 4 times, then   every 1 year for 2 times, then every 2 years for 3 times  If stable after 10 years, then no more followups  (If patient reaches age [de-identified], then can switch to age [de-identified] algorithm ) Recommend next followup in 6 months  Preferred imaging modality: abdomen MRI   and MRCP with and without IV contrast, or triple phase abdomen CT with IV contrast, or abdomen MRI and MRCP without IV contrast      Cardiomegaly, coronary atherosclerosis, renal atrophy, renal cysts, and other findings as above  · Chest x-ray on 06/14 No definite acute infiltrate  Incidental Findings:   · None     Test Results Pending at Discharge (will require follow up): · None     Outpatient Tests Requested:  · None    Complications:     None    Reason for Admission:  Shortness of breath    Hospital Course:     Tanner Leon is a 76 y o  male patient who originally presented to the hospital on 6/14/2022 due to shortness of breath  The patient subsequently was admitted for COVID-19 infection with acute respiratory failure hypoxia requiring oxygen supplement  He completed remdesivir and dexamethasone treatment  He was started on IV vancomycin and cefepime due to the CT scan finding of multilobar pneumonia concerning for concomitant bacterial pneumonia infection  Patient developed hemoptysis while on heparin infusion per COVID protocol with elevated D-dimer and subsequently medication was stopped  Pulmonology evaluation done  The hemoptysis resolved and pulmonology recommend to follow-up with chest x-ray after discharge    Due to overall decline despite completion of COVID-19 treatment, on 06/25 chest x-ray was obtained and revealed new right pneumothorax  Goals of care discussions was done with the patient and his family; due to the patient being at increased risk of decompensation and death, the decision is to pursue hospice care  The patient will be discharged home with hospice care today  Please see above list of diagnoses and related plan for additional information  Condition at Discharge: stable     Discharge Day Visit / Exam:     Subjective:  Patient is sleeping comfortably in his bed  Vitals: Blood Pressure: (!) 79/49 (06/26/22 2157)  Pulse: 57 (06/26/22 2157)  Temperature: 98 5 °F (36 9 °C) (06/26/22 2157)  Temp Source: Axillary (06/26/22 2157)  Respirations: 20 (06/26/22 2157)  Height: 5' 8" (172 7 cm) (06/21/22 1225)  Weight - Scale: 104 kg (229 lb 11 5 oz) (06/26/22 0600)  SpO2: 91 % (06/27/22 0711)  Exam:   Physical Exam  Vitals and nursing note reviewed  Constitutional:       General: He is not in acute distress  Comments: Chronically ill and frail      HENT:      Head: Normocephalic and atraumatic  Right Ear: External ear normal       Left Ear: External ear normal       Nose: Nose normal       Mouth/Throat:      Mouth: Mucous membranes are moist       Pharynx: Oropharynx is clear  Eyes:      General:         Right eye: No discharge  Left eye: No discharge  Extraocular Movements: Extraocular movements intact  Pupils: Pupils are equal, round, and reactive to light  Cardiovascular:      Rate and Rhythm: Normal rate and regular rhythm  Pulses: Normal pulses  Heart sounds: Normal heart sounds  No murmur heard  Pulmonary:      Effort: Pulmonary effort is normal  No respiratory distress  Chest:      Chest wall: No tenderness  Musculoskeletal:         General: No swelling, tenderness or deformity  Normal range of motion  Cervical back: Normal range of motion and neck supple  No rigidity  Skin:     General: Skin is warm and dry  Capillary Refill: Capillary refill takes less than 2 seconds  Coloration: Skin is pale  Findings: No erythema  Neurological:      General: No focal deficit present  Mental Status: He is alert and oriented to person, place, and time  Mental status is at baseline  Comments: With some forgetfulness     Psychiatric:         Mood and Affect: Mood normal          Behavior: Behavior normal          Thought Content: Thought content normal          Discussion with Family:  DaughterEdi at bedside    Discharge instructions/Information to patient and family:   See after visit summary for information provided to patient and family  Provisions for Follow-Up Care:  See after visit summary for information related to follow-up care and any pertinent home health orders  Disposition:     Home  With hospice care     Planned Readmission:    No      Discharge Statement:  I spent 37 minutes discharging the patient  This time was spent on the day of discharge  I had direct contact with the patient on the day of discharge  Greater than 50% of the total time was spent examining patient, answering all patient questions, arranging and discussing plan of care with patient as well as directly providing post-discharge instructions  Additional time then spent on discharge activities  Discharge Medications:  See after visit summary for reconciled discharge medications provided to patient and family        ** Please Note: This note has been constructed using a voice recognition system **

## 2022-06-27 NOTE — ASSESSMENT & PLAN NOTE
· COVID-19 positive on 06/13/2022  Vaccinated x2 against COVID-19  · Patient completed 5 days of remdesivir on 06/18/2022  · Patient completed 10 days of Decadron on 06/23/2022  · Patient titrated down to 3 L of nasal cannula  · CT done on 06/19/2022 concerning for superimposed bacterial pneumonia  · Chest x-ray on 06/25/2022 with large right pneumothorax  · Patient completed 5 days of vancomycin, MRSA screen negative  Also completed 7 days of cefepime on 06/25/2022  · Vancomycin discontinued on 06/23/2022  · Patient was initially on heparin drip however discontinued due to hemoptysis    SubQ heparin initiated  · Cultures negative to date  · Supportive care

## 2022-06-27 NOTE — ASSESSMENT & PLAN NOTE
· Noted on chest x-ray from 06/25/2022  · At this time patient and family do not want any invasive procedures  · Patient is being discharged home with hospice care

## 2022-06-27 NOTE — CASE MANAGEMENT
Case Management Discharge Planning Note    Patient name Tona Flannery  Location /-54 MRN [id number]  : 1945 Date 2022       Current Admission Date: 2022  Current Admission Diagnosis:Pneumonia due to COVID-19 virus   Patient Active Problem List    Diagnosis Date Noted    Pneumothorax 2022    Goals of care, counseling/discussion 2022    Thrush 2022    Pneumonia due to COVID-19 virus 2022    Acute respiratory failure with hypoxia ([facility] ) 2022    Elevated d-dimer 2022    Pancreatic mass 2022    Primary osteoarthritis of both knees 2021    Hyperlipidemia 2021    Class 2 severe obesity due to excess calories with serious comorbidity and body mass index (BMI) of 39 0 to 39 9 in adult [facility]) 2021    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 ([facility] ) 2021    Secondary hyperparathyroidism of renal origin ([facility] ) 2021    Chronic renal failure 2021    Other fluid overload 2020    Abnormal nuclear stress test 2020    [facility]lobular emph[facility]sema [facility] ) 2020    Disorder of phosphorus metabolism, unspecified 2019    Essential hypertension 2019    Hemodialysis-associated hypotension 2019    AAA (abdominal aortic aneurysm) ([facility] ) 2019    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, initial encounter ([place] [address] ) 2019    Anemia in chronic kidney disease, on chronic dialysis ([facility] ) 2018    Benign prostatic hyperplasia without lower urinary tract symptoms 2018    Chronic gout, unspecified, with tophus (tophi) 2018    Iron deficiency anemia 2018    Kidney stone 2018    Magnesium deficiency 2018    Other disorders of electrolyte and fluid balance, not elsewhere classified 2018    Personal history of nicotine dependence 2018    Proteinuria, unspecified 2018    Unspecified protein-calorie malnutrition ([facility] ) 08/29/2018    Skin lesion of face 08/23/2018    Chronic systolic congestive heart failure ([facility] ) 06/01/2018    ASCVD (arteriosclerotic cardiovascular disease) 12/11/2017    Carotid stenosis, right 12/11/2017    Gastroesophageal reflux disease 12/11/2017    Vitamin D deficiency 12/11/2017    Osteoarthritis of both knees 09/02/2015      LOS (days): 13  Geometric Mean LOS (GMLOS) (days): 5 40  Days to GMLOS:-7 4     OBJECTIVE:  Risk of Unplanned Readmission Score: 22 31         Current admission status: Inpatient   Preferred Pharmacy:   [facility]/pharmacy #[id number]- [place] [address]  [address]  [address], [facility], [address]  Phone: [phone] Fax: [phone] [facility], [facility] [address]  [address] [address]  Phone: [phone] Fax: [phone] [facility] [address], [address] [facility] [address] [facility] [place] [address] [address] [address]  Phone: [phone] Fax: [phone]    Primary Care Provider: Jhon Gutierrez DO    Primary Insurance: Blanca Air[facility]  Secondary Insurance:     DISCHARGE DETAILS:    Discharge planning discussed with[de-identified] patient and daughter at [facility] bedside  Freedom of Choice: Yes  Comments - Freedom of Choice: pt was accepted by [address] contacted family/caregiver?: Yes  Were Treatment Team discharge recommendations reviewed with patient/caregiver?: Yes  Did patient/caregiver verbalize understanding of patient care needs?: Yes  Were patient/caregiver advised of the risks associated with not following Treatment Team discharge recommendations?: Yes    Contacts  Patient Contacts: Hailey Neal  Relationship to Patient[de-identified] Family (daughter)  Contact Method:  In Person  Reason/Outcome: Discharge [address]         Is the patient interested in [facility] at discharge?: No         Other Referral/Resources/Interventions Provided:  Interventions: Hospice  Referral Comments: pt to be transported home with [facility]  12pm transport  Daniel Vandana was sent for transport, Sahil Muir was made aware of 12pm transport as requested she stated the oxygen has been delivered    Would you like to participate in our [address] service program?  : No - Declined    Treatment Team Recommendation: Hospice (home with family and [facility]- 12:00 bls)  Discharge Destination Plan[de-identified] Hospice (home with family and [facility])         pt and family are in agreement with the d/c and d/c plan                             Family notified[de-identified] daughter was in the room

## 2022-06-27 NOTE — CASE MANAGEMENT
Case Management Discharge Planning Note    Patient name Evan Meyers  Location /-55 MRN [id number]  : 1945 Date 2022       Current Admission Date: 2022  Current Admission Diagnosis:Pneumonia due to COVID-19 virus   Patient Active Problem List    Diagnosis Date Noted    Pneumothorax 2022    Goals of care, counseling/discussion 2022    Thrush 2022    Pneumonia due to COVID-19 virus 2022    Acute respiratory failure with hypoxia ([facility] ) 2022    Elevated d-dimer 2022    Pancreatic mass 2022    Primary osteoarthritis of both knees 2021    Hyperlipidemia 2021    Class 2 severe obesity due to excess calories with serious comorbidity and body mass index (BMI) of 39 0 to 39 9 in adult [facility]) 2021    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 ([facility] ) 2021    Secondary hyperparathyroidism of renal origin ([facility] ) 2021    Chronic renal failure 2021    Other fluid overload 2020    Abnormal nuclear stress test 2020    Panlobular emphysema [facility] ) 2020    Disorder of phosphorus metabolism, unspecified 2019    Essential hypertension 2019    Hemodialysis-associated hypotension 2019    AAA (abdominal aortic aneurysm) [facility] ) 2019    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, initial encounter ([place] [address] ) 2019    Anemia in chronic kidney disease, on chronic dialysis ([facility] ) 2018    Benign prostatic hyperplasia without lower urinary tract symptoms 2018    Chronic gout, unspecified, with tophus (tophi) 2018    Iron deficiency anemia 2018    Kidney stone 2018    Magnesium deficiency 2018    Other disorders of electrolyte and fluid balance, not elsewhere classified 2018    Personal history of nicotine dependence 2018    Proteinuria, unspecified 2018    Unspecified protein-calorie malnutrition [facility] ) 08/29/2018    Skin lesion of face 08/23/2018    Chronic systolic congestive heart failure ([facility] ) 06/01/2018    ASCVD (arteriosclerotic cardiovascular disease) 12/11/2017    Carotid stenosis, right 12/11/2017    Gastroesophageal reflux disease 12/11/2017    Vitamin D deficiency 12/11/2017    Osteoarthritis of both knees 09/02/2015      LOS (days): 13  Geometric Mean LOS (GMLOS) (days): 5 40  Days to GMLOS:-7 5     OBJECTIVE:  Risk of Unplanned Readmission Score: 17 52         Current admission status: Inpatient   Preferred Pharmacy:   [facility]/pharmacy #[id number]- [address] - [address]  [address]  [address] [place] [address], [address] [facility]  Phone: [phone] Fax: [phone] [place] [address]  Phone: [phone] Fax: [phone] [facility] [address], [address] [facility] [address] [place] [address]  Phone: [phone] Fax: [phone]    Primary Care Provider: Bridgett Pérez DO    Primary Insurance: Latrice Fatima [facility] REP  Secondary Insurance:     DISCHARGE DETAILS:     auth# for transport [id number]   Transport team was made aware

## 2022-06-28 NOTE — UTILIZATION REVIEW
Notification of Discharge   This is a Notification of Discharge from our facility [address]  Please be advised that this patient has been discharge from our facility  Below you will find the admission and discharge date and time including the patients disposition  UTILIZATION REVIEW CONTACT:  Kristofer Mascorro  Utilization   Network Utilization Review Department  Phone: [phone] carefully listen to the prompts  All voicemails are confidential   Email: [email or web address]     PHYSICIAN ADVISORY SERVICES:  FOR [id number] REVIEW - MEDICAL NECESSITY DENIAL  Phone: [phone]  Fax: [phone]  Email: [email or web address]     PRESENTATION DATE: 6/14/2022  1:53 PM  OBERVATION ADMISSION DATE:  INPATIENT ADMISSION DATE: 6/14/22  3:18 PM   DISCHARGE DATE: 6/27/2022 12:27 PM  DISPOSITION: Home with [address] with Hospice Care      IMPORTANT INFORMATION:  Send all requests for admission clinical reviews, approved or denied determinations and any other requests to dedicated fax number below belonging to the campus where the patient is receiving treatment   List of dedicated fax numbers:  [address] DENIALS (Administrative/Medical Necessity) [phone]   [address] (Maternity/NICU/Pediatrics) [phone]   [place] Needs [phone]   130 [facility] [phone]   [address] [place] [address] [phone]   [address] [phone]   [facility]  [phone]   [address] [place] [address], [address]  [address] [phone]

## 2022-06-29 ENCOUNTER — HOME CARE VISIT (OUTPATIENT)
Dept: HOME HOSPICE | Facility: HOSPICE | Age: 77
End: 2022-06-29
Payer: MEDICARE

## 2022-06-29 NOTE — HOSPICE
[facility] pt noted to have   Unresponsive to verbal stimuli  Absent heartbeat and respirations  Pt pronounced at 4:23p  Pts dtrs Pb Rodriguez and David notified  Wife Nicole Lee notified that her spouse had   Family supportive  Huchino  Tears  Sharing memories of pt Sohail Blakely and his life as  and parent  family gratefulthat thy could spend time in the hospital with him and fulfill his wished to come home to die withhis family at his home  PT wife and dtrs request [place] Finklea the  to visit  [facility] [place] and shared prayes and memories with family  Call received from Venancio Mcclure with [facility] that transprt enroute to take  into their care

## 2024-05-09 ENCOUNTER — DOCUMENTATION (OUTPATIENT)
Dept: HOME HEALTH SERVICES | Facility: HOME HEALTHCARE | Age: 79
End: 2024-05-09

## 2024-05-09 NOTE — PROGRESS NOTES
"22-22 Vlad Jerry Bereavement Final IDG [id number] (   Primary: Humza and Tabitha Jerry is a 77 year old man residing with his wife Yennifer in [place].   He and Yennifer have been  58 years, anniversary was . ?They also had a son MARYCRUZ who  by suicide at age 31. Tabitha lives?in [place] near [facility] and Humza lives in [facility]. ?They have been taking turns staying with their parents.   Vlad served in the [employer] in [place] during the [place] era, then worked in the [employer] industry.   Vlad at one time identified with the [employer] lilia but has not been active. ?His main interest is taking care of the family. ?His dtrs describe him as the one who always said \"What do you need and how can I help\". ?      TOD: Pts dtrs Tabitha and Humza notified. Wife Yennifer notified that her spouse had . Family supportive.Hugs. Tears. Sharing memories of pt Vlad and his life as  and parent. family gratefulthat thy could spend time in the hospital with him and fulfill his wished to come home to die withhis family at his home. PT wife and dtrs request Laci Obregon the  to visit. Laci arrived and shared prayes and memories with family.      C/C:  MSW Yary Fong made pc to and spoke with Tabitha...expressed condolences which she accepted. ?She stated that the family is all exhausted, they have been blindsighted ?by all that has been hapening and in shock. ?She reported no needs at the time of the call. ?MSW requested that she send addresses for her and Humza and she agreed. ?MSW encouraged her to call with needs and she agreed.   ?   Spouse: Yennifer Jerry  LR- passed away this morning    DTR: Humza Cruz SRA LR-mr   DTR: Tabitha Aj SRA LR-mr    MSW Yary Fong or  to follow Yennifer, Humza and Tabitha on a LR POC in 4-6 weeks.       LATE ENTRY ADD TO CHART   Note added to chart by Nika Gonsales   "

## (undated) DEVICE — STERILE POLYISOPRENE POWDER-FREE SURGICAL GLOVES: Brand: PROTEXIS

## (undated) DEVICE — 3 TO 1 DERMACARRIER: Brand: MESHGRAFTTM II TISSUE EXPANSION SYSTEM

## (undated) DEVICE — SPONGE LAP 18 X 18 IN STRL RFD

## (undated) DEVICE — SUT SILK 2-0 FS 18 IN 685G

## (undated) DEVICE — SUT VICRYL 3-0 PS-2 18 IN J497G

## (undated) DEVICE — INTENDED FOR TISSUE SEPARATION, AND OTHER PROCEDURES THAT REQUIRE A SHARP SURGICAL BLADE TO PUNCTURE OR CUT.: Brand: BARD-PARKER SAFETY BLADES SIZE 15, STERILE

## (undated) DEVICE — SYRINGE 30ML LL

## (undated) DEVICE — NEEDLE 25G X 1 1/2

## (undated) DEVICE — SKIN MARKER DUAL TIP WITH RULER CAP, FLEXIBLE RULER AND LABELS: Brand: DEVON

## (undated) DEVICE — BASIC PACK: Brand: CONVERTORS

## (undated) DEVICE — TUBING SUCTION 5MM X 12 FT

## (undated) DEVICE — DRESSING XEROFORM 5 X 9

## (undated) DEVICE — DERMACARRIERS II RATIO 1.5:1  SKIN GRAFT CARRIER

## (undated) DEVICE — LAMINECTOMY ARM CRADLE FOAM POSITIONER: Brand: CARDINAL HEALTH

## (undated) DEVICE — 4-PORT MANIFOLD: Brand: NEPTUNE 2

## (undated) DEVICE — ABDOMINAL PAD: Brand: DERMACEA

## (undated) DEVICE — TIBURON SPLIT SHEET: Brand: CONVERTORS

## (undated) DEVICE — ASTOUND STANDARD SURGICAL GOWN, XL: Brand: CONVERTORS

## (undated) DEVICE — COTTON TIP APPLICTOR 2 PK

## (undated) DEVICE — NEEDLE BLUNT 18 G X 1 1/2IN

## (undated) DEVICE — GAUZE SPONGES,16 PLY: Brand: CURITY

## (undated) DEVICE — SUT ETHILON 4-0 PS-2 18 IN 1667H

## (undated) DEVICE — PROXIMATE SKIN STAPLERS (35 WIDE) CONTAINS 35 STAINLESS STEEL STAPLES (FIXED HEAD): Brand: PROXIMATE

## (undated) DEVICE — ELECTRODE NEEDLE MOD E-Z CLEAN 2.75IN 7CM -0013M

## (undated) DEVICE — BULB SYRINGE,IRRIGATION WITH PROTECTIVE CAP: Brand: DOVER

## (undated) DEVICE — CABLE BIPOLAR DISP MEGADYNE

## (undated) DEVICE — 1820 FOAM BLOCK NEEDLE COUNTER: Brand: DEVON

## (undated) DEVICE — SYRINGE 10ML LL CONTROL TOP

## (undated) DEVICE — STANDARD SURGICAL GOWN, L: Brand: CONVERTORS

## (undated) DEVICE — SPONGE 4 X 4 XRAY 16 PLY STRL LF RFD

## (undated) DEVICE — BLADE ELECTRO MODEL B DISP